# Patient Record
Sex: FEMALE | Race: WHITE | NOT HISPANIC OR LATINO | Employment: FULL TIME | URBAN - METROPOLITAN AREA
[De-identification: names, ages, dates, MRNs, and addresses within clinical notes are randomized per-mention and may not be internally consistent; named-entity substitution may affect disease eponyms.]

---

## 2017-10-27 ENCOUNTER — OFFICE VISIT (OUTPATIENT)
Dept: FAMILY MEDICINE | Facility: CLINIC | Age: 52
End: 2017-10-27
Payer: COMMERCIAL

## 2017-10-27 ENCOUNTER — TELEPHONE (OUTPATIENT)
Dept: FAMILY MEDICINE | Facility: CLINIC | Age: 52
End: 2017-10-27

## 2017-10-27 VITALS
DIASTOLIC BLOOD PRESSURE: 100 MMHG | BODY MASS INDEX: 27.8 KG/M2 | WEIGHT: 173 LBS | TEMPERATURE: 97.7 F | OXYGEN SATURATION: 99 % | HEIGHT: 66 IN | SYSTOLIC BLOOD PRESSURE: 160 MMHG | HEART RATE: 69 BPM

## 2017-10-27 DIAGNOSIS — M31.30 WEGENER'S GRANULOMATOSIS: Primary | ICD-10-CM

## 2017-10-27 DIAGNOSIS — Z12.31 ENCOUNTER FOR SCREENING MAMMOGRAM FOR BREAST CANCER: ICD-10-CM

## 2017-10-27 DIAGNOSIS — R73.9 HYPERGLYCEMIA: ICD-10-CM

## 2017-10-27 DIAGNOSIS — Z72.0 TOBACCO ABUSE: ICD-10-CM

## 2017-10-27 DIAGNOSIS — I10 HYPERTENSION GOAL BP (BLOOD PRESSURE) < 140/90: ICD-10-CM

## 2017-10-27 LAB
ALBUMIN SERPL-MCNC: 4 G/DL (ref 3.4–5)
ANION GAP SERPL CALCULATED.3IONS-SCNC: 7 MMOL/L (ref 3–14)
BUN SERPL-MCNC: 33 MG/DL (ref 7–30)
CALCIUM SERPL-MCNC: 9 MG/DL (ref 8.5–10.1)
CHLORIDE SERPL-SCNC: 105 MMOL/L (ref 94–109)
CO2 SERPL-SCNC: 28 MMOL/L (ref 20–32)
CREAT SERPL-MCNC: 1.42 MG/DL (ref 0.52–1.04)
GFR SERPL CREATININE-BSD FRML MDRD: 39 ML/MIN/1.7M2
GLUCOSE SERPL-MCNC: 103 MG/DL (ref 70–99)
HBA1C MFR BLD: 5.8 % (ref 4.3–6)
PHOSPHATE SERPL-MCNC: 3.8 MG/DL (ref 2.5–4.5)
POTASSIUM SERPL-SCNC: 3.7 MMOL/L (ref 3.4–5.3)
SODIUM SERPL-SCNC: 140 MMOL/L (ref 133–144)

## 2017-10-27 PROCEDURE — 36415 COLL VENOUS BLD VENIPUNCTURE: CPT | Performed by: FAMILY MEDICINE

## 2017-10-27 PROCEDURE — 99214 OFFICE O/P EST MOD 30 MIN: CPT | Performed by: FAMILY MEDICINE

## 2017-10-27 PROCEDURE — 80069 RENAL FUNCTION PANEL: CPT | Performed by: FAMILY MEDICINE

## 2017-10-27 PROCEDURE — 83036 HEMOGLOBIN GLYCOSYLATED A1C: CPT | Performed by: FAMILY MEDICINE

## 2017-10-27 RX ORDER — LOSARTAN POTASSIUM 50 MG/1
50 TABLET ORAL DAILY
Qty: 30 TABLET | Refills: 3 | Status: SHIPPED | OUTPATIENT
Start: 2017-10-27 | End: 2020-02-28

## 2017-10-27 NOTE — MR AVS SNAPSHOT
After Visit Summary   10/27/2017    Flavia Brice    MRN: [id number]           Patient Information     Date Of Birth          1965        Visit Information        Provider Department      10/27/2017 2:40 PM Jimenez Gonzalez MD [facility]        Today's Diagnoses     Wegener's granulomatosis (H)    -  1    Hypertension goal BP (blood pressure) < 140/90        Tobacco abuse        Hyperglycemia        Encounter for screening mammogram for breast cancer           Follow-ups after your visit        Additional Services     RHEUMATOLOGY REFERRAL       Your provider has referred you to: [facility]: [facility] Baldev Bowles [phone]   [email or web address]    Please be aware that coverage of these services is subject to the terms and limitations of your health insurance plan.  Call member services at your health plan with any benefit or coverage questions.      Please bring the following with you to your appointment:    (1) Any X-Rays, CTs or MRIs which have been performed.  Contact the facility where they were done to arrange for  prior to your scheduled appointment.    (2) List of current medications   (3) This referral request   (4) Any documents/labs given to you for this referral                  Future tests that were ordered for you today     Open Future Orders        Priority Expected Expires Ordered    *MA Screening Digital Bilateral Routine  10/27/2018 10/27/2017            Who to contact     If you have questions or need follow up information about today's clinic visit or your schedule please contact [facility] directly at [phone].  Normal or non-critical lab and imaging results will be communicated to you by MyChart, letter or phone within 4 business days after the clinic has received the results. If you do not hear from us within 7 days, please contact the clinic through MyChart or phone. If you have a critical or  "abnormal lab result, we will notify you by phone as soon as possible.  Submit refill requests through [employer] or call your pharmacy and they will forward the refill request to us. Please allow 3 business days for your refill to be completed.          Additional Information About Your Visit        [employer] Information     [employer] gives you secure access to your electronic health record. If you see a primary care provider, you can also send messages to your care team and make appointments. If you have questions, please call your primary care clinic.  If you do not have a primary care provider, please call [phone] and they will assist you.        Care EveryWhere ID     This is your Care EveryWhere ID. This could be used by other organizations to access your [place] medical records  [id number]        Your Vitals Were     Pulse Temperature Height Pulse Oximetry BMI (Body Mass Index)       69 97.7  F (36.5  C) (Oral) 5' 6\" (1.676 m) 99% 27.92 kg/m2        Blood Pressure from Last 3 Encounters:   10/27/17 (!) 160/100   02/27/14 132/83    Weight from Last 3 Encounters:   10/27/17 173 lb (78.5 kg)   02/27/14 182 lb (82.6 kg)              We Performed the Following     Hemoglobin A1c     Renal panel (Alb, BUN, Ca, Cl, CO2, Creat, Gluc, Phos, K, Na)     RHEUMATOLOGY REFERRAL          Today's Medication Changes          These changes are accurate as of: 10/27/17  3:27 PM.  If you have any questions, ask your nurse or doctor.               Start taking these medicines.        Dose/Directions    losartan 50 MG tablet   Commonly known as:  COZAAR   Used for:  Hypertension goal BP (blood pressure) < 140/90   Started by:  Jimenez Gonzalez MD        Dose:  50 mg   Take 1 tablet (50 mg) by mouth daily For blood pressure take with breakfast   Quantity:  30 tablet   Refills:  3            Where to get your medicines      These medications were sent to [facility] - [place] [facility] [address] [facility]  [address] [facility] [address]     Phone:  [phone]     losartan 50 MG tablet                Primary Care Provider Office Phone # Fax #    Brandyn Licea [phone] [address] [facility] [address]        Equal Access to Services     SHERIDAN KUHN : Hadii aad ku hadasho Soomaali, waaxda luqadaha, qaybta kaalmada adeegyada, waxay idiin hayaan adeeg khfelisaalbertina lamegan viveros. So [facility] [phone].    ATENCIÓN: Si habla español, tiene a thompson disposición servicios gratuitos de asistencia lingüística. Louisame al [phone].    We comply with applicable federal civil rights laws and [place] laws. We do not discriminate on the basis of race, color, national origin, age, disability, sex, sexual orientation, or gender identity.            Thank you!     Thank you for choosing [facility]  for your care. Our goal is always to provide you with excellent care. Hearing back from our patients is one way we can continue to improve our services. Please take a few minutes to complete the written survey that you may receive in the mail after your visit with us. Thank you!             Your Updated Medication List - Protect others around you: Learn how to safely use, store and throw away your medicines at www.disposemymeds.org.          This list is accurate as of: 10/27/17  3:27 PM.  Always use your most recent med list.                   Brand Name Dispense Instructions for use Diagnosis    losartan 50 MG tablet    COZAAR    30 tablet    Take 1 tablet (50 mg) by mouth daily For blood pressure take with breakfast    Hypertension goal BP (blood pressure) < 140/90       PREDNISONE PO      Take 2.5 mg by mouth daily

## 2017-10-27 NOTE — NURSING NOTE
"Chief Complaint   Patient presents with     Hypertension       Initial BP (!) 160/100  Pulse 69  Temp 97.7  F (36.5  C) (Oral)  Ht 5' 6\" (1.676 m)  Wt 173 lb (78.5 kg)  SpO2 99%  BMI 27.92 kg/m2 Estimated body mass index is 27.92 kg/(m^2) as calculated from the following:    Height as of this encounter: 5' 6\" (1.676 m).    Weight as of this encounter: 173 lb (78.5 kg).  Medication Reconciliation: complete  Penelope Hall CMA    "

## 2017-10-27 NOTE — LETTER
October 30, 2017    Flavia Brice  [address] [facility] [address]        Dear Flavia,    Generally normal results except kidney tests slightly worse. Blood sugars are NOT in diabetic range. Return to clinic for repeat blood pressure and kidney recheck in 3-4 weeks. Drink more water.    If you have any questions or concerns, please call myself or my nurse at [phone].    Sincerely,    Jimenez Gonzalez MD/nicanor      Results for orders placed or performed in visit on 10/27/17   Renal panel (Alb, BUN, Ca, Cl, CO2, Creat, Gluc, Phos, K, Na)   Result Value Ref Range    Sodium 140 133 - 144 mmol/L    Potassium 3.7 3.4 - 5.3 mmol/L    Chloride 105 94 - 109 mmol/L    Carbon Dioxide 28 20 - 32 mmol/L    Anion Gap 7 3 - 14 mmol/L    Glucose 103 (H) 70 - 99 mg/dL    Urea Nitrogen 33 (H) 7 - 30 mg/dL    Creatinine 1.42 (H) 0.52 - 1.04 mg/dL    GFR Estimate 39 (L) >60 mL/min/1.7m2    GFR Estimate If Black 47 (L) >60 mL/min/1.7m2    Calcium 9.0 8.5 - 10.1 mg/dL    Phosphorus 3.8 2.5 - 4.5 mg/dL    Albumin 4.0 3.4 - 5.0 g/dL   Hemoglobin A1c   Result Value Ref Range    Hemoglobin A1C 5.8 4.3 - 6.0 %

## 2017-10-27 NOTE — TELEPHONE ENCOUNTER
Flavia Brice is a 52 year old female who calls with elevated bp.    NURSING ASSESSMENT:  Description:  Pt states last time she was at dentist it was for infection and 4 teeth had to be pulled, her bp was elevated at that appointment.  Today she went back and bp was 166/102 on one wrist and 167/123 on other wrist.  Pt is smoker.  Pt had had some odom's recently but not today.  Pt denies any odom, chest pain,  dizziness, trouble breathing, light headedness or other sx.  She feels fine. Pt thinks she may have been anxious at dentist today.  Allergies: No Known Allergies      NURSING PLAN: Nursing advice to patient appt today, appt made.  Advised if any sx start needs to go to ER. Advised to not drink caffeine or smoke before ov.  Advised to do relaxing things this afternoon.      RECOMMENDED DISPOSITION:  See in 24 hours   Will comply with recommendation: Yes  If further questions/concerns or if symptoms do not improve, worsen or new symptoms develop, call your PCP or [facility] as soon as possible.      Guideline used:  Telephone Triage Protocols for Nurses, pp. 347-349 Fifth Edition, Eva Atkins RN

## 2017-10-27 NOTE — PROGRESS NOTES
"SUBJECTIVE:  Flavia Brice, a 52 year old female scheduled an appointment to discuss the following issues:  Blood pressure elevated.  Mom possible htn too.   chantix in past not helpful. No wellbutrin in past.   Smoker 1ppd.  No family history mi/cva. Parents alive.  No chest pain or shortness of breath. Good exercise tolerance. History pneumonia x1 in past - albuterol x1.  On prednisone for years for rea's granuloma.   Likes sodium but not added. Exercise - active and lots at work.   Stress from work.   Heart testing about 5 years ago.   Not seen rheumatology 2014.   No past medical history on file.    Past Surgical History:   Procedure Laterality Date     GALLBLADDER SURGERY         Family History   Problem Relation Age of Onset     DIABETES Father      HEART DISEASE Maternal Grandmother      HEART DISEASE Maternal Grandfather      Arthritis Paternal Grandmother      HEART DISEASE Paternal Grandfather        Social History   Substance Use Topics     Smoking status: Current Every Day Smoker     Smokeless tobacco: Never Used     Alcohol use Yes      Comment: monthly       ROS:  All other ROS negative  OBJECTIVE:  BP (!) 160/100  Pulse 69  Temp 97.7  F (36.5  C) (Oral)  Ht 5' 6\" (1.676 m)  Wt 173 lb (78.5 kg)  SpO2 99%  BMI 27.92 kg/m2  EXAM:  GENERAL APPEARANCE: healthy, alert and no distress  EYES: EOMI,  PERRL  HENT: ear canals and TM's normal and nose and mouth without ulcers or lesions  NECK: no adenopathy, no asymmetry, masses, or scars and thyroid normal to palpation  RESP: lungs clear to auscultation - no rales, rhonchi or wheezes  CV: regular rates and rhythm, normal S1 S2, no S3 or S4 and no murmur, click or rub -  ABDOMEN:  soft, nontender, no HSM or masses and bowel sounds normal  MS: extremities normal- no gross deformities noted, no evidence of inflammation in joints, FROM in all extremities.  NEURO: Normal strength and tone, sensory exam grossly normal, mentation intact and speech " normal  PSYCH: mentation appears normal and affect normal/bright  PSYCH: mildly anxious    ASSESSMENT / PLAN:  (M31.30) Wegener's granulomatosis (H)  (primary encounter diagnosis)  Comment: on prednisone past 2 weeks. Not seen rheum in years  Plan: RHEUMATOLOGY REFERRAL        Follow-up rheum.     (I10) Hypertension goal BP (blood pressure) < 140/90  Comment: needs help  Plan: Renal panel (Alb, BUN, Ca, Cl, CO2, Creat,         Gluc, Phos, K, Na), losartan (COZAAR) 50 MG         tablet        Reveiwed risks and side effects of medication  Chest pain or shortness of breath to er. Quit smoking. Return to clinic 3-4 weeks. Exercise and limit sodium. Lipids ok in past  Call/email with questions/concerns.    (Z72.0) Tobacco abuse  Comment: needs help. Failed chantix  Plan: consider wellbutrin but blood pressure needs addressing.     (R73.9) Hyperglycemia  Comment: not bad on prednisone  Plan: Hemoglobin A1c        Lower simple carbs    (Z12.31) Encounter for screening mammogram for breast cancer  Comment: overdue  Plan: *MA Screening Digital Bilateral        Pap due also.     Jimenez Gonzalez

## 2017-11-19 ENCOUNTER — HEALTH MAINTENANCE LETTER (OUTPATIENT)
Age: 52
End: 2017-11-19

## 2017-12-01 ENCOUNTER — OFFICE VISIT (OUTPATIENT)
Dept: URGENT CARE | Facility: URGENT CARE | Age: 52
End: 2017-12-01
Payer: COMMERCIAL

## 2017-12-01 ENCOUNTER — RADIANT APPOINTMENT (OUTPATIENT)
Dept: GENERAL RADIOLOGY | Facility: CLINIC | Age: 52
End: 2017-12-01
Attending: FAMILY MEDICINE
Payer: COMMERCIAL

## 2017-12-01 ENCOUNTER — TELEPHONE (OUTPATIENT)
Dept: FAMILY MEDICINE | Facility: CLINIC | Age: 52
End: 2017-12-01

## 2017-12-01 VITALS
WEIGHT: 174.2 LBS | DIASTOLIC BLOOD PRESSURE: 93 MMHG | HEART RATE: 71 BPM | SYSTOLIC BLOOD PRESSURE: 151 MMHG | OXYGEN SATURATION: 98 % | BODY MASS INDEX: 28.12 KG/M2 | TEMPERATURE: 97.2 F

## 2017-12-01 DIAGNOSIS — Z72.0 TOBACCO ABUSE: ICD-10-CM

## 2017-12-01 DIAGNOSIS — R05.9 COUGH: ICD-10-CM

## 2017-12-01 DIAGNOSIS — J20.9 ACUTE BRONCHITIS WITH SYMPTOMS > 10 DAYS: Primary | ICD-10-CM

## 2017-12-01 PROCEDURE — 99213 OFFICE O/P EST LOW 20 MIN: CPT | Performed by: FAMILY MEDICINE

## 2017-12-01 PROCEDURE — 71020 XR CHEST 2 VW: CPT

## 2017-12-01 RX ORDER — ALBUTEROL SULFATE 90 UG/1
2 AEROSOL, METERED RESPIRATORY (INHALATION) EVERY 6 HOURS PRN
Qty: 1 INHALER | Refills: 1 | Status: SHIPPED | OUTPATIENT
Start: 2017-12-01 | End: 2018-03-26

## 2017-12-01 RX ORDER — AZITHROMYCIN 250 MG/1
TABLET, FILM COATED ORAL
Qty: 6 TABLET | Refills: 0 | Status: SHIPPED | OUTPATIENT
Start: 2017-12-01 | End: 2018-03-26

## 2017-12-01 NOTE — MR AVS SNAPSHOT
After Visit Summary   12/1/2017    Flavia Brcie    MRN: [id number]           Patient Information     Date Of Birth          1965        Visit Information        Provider Department      12/1/2017 5:00 PM Pipo Angel MD [facility]        Today's Diagnoses     Acute bronchitis with symptoms > 10 days    -  1    Cough        Tobacco abuse          Care Instructions      Acute Bronchitis  Your healthcare provider has told you that you have acute bronchitis. Bronchitis is infection or inflammation of the bronchial tubes (airways in the lungs). Normally, air moves easily in and out of the airways. Bronchitis narrows the airways, making it harder for air to flow in and out of the lungs. This causes symptoms such as shortness of breath, coughing up yellow or green mucus, and wheezing. Bronchitis can be acute or chronic. Acute means the condition comes on quickly and goes away in a short time, usually within 3 to 10 days. Chronic means a condition lasts a long time and often comes back.    What causes acute bronchitis?  Acute bronchitis almost always starts as a viral respiratory infection, such as a cold or the flu. Certain factors make it more likely for a cold or flu to turn into bronchitis. These include being very young, being elderly, having a heart or lung problem, or having a weak immune system. Cigarette smoking also makes bronchitis more likely.  When bronchitis develops, the airways become swollen. The airways may also become infected with bacteria. This is known as a secondary infection.  Diagnosing acute bronchitis  Your healthcare provider will examine you and ask about your symptoms and health history. You may also have a sputum culture to test the fluid in your lungs. Chest X-rays may be done to look for infection in the lungs.  Treating acute bronchitis  Bronchitis usually clears up as the cold or flu goes away. You can help feel better faster by doing the  following:    Take medicine as directed. You may be told to take ibuprofen or other over-the-counter medicines. These help relieve inflammation in your bronchial tubes. Your healthcare provider may prescribe an inhaler to help open up the bronchial tubes. Most of the time, acute bronchitis is caused by a viral infection. Antibiotics are usually not prescribed for viral infections.    Drink plenty of fluids, such as water, juice, or warm soup. Fluids loosen mucus so that you can cough it up. This helps you breathe more easily. Fluids also prevent dehydration.    Make sure you get plenty of rest.    Do not smoke. Do not allow anyone else to smoke in your home.  Recovery and follow-up  Follow up with your doctor as you are told. You will likely feel better in a week or two. But a dry cough can linger beyond that time. Let your doctor know if you still have symptoms (other than a dry cough) after 2 weeks, or if you re prone to getting bronchial infections. Take steps to protect yourself from future infections. These steps include stopping smoking and avoiding tobacco smoke, washing your hands often, and getting a yearly flu shot.  When to call your healthcare provider  Call the healthcare provider if you have any of the following:    Fever of 100.4 F (38.0 C) or higher, or as advised    Symptoms that get worse, or new symptoms    Trouble breathing    Symptoms that don t start to improve within a week, or within 3 days of taking antibiotics   Date Last Reviewed: 12/1/2016 2000-2017 [employer] [address] [place] [address], [place] [address]. All rights reserved. This information is not intended as a substitute for professional medical care. Always follow your healthcare professional's instructions.        Patient Education    Azithromycin Ophthalmic drops, solution    Azithromycin Oral suspension    Azithromycin Oral suspension, extended release    Azithromycin Oral tablet    Azithromycin Solution for  injection  Azithromycin Oral tablet  What is this medicine?  AZITHROMYCIN (az ith will MYE sin) is a macrolide antibiotic. It is used to treat or prevent certain kinds of bacterial infections. It will not work for colds, flu, or other viral infections.  This medicine may be used for other purposes; ask your health care provider or pharmacist if you have questions.  What should I tell my health care provider before I take this medicine?  They need to know if you have any of these conditions:    kidney disease    liver disease    irregular heartbeat or heart disease    an unusual or allergic reaction to azithromycin, erythromycin, other macrolide antibiotics, foods, dyes, or preservatives    pregnant or trying to get pregnant    breast-feeding  How should I use this medicine?  Take this medicine by mouth with a full glass of water. Follow the directions on the prescription label. The tablets can be taken with food or on an empty stomach. If the medicine upsets your stomach, take it with food. Take your medicine at regular intervals. Do not take your medicine more often than directed. Take all of your medicine as directed even if you think your are better. Do not skip doses or stop your medicine early.  Talk to your pediatrician regarding the use of this medicine in children. Special care may be needed.  Overdosage: If you think you have taken too much of this medicine contact a poison control center or emergency room at once.  NOTE: This medicine is only for you. Do not share this medicine with others.  What if I miss a dose?  If you miss a dose, take it as soon as you can. If it is almost time for your next dose, take only that dose. Do not take double or extra doses.  What may interact with this medicine?  Do not take this medicine with any of the following medications:    lincomycin  This medicine may also interact with the following medications:    amiodarone    antacids    birth control  pills    cyclosporine    digoxin    magnesium    nelfinavir    phenytoin    warfarin  This list may not describe all possible interactions. Give your health care provider a list of all the medicines, herbs, non-prescription drugs, or dietary supplements you use. Also tell them if you smoke, drink alcohol, or use illegal drugs. Some items may interact with your medicine.  What should I watch for while using this medicine?  Tell your doctor or health care professional if your symptoms do not improve.  Do not treat diarrhea with over the counter products. Contact your doctor if you have diarrhea that lasts more than 2 days or if it is severe and watery.  This medicine can make you more sensitive to the sun. Keep out of the sun. If you cannot avoid being in the sun, wear protective clothing and use sunscreen. Do not use sun lamps or tanning beds/booths.  What side effects may I notice from receiving this medicine?  Side effects that you should report to your doctor or health care professional as soon as possible:    allergic reactions like skin rash, itching or hives, swelling of the face, lips, or tongue    confusion, nightmares or hallucinations    dark urine    difficulty breathing    hearing loss    irregular heartbeat or chest pain    pain or difficulty passing urine    redness, blistering, peeling or loosening of the skin, including inside the mouth    white patches or sores in the mouth    yellowing of the eyes or skin  Side effects that usually do not require medical attention (report to your doctor or health care professional if they continue or are bothersome):    diarrhea    dizziness, drowsiness    headache    stomach upset or vomiting    tooth discoloration    vaginal irritation  This list may not describe all possible side effects. Call your doctor for medical advice about side effects. You may report side effects to FDA at 2-106-FDA-7358.  Where should I keep my medicine?  Keep out of the reach of  children.  Store at room temperature between 15 and 30 degrees C (59 and 86 degrees F). Throw away any unused medicine after the expiration date.  NOTE:This sheet is a summary. It may not cover all possible information. If you have questions about this medicine, talk to your doctor, pharmacist, or health care provider. Copyright  2016 [employer]      Proair HFA Inhaler  Medicine: Albuterol (al-BYOO-ter-ahl)  What it does  Works quickly to relax your airways and make breathing easier. The medicine usually lasts 3 to 4 hours. Use when you need fast relief.  Test spray (priming)  Prime inhaler before first use or if not used for 2 weeks or more. Shake the inhaler and spray 3 times away from face.  How to use this inhaler  1. Wash and dry your hands well.  2. Remove the mouthpiece cover. Check for loose parts inside the mouthpiece.  3. Sit up straight or stand up.  4. Shake inhaler well before each spray.  5. Fully exhale (breathe out) through mouth. Hold the inhaler so the mouthpiece is at the bottom and the canister is sticking straight up.  6. Place the mouthpiece between your lips. Make sure that your tongue is flat under the mouthpiece and does not block the opening.  7. Seal your lips around the mouthpiece.  8. Push the canister all the way down as you slowly take a deep breath through your mouth for 5 seconds.    9. Hold your breath for 10 seconds. If you cannot hold your breath for 10 seconds, then hold it for as long as you can.  10. If you need another dose, wait one minute and repeat steps 4 to 9.  11. Replace the cover after each use. Store in a cool, dry place.  Cleaning  Wash one time each week. Remove canister and wash mouthpiece with warm water. Do not get the canister wet. Let air-dry overnight. Put inhaler back together and spray two times.  If not cleaned, the inhaler may not work.  How to tell if the inhaler is empty  Each inhaler contains 200 puffs. The inhaler is empty when the dose counter  reads 0. The numbers will turn red when you have 20 doses left to remind you to get a refill.  If you have questions about the use of your inhaler, please ask your pharmacist or provider.  For more information and video demos, go to [email or web address].  For informational purposes only. Not to replace the advice of your health care provider.  Copyright   2013 [facility]. All rights reserved. [employer] [id number] - REV 02/16.    How to Quit Smoking  Smoking is one of the hardest habits to break. About half of all people who have ever smoked have been able to quit. Most people who still smoke want to quit. Here are some of the best ways to stop smoking.    Keep trying  Most smokers make many attempts at quitting before they are successful. It s important not to give up.  Go cold turkey  Most former smokers quit cold turkey (all at once). Trying to cut back gradually doesn't seem to work as well, perhaps because it continues the smoking habit. Also, it is possible to inhale more while smoking fewer cigarettes. This results in the same amount of nicotine in your body.  Get support  Support programs can be a big help, especially for heavy smokers. These groups offer lectures, ways to change behavior, and peer support. Here are some ways to find a support program:    Free national quitline: 800-QUIT-NOW (896-542-5868).    [facility] quit-smoking programs.    American Lung Association: (938.591.7421).    American Cancer Society (004-243-6398).  Support at home is important too. Nonsmokers can offer praise and encouragement. If the smoker in your life finds it hard to quit, encourage them to keep trying.  Over-the-counter medicines  Nicotine replacement therapy may make quitting easier. Certain aids, such as the nicotine patch, gum, and lozenges, are available without a prescription. It is best to use these under a doctor s care, though. The skin patch provides a steady supply of nicotine. Nicotine gum and  "lozenges give temporary bursts of low levels of nicotine. Both methods reduce the craving for cigarettes. Warning: If you have nausea, vomiting, dizziness, weakness, or a fast heartbeat, stop using these products and see your doctor.  Prescription medicines  After reviewing your smoking patterns and past attempts to quit, your doctor may offer a prescription medicine such as bupropion, varenicline, a nicotine inhaler, or nasal spray. Each has advantages and side effects. Your doctor can review these with you.  Health benefits of quitting  The benefits of quitting start right away and keep improving the longer you go without smoking. These benefits occur at any age.  So whether you are 17 or 70, quitting is a good decision. Some of the benefits include:    20 minutes: Blood pressure and pulse return to normal.    8 hours: Oxygen levels return to normal.    2 days: Ability to smell and taste begin to improve as damaged nerves regrow.    2 to 3 weeks: Circulation and lung function improve.    1 to 9 months: Coughing, congestion, and shortness of breath decrease; tiredness decreases.    1 year: Risk of heart attack decreases by half.    5 years: Risk of lung cancer decreases by half; risk of stroke becomes the same as a nonsmoker s.  For more on how to quit smoking, try these online resources:     Smokefree.gov    \"Clearing the Air\" booklet from the National Cancer [place]: smokefree.gov/sites/default/files/pdf/clearing-the-air-accessible.pdf  Date Last Reviewed: 3/1/2017    1883-1826 [employer]. [address] [place] [address]. All rights reserved. This information is not intended as a substitute for professional medical care. Always follow your healthcare professional's instructions.                Follow-ups after your visit        Who to contact     If you have questions or need follow up information about today's clinic visit or your schedule please contact [facility] directly at [phone].  Normal or non-critical lab and imaging results will be communicated to you by [employer], letter or phone within 4 business days after the clinic has received the results. If you do not hear from us within 7 days, please contact the clinic through [employer] or phone. If you have a critical or abnormal lab result, we will notify you by phone as soon as possible.  Submit refill requests through [employer] or call your pharmacy and they will forward the refill request to us. Please allow 3 business days for your refill to be completed.          Additional Information About Your Visit        [employer] Information     [employer] gives you secure access to your electronic health record. If you see a primary care provider, you can also send messages to your care team and make appointments. If you have questions, please call your primary care clinic.  If you do not have a primary care provider, please call [phone] and they will assist you.        Care EveryWhere ID     This is your Care EveryWhere ID. This could be used by other organizations to access your [place] medical records  [id number]        Your Vitals Were     Pulse Temperature Pulse Oximetry BMI (Body Mass Index)          71 97.2  F (36.2  C) (Oral) 98% 28.12 kg/m2         Blood Pressure from Last 3 Encounters:   12/01/17 (!) 151/93   10/27/17 (!) 160/100   02/27/14 132/83    Weight from Last 3 Encounters:   12/01/17 174 lb 3.2 oz (79 kg)   10/27/17 173 lb (78.5 kg)   02/27/14 182 lb (82.6 kg)                 Today's Medication Changes          These changes are accurate as of: 12/1/17  5:43 PM.  If you have any questions, ask your nurse or doctor.               Start taking these medicines.        Dose/Directions    albuterol 108 (90 BASE) MCG/ACT Inhaler   Commonly known as:  PROAIR HFA/PROVENTIL HFA/VENTOLIN HFA   Used for:  Acute bronchitis with symptoms > 10 days   Started by:  Pipo Angel MD        Dose:  2 puff   Inhale 2 puffs  into the lungs every 6 hours as needed for shortness of breath / dyspnea or wheezing   Quantity:  1 Inhaler   Refills:  1       azithromycin 250 MG tablet   Commonly known as:  ZITHROMAX   Used for:  Acute bronchitis with symptoms > 10 days   Started by:  Pipo Angel MD        Two tablets first day, then one tablet daily for four days.   Quantity:  6 tablet   Refills:  0            Where to get your medicines      These medications were sent to [facility] - [place], [place] - [address]  [address] [facility] [address]     Phone:  [phone]     albuterol 108 (90 BASE) MCG/ACT Inhaler    azithromycin 250 MG tablet                Primary Care Provider Office Phone # Fax #    Brandyn Licea [phone] [address] [facility] [address]        Equal Access to Services     [facility]: Hadii aad ku hadasho Soomaali, waaxda luqadaha, qaybta kaalmada adeegyada, allan monroe hayjuan vincent . So [facility] [phone].    ATENCIÓN: Si habla español, tiene a thompson disposición servicios gratuitos de asistencia lingüística. [facility] [phone].    We comply with applicable federal civil rights laws and [place] laws. We do not discriminate on the basis of race, color, national origin, age, disability, sex, sexual orientation, or gender identity.            Thank you!     Thank you for choosing [facility]  for your care. Our goal is always to provide you with excellent care. Hearing back from our patients is one way we can continue to improve our services. Please take a few minutes to complete the written survey that you may receive in the mail after your visit with us. Thank you!             Your Updated Medication List - Protect others around you: Learn how to safely use, store and throw away your medicines at www.disposemymeds.org.          This list is accurate as of: 12/1/17  5:43 PM.  Always use your most recent med list.                    Brand Name Dispense Instructions for use Diagnosis    albuterol 108 (90 BASE) MCG/ACT Inhaler    PROAIR HFA/PROVENTIL HFA/VENTOLIN HFA    1 Inhaler    Inhale 2 puffs into the lungs every 6 hours as needed for shortness of breath / dyspnea or wheezing    Acute bronchitis with symptoms > 10 days       azithromycin 250 MG tablet    ZITHROMAX    6 tablet    Two tablets first day, then one tablet daily for four days.    Acute bronchitis with symptoms > 10 days       losartan 50 MG tablet    COZAAR    30 tablet    Take 1 tablet (50 mg) by mouth daily For blood pressure take with breakfast    Hypertension goal BP (blood pressure) < 140/90       PREDNISONE PO      Take 2.5 mg by mouth daily

## 2017-12-01 NOTE — TELEPHONE ENCOUNTER
Panel Management Review      Patient has the following on her problem list: None      Composite cancer screening  Chart review shows that this patient is due/due soon for the following Pap Smear and Colonoscopy  Summary:    Patient is due/failing the following:   COLONOSCOPY and PAP    Action needed:   afe,colonoscopy    Type of outreach:    Sent [email or web address] message.    Questions for provider review:    None                                                                                                                                    Penelope Hall CMA     Chart routed to 0 .

## 2017-12-01 NOTE — NURSING NOTE
"Chief Complaint   Patient presents with     Cough       Initial BP (!) 151/93  Pulse 71  Temp 97.2  F (36.2  C) (Oral)  Wt 174 lb 3.2 oz (79 kg)  SpO2 98%  BMI 28.12 kg/m2 Estimated body mass index is 28.12 kg/(m^2) as calculated from the following:    Height as of 10/27/17: 5' 6\" (1.676 m).    Weight as of this encounter: 174 lb 3.2 oz (79 kg).  Medication Reconciliation: complete     Vy Valencia MA      "

## 2017-12-01 NOTE — PATIENT INSTRUCTIONS
Acute Bronchitis  Your healthcare provider has told you that you have acute bronchitis. Bronchitis is infection or inflammation of the bronchial tubes (airways in the lungs). Normally, air moves easily in and out of the airways. Bronchitis narrows the airways, making it harder for air to flow in and out of the lungs. This causes symptoms such as shortness of breath, coughing up yellow or green mucus, and wheezing. Bronchitis can be acute or chronic. Acute means the condition comes on quickly and goes away in a short time, usually within 3 to 10 days. Chronic means a condition lasts a long time and often comes back.    What causes acute bronchitis?  Acute bronchitis almost always starts as a viral respiratory infection, such as a cold or the flu. Certain factors make it more likely for a cold or flu to turn into bronchitis. These include being very young, being elderly, having a heart or lung problem, or having a weak immune system. Cigarette smoking also makes bronchitis more likely.  When bronchitis develops, the airways become swollen. The airways may also become infected with bacteria. This is known as a secondary infection.  Diagnosing acute bronchitis  Your healthcare provider will examine you and ask about your symptoms and health history. You may also have a sputum culture to test the fluid in your lungs. Chest X-rays may be done to look for infection in the lungs.  Treating acute bronchitis  Bronchitis usually clears up as the cold or flu goes away. You can help feel better faster by doing the following:    Take medicine as directed. You may be told to take ibuprofen or other over-the-counter medicines. These help relieve inflammation in your bronchial tubes. Your healthcare provider may prescribe an inhaler to help open up the bronchial tubes. Most of the time, acute bronchitis is caused by a viral infection. Antibiotics are usually not prescribed for viral infections.    Drink plenty of fluids, such as  water, juice, or warm soup. Fluids loosen mucus so that you can cough it up. This helps you breathe more easily. Fluids also prevent dehydration.    Make sure you get plenty of rest.    Do not smoke. Do not allow anyone else to smoke in your home.  Recovery and follow-up  Follow up with your doctor as you are told. You will likely feel better in a week or two. But a dry cough can linger beyond that time. Let your doctor know if you still have symptoms (other than a dry cough) after 2 weeks, or if you re prone to getting bronchial infections. Take steps to protect yourself from future infections. These steps include stopping smoking and avoiding tobacco smoke, washing your hands often, and getting a yearly flu shot.  When to call your healthcare provider  Call the healthcare provider if you have any of the following:    Fever of 100.4 F (38.0 C) or higher, or as advised    Symptoms that get worse, or new symptoms    Trouble breathing    Symptoms that don t start to improve within a week, or within 3 days of taking antibiotics   Date Last Reviewed: 12/1/2016 2000-2017 [employer]. [address] [place] [address]. All rights reserved. This information is not intended as a substitute for professional medical care. Always follow your healthcare professional's instructions.        Patient Education    Azithromycin Ophthalmic drops, solution    Azithromycin Oral suspension    Azithromycin Oral suspension, extended release    Azithromycin Oral tablet    Azithromycin Solution for injection  Azithromycin Oral tablet  What is this medicine?  AZITHROMYCIN (az ith will MYE sin) is a macrolide antibiotic. It is used to treat or prevent certain kinds of bacterial infections. It will not work for colds, flu, or other viral infections.  This medicine may be used for other purposes; ask your health care provider or pharmacist if you have questions.  What should I tell my health care provider before I take  this medicine?  They need to know if you have any of these conditions:    kidney disease    liver disease    irregular heartbeat or heart disease    an unusual or allergic reaction to azithromycin, erythromycin, other macrolide antibiotics, foods, dyes, or preservatives    pregnant or trying to get pregnant    breast-feeding  How should I use this medicine?  Take this medicine by mouth with a full glass of water. Follow the directions on the prescription label. The tablets can be taken with food or on an empty stomach. If the medicine upsets your stomach, take it with food. Take your medicine at regular intervals. Do not take your medicine more often than directed. Take all of your medicine as directed even if you think your are better. Do not skip doses or stop your medicine early.  Talk to your pediatrician regarding the use of this medicine in children. Special care may be needed.  Overdosage: If you think you have taken too much of this medicine contact a poison control center or emergency room at once.  NOTE: This medicine is only for you. Do not share this medicine with others.  What if I miss a dose?  If you miss a dose, take it as soon as you can. If it is almost time for your next dose, take only that dose. Do not take double or extra doses.  What may interact with this medicine?  Do not take this medicine with any of the following medications:    lincomycin  This medicine may also interact with the following medications:    amiodarone    antacids    birth control pills    cyclosporine    digoxin    magnesium    nelfinavir    phenytoin    warfarin  This list may not describe all possible interactions. Give your health care provider a list of all the medicines, herbs, non-prescription drugs, or dietary supplements you use. Also tell them if you smoke, drink alcohol, or use illegal drugs. Some items may interact with your medicine.  What should I watch for while using this medicine?  Tell your doctor or  health care professional if your symptoms do not improve.  Do not treat diarrhea with over the counter products. Contact your doctor if you have diarrhea that lasts more than 2 days or if it is severe and watery.  This medicine can make you more sensitive to the sun. Keep out of the sun. If you cannot avoid being in the sun, wear protective clothing and use sunscreen. Do not use sun lamps or tanning beds/booths.  What side effects may I notice from receiving this medicine?  Side effects that you should report to your doctor or health care professional as soon as possible:    allergic reactions like skin rash, itching or hives, swelling of the face, lips, or tongue    confusion, nightmares or hallucinations    dark urine    difficulty breathing    hearing loss    irregular heartbeat or chest pain    pain or difficulty passing urine    redness, blistering, peeling or loosening of the skin, including inside the mouth    white patches or sores in the mouth    yellowing of the eyes or skin  Side effects that usually do not require medical attention (report to your doctor or health care professional if they continue or are bothersome):    diarrhea    dizziness, drowsiness    headache    stomach upset or vomiting    tooth discoloration    vaginal irritation  This list may not describe all possible side effects. Call your doctor for medical advice about side effects. You may report side effects to FDA at 7-835-FDA-1763.  Where should I keep my medicine?  Keep out of the reach of children.  Store at room temperature between 15 and 30 degrees C (59 and 86 degrees F). Throw away any unused medicine after the expiration date.  NOTE:This sheet is a summary. It may not cover all possible information. If you have questions about this medicine, talk to your doctor, pharmacist, or health care provider. Copyright  2016 [employer]      Proair HFA Inhaler  Medicine: Albuterol ([id number]-ter-ahl)  What it does  Works quickly to relax  your airways and make breathing easier. The medicine usually lasts 3 to 4 hours. Use when you need fast relief.  Test spray (priming)  Prime inhaler before first use or if not used for 2 weeks or more. Shake the inhaler and spray 3 times away from face.  How to use this inhaler  1. Wash and dry your hands well.  2. Remove the mouthpiece cover. Check for loose parts inside the mouthpiece.  3. Sit up straight or stand up.  4. Shake inhaler well before each spray.  5. Fully exhale (breathe out) through mouth. Hold the inhaler so the mouthpiece is at the bottom and the canister is sticking straight up.  6. Place the mouthpiece between your lips. Make sure that your tongue is flat under the mouthpiece and does not block the opening.  7. Seal your lips around the mouthpiece.  8. Push the canister all the way down as you slowly take a deep breath through your mouth for 5 seconds.    9. Hold your breath for 10 seconds. If you cannot hold your breath for 10 seconds, then hold it for as long as you can.  10. If you need another dose, wait one minute and repeat steps 4 to 9.  11. Replace the cover after each use. Store in a cool, dry place.  Cleaning  Wash one time each week. Remove canister and wash mouthpiece with warm water. Do not get the canister wet. Let air-dry overnight. Put inhaler back together and spray two times.  If not cleaned, the inhaler may not work.  How to tell if the inhaler is empty  Each inhaler contains 200 puffs. The inhaler is empty when the dose counter reads 0. The numbers will turn red when you have 20 doses left to remind you to get a refill.  If you have questions about the use of your inhaler, please ask your pharmacist or provider.  For more information and video demos, go to [email or web address].  For informational purposes only. Not to replace the advice of your health care provider.  Copyright   2013 [facility]. All rights reserved. [employer] 228846 - REV 02/16.    How  to Quit Smoking  Smoking is one of the hardest habits to break. About half of all people who have ever smoked have been able to quit. Most people who still smoke want to quit. Here are some of the best ways to stop smoking.    Keep trying  Most smokers make many attempts at quitting before they are successful. It s important not to give up.  Go cold turkey  Most former smokers quit cold turkey (all at once). Trying to cut back gradually doesn't seem to work as well, perhaps because it continues the smoking habit. Also, it is possible to inhale more while smoking fewer cigarettes. This results in the same amount of nicotine in your body.  Get support  Support programs can be a big help, especially for heavy smokers. These groups offer lectures, ways to change behavior, and peer support. Here are some ways to find a support program:    Free national quitline: 800-QUIT-NOW (453-373-1631).    [facility] quit-smoking programs.    American Lung Association: (600.431.2831).    American Cancer Society (483-782-5215).  Support at home is important too. Nonsmokers can offer praise and encouragement. If the smoker in your life finds it hard to quit, encourage them to keep trying.  Over-the-counter medicines  Nicotine replacement therapy may make quitting easier. Certain aids, such as the nicotine patch, gum, and lozenges, are available without a prescription. It is best to use these under a doctor s care, though. The skin patch provides a steady supply of nicotine. Nicotine gum and lozenges give temporary bursts of low levels of nicotine. Both methods reduce the craving for cigarettes. Warning: If you have nausea, vomiting, dizziness, weakness, or a fast heartbeat, stop using these products and see your doctor.  Prescription medicines  After reviewing your smoking patterns and past attempts to quit, your doctor may offer a prescription medicine such as bupropion, varenicline, a nicotine inhaler, or nasal spray. Each has  "advantages and side effects. Your doctor can review these with you.  Health benefits of quitting  The benefits of quitting start right away and keep improving the longer you go without smoking. These benefits occur at any age.  So whether you are 17 or 70, quitting is a good decision. Some of the benefits include:    20 minutes: Blood pressure and pulse return to normal.    8 hours: Oxygen levels return to normal.    2 days: Ability to smell and taste begin to improve as damaged nerves regrow.    2 to 3 weeks: Circulation and lung function improve.    1 to 9 months: Coughing, congestion, and shortness of breath decrease; tiredness decreases.    1 year: Risk of heart attack decreases by half.    5 years: Risk of lung cancer decreases by half; risk of stroke becomes the same as a nonsmoker s.  For more on how to quit smoking, try these online resources:     Smokefree.gov    \"Clearing the Air\" booklet from the National Cancer [place]: smokefree.gov/sites/default/files/pdf/clearing-the-air-accessible.pdf  Date Last Reviewed: 3/1/2017    4046-0684 [employer]. [address] [place] [id number]. All rights reserved. This information is not intended as a substitute for professional medical care. Always follow your healthcare professional's instructions.        "

## 2017-12-01 NOTE — PROGRESS NOTES
SUBJECTIVE:                                                    Flavia Brice is a 52 year old female who presents to clinic today for the following health issues:    ENT Symptoms             Symptoms: cc Present Absent Comment   Fever/Chills   x    Fatigue  x     Muscle Aches   x    Eye Irritation   x    Sneezing   x    Nasal Gene/Drg  x     Sinus Pressure/Pain  x     Loss of smell   x    Dental pain   x    Sore Throat   x    Swollen Glands   x    Ear Pain/Fullness   x    Cough x      Wheeze  x  Feels like her chest is tight, slightly productive    Chest Pain   x    Shortness of breath   x    Rash   x    Other   x      Symptom duration:  6 weeks   Symptom severity:     Treatments tried:  Dayquil, Nyquil, Mucinex, and Theraflu    Contacts:  None     Smokes about 1/2 a pack of cigarette/day.       Problem list and histories reviewed & adjusted, as indicated.  Additional history: as documented    Patient Active Problem List   Diagnosis     Wegener's granulomatosis (H)     CARDIOVASCULAR SCREENING; LDL GOAL LESS THAN 130     Overweight     Advanced directives, counseling/discussion     Tobacco abuse     Past Surgical History:   Procedure Laterality Date     GALLBLADDER SURGERY         Social History   Substance Use Topics     Smoking status: Current Every Day Smoker     Smokeless tobacco: Never Used     Alcohol use Yes      Comment: monthly     Family History   Problem Relation Age of Onset     DIABETES Father      HEART DISEASE Maternal Grandmother      HEART DISEASE Maternal Grandfather      Arthritis Paternal Grandmother      HEART DISEASE Paternal Grandfather          Current Outpatient Prescriptions   Medication Sig Dispense Refill     PREDNISONE PO Take 2.5 mg by mouth daily       losartan (COZAAR) 50 MG tablet Take 1 tablet (50 mg) by mouth daily For blood pressure take with breakfast 30 tablet 3     No Known Allergies  Recent Labs   Lab Test  10/27/17   1541  02/27/14   1148 10/29/13 02/28/13   A1C   5.8   --   6.0  6.0   LDL   --   87   --    --    HDL   --   74   --    --    TRIG   --   160*   --    --    ALT   --   39  29  30   CR  1.42*  1.21*  1.16  1.46   GFRESTIMATED  39*  47*  49.7  3.81   GFRESTBLACK  47*  57*  >60.0  46.1   POTASSIUM  3.7  3.8  3.6   --       BP Readings from Last 3 Encounters:   12/01/17 (!) 151/93   10/27/17 (!) 160/100   02/27/14 132/83    Wt Readings from Last 3 Encounters:   12/01/17 174 lb 3.2 oz (79 kg)   10/27/17 173 lb (78.5 kg)   02/27/14 182 lb (82.6 kg)                  Labs reviewed in EPIC          ROS:   ROS: 10 point ROS neg other than the symptoms noted above in the HPI.    OBJECTIVE:   BP (!) 151/93  Pulse 71  Temp 97.2  F (36.2  C) (Oral)  Wt 174 lb 3.2 oz (79 kg)  SpO2 98%  BMI 28.12 kg/m2  Body mass index is 28.12 kg/(m^2).  GENERAL: alert and no distress  EYES: Eyes grossly normal to inspection, PERRL and conjunctivae and sclerae normal  HENT: ear canals and TM's normal, nose and mouth without ulcers or lesions  NECK: no adenopathy, no asymmetry, masses, or scars and thyroid normal to palpation  RESP: few bibasilar crackles, no wheeze or rhonchi auscultated   CV: regular rates and rhythm, normal S1 S2, no S3 or S4 and no murmur, click or rub  ABDOMEN: soft, nontender, no hepatosplenomegaly, no masses and bowel sounds normal  MS: no gross musculoskeletal defects noted, no edema    XR CHEST 2 VW 12/1/2017 5:36 PM      HISTORY: cough; Cough         IMPRESSION: Mild linear atelectasis or fibrosis at the left lung base.  The lungs are otherwise clear. No pleural effusions.         ASSESSMENT/PLAN:         ICD-10-CM    1. Acute bronchitis with symptoms > 10 days J20.9 azithromycin (ZITHROMAX) 250 MG tablet     albuterol (PROAIR HFA/PROVENTIL HFA/VENTOLIN HFA) 108 (90 BASE) MCG/ACT Inhaler   2. Cough R05 XR Chest 2 Views   3. Tobacco abuse Z72.0        Discussed in detail differentials and further management. Chest X-ray unremarkable, findings expalined. Symptoms  are likely secondary to subacute bronchitis. Azithromycin and albuterol prescribed, common side effects discussed. Recommended well hydration, over-the-counter analgesia, warm fluids and humidifier use. Blood pressure above target goal, will be following PCP next week. Smoking cessation counseling provided. Written instructions/information provided. Patient understood and in agreement with the above plan. All questions are answered.       MEDICATIONS:   Orders Placed This Encounter   Medications     azithromycin (ZITHROMAX) 250 MG tablet     Sig: Two tablets first day, then one tablet daily for four days.     Dispense:  6 tablet     Refill:  0     albuterol (PROAIR HFA/PROVENTIL HFA/VENTOLIN HFA) 108 (90 BASE) MCG/ACT Inhaler     Sig: Inhale 2 puffs into the lungs every 6 hours as needed for shortness of breath / dyspnea or wheezing     Dispense:  1 Inhaler     Refill:  1     Patient Instructions       Acute Bronchitis  Your healthcare provider has told you that you have acute bronchitis. Bronchitis is infection or inflammation of the bronchial tubes (airways in the lungs). Normally, air moves easily in and out of the airways. Bronchitis narrows the airways, making it harder for air to flow in and out of the lungs. This causes symptoms such as shortness of breath, coughing up yellow or green mucus, and wheezing. Bronchitis can be acute or chronic. Acute means the condition comes on quickly and goes away in a short time, usually within 3 to 10 days. Chronic means a condition lasts a long time and often comes back.    What causes acute bronchitis?  Acute bronchitis almost always starts as a viral respiratory infection, such as a cold or the flu. Certain factors make it more likely for a cold or flu to turn into bronchitis. These include being very young, being elderly, having a heart or lung problem, or having a weak immune system. Cigarette smoking also makes bronchitis more likely.  When bronchitis develops, the  airways become swollen. The airways may also become infected with bacteria. This is known as a secondary infection.  Diagnosing acute bronchitis  Your healthcare provider will examine you and ask about your symptoms and health history. You may also have a sputum culture to test the fluid in your lungs. Chest X-rays may be done to look for infection in the lungs.  Treating acute bronchitis  Bronchitis usually clears up as the cold or flu goes away. You can help feel better faster by doing the following:    Take medicine as directed. You may be told to take ibuprofen or other over-the-counter medicines. These help relieve inflammation in your bronchial tubes. Your healthcare provider may prescribe an inhaler to help open up the bronchial tubes. Most of the time, acute bronchitis is caused by a viral infection. Antibiotics are usually not prescribed for viral infections.    Drink plenty of fluids, such as water, juice, or warm soup. Fluids loosen mucus so that you can cough it up. This helps you breathe more easily. Fluids also prevent dehydration.    Make sure you get plenty of rest.    Do not smoke. Do not allow anyone else to smoke in your home.  Recovery and follow-up  Follow up with your doctor as you are told. You will likely feel better in a week or two. But a dry cough can linger beyond that time. Let your doctor know if you still have symptoms (other than a dry cough) after 2 weeks, or if you re prone to getting bronchial infections. Take steps to protect yourself from future infections. These steps include stopping smoking and avoiding tobacco smoke, washing your hands often, and getting a yearly flu shot.  When to call your healthcare provider  Call the healthcare provider if you have any of the following:    Fever of 100.4 F (38.0 C) or higher, or as advised    Symptoms that get worse, or new symptoms    Trouble breathing    Symptoms that don t start to improve within a week, or within 3 days of taking  antibiotics   Date Last Reviewed: 12/1/2016 2000-2017 [employer]. [address] [place] [address], [place] [address]. All rights reserved. This information is not intended as a substitute for professional medical care. Always follow your healthcare professional's instructions.        Patient Education    Azithromycin Ophthalmic drops, solution    Azithromycin Oral suspension    Azithromycin Oral suspension, extended release    Azithromycin Oral tablet    Azithromycin Solution for injection  Azithromycin Oral tablet  What is this medicine?  AZITHROMYCIN (az ith willkaterina MCFARLAND sin) is a macrolide antibiotic. It is used to treat or prevent certain kinds of bacterial infections. It will not work for colds, flu, or other viral infections.  This medicine may be used for other purposes; ask your health care provider or pharmacist if you have questions.  What should I tell my health care provider before I take this medicine?  They need to know if you have any of these conditions:    kidney disease    liver disease    irregular heartbeat or heart disease    an unusual or allergic reaction to azithromycin, erythromycin, other macrolide antibiotics, foods, dyes, or preservatives    pregnant or trying to get pregnant    breast-feeding  How should I use this medicine?  Take this medicine by mouth with a full glass of water. Follow the directions on the prescription label. The tablets can be taken with food or on an empty stomach. If the medicine upsets your stomach, take it with food. Take your medicine at regular intervals. Do not take your medicine more often than directed. Take all of your medicine as directed even if you think your are better. Do not skip doses or stop your medicine early.  Talk to your pediatrician regarding the use of this medicine in children. Special care may be needed.  Overdosage: If you think you have taken too much of this medicine contact a poison control center or emergency room at once.  NOTE:  This medicine is only for you. Do not share this medicine with others.  What if I miss a dose?  If you miss a dose, take it as soon as you can. If it is almost time for your next dose, take only that dose. Do not take double or extra doses.  What may interact with this medicine?  Do not take this medicine with any of the following medications:    lincomycin  This medicine may also interact with the following medications:    amiodarone    antacids    birth control pills    cyclosporine    digoxin    magnesium    nelfinavir    phenytoin    warfarin  This list may not describe all possible interactions. Give your health care provider a list of all the medicines, herbs, non-prescription drugs, or dietary supplements you use. Also tell them if you smoke, drink alcohol, or use illegal drugs. Some items may interact with your medicine.  What should I watch for while using this medicine?  Tell your doctor or health care professional if your symptoms do not improve.  Do not treat diarrhea with over the counter products. Contact your doctor if you have diarrhea that lasts more than 2 days or if it is severe and watery.  This medicine can make you more sensitive to the sun. Keep out of the sun. If you cannot avoid being in the sun, wear protective clothing and use sunscreen. Do not use sun lamps or tanning beds/booths.  What side effects may I notice from receiving this medicine?  Side effects that you should report to your doctor or health care professional as soon as possible:    allergic reactions like skin rash, itching or hives, swelling of the face, lips, or tongue    confusion, nightmares or hallucinations    dark urine    difficulty breathing    hearing loss    irregular heartbeat or chest pain    pain or difficulty passing urine    redness, blistering, peeling or loosening of the skin, including inside the mouth    white patches or sores in the mouth    yellowing of the eyes or skin  Side effects that usually do not  require medical attention (report to your doctor or health care professional if they continue or are bothersome):    diarrhea    dizziness, drowsiness    headache    stomach upset or vomiting    tooth discoloration    vaginal irritation  This list may not describe all possible side effects. Call your doctor for medical advice about side effects. You may report side effects to FDA at 8-733-UOD-8345.  Where should I keep my medicine?  Keep out of the reach of children.  Store at room temperature between 15 and 30 degrees C (59 and 86 degrees F). Throw away any unused medicine after the expiration date.  NOTE:This sheet is a summary. It may not cover all possible information. If you have questions about this medicine, talk to your doctor, pharmacist, or health care provider. Copyright  2016 [employer]      Proair HFA Inhaler  Medicine: Albuterol (al-BYOO-ter-ahl)  What it does  Works quickly to relax your airways and make breathing easier. The medicine usually lasts 3 to 4 hours. Use when you need fast relief.  Test spray (priming)  Prime inhaler before first use or if not used for 2 weeks or more. Shake the inhaler and spray 3 times away from face.  How to use this inhaler  1. Wash and dry your hands well.  2. Remove the mouthpiece cover. Check for loose parts inside the mouthpiece.  3. Sit up straight or stand up.  4. Shake inhaler well before each spray.  5. Fully exhale (breathe out) through mouth. Hold the inhaler so the mouthpiece is at the bottom and the canister is sticking straight up.  6. Place the mouthpiece between your lips. Make sure that your tongue is flat under the mouthpiece and does not block the opening.  7. Seal your lips around the mouthpiece.  8. Push the canister all the way down as you slowly take a deep breath through your mouth for 5 seconds.    9. Hold your breath for 10 seconds. If you cannot hold your breath for 10 seconds, then hold it for as long as you can.  10. If you need another  dose, wait one minute and repeat steps 4 to 9.  11. Replace the cover after each use. Store in a cool, dry place.  Cleaning  Wash one time each week. Remove canister and wash mouthpiece with warm water. Do not get the canister wet. Let air-dry overnight. Put inhaler back together and spray two times.  If not cleaned, the inhaler may not work.  How to tell if the inhaler is empty  Each inhaler contains 200 puffs. The inhaler is empty when the dose counter reads 0. The numbers will turn red when you have 20 doses left to remind you to get a refill.  If you have questions about the use of your inhaler, please ask your pharmacist or provider.  For more information and video demos, go to [employer]org/inhaler.  For informational purposes only. Not to replace the advice of your health care provider.  Copyright   2013 [facility]. All rights reserved. [employer] 108087 - REV 02/16.    How to Quit Smoking  Smoking is one of the hardest habits to break. About half of all people who have ever smoked have been able to quit. Most people who still smoke want to quit. Here are some of the best ways to stop smoking.    Keep trying  Most smokers make many attempts at quitting before they are successful. It s important not to give up.  Go cold turkey  Most former smokers quit cold turkey (all at once). Trying to cut back gradually doesn't seem to work as well, perhaps because it continues the smoking habit. Also, it is possible to inhale more while smoking fewer cigarettes. This results in the same amount of nicotine in your body.  Get support  Support programs can be a big help, especially for heavy smokers. These groups offer lectures, ways to change behavior, and peer support. Here are some ways to find a support program:    Free national quitline: 800-QUIT-NOW (506-496-0550).    Hospital quit-smoking programs.    American Lung Association: (721.628.4462).    American Cancer Society (990-406-9906).  Support at  "home is important too. Nonsmokers can offer praise and encouragement. If the smoker in your life finds it hard to quit, encourage them to keep trying.  Over-the-counter medicines  Nicotine replacement therapy may make quitting easier. Certain aids, such as the nicotine patch, gum, and lozenges, are available without a prescription. It is best to use these under a doctor s care, though. The skin patch provides a steady supply of nicotine. Nicotine gum and lozenges give temporary bursts of low levels of nicotine. Both methods reduce the craving for cigarettes. Warning: If you have nausea, vomiting, dizziness, weakness, or a fast heartbeat, stop using these products and see your doctor.  Prescription medicines  After reviewing your smoking patterns and past attempts to quit, your doctor may offer a prescription medicine such as bupropion, varenicline, a nicotine inhaler, or nasal spray. Each has advantages and side effects. Your doctor can review these with you.  Health benefits of quitting  The benefits of quitting start right away and keep improving the longer you go without smoking. These benefits occur at any age.  So whether you are 17 or 70, quitting is a good decision. Some of the benefits include:    20 minutes: Blood pressure and pulse return to normal.    8 hours: Oxygen levels return to normal.    2 days: Ability to smell and taste begin to improve as damaged nerves regrow.    2 to 3 weeks: Circulation and lung function improve.    1 to 9 months: Coughing, congestion, and shortness of breath decrease; tiredness decreases.    1 year: Risk of heart attack decreases by half.    5 years: Risk of lung cancer decreases by half; risk of stroke becomes the same as a nonsmoker s.  For more on how to quit smoking, try these online resources:     Smokefree.gov    \"Clearing the Air\" booklet from the National [employer] [place]: smokefree.gov/sites/default/files/pdf/clearing-the-air-accessible.pdf  Date Last Reviewed: " 3/1/2017    0772-4432 [employer]. [address] [place] [address], [place] [address]. All rights reserved. This information is not intended as a substitute for professional medical care. Always follow your healthcare professional's instructions.            Pipo Angel MD  [facility]

## 2017-12-05 ENCOUNTER — TELEPHONE (OUTPATIENT)
Dept: FAMILY MEDICINE | Facility: CLINIC | Age: 52
End: 2017-12-05

## 2018-01-18 ENCOUNTER — RADIANT APPOINTMENT (OUTPATIENT)
Dept: MAMMOGRAPHY | Facility: CLINIC | Age: 53
End: 2018-01-18
Payer: COMMERCIAL

## 2018-01-18 DIAGNOSIS — Z12.31 VISIT FOR SCREENING MAMMOGRAM: ICD-10-CM

## 2018-01-18 PROCEDURE — 77067 SCR MAMMO BI INCL CAD: CPT | Mod: TC

## 2018-03-08 ENCOUNTER — TELEPHONE (OUTPATIENT)
Dept: FAMILY MEDICINE | Facility: CLINIC | Age: 53
End: 2018-03-08

## 2018-03-08 NOTE — TELEPHONE ENCOUNTER
Panel Management Review      Patient has the following on her problem list: None      Composite cancer screening  Chart review shows that this patient is due/due soon for the following Pap Smear, Colonoscopy and Fecal Colorectal (FIT)  Summary:    Patient is due/failing the following:   COLONOSCOPY, FIT and PAP    Action needed:   Patient needs office visit for AFE.    Type of outreach:    Sent [employer] message.    Questions for provider review:    None                                                                                                                                    Penelope Hall CMA     Chart routed to 0 .

## 2018-03-26 ENCOUNTER — OFFICE VISIT (OUTPATIENT)
Dept: FAMILY MEDICINE | Facility: CLINIC | Age: 53
End: 2018-03-26
Payer: COMMERCIAL

## 2018-03-26 ENCOUNTER — TELEPHONE (OUTPATIENT)
Dept: OTHER | Facility: CLINIC | Age: 53
End: 2018-03-26

## 2018-03-26 VITALS
HEART RATE: 63 BPM | RESPIRATION RATE: 16 BRPM | BODY MASS INDEX: 30.56 KG/M2 | SYSTOLIC BLOOD PRESSURE: 171 MMHG | OXYGEN SATURATION: 100 % | DIASTOLIC BLOOD PRESSURE: 99 MMHG | TEMPERATURE: 97.8 F | WEIGHT: 183.4 LBS | HEIGHT: 65 IN

## 2018-03-26 DIAGNOSIS — I10 HYPERTENSION GOAL BP (BLOOD PRESSURE) < 140/90: ICD-10-CM

## 2018-03-26 DIAGNOSIS — Z12.4 SCREENING FOR CERVICAL CANCER: ICD-10-CM

## 2018-03-26 DIAGNOSIS — Z00.01 ENCOUNTER FOR GENERAL ADULT MEDICAL EXAMINATION WITH ABNORMAL FINDINGS: Primary | ICD-10-CM

## 2018-03-26 DIAGNOSIS — Z11.51 SCREENING FOR HUMAN PAPILLOMAVIRUS: ICD-10-CM

## 2018-03-26 DIAGNOSIS — F43.21 SITUATIONAL DEPRESSION: ICD-10-CM

## 2018-03-26 DIAGNOSIS — Z13.220 SCREENING FOR LIPOID DISORDERS: ICD-10-CM

## 2018-03-26 DIAGNOSIS — Z12.11 SPECIAL SCREENING FOR MALIGNANT NEOPLASMS, COLON: ICD-10-CM

## 2018-03-26 DIAGNOSIS — M31.30 WEGENER'S GRANULOMATOSIS: ICD-10-CM

## 2018-03-26 DIAGNOSIS — Z11.59 NEED FOR HEPATITIS C SCREENING TEST: ICD-10-CM

## 2018-03-26 DIAGNOSIS — F17.200 TOBACCO USE DISORDER: ICD-10-CM

## 2018-03-26 DIAGNOSIS — Z13.29 SCREENING FOR THYROID DISORDER: ICD-10-CM

## 2018-03-26 DIAGNOSIS — Z13.1 SCREENING FOR DIABETES MELLITUS: ICD-10-CM

## 2018-03-26 LAB
ALBUMIN SERPL-MCNC: 3.6 G/DL (ref 3.4–5)
ANION GAP SERPL CALCULATED.3IONS-SCNC: 11 MMOL/L (ref 3–14)
BUN SERPL-MCNC: 23 MG/DL (ref 7–30)
CALCIUM SERPL-MCNC: 8.6 MG/DL (ref 8.5–10.1)
CHLORIDE SERPL-SCNC: 110 MMOL/L (ref 94–109)
CHOLEST SERPL-MCNC: 188 MG/DL
CO2 SERPL-SCNC: 21 MMOL/L (ref 20–32)
CREAT SERPL-MCNC: 1.08 MG/DL (ref 0.52–1.04)
GFR SERPL CREATININE-BSD FRML MDRD: 53 ML/MIN/1.7M2
GLUCOSE SERPL-MCNC: 68 MG/DL (ref 70–99)
HDLC SERPL-MCNC: 87 MG/DL
LDLC SERPL CALC-MCNC: 82 MG/DL
NONHDLC SERPL-MCNC: 101 MG/DL
PHOSPHATE SERPL-MCNC: 3 MG/DL (ref 2.5–4.5)
POTASSIUM SERPL-SCNC: 4.1 MMOL/L (ref 3.4–5.3)
SODIUM SERPL-SCNC: 142 MMOL/L (ref 133–144)
TRIGL SERPL-MCNC: 93 MG/DL
TSH SERPL DL<=0.005 MIU/L-ACNC: 1.44 MU/L (ref 0.4–4)

## 2018-03-26 PROCEDURE — 36415 COLL VENOUS BLD VENIPUNCTURE: CPT | Performed by: NURSE PRACTITIONER

## 2018-03-26 PROCEDURE — 84443 ASSAY THYROID STIM HORMONE: CPT | Performed by: NURSE PRACTITIONER

## 2018-03-26 PROCEDURE — 80061 LIPID PANEL: CPT | Performed by: NURSE PRACTITIONER

## 2018-03-26 PROCEDURE — 86803 HEPATITIS C AB TEST: CPT | Performed by: NURSE PRACTITIONER

## 2018-03-26 PROCEDURE — 99396 PREV VISIT EST AGE 40-64: CPT | Performed by: NURSE PRACTITIONER

## 2018-03-26 PROCEDURE — 87624 HPV HI-RISK TYP POOLED RSLT: CPT | Performed by: NURSE PRACTITIONER

## 2018-03-26 PROCEDURE — G0145 SCR C/V CYTO,THINLAYER,RESCR: HCPCS | Performed by: NURSE PRACTITIONER

## 2018-03-26 PROCEDURE — 80069 RENAL FUNCTION PANEL: CPT | Performed by: NURSE PRACTITIONER

## 2018-03-26 PROCEDURE — 99212 OFFICE O/P EST SF 10 MIN: CPT | Mod: 25 | Performed by: NURSE PRACTITIONER

## 2018-03-26 RX ORDER — LOSARTAN POTASSIUM AND HYDROCHLOROTHIAZIDE 12.5; 5 MG/1; MG/1
1 TABLET ORAL DAILY
Qty: 90 TABLET | Refills: 3 | Status: SHIPPED | OUTPATIENT
Start: 2018-03-26 | End: 2020-07-27 | Stop reason: DRUGHIGH

## 2018-03-26 RX ORDER — LOSARTAN POTASSIUM 50 MG/1
50 TABLET ORAL DAILY
Qty: 30 TABLET | Refills: 3 | Status: CANCELLED | OUTPATIENT
Start: 2018-03-26

## 2018-03-26 ASSESSMENT — PATIENT HEALTH QUESTIONNAIRE - PHQ9: 5. POOR APPETITE OR OVEREATING: NOT AT ALL

## 2018-03-26 ASSESSMENT — ANXIETY QUESTIONNAIRES
1. FEELING NERVOUS, ANXIOUS, OR ON EDGE: SEVERAL DAYS
2. NOT BEING ABLE TO STOP OR CONTROL WORRYING: NOT AT ALL
GAD7 TOTAL SCORE: 1
5. BEING SO RESTLESS THAT IT IS HARD TO SIT STILL: NOT AT ALL
6. BECOMING EASILY ANNOYED OR IRRITABLE: NOT AT ALL
7. FEELING AFRAID AS IF SOMETHING AWFUL MIGHT HAPPEN: NOT AT ALL
3. WORRYING TOO MUCH ABOUT DIFFERENT THINGS: NOT AT ALL
IF YOU CHECKED OFF ANY PROBLEMS ON THIS QUESTIONNAIRE, HOW DIFFICULT HAVE THESE PROBLEMS MADE IT FOR YOU TO DO YOUR WORK, TAKE CARE OF THINGS AT HOME, OR GET ALONG WITH OTHER PEOPLE: NOT DIFFICULT AT ALL

## 2018-03-26 NOTE — TELEPHONE ENCOUNTER
Pt referred to [facility] by Monet Ackerman NP for Wegener's granulomatosis and intermittent leg swelling.    Pt needs to be scheduled for consult with Vascular Medicine.  Will route to scheduling to coordinate an appointment at next available.    NARESH Aldana RN

## 2018-03-26 NOTE — TELEPHONE ENCOUNTER
Called and left a message for the patient to call us back to schedule a consult appointment with vascular medicine.

## 2018-03-26 NOTE — PROGRESS NOTES
"  SUBJECTIVE:   Flavia Brice is a 53 year old female who presents to clinic today for the following health issues:      Hypertension Follow-up      Outpatient blood pressures are not being checked.    Low Salt Diet: not monitoring salt      Amount of exercise or physical activity: None    Problems taking medications regularly: No    Medication side effects: none    Diet: regular (no restrictions)    Patient is getting headaches-thinks this is related to her blood pressure    {additional problems for provider to add:998002}    Problem list and histories reviewed & adjusted, as indicated.  Additional history: {NONE - AS DOCUMENTED:279808::\"as documented\"}    {HIST REVIEW/ LINKS 2:909407}    Reviewed and updated as needed this visit by clinical staff       Reviewed and updated as needed this visit by Provider         {PROVIDER CHARTING PREFERENCE:077405}  "

## 2018-03-26 NOTE — MR AVS SNAPSHOT
After Visit Summary   3/26/2018    Flavia Brice    MRN: [id number]           Patient Information     Date Of Birth          1965        Visit Information        Provider Department      3/26/2018 10:20 AM Monet Ackerman NP [facility]        Today's Diagnoses     Screening for cervical cancer    -  1    Hypertension goal BP (blood pressure) < 140/90        Screening for human papillomavirus        Special screening for malignant neoplasms, colon        Need for hepatitis C screening test        Screening for lipoid disorders        Screening for diabetes mellitus        Tobacco use disorder        Screening for thyroid disorder        Wegener's granulomatosis (H)          Care Instructions    Blood pressure check in 2 weeks.  Follow up as needed.     Preventive Health Recommendations  Female Ages 50 - 64    Yearly exam: See your health care provider every year in order to  o Review health changes.   o Discuss preventive care.    o Review your medicines if your doctor has prescribed any.      Get a Pap test every three years (unless you have an abnormal result and your provider advises testing more often).    If you get Pap tests with HPV test, you only need to test every 5 years, unless you have an abnormal result.     You do not need a Pap test if your uterus was removed (hysterectomy) and you have not had cancer.    You should be tested each year for STDs (sexually transmitted diseases) if you're at risk.     Have a mammogram every 1 to 2 years.    Have a colonoscopy at age 50, or have a yearly FIT test (stool test). These exams screen for colon cancer.      Have a cholesterol test every 5 years, or more often if advised.    Have a diabetes test (fasting glucose) every three years. If you are at risk for diabetes, you should have this test more often.     If you are at risk for osteoporosis (brittle bone disease), think about having a bone density scan (DEXA).    Shots: Get  a flu shot each year. Get a tetanus shot every 10 years.    Nutrition:     Eat at least 5 servings of fruits and vegetables each day.    Eat whole-grain bread, whole-wheat pasta and brown rice instead of white grains and rice.    Talk to your provider about Calcium and Vitamin D.     Lifestyle    Exercise at least 150 minutes a week (30 minutes a day, 5 days a week). This will help you control your weight and prevent disease.    Limit alcohol to one drink per day.    No smoking.     Wear sunscreen to prevent skin cancer.     See your dentist every six months for an exam and cleaning.    See your eye doctor every 1 to 2 years.    Low-Salt Choices  Eating salt (sodium) can make your body retain too much water. Excess water makes your heart work harder. Canned, packaged, and frozen foods are easy to prepare. But they are often high in sodium. Here are some ideas for low-salt foods you can easily make yourself.    For breakfast    Fruit or 100% fruit juice    Whole-wheat bread or an English muffin. Look for sodium content on Nutrition Facts labels.    Low-fat milk or yogurt    Unsalted eggs    Shredded wheat    Corn tortillas    Unsalted steamed rice    Regular (not instant) hot cereal, made without salt  Stay away from:    Sausage, sanford, and ham    Flour tortillas    Packaged muffins, pancakes, and biscuits    Instant hot cereals    Cottage cheese  For lunch and dinner    Fresh fish, chicken, turkey, or meat--baked, broiled, or roasted without salt    Dry beans, cooked without salt    Tofu, stir-fried without salt    Unsalted fresh fruit and vegetables, or frozen or canned fruit and vegetables with no added salt  Stay away from:    Lunch or deli meat that is cured or smoked    Cheese    Tomato juice and ketchup    Canned vegetables, soups, and fish not labeled as no-salt-added or reduced sodium    Packaged gravies and sauces    Olives, pickles, and relish    Bottled salad dressings  For snacks and  desserts    Yogurt    Unsalted, air-popped popcorn    Unsalted nuts or seeds  Stay away from:    Pies and cakes    Packaged dessert mixes    Pizza    Canned and packaged puddings    Pretzels, chips, crackers, and nuts--unless the label says unsalted  Date Last Reviewed: 6/1/2017 2000-2017 [employer]. [address] [place] [address]. All rights reserved. This information is not intended as a substitute for professional medical care. Always follow your healthcare professional's instructions.        Step-by-Step  Checking Your Blood Pressure    Date Last Reviewed: 4/27/2016 2000-2017 [employer]. [address] [place] [address]. All rights reserved. This information is not intended as a substitute for professional medical care. Always follow your healthcare professional's instructions.                Follow-ups after your visit        Additional Services     GASTROENTEROLOGY ADULT REF PROCEDURE ONLY       Last Lab Result: Creatinine (mg/dL)       Date                     Value                 10/27/2017               1.42 (H)         ----------  There is no height or weight on file to calculate BMI.     Needed:  No  Language:  English    Patient will be contacted to schedule procedure.     Please be aware that coverage of these services is subject to the terms and limitations of your health insurance plan.  Call member services at your health plan with any benefit or coverage questions.  Any procedures must be performed at a [facility] facility OR coordinated by your clinic's referral office.    Please bring the following with you to your appointment:    (1) Any X-Rays, CTs or MRIs which have been performed.  Contact the facility where they were done to arrange for  prior to your scheduled appointment.    (2) List of current medications   (3) This referral request   (4) Any documents/labs given to you for this referral            VASCULAR REFERRAL        Your provider has referred you to the [facility] at [facility].    Reason for Referral: Vascular Medicine    Urgency of Referral: Next available    A referral has been sent to our Vascular  Services. If you do not receive a call from them within 24-48 hours you may reach them at [phone].    Please be aware that coverage of these services is subject to the terms and limitations of your health insurance plan.  Call member services at your health plan with any benefit or coverage questions.      Please bring the following with you to your appointment:  (1) Any X-Rays, CTs or MRIs which have been performed.  Contact the facility where they were done to arrange for  prior to your scheduled appointment.  Any new CT, MRI or other procedures ordered by your specialist must be performed at a [facility] or coordinated by your clinic's referral office.    (2) List of current medications   (3) This referral request   (4) Any documents/labs given to you for this referral                  Follow-up notes from your care team     Return if symptoms worsen or fail to improve.      Who to contact     Normal or non-critical lab and imaging results will be communicated to you by Mailjethart, letter or phone within 4 business days after the clinic has received the results. If you do not hear from us within 7 days, please contact the clinic through Mailjethart or phone. If you have a critical or abnormal lab result, we will notify you by phone as soon as possible.  Submit refill requests through [employer] or call your pharmacy and they will forward the refill request to us. Please allow 3 business days for your refill to be completed.          If you need to speak with a  for additional information , please call: [phone]             Additional Information About Your Visit        [employer] Information     [employer] gives you secure access to your electronic health record. If you see a  "primary care provider, you can also send messages to your care team and make appointments. If you have questions, please call your primary care clinic.  If you do not have a primary care provider, please call [phone] and they will assist you.        Care EveryWhere ID     This is your Care EveryWhere ID. This could be used by other organizations to access your [facility] medical records  [id number]        Your Vitals Were     Pulse Temperature Respirations Height Last Period Pulse Oximetry    63 97.8  F (36.6  C) (Oral) 16 5' 5.35\" (1.66 m) (Approximate) 100%    Breastfeeding? BMI (Body Mass Index)                No 30.19 kg/m2           Blood Pressure from Last 3 Encounters:   03/26/18 (!) 171/99   12/01/17 (!) 151/93   10/27/17 (!) 160/100    Weight from Last 3 Encounters:   03/26/18 183 lb 6.4 oz (83.2 kg)   12/01/17 174 lb 3.2 oz (79 kg)   10/27/17 173 lb (78.5 kg)              We Performed the Following     GASTROENTEROLOGY ADULT REF PROCEDURE ONLY     Hepatitis C Screen Reflex to HCV RNA Quant and Genotype     HPV High Risk Types DNA Cervical     Lipid panel reflex to direct LDL Fasting     Pap imaged thin layer screen with HPV - recommended age 30 - 65 years (select HPV order below)     Renal panel (Alb, BUN, Ca, Cl, CO2, Creat, Gluc, Phos, K, Na)     TOBACCO CESSATION - FOR HEALTH MAINTENANCE     TSH with free T4 reflex     VASCULAR REFERRAL          Today's Medication Changes          These changes are accurate as of 3/26/18 10:53 AM.  If you have any questions, ask your nurse or doctor.               Start taking these medicines.        Dose/Directions    losartan-hydrochlorothiazide 50-12.5 MG per tablet   Commonly known as:  HYZAAR   Used for:  Hypertension goal BP (blood pressure) < 140/90   Started by:  Monet Ackerman NP        Dose:  1 tablet   Take 1 tablet by mouth daily   Quantity:  90 tablet   Refills:  3            Where to get your medicines      These medications were sent to [facility] " [facility] - [place], [place] - [address]  [address] [address]     Phone:  [phone]     losartan-hydrochlorothiazide 50-12.5 MG per tablet                Primary Care Provider Office Phone # Fax #    Brandyn Licea [phone] [address] [facility] [address]        Equal Access to Services     SHERIDAN KUHN : Hadii aad ku hadasho Soomaali, waaxda luqadaha, qaybta kaalmada adeegyada, waxay idiin hayaan adeeg kharash la'juann . [facility] [phone].    ATENCIÓN: Si habla español, tiene a thompson disposición servicios gratuitos de asistencia lingüística. Llame al [phone].    We comply with applicable federal civil rights laws and [place] laws. We do not discriminate on the basis of race, color, national origin, age, disability, sex, sexual orientation, or gender identity.            Thank you!     Thank you for choosing [facility]  for your care. Our goal is always to provide you with excellent care. Hearing back from our patients is one way we can continue to improve our services. Please take a few minutes to complete the written survey that you may receive in the mail after your visit with us. Thank you!             Your Updated Medication List - Protect others around you: Learn how to safely use, store and throw away your medicines at www.disposemymeds.org.          This list is accurate as of 3/26/18 10:53 AM.  Always use your most recent med list.                   Brand Name Dispense Instructions for use Diagnosis    losartan 50 MG tablet    COZAAR    30 tablet    Take 1 tablet (50 mg) by mouth daily For blood pressure take with breakfast    Hypertension goal BP (blood pressure) < 140/90       losartan-hydrochlorothiazide 50-12.5 MG per tablet    HYZAAR    90 tablet    Take 1 tablet by mouth daily    Hypertension goal BP (blood pressure) < 140/90       PREDNISONE PO      Take 2.5 mg by mouth daily

## 2018-03-26 NOTE — PROGRESS NOTES
SUBJECTIVE:   CC: Flavia Brice is an 53 year old woman who presents for preventive health visit.     Healthy Habits:    Do you get at least three servings of calcium containing foods daily (dairy, green leafy vegetables, etc.)? yes    Amount of exercise or daily activities, outside of work: 0 day(s) per week    Problems taking medications regularly No    Medication side effects: No    Have you had an eye exam in the past two years? yes    Do you see a dentist twice per year? yes    Do you have sleep apnea, excessive snoring or daytime drowsiness?yes    Patient/Parent of patient informed that anything we discuss that is not related to preventative medicine, may be billed for; patient verbalizes understanding.    Concern(s):  1. Blood pressure-getting headaches; wearing off too soon?  2. Hx of Wegener granulomatosis- would like creatinine checked and referral to vascular. Starting to have symptoms she had previously- occasional ringing in ear, intermittent leg swelling.         Today's PHQ-2 Score:   PHQ-2 ( 1999 [employer]) 3/26/2018 2/27/2014   Q1: Little interest or pleasure in doing things 0 0   Q2: Feeling down, depressed or hopeless 0 0   PHQ-2 Score 0 0       Abuse: Current or Past(Physical, Sexual or Emotional)- No  Do you feel safe in your environment - Yes    Social History   Substance Use Topics     Smoking status: Current Every Day Smoker     Packs/day: 0.50     Types: Cigarettes     Smokeless tobacco: Never Used     Alcohol use Yes      Comment: monthly     If you drink alcohol do you typically have >3 drinks per day or >7 drinks per week? No                     Reviewed orders with patient.  Reviewed health maintenance and updated orders accordingly - Yes  Labs reviewed in EPIC  BP Readings from Last 3 Encounters:   03/26/18 (!) 171/99   12/01/17 (!) 151/93   10/27/17 (!) 160/100    Wt Readings from Last 3 Encounters:   03/26/18 183 lb 6.4 oz (83.2 kg)   12/01/17 174 lb 3.2 oz (79 kg)   10/27/17  173 lb (78.5 kg)                  Patient Active Problem List   Diagnosis     Wegener's granulomatosis (H)     CARDIOVASCULAR SCREENING; LDL GOAL LESS THAN 130     Overweight     Advanced directives, counseling/discussion     Tobacco abuse     Past Surgical History:   Procedure Laterality Date     GALLBLADDER SURGERY         Social History   Substance Use Topics     Smoking status: Current Every Day Smoker     Packs/day: 0.50     Types: Cigarettes     Smokeless tobacco: Never Used     Alcohol use Yes      Comment: monthly     Family History   Problem Relation Age of Onset     DIABETES Father      HEART DISEASE Maternal Grandmother      HEART DISEASE Maternal Grandfather      Arthritis Paternal Grandmother      HEART DISEASE Paternal Grandfather          Current Outpatient Prescriptions   Medication Sig Dispense Refill     losartan-hydrochlorothiazide (HYZAAR) 50-12.5 MG per tablet Take 1 tablet by mouth daily 90 tablet 3     PREDNISONE PO Take 2.5 mg by mouth daily       losartan (COZAAR) 50 MG tablet Take 1 tablet (50 mg) by mouth daily For blood pressure take with breakfast 30 tablet 3     No Known Allergies    Patient over age 50, mutual decision to screen reflected in health maintenance.    Pertinent mammograms are reviewed under the imaging tab.  History of abnormal Pap smear: NO - age 30- 65 PAP every 3 years recommended    Reviewed and updated as needed this visit by clinical staff  Tobacco  Allergies  Meds  Med Hx  Surg Hx  Fam Hx  Soc Hx        Reviewed and updated as needed this visit by Provider        History reviewed. No pertinent past medical history.   Past Surgical History:   Procedure Laterality Date     GALLBLADDER SURGERY       Obstetric History     No data available          ROS:  C: NEGATIVE for fever, chills, change in weight  I: NEGATIVE for worrisome rashes, moles or lesions  E: NEGATIVE for vision changes or irritation  ENT: NEGATIVE for ear, mouth and throat problems  R: NEGATIVE for  "significant cough or SOB  B: NEGATIVE for masses, tenderness or discharge  CV: NEGATIVE for chest pain, palpitations or peripheral edema  GI: NEGATIVE for nausea, abdominal pain, heartburn, or change in bowel habits  : NEGATIVE for unusual urinary or vaginal symptoms. No vaginal bleeding.  M: NEGATIVE for significant arthralgias or myalgia  N: NEGATIVE for weakness, dizziness or paresthesias  E: NEGATIVE for temperature intolerance, skin/hair changes  H: NEGATIVE for bleeding problems  P: POSITIVE for situational depression d/t her and spouse losing jobs, she has obtained new employment that she does enjoy, but her  has not.  Discussed antidepressants, pt thinks she is doing ok right now.     OBJECTIVE:   BP (!) 171/99  Pulse 63  Temp 97.8  F (36.6  C) (Oral)  Resp 16  Ht 5' 5.35\" (1.66 m)  Wt 183 lb 6.4 oz (83.2 kg)  LMP  (Approximate)  SpO2 100%  Breastfeeding? No  BMI 30.19 kg/m2  EXAM:  GENERAL APPEARANCE: healthy, alert and no distress  EYES: Eyes grossly normal to inspection, PERRL and conjunctivae and sclerae normal  HENT: ear canals and TM's normal, nose and mouth without ulcers or lesions, oropharynx clear and oral mucous membranes moist  NECK: no adenopathy, no asymmetry, masses, or scars and thyroid normal to palpation  RESP: lungs clear to auscultation - no rales, rhonchi or wheezes  BREAST: deferred per pt  CV: regular rate and rhythm, normal S1 S2, no S3 or S4, no murmur, click or rub, no peripheral edema and peripheral pulses strong  ABDOMEN: soft, nontender, no hepatosplenomegaly, no masses and bowel sounds normal   (female): normal female external genitalia, normal urethral meatus, vaginal mucosal atrophy noted, normal cervix, adnexae, and uterus without masses or abnormal discharge  MS: no musculoskeletal defects are noted and gait is age appropriate without ataxia  SKIN: no suspicious lesions or rashes  NEURO: Normal strength and tone, sensory exam grossly normal, mentation " "intact and speech normal  PSYCH: mentation appears normal POSITIVE for tearful at times.     ASSESSMENT/PLAN:       ICD-10-CM    1. Encounter for general adult medical examination with abnormal findings Z00.01    2. Hypertension goal BP (blood pressure) < 140/90 I10 losartan-hydrochlorothiazide (HYZAAR) 50-12.5 MG per tablet     Renal panel (Alb, BUN, Ca, Cl, CO2, Creat, Gluc, Phos, K, Na)   3. Screening for cervical cancer Z12.4 Pap imaged thin layer screen with HPV - recommended age 30 - 65 years (select HPV order below)   4. Screening for human papillomavirus Z11.51 HPV High Risk Types DNA Cervical   5. Special screening for malignant neoplasms, colon Z12.11 GASTROENTEROLOGY ADULT REF PROCEDURE ONLY   6. Need for hepatitis C screening test Z11.59 Hepatitis C Screen Reflex to HCV RNA Quant and Genotype   7. Screening for lipoid disorders Z13.220 Lipid panel reflex to direct LDL Fasting   8. Screening for diabetes mellitus Z13.1 CANCELED: Glucose   9. Tobacco use disorder F17.200 TOBACCO CESSATION - FOR HEALTH MAINTENANCE   10. Screening for thyroid disorder Z13.29 TSH with free T4 reflex   11. Wegener's granulomatosis (H) M31.30 Renal panel (Alb, BUN, Ca, Cl, CO2, Creat, Gluc, Phos, K, Na)     VASCULAR REFERRAL   12. Situational depression F43.21        COUNSELING:   Reviewed preventive health counseling, as reflected in patient instructions         reports that she has been smoking Cigarettes.  She has been smoking about 0.50 packs per day. She has never used smokeless tobacco.  Tobacco Cessation Action Plan: Information offered: Patient not interested at this time  Estimated body mass index is 30.19 kg/(m^2) as calculated from the following:    Height as of this encounter: 5' 5.35\" (1.66 m).    Weight as of this encounter: 183 lb 6.4 oz (83.2 kg).   Weight management plan: Discussed healthy diet and exercise guidelines and patient will follow up in 12 months in clinic to re-evaluate.    Counseling " Resources:  ATP IV Guidelines  Pooled Cohorts Equation Calculator  Breast Cancer Risk Calculator  FRAX Risk Assessment  ICSI Preventive Guidelines  Dietary Guidelines for Americans, 2010  USDA's MyPlate  ASA Prophylaxis  Lung CA Screening    ISS[facility] Still  [facility] RANJIT

## 2018-03-26 NOTE — PATIENT INSTRUCTIONS
Blood pressure check in 2 weeks.  Follow up as needed.     Preventive Health Recommendations  Female Ages 50 - 64    Yearly exam: See your health care provider every year in order to  o Review health changes.   o Discuss preventive care.    o Review your medicines if your doctor has prescribed any.      Get a Pap test every three years (unless you have an abnormal result and your provider advises testing more often).    If you get Pap tests with HPV test, you only need to test every 5 years, unless you have an abnormal result.     You do not need a Pap test if your uterus was removed (hysterectomy) and you have not had cancer.    You should be tested each year for STDs (sexually transmitted diseases) if you're at risk.     Have a mammogram every 1 to 2 years.    Have a colonoscopy at age 50, or have a yearly FIT test (stool test). These exams screen for colon cancer.      Have a cholesterol test every 5 years, or more often if advised.    Have a diabetes test (fasting glucose) every three years. If you are at risk for diabetes, you should have this test more often.     If you are at risk for osteoporosis (brittle bone disease), think about having a bone density scan (DEXA).    Shots: Get a flu shot each year. Get a tetanus shot every 10 years.    Nutrition:     Eat at least 5 servings of fruits and vegetables each day.    Eat whole-grain bread, whole-wheat pasta and brown rice instead of white grains and rice.    Talk to your provider about Calcium and Vitamin D.     Lifestyle    Exercise at least 150 minutes a week (30 minutes a day, 5 days a week). This will help you control your weight and prevent disease.    Limit alcohol to one drink per day.    No smoking.     Wear sunscreen to prevent skin cancer.     See your dentist every six months for an exam and cleaning.    See your eye doctor every 1 to 2 years.    Low-Salt Choices  Eating salt (sodium) can make your body retain too much water. Excess water makes your  heart work harder. Canned, packaged, and frozen foods are easy to prepare. But they are often high in sodium. Here are some ideas for low-salt foods you can easily make yourself.    For breakfast    Fruit or 100% fruit juice    Whole-wheat bread or an English muffin. Look for sodium content on Nutrition Facts labels.    Low-fat milk or yogurt    Unsalted eggs    Shredded wheat    Corn tortillas    Unsalted steamed rice    Regular (not instant) hot cereal, made without salt  Stay away from:    Sausage, sanford, and ham    Flour tortillas    Packaged muffins, pancakes, and biscuits    Instant hot cereals    Cottage cheese  For lunch and dinner    Fresh fish, chicken, turkey, or meat baked, broiled, or roasted without salt    Dry beans, cooked without salt    Tofu, stir-fried without salt    Unsalted fresh fruit and vegetables, or frozen or canned fruit and vegetables with no added salt  Stay away from:    Lunch or deli meat that is cured or smoked    Cheese    Tomato juice and ketchup    Canned vegetables, soups, and fish not labeled as no-salt-added or reduced sodium    Packaged gravies and sauces    Olives, pickles, and relish    Bottled salad dressings  For snacks and desserts    Yogurt    Unsalted, air-popped popcorn    Unsalted nuts or seeds  Stay away from:    Pies and cakes    Packaged dessert mixes    Pizza    Canned and packaged puddings    Pretzels, chips, crackers, and nuts unless the label says unsalted  Date Last Reviewed: 6/1/2017 2000-2017 [employer]. [address] [place] [address]. All rights reserved. This information is not intended as a substitute for professional medical care. Always follow your healthcare professional's instructions.        Step-by-Step  Checking Your Blood Pressure    Date Last Reviewed: 4/27/2016 2000-2017 [employer]. [address] [place] [address]. All rights reserved. This information is not intended as a substitute for  professional medical care. Always follow your healthcare professional's instructions.

## 2018-03-26 NOTE — NURSING NOTE
"Chief Complaint   Patient presents with     RECHECK       Initial BP (!) 171/99  Pulse 63  Temp 97.8  F (36.6  C) (Oral)  Resp 16  Ht 5' 5.35\" (1.66 m)  Wt 183 lb 6.4 oz (83.2 kg)  LMP  (Approximate)  SpO2 100%  Breastfeeding? No  BMI 30.19 kg/m2 Estimated body mass index is 30.19 kg/(m^2) as calculated from the following:    Height as of this encounter: 5' 5.35\" (1.66 m).    Weight as of this encounter: 183 lb 6.4 oz (83.2 kg).  Medication Reconciliation: complete     Lisa Burnett MA  "

## 2018-03-27 LAB — HCV AB SERPL QL IA: NONREACTIVE

## 2018-03-27 ASSESSMENT — ANXIETY QUESTIONNAIRES: GAD7 TOTAL SCORE: 1

## 2018-03-27 ASSESSMENT — PATIENT HEALTH QUESTIONNAIRE - PHQ9: SUM OF ALL RESPONSES TO PHQ QUESTIONS 1-9: 4

## 2018-03-27 NOTE — PROGRESS NOTES
Derick Alejandro,    Thank you for your recent office visit.    Here are your recent results.  Labs look good, improved from previous.  Follow up as needed.     Feel free to contact me via [place] or call the clinic at [phone].    Sincerely,    KELLY Sparks, FNP-BC

## 2018-03-28 LAB
COPATH REPORT: NORMAL
PAP: NORMAL

## 2018-03-28 NOTE — TELEPHONE ENCOUNTER
Called and left a message for the patient to call us back to schedule a consult with vascular medicine. Stated this would be our last attempt at calling them and to call us back at their convenience.

## 2018-03-30 LAB
FINAL DIAGNOSIS: NORMAL
HPV HR 12 DNA CVX QL NAA+PROBE: NEGATIVE
HPV16 DNA SPEC QL NAA+PROBE: NEGATIVE
HPV18 DNA SPEC QL NAA+PROBE: NEGATIVE
SPECIMEN DESCRIPTION: NORMAL
SPECIMEN SOURCE CVX/VAG CYTO: NORMAL

## 2018-04-02 PROBLEM — F43.21 SITUATIONAL DEPRESSION: Status: ACTIVE | Noted: 2018-04-02

## 2018-04-02 PROBLEM — I10 HYPERTENSION GOAL BP (BLOOD PRESSURE) < 140/90: Status: ACTIVE | Noted: 2018-04-02

## 2018-04-02 PROBLEM — F17.200 TOBACCO USE DISORDER: Status: ACTIVE | Noted: 2018-04-02

## 2018-04-03 NOTE — TELEPHONE ENCOUNTER
2 calls were made to the patient and she has not called to schedule. Routing back to nurse to notify referring physician.

## 2018-05-11 ENCOUNTER — TELEPHONE (OUTPATIENT)
Dept: FAMILY MEDICINE | Facility: CLINIC | Age: 53
End: 2018-05-11

## 2018-05-11 NOTE — LETTER
June 1, 2018      Flavia Brice  [address] [address]        Dear Flavia,       This is a friendly reminder that you are due for a blood pressure recheck as it was elevated at your last visit.    Your health is important to us.  We periodically review your chart to make sure that you are up-to-date on all health maintenance and that your chronic illnesses are adequately controlled.       You can schedule a appointment with the nurse in the clinic-this is a no charge visit. Or do a walk in appointment with the pharmacy.    Also, upon your doctor reviewing your chart, it appears that you are due for your colonoscopy.    We have taken the liberty of ordering this for you.      Please call the clinic at [phone] to schedule your colonoscopy appointment    If you have already had the above completed, please contact the clinic to have your chart updated.      Thank You,    [facility]

## 2018-05-11 NOTE — TELEPHONE ENCOUNTER
Panel Management Review    Summary:    Patient is due/failing the following:   BP CHECK and COLONOSCOPY    Type of outreach:    Sent Tagito message.    Questions for provider review:    None                                                                                                                                    Lisa Burnett MA       Chart routed to Care Team .

## 2018-05-18 NOTE — TELEPHONE ENCOUNTER
Left message for patient to call back pod 1 hotline([phone])    Patient is due/failing the following:   BP CHECK and COLONOSCOPY

## 2018-05-25 NOTE — TELEPHONE ENCOUNTER
Left message for patient to call back pod 1 hotline([phone])    Patient is due/failing the following:   BP CHECK and COLONOSCOPY

## 2018-06-12 ENCOUNTER — ALLIED HEALTH/NURSE VISIT (OUTPATIENT)
Dept: NURSING | Facility: CLINIC | Age: 53
End: 2018-06-12
Payer: COMMERCIAL

## 2018-06-12 VITALS — DIASTOLIC BLOOD PRESSURE: 79 MMHG | SYSTOLIC BLOOD PRESSURE: 115 MMHG | HEART RATE: 61 BPM

## 2018-06-12 DIAGNOSIS — I10 HYPERTENSION GOAL BP (BLOOD PRESSURE) < 140/90: Primary | ICD-10-CM

## 2018-06-12 PROCEDURE — 99207 ZZC NO CHARGE NURSE ONLY: CPT

## 2018-06-12 NOTE — PROGRESS NOTES
Flaviaben Brice is a 53 year old patient who comes in today for a Blood Pressure check.  Initial BP:  /79  Pulse 61     Disposition: follow-up as previously indicated by provider

## 2018-06-12 NOTE — MR AVS SNAPSHOT
After Visit Summary   6/12/2018    Flavia Brice    MRN: [id number]           Patient Information     Date Of Birth          1965        Visit Information        Provider Department      6/12/2018 2:30 PM [facility]        Today's Diagnoses     Hypertension goal BP (blood pressure) < 140/90    -  1       Follow-ups after your visit        Who to contact     If you have questions or need follow up information about today's clinic visit or your schedule please contact [facility] directly at [phone].  Normal or non-critical lab and imaging results will be communicated to you by [employer], letter or phone within 4 business days after the clinic has received the results. If you do not hear from us within 7 days, please contact the clinic through [employer] or phone. If you have a critical or abnormal lab result, we will notify you by phone as soon as possible.  Submit refill requests through [employer] or call your pharmacy and they will forward the refill request to us. Please allow 3 business days for your refill to be completed.          Additional Information About Your Visit        MyChart Information     [employer] gives you secure access to your electronic health record. If you see a primary care provider, you can also send messages to your care team and make appointments. If you have questions, please call your primary care clinic.  If you do not have a primary care provider, please call [phone] and they will assist you.        Care EveryWhere ID     This is your Care EveryWhere ID. This could be used by other organizations to access your [facility] medical records  [id number]        Your Vitals Were     Pulse                   61            Blood Pressure from Last 3 Encounters:   06/12/18 115/79   03/26/18 (!) 171/99   12/01/17 (!) 151/93    Weight from Last 3 Encounters:   03/26/18 183 lb 6.4 oz (83.2 kg)   12/01/17 174 lb 3.2 oz (79 kg)    10/27/17 173 lb (78.5 kg)              Today, you had the following     No orders found for display       Primary Care Provider Office Phone # Fax #    Monet Ackerman [phone] [address]   [place] [address]        Equal Access to Services     [facility]: Hadii aad ku hadasho Soomaali, waaxda luqadaha, qaybta kaalmada adeegyada, waxay idiin hayaan adeeg khfelisash la'aan . So [facility] [phone].    ATENCIÓN: Si habla español, tiene a thompson disposición servicios gratuitos de asistencia lingüística. [facility] [phone].    We comply with applicable federal civil rights laws and [place] laws. We do not discriminate on the basis of race, color, national origin, age, disability, sex, sexual orientation, or gender identity.            Thank you!     Thank you for choosing [facility]  for your care. Our goal is always to provide you with excellent care. Hearing back from our patients is one way we can continue to improve our services. Please take a few minutes to complete the written survey that you may receive in the mail after your visit with us. Thank you!             Your Updated Medication List - Protect others around you: Learn how to safely use, store and throw away your medicines at www.disposemymeds.org.          This list is accurate as of 6/12/18  2:47 PM.  Always use your most recent med list.                   Brand Name Dispense Instructions for use Diagnosis    losartan 50 MG tablet    COZAAR    30 tablet    Take 1 tablet (50 mg) by mouth daily For blood pressure take with breakfast    Hypertension goal BP (blood pressure) < 140/90       losartan-hydrochlorothiazide 50-12.5 MG per tablet    HYZAAR    90 tablet    Take 1 tablet by mouth daily    Hypertension goal BP (blood pressure) < 140/90       PREDNISONE PO      Take 2.5 mg by mouth daily

## 2018-09-25 ENCOUNTER — TELEPHONE (OUTPATIENT)
Dept: FAMILY MEDICINE | Facility: CLINIC | Age: 53
End: 2018-09-25

## 2018-09-25 DIAGNOSIS — Z12.11 COLON CANCER SCREENING: Primary | ICD-10-CM

## 2018-09-25 NOTE — TELEPHONE ENCOUNTER
Panel Management Review  Summary:    Patient is due/failing the following:   Dap, phq9, gad7  Colonoscopy  Flu vaccine    Type of outreach:    Sent Data[employer] message.    Questions for provider review:    None                                                                                                                                    Lisa Burnett MA       Chart routed to Care Team .

## 2018-09-25 NOTE — LETTER
October 29, 2018      Flavia Brice  [address] [address]        Dear Flavia,       We have tried to contact you by phone several times.  This is a reminder letter.     In order to ensure we are providing the best quality care, we have reviewed your chart and see that you are due for:     1.   Please complete the 2 enclosed questionnaires to update your chart   2.   Flu vaccine   3.   You are due for a visit to follow up on depression and anxiety  4.   Colonoscopy or FIT test(colon cancer screen).    We have taken the liberty of ordering these for you. The FIT test is enclosed with a return envelope.      Please call the clinic at [phone] to schedule your colonoscopy appointment or complete the enclosed FIT test and mail in.    For fasting labs please fast for at least 10 hours. You can still take your medications and have water.     We greatly appreciate the opportunity to serve you.  Thank you for trusting us with your health care.     If you are now being seen elsewhere please let us know and we will remove you from the list.     Sincerely,     [facility]

## 2018-10-02 NOTE — TELEPHONE ENCOUNTER
Left message for patient to call back pod 1 hotline([phone])    Patient is due/failing the following:   Dap, phq9, gad7  Colonoscopy  Flu vaccine

## 2018-10-23 NOTE — TELEPHONE ENCOUNTER
Left message for patient to call back pod 1 hotline([phone])    Patient is due/failing the following:   Dap, phq9, gad7  Colonoscopy  Flu vaccine

## 2019-01-21 ENCOUNTER — TELEPHONE (OUTPATIENT)
Dept: FAMILY MEDICINE | Facility: CLINIC | Age: 54
End: 2019-01-21

## 2019-02-18 ENCOUNTER — HOSPITAL ENCOUNTER (OUTPATIENT)
Facility: AMBULATORY SURGERY CENTER | Age: 54
Discharge: HOME | End: 2019-02-18
Attending: SPECIALIST | Admitting: SPECIALIST
Payer: COMMERCIAL

## 2019-02-18 VITALS
DIASTOLIC BLOOD PRESSURE: 91 MMHG | OXYGEN SATURATION: 98 % | SYSTOLIC BLOOD PRESSURE: 169 MMHG | TEMPERATURE: 98.1 F | RESPIRATION RATE: 16 BRPM

## 2019-02-18 LAB — COLONOSCOPY: NORMAL

## 2019-02-18 PROCEDURE — G8918 PT W/O PREOP ORDER IV AB PRO: HCPCS

## 2019-02-18 PROCEDURE — 45385 COLONOSCOPY W/LESION REMOVAL: CPT

## 2019-02-18 PROCEDURE — 88305 TISSUE EXAM BY PATHOLOGIST: CPT | Performed by: SPECIALIST

## 2019-02-18 PROCEDURE — G8907 PT DOC NO EVENTS ON DISCHARG: HCPCS

## 2019-02-18 PROCEDURE — 45381 COLONOSCOPY SUBMUCOUS NJX: CPT

## 2019-02-18 RX ORDER — FENTANYL CITRATE 50 UG/ML
INJECTION, SOLUTION INTRAMUSCULAR; INTRAVENOUS PRN
Status: DISCONTINUED | OUTPATIENT
Start: 2019-02-18 | End: 2019-02-18 | Stop reason: HOSPADM

## 2019-02-18 NOTE — H&P
Pre-Endoscopy History and Physical     Flavia Brice MRN# [id number]   YOB: 1965 Age: 54 year old     Date of Procedure: 2/18/2019  Primary care provider: Monet Ackerman  Type of Endoscopy: Colonoscopy with possible biopsy, possible polypectomy  Reason for Procedure: screening  Type of Anesthesia Anticipated: Conscious Sedation    HPI:    Flavia is a 54 year old female who will be undergoing the above procedure.      A history and physical has been performed. The patient's medications and allergies have been reviewed. The risks and benefits of the procedure and the sedation options and risks were discussed with the patient.  All questions were answered and informed consent was obtained.      She denies a personal or family history of anesthesia complications or bleeding disorders.     Patient Active Problem List   Diagnosis     Wegener's granulomatosis (H)     CARDIOVASCULAR SCREENING; LDL GOAL LESS THAN 130     Overweight     Advanced directives, counseling/discussion     Tobacco abuse     Hypertension goal BP (blood pressure) < 140/90     Tobacco use disorder     Situational depression        History reviewed. No pertinent past medical history.     Past Surgical History:   Procedure Laterality Date     GALLBLADDER SURGERY         Social History     Tobacco Use     Smoking status: Current Every Day Smoker     Packs/day: 0.50     Types: Cigarettes     Smokeless tobacco: Never Used   Substance Use Topics     Alcohol use: Yes     Comment: monthly       Family History   Problem Relation Age of Onset     Diabetes Father      Heart Disease Maternal Grandmother      Heart Disease Maternal Grandfather      Arthritis Paternal Grandmother      Heart Disease Paternal Grandfather        Prior to Admission medications    Medication Sig Start Date End Date Taking? Authorizing Provider   losartan-hydrochlorothiazide (HYZAAR) 50-12.5 MG per tablet Take 1 tablet by mouth daily 3/26/18  Yes Monet Ackerman,  "NP   losartan (COZAAR) 50 MG tablet Take 1 tablet (50 mg) by mouth daily For blood pressure take with breakfast 10/27/17   Jimenez Gonzalez MD   PREDNISONE PO Take 2.5 mg by mouth daily    Reported, Patient       No Known Allergies     REVIEW OF SYSTEMS:   5 point ROS negative except as noted above in HPI, including Gen., Resp., CV, GI &  system review.    PHYSICAL EXAM:   BP (!) 146/93   Temp 98.1  F (36.7  C) (Temporal)   Resp 14   SpO2 97%  Estimated body mass index is 30.19 kg/m  as calculated from the following:    Height as of 3/26/18: 1.66 m (5' 5.35\").    Weight as of 3/26/18: 83.2 kg (183 lb 6.4 oz).   GENERAL APPEARANCE: alert, and oriented  MENTAL STATUS: alert  AIRWAY EXAM: Mallampatti Class III (visualization of the soft palate and base of uvula)  RESP: lungs clear to auscultation - no rales, rhonchi or wheezes  CV: regular rates and rhythm  DIAGNOSTICS:    Not indicated    IMPRESSION   ASA Class 2 - Mild systemic disease    PLAN:   Plan for Colonoscopy with possible biopsy, possible polypectomy. We discussed the risks, benefits and alternatives and the patient wished to proceed.    The above has been forwarded to the consulting provider.      Signed Electronically by: Javier Guillen  February 18, 2019          "

## 2019-02-18 NOTE — RESULT ENCOUNTER NOTE
Derick Alejandro,    Thank you for your recent office visit.    Here are your recent results.  Colonoscopy results per GI:    Impression:               - The examined portion of the ileum was normal.                             - Six 5 to 20 mm polyps in the ascending colon,                             removed with a hot snare. Incomplete resection (the                             largest sessile polyp). Resected tissue retrieved.                             Tattooed.                             - One 9 mm polyp in the descending colon, removed                             with a hot snare. Resected and retrieved.                             - One 7 mm polyp in the sigmoid colon, removed with                             a cold snare. Resected and retrieved.                             - Diverticulosis in the sigmoid colon.                             - The examination was otherwise normal on direct                             and retroflexion views.   Recommendation:           - Await pathology results.                             - Repeat colonoscopy in 6 months for surveillance                             after piecemeal polypectomy.     Feel free to contact me via [employer] or call the clinic at [phone].    Sincerely,    KELLY Sparks, FNP-BC

## 2019-02-19 NOTE — OR NURSING
Post procedural phone call:   spoke with pt who stated she had 3 bowel movements after discharge that were all blood/clots. She contacted Dr. Guillen who directed her to the ED at [facility].  This morning she is feeling better at home, no rectal bleeding.  Instructed to continue monitoring for blood, increase po fluid intake, and contact Dr. Guillen with any additional concerns.

## 2019-02-20 LAB — COPATH REPORT: NORMAL

## 2019-02-21 NOTE — RESULT ENCOUNTER NOTE
Derick Alejandro,    Thank you for your recent office visit.    Here are your recent results.  Per GI:     Assessment: Adenomatous polyps are benign, but have malignant potential. This increases the risk of colon cancer and impacts the frequency of colonoscopy (every five years). Colonoscopy every three years is indicated when 1) polyp size is > 1 cm, 2) three or more adenomas are present, 3) villous features are present and 4) high grade dysplasia is present. Your patient met three of these criteria. Some evidence suggests that daily aspirin or calcium reduces the risk of recurrence, but studies are inconclusive. Daily aspirin can be used for prevention of colorectal cancer (between the ages of 50 and 70). Because the large polyp was removed piecemeal and I suspect there is residual polyp tissue, an even shorter interval is indicated.     Recommendations: Surveillance colonoscopy in six months (2019).   First degree relatives of patients with adenomatous polyps are at increased risk of developing adenomas and colorectal cancer; screening colonoscopy is recommended.   Assess the patient's age and comorbid conditions to determine if a daily aspirin is appropriate.     Feel free to contact me via [employer] or call the clinic at [phone].    Sincerely,    Monet Ackerman, KELLY, FNP-BC

## 2019-09-23 ENCOUNTER — TELEPHONE (OUTPATIENT)
Dept: FAMILY MEDICINE | Facility: CLINIC | Age: 54
End: 2019-09-23

## 2019-09-23 NOTE — LETTER
October 10, 2019      Flavia Brice  [address] [address]        Dear Flavia,     We have tried to contact you by phone several and/or mychart times.    In order to ensure we are providing the best quality care, we have reviewed your chart and see that you are due for the following.    1. Your next office visit is due for physical   2. Fasting/Non-Fasting labs- basic panel, hiv screen   3. Vaccines-Influenza, shingles   4. Tobacco cessation   5. Blood pressure check with the ancillary nurse   6. Complete the enclosed questionnaires to update your chart     For fasting labs please fast for at least 10 hours. You can still take your medications and have water.    We greatly appreciate the opportunity to serve you.  Thank you for trusting us with your health care.    If you are now being seen elsewhere please let us know and we will remove you from the list.    Sincerely,    [facility]

## 2019-09-23 NOTE — TELEPHONE ENCOUNTER
Panel Management Review  Summary:    Patient is due/failing the following:   Preventative visit, BP recheck, labs, PHQ9/Thad/ depression action plan, Advance care planning, tobacco use and flu shot.     Type of outreach:    Please call to assist with scheduling                                                                                                                    Monet Ackerman NP     Chart routed to Care Team .

## 2019-09-26 NOTE — TELEPHONE ENCOUNTER
Type of outreach:    Sent Taggo message.     Patient is due/failing the following:   Physical  Ancillary BP recheck  Labs-basic panel, hiv screen  PHQ9/Gad7  tobacco cessation  Vaccines-flu shot, shingles vaccine

## 2019-10-03 NOTE — TELEPHONE ENCOUNTER
Type of outreach:    Phone, left message for patient to call back.     Patient is due/failing the following:   Physical  Ancillary BP recheck  Labs-basic panel, hiv screen  PHQ9/Gad7  tobacco cessation  Vaccines-flu shot, shingles vaccine

## 2019-12-23 ENCOUNTER — TELEPHONE (OUTPATIENT)
Dept: FAMILY MEDICINE | Facility: CLINIC | Age: 54
End: 2019-12-23

## 2019-12-23 NOTE — LETTER
January 6, 2020      Flavia Brice  [address] [address]        Dear Flavia,     We have tried to contact you by phone several and/or mychart times.    In order to ensure we are providing the best quality care, we have reviewed your chart and see that you are due for the following.    1. Your next office visit is due for a physical and follow up on blood pressure and depression   2. Fasting/Non-Fasting labs- hiv screen, basic panel   3. Mammogram   4. Enclosed questionnaires       For fasting labs please fast for at least 10 hours. You can still take your medications and have water.    We greatly appreciate the opportunity to serve you.  Thank you for trusting us with your health care.    If you are now being seen elsewhere please let us know and we will remove you from the list.    Sincerely,  [facility]

## 2019-12-23 NOTE — TELEPHONE ENCOUNTER
Panel Management Review  Summary:    Patient is due/failing the following:   BP CHECK  Health Maintenance Due   Topic Date Due     HIV SCREENING  01/20/1979     ZOSTER IMMUNIZATION (1 of 2) 01/20/2014     PHQ-2  01/01/2019     ADVANCE CARE PLANNING  02/27/2019     TOBACCO CESSATION COUNSELING  03/26/2019     PREVENTIVE CARE VISIT  03/26/2019     BMP  03/26/2019     DHRUV ASSESSMENT  03/26/2019     INFLUENZA VACCINE (1) 09/01/2019     MAMMO SCREENING  01/18/2020        Type of outreach:    Please call to assist with scheduling                                                                                                                   Monet Ackerman NP    Chart routed to Care Team .

## 2020-02-25 NOTE — PROGRESS NOTES
Subjective     Flavia Brice is a 55 year old female who presents to clinic today for the following health issues:    HPI   ED/UC Followup:    Facility:  [facility]  Date of visit: 2/18/2020  Reason for visit: Hypertension, chest pain,   Current Status: patient states doing better. Blood pressure is still high.  She reports that she has never been good about taking medication, she would take her BP medication if she had headaches, otherwise she would not take it.  She has not taken medication daily for a week.  She has joint a gym, attending 3-4 times per week and she is on day 9 of chantix to quit smoking. 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        Patient Active Problem List   Diagnosis     Wegener's granulomatosis (H)     CARDIOVASCULAR SCREENING; LDL GOAL LESS THAN 130     Overweight     Advanced directives, counseling/discussion     Tobacco abuse     Hypertension goal BP (blood pressure) < 140/90     Tobacco use disorder     Situational depression     Past Surgical History:   Procedure Laterality Date     COLONOSCOPY WITH CO2 INSUFFLATION N/A 2/18/2019    Procedure: COLONOSCOPY WITH CO2 INSUFFLATION;  Surgeon: Javier Guillen MD;  Location: [facility] OR     GALLBLADDER SURGERY         Social History     Tobacco Use     Smoking status: Former Smoker     Packs/day: 0.50     Types: Cigarettes     Smokeless tobacco: Never Used   Substance Use Topics     Alcohol use: Yes     Comment: monthly     Family History   Problem Relation Age of Onset     Diabetes Father      Heart Disease Maternal Grandmother      Heart Disease Maternal Grandfather      Arthritis Paternal Grandmother      Heart Disease Paternal Grandfather          Current Outpatient Medications   Medication Sig Dispense Refill     losartan-hydrochlorothiazide (HYZAAR) 100-12.5 MG tablet Take 1 tablet by mouth daily 90 tablet 1     losartan-hydrochlorothiazide (HYZAAR) 50-12.5 MG per  "tablet Take 1 tablet by mouth daily 90 tablet 3     varenicline (CHANTIX) 1 MG tablet Take 1 mg by mouth       CHANTIX STARTING MONTH MARILY 0.5 MG X 11 & 1 MG X 42 tablet        No Known Allergies  Recent Labs   Lab Test 03/26/18  1058 10/27/17  1541 02/27/14  1148 10/29/13 02/28/13   A1C  --  5.8  --  6.0 6.0   LDL 82  --  87  --   --    HDL 87  --  74  --   --    TRIG 93  --  160*  --   --    ALT  --   --  39 29 30   CR 1.08* 1.42* 1.21* 1.16 1.46   GFRESTIMATED 53* 39* 47* 49.7 3.81   GFRESTBLACK 64 47* 57* >60.0 46.1   POTASSIUM 4.1 3.7 3.8 3.6  --    TSH 1.44  --   --   --   --       BP Readings from Last 3 Encounters:   02/28/20 (!) 146/85   02/18/19 (!) 169/91   06/12/18 115/79    Wt Readings from Last 3 Encounters:   02/28/20 77.1 kg (170 lb)   03/26/18 83.2 kg (183 lb 6.4 oz)   12/01/17 79 kg (174 lb 3.2 oz)                    Reviewed and updated as needed this visit by Provider  Tobacco  Allergies  Meds  Problems  Med Hx  Surg Hx  Fam Hx         Review of Systems   ROS COMP: Constitutional, HEENT, cardiovascular, pulmonary, GI, , musculoskeletal, neuro, skin, endocrine and psych systems are negative, except as otherwise noted.      Objective    BP (!) 146/85   Pulse 68   Temp 98.2  F (36.8  C) (Oral)   Resp 16   Ht 1.66 m (5' 5.35\")   Wt 77.1 kg (170 lb)   SpO2 99%   BMI 27.98 kg/m    Body mass index is 27.98 kg/m .  Physical Exam   GENERAL: healthy, alert and no distress  EYES: Eyes grossly normal to inspection, PERRL and conjunctivae and sclerae normal  RESP: lungs clear to auscultation - no rales, rhonchi or wheezes  CV: regular rate and rhythm, normal S1 S2, no S3 or S4, no murmur, click or rub, no peripheral edema and peripheral pulses strong  MS: no gross musculoskeletal defects noted, no edema, no CVA tenderness  PSYCH: mentation appears normal, POSITIVE tearful at times during visit.     Diagnostic Test Results:  Labs reviewed in Epic  See orders        Assessment & Plan       " "ICD-10-CM    1. Hospital discharge follow-up Z09    2. Advance care planning Z71.89    3. Hypertension goal BP (blood pressure) < 140/90 I10 BASIC METABOLIC PANEL     losartan-hydrochlorothiazide (HYZAAR) 100-12.5 MG tablet   4. Encounter for screening mammogram for breast cancer Z12.31 MA SCREENING DIGITAL BILAT - Future  (s+30)   5. Screening for human immunodeficiency virus without presence of risk factors Z11.4 HIV Screening        BMI:   Estimated body mass index is 27.98 kg/m  as calculated from the following:    Height as of this encounter: 1.66 m (5' 5.35\").    Weight as of this encounter: 77.1 kg (170 lb).   Weight management plan: Discussed healthy diet and exercise guidelines        See Patient Instructions    Return in about 2 weeks (around 3/13/2020), or if symptoms worsen or fail to improve, for BP Recheck.    Monet Ackerman, ISSAC  [facility] [place]    "

## 2020-02-25 NOTE — PATIENT INSTRUCTIONS
Reminders:     Please remember to arrive 5-10 minutes early for your appointments. If you are late you may need to reschedule your appointment.    If you have mychart please be aware your results and communications will be sent within your mychart. Unless results are critical and/or urgent value.       Patient Education     Low-Salt Choices  Eating salt (sodium) can make your body retain too much water. Excess water makes your heart work harder. Canned, packaged, and frozen foods are easy to prepare. But they are often high in sodium. Here are some ideas for low-salt foods you can easily make yourself.    For breakfast    Fruit or 100% fruit juice    Whole-wheat bread or an English muffin. Look for sodium content on Nutrition Facts labels.    Low-fat milk or yogurt    Unsalted eggs    Shredded wheat    Corn tortillas    Unsalted steamed rice    Regular (not instant) hot cereal, made without salt  Stay away from:    Sausage, sanford, and ham    Flour tortillas    Packaged muffins, pancakes, and biscuits    Instant hot cereals    Cottage cheese    For lunch and dinner    Fresh fish, chicken, turkey, or meat--baked, broiled, or roasted without salt    Dry beans, cooked without salt    Tofu, stir-fried without salt    Unsalted fresh fruit and vegetables, or frozen or canned fruit and vegetables with no added salt  Stay away from:    Lunch or deli meat that is cured or smoked    Cheese    Tomato juice and ketchup    Canned vegetables, soups, and fish not labeled as no-salt-added or reduced sodium    Packaged gravies and sauces    Olives, pickles, and relish    Bottled salad dressings    For snacks and desserts    Yogurt    Unsalted, air-popped popcorn    Unsalted nuts or seeds  Stay away from:    Pies and cakes    Packaged dessert mixes    Pizza    Canned and packaged puddings    Pretzels, chips, crackers, and nuts--unless the label says unsalted    Date Last Reviewed: 6/1/2017 2000-2019 [employer] [address]  [place] [address]. All rights reserved. This information is not intended as a substitute for professional medical care. Always follow your healthcare professional's instructions.

## 2020-02-28 ENCOUNTER — OFFICE VISIT (OUTPATIENT)
Dept: FAMILY MEDICINE | Facility: CLINIC | Age: 55
End: 2020-02-28
Payer: COMMERCIAL

## 2020-02-28 VITALS
BODY MASS INDEX: 28.32 KG/M2 | HEART RATE: 68 BPM | SYSTOLIC BLOOD PRESSURE: 146 MMHG | TEMPERATURE: 98.2 F | OXYGEN SATURATION: 99 % | DIASTOLIC BLOOD PRESSURE: 85 MMHG | WEIGHT: 170 LBS | HEIGHT: 65 IN | RESPIRATION RATE: 16 BRPM

## 2020-02-28 DIAGNOSIS — I10 HYPERTENSION GOAL BP (BLOOD PRESSURE) < 140/90: ICD-10-CM

## 2020-02-28 DIAGNOSIS — Z12.31 ENCOUNTER FOR SCREENING MAMMOGRAM FOR BREAST CANCER: ICD-10-CM

## 2020-02-28 DIAGNOSIS — Z11.4 SCREENING FOR HUMAN IMMUNODEFICIENCY VIRUS WITHOUT PRESENCE OF RISK FACTORS: ICD-10-CM

## 2020-02-28 DIAGNOSIS — Z09 HOSPITAL DISCHARGE FOLLOW-UP: Primary | ICD-10-CM

## 2020-02-28 DIAGNOSIS — Z71.89 ADVANCE CARE PLANNING: ICD-10-CM

## 2020-02-28 LAB
ANION GAP SERPL CALCULATED.3IONS-SCNC: 6 MMOL/L (ref 3–14)
BUN SERPL-MCNC: 33 MG/DL (ref 7–30)
CALCIUM SERPL-MCNC: 9.2 MG/DL (ref 8.5–10.1)
CHLORIDE SERPL-SCNC: 109 MMOL/L (ref 94–109)
CO2 SERPL-SCNC: 26 MMOL/L (ref 20–32)
CREAT SERPL-MCNC: 1.36 MG/DL (ref 0.52–1.04)
GFR SERPL CREATININE-BSD FRML MDRD: 43 ML/MIN/{1.73_M2}
GLUCOSE SERPL-MCNC: 125 MG/DL (ref 70–99)
POTASSIUM SERPL-SCNC: 4.6 MMOL/L (ref 3.4–5.3)
SODIUM SERPL-SCNC: 141 MMOL/L (ref 133–144)

## 2020-02-28 PROCEDURE — 99214 OFFICE O/P EST MOD 30 MIN: CPT | Performed by: NURSE PRACTITIONER

## 2020-02-28 PROCEDURE — 36415 COLL VENOUS BLD VENIPUNCTURE: CPT | Performed by: NURSE PRACTITIONER

## 2020-02-28 PROCEDURE — 80048 BASIC METABOLIC PNL TOTAL CA: CPT | Performed by: NURSE PRACTITIONER

## 2020-02-28 RX ORDER — LOSARTAN POTASSIUM AND HYDROCHLOROTHIAZIDE 12.5; 1 MG/1; MG/1
1 TABLET ORAL DAILY
Qty: 90 TABLET | Refills: 1 | Status: SHIPPED | OUTPATIENT
Start: 2020-02-28 | End: 2020-07-27

## 2020-02-28 RX ORDER — VARENICLINE TARTRATE 1 MG/1
1 TABLET, FILM COATED ORAL
COMMUNITY
Start: 2020-02-04 | End: 2020-07-27

## 2020-02-28 ASSESSMENT — ANXIETY QUESTIONNAIRES
6. BECOMING EASILY ANNOYED OR IRRITABLE: SEVERAL DAYS
5. BEING SO RESTLESS THAT IT IS HARD TO SIT STILL: MORE THAN HALF THE DAYS
3. WORRYING TOO MUCH ABOUT DIFFERENT THINGS: NOT AT ALL
GAD7 TOTAL SCORE: 4
1. FEELING NERVOUS, ANXIOUS, OR ON EDGE: NOT AT ALL
7. FEELING AFRAID AS IF SOMETHING AWFUL MIGHT HAPPEN: NOT AT ALL
2. NOT BEING ABLE TO STOP OR CONTROL WORRYING: NOT AT ALL
IF YOU CHECKED OFF ANY PROBLEMS ON THIS QUESTIONNAIRE, HOW DIFFICULT HAVE THESE PROBLEMS MADE IT FOR YOU TO DO YOUR WORK, TAKE CARE OF THINGS AT HOME, OR GET ALONG WITH OTHER PEOPLE: NOT DIFFICULT AT ALL

## 2020-02-28 ASSESSMENT — PATIENT HEALTH QUESTIONNAIRE - PHQ9
SUM OF ALL RESPONSES TO PHQ QUESTIONS 1-9: 3
5. POOR APPETITE OR OVEREATING: SEVERAL DAYS

## 2020-02-28 ASSESSMENT — MIFFLIN-ST. JEOR: SCORE: 1367.6

## 2020-02-29 ASSESSMENT — ANXIETY QUESTIONNAIRES: GAD7 TOTAL SCORE: 4

## 2020-03-02 ENCOUNTER — HEALTH MAINTENANCE LETTER (OUTPATIENT)
Age: 55
End: 2020-03-02

## 2020-03-05 NOTE — RESULT ENCOUNTER NOTE
Derick Alejandro,    Thank you for your recent office visit.    Here are your recent results.  Your creatine and GFR have slightly worsened from last year, I see this has been an issue in the past as well.  Continue to work on blood pressure management. Low salt diet, work on increasing exercise daily.  Would you like to see a nephrologist to discuss?    Feel free to contact me via [employer] or call the clinic at [phone].    Sincerely,    KELLY Sparks, FNP-BC

## 2020-06-12 ENCOUNTER — TELEPHONE (OUTPATIENT)
Dept: FAMILY MEDICINE | Facility: CLINIC | Age: 55
End: 2020-06-12

## 2020-06-12 NOTE — TELEPHONE ENCOUNTER
Panel Management Review    Summary:    Patient is due/failing the following:   PHYSICAL with hypertension recheck    Type of outreach:    Sent [employer] message.                                                                                                                                  Zulma Coronado CMA     Chart routed to Care Team .

## 2020-06-19 NOTE — TELEPHONE ENCOUNTER
Type of outreach:    Phone, left message for patient to call back.        Patient is due/failing the following:   Follow up on blood pressure and depression  PHYSICAL

## 2020-07-22 ENCOUNTER — APPOINTMENT (OUTPATIENT)
Dept: CT IMAGING | Facility: CLINIC | Age: 55
End: 2020-07-22
Attending: EMERGENCY MEDICINE
Payer: COMMERCIAL

## 2020-07-22 ENCOUNTER — NURSE TRIAGE (OUTPATIENT)
Dept: NURSING | Facility: CLINIC | Age: 55
End: 2020-07-22

## 2020-07-22 ENCOUNTER — HOSPITAL ENCOUNTER (EMERGENCY)
Facility: CLINIC | Age: 55
Discharge: HOME | End: 2020-07-22
Attending: EMERGENCY MEDICINE | Admitting: EMERGENCY MEDICINE
Payer: COMMERCIAL

## 2020-07-22 ENCOUNTER — APPOINTMENT (OUTPATIENT)
Dept: GENERAL RADIOLOGY | Facility: CLINIC | Age: 55
End: 2020-07-22
Attending: EMERGENCY MEDICINE
Payer: COMMERCIAL

## 2020-07-22 VITALS
OXYGEN SATURATION: 100 % | RESPIRATION RATE: 16 BRPM | TEMPERATURE: 98.7 F | BODY MASS INDEX: 27.98 KG/M2 | DIASTOLIC BLOOD PRESSURE: 84 MMHG | SYSTOLIC BLOOD PRESSURE: 146 MMHG | HEART RATE: 84 BPM | WEIGHT: 170 LBS

## 2020-07-22 DIAGNOSIS — R06.02 SHORTNESS OF BREATH: ICD-10-CM

## 2020-07-22 LAB
ANION GAP SERPL CALCULATED.3IONS-SCNC: 2 MMOL/L (ref 3–14)
BASOPHILS # BLD AUTO: 0.1 10E9/L (ref 0–0.2)
BASOPHILS NFR BLD AUTO: 0.4 %
BUN SERPL-MCNC: 29 MG/DL (ref 7–30)
CALCIUM SERPL-MCNC: 9.6 MG/DL (ref 8.5–10.1)
CHLORIDE SERPL-SCNC: 108 MMOL/L (ref 94–109)
CO2 SERPL-SCNC: 27 MMOL/L (ref 20–32)
CREAT SERPL-MCNC: 1.17 MG/DL (ref 0.52–1.04)
D DIMER PPP FEU-MCNC: 0.6 UG/ML FEU (ref 0–0.5)
DIFFERENTIAL METHOD BLD: ABNORMAL
EOSINOPHIL # BLD AUTO: 0.1 10E9/L (ref 0–0.7)
EOSINOPHIL NFR BLD AUTO: 1 %
ERYTHROCYTE [DISTWIDTH] IN BLOOD BY AUTOMATED COUNT: 13.9 % (ref 10–15)
GFR SERPL CREATININE-BSD FRML MDRD: 52 ML/MIN/{1.73_M2}
GLUCOSE SERPL-MCNC: 92 MG/DL (ref 70–99)
HCT VFR BLD AUTO: 38.8 % (ref 35–47)
HGB BLD-MCNC: 12 G/DL (ref 11.7–15.7)
IMM GRANULOCYTES # BLD: 0.1 10E9/L (ref 0–0.4)
IMM GRANULOCYTES NFR BLD: 0.5 %
LABORATORY COMMENT REPORT: NORMAL
LYMPHOCYTES # BLD AUTO: 1.4 10E9/L (ref 0.8–5.3)
LYMPHOCYTES NFR BLD AUTO: 11.4 %
MCH RBC QN AUTO: 30.5 PG (ref 26.5–33)
MCHC RBC AUTO-ENTMCNC: 30.9 G/DL (ref 31.5–36.5)
MCV RBC AUTO: 99 FL (ref 78–100)
MONOCYTES # BLD AUTO: 1.2 10E9/L (ref 0–1.3)
MONOCYTES NFR BLD AUTO: 9.4 %
NEUTROPHILS # BLD AUTO: 9.7 10E9/L (ref 1.6–8.3)
NEUTROPHILS NFR BLD AUTO: 77.3 %
NRBC # BLD AUTO: 0 10*3/UL
NRBC BLD AUTO-RTO: 0 /100
NT-PROBNP SERPL-MCNC: 929 PG/ML (ref 0–900)
PLATELET # BLD AUTO: 315 10E9/L (ref 150–450)
POTASSIUM SERPL-SCNC: 4.3 MMOL/L (ref 3.4–5.3)
RBC # BLD AUTO: 3.94 10E12/L (ref 3.8–5.2)
SARS-COV-2 RNA SPEC QL NAA+PROBE: NEGATIVE
SARS-COV-2 RNA SPEC QL NAA+PROBE: NORMAL
SODIUM SERPL-SCNC: 137 MMOL/L (ref 133–144)
SPECIMEN SOURCE: NORMAL
SPECIMEN SOURCE: NORMAL
TROPONIN I SERPL-MCNC: <0.015 UG/L (ref 0–0.04)
WBC # BLD AUTO: 12.5 10E9/L (ref 4–11)

## 2020-07-22 PROCEDURE — 99285 EMERGENCY DEPT VISIT HI MDM: CPT | Mod: 25 | Performed by: EMERGENCY MEDICINE

## 2020-07-22 PROCEDURE — 71045 X-RAY EXAM CHEST 1 VIEW: CPT

## 2020-07-22 PROCEDURE — 71275 CT ANGIOGRAPHY CHEST: CPT

## 2020-07-22 PROCEDURE — 83880 ASSAY OF NATRIURETIC PEPTIDE: CPT | Performed by: EMERGENCY MEDICINE

## 2020-07-22 PROCEDURE — C9803 HOPD COVID-19 SPEC COLLECT: HCPCS | Performed by: EMERGENCY MEDICINE

## 2020-07-22 PROCEDURE — 93010 ELECTROCARDIOGRAM REPORT: CPT | Mod: Z6 | Performed by: EMERGENCY MEDICINE

## 2020-07-22 PROCEDURE — 25000128 H RX IP 250 OP 636

## 2020-07-22 PROCEDURE — 93005 ELECTROCARDIOGRAM TRACING: CPT | Performed by: EMERGENCY MEDICINE

## 2020-07-22 PROCEDURE — 85025 COMPLETE CBC W/AUTO DIFF WBC: CPT | Performed by: EMERGENCY MEDICINE

## 2020-07-22 PROCEDURE — 80048 BASIC METABOLIC PNL TOTAL CA: CPT | Performed by: EMERGENCY MEDICINE

## 2020-07-22 PROCEDURE — 85379 FIBRIN DEGRADATION QUANT: CPT | Performed by: EMERGENCY MEDICINE

## 2020-07-22 PROCEDURE — U0003 INFECTIOUS AGENT DETECTION BY NUCLEIC ACID (DNA OR RNA); SEVERE ACUTE RESPIRATORY SYNDROME CORONAVIRUS 2 (SARS-COV-2) (CORONAVIRUS DISEASE [COVID-19]), AMPLIFIED PROBE TECHNIQUE, MAKING USE OF HIGH THROUGHPUT TECHNOLOGIES AS DESCRIBED BY CMS-2020-01-R: HCPCS | Performed by: EMERGENCY MEDICINE

## 2020-07-22 PROCEDURE — 84484 ASSAY OF TROPONIN QUANT: CPT | Performed by: EMERGENCY MEDICINE

## 2020-07-22 RX ORDER — IOPAMIDOL 755 MG/ML
60 INJECTION, SOLUTION INTRAVASCULAR ONCE
Status: COMPLETED | OUTPATIENT
Start: 2020-07-22 | End: 2020-07-22

## 2020-07-22 RX ADMIN — IOPAMIDOL 60 ML: 755 INJECTION, SOLUTION INTRAVENOUS at 13:33

## 2020-07-22 NOTE — TELEPHONE ENCOUNTER
She has chest tightness with breathing. She has an autoimmune disease and was seen in the  ER last weekend for that. They advised her next time, to go to the [facility], which is where she'll go today.  Bronwyn Vang RN  [facility]      Reason for Disposition    SEVERE or constant chest pain or pressure (Exception: mild central chest pain, present only when coughing)     Woke at 3:30 a.m. had to put a fan in the window. She got up and took some Dayquil and an hot shower. Wasn't phlegmy but did feel better.    Additional Information    Negative: SEVERE difficulty breathing (e.g., struggling for each breath, speaks in single words)    Negative: Difficult to awaken or acting confused (e.g., disoriented, slurred speech)    Negative: Bluish (or gray) lips or face now    Negative: Shock suspected (e.g., cold/pale/clammy skin, too weak to stand, low BP, rapid pulse)    Negative: Sounds like a life-threatening emergency to the triager    Negative: [1] COVID-19 exposure AND [2] no symptoms    Negative: COVID-19 and Breastfeeding, questions about    Negative: [1] Adult with possible COVID-19 symptoms AND [2] triager concerned about severity of symptoms or other causes    Protocols used: CORONAVIRUS (COVID-19) DIAGNOSED OR [id number] 5.16.20

## 2020-07-22 NOTE — ED TRIAGE NOTES
"Patient reports \"just not feeling well\" c/o  Chest tightness and difficult taking a \"big breath in\"  Patient reports that she was at [facility] recently with concerns of left sided swelling from arms all the way down to her feet.  Was worked up  And told to go to rheumotology    "

## 2020-07-22 NOTE — ED PROVIDER NOTES
[place] EMERGENCY DEPARTMENT ([facility])  July 22, 2020  History     Chief Complaint   Patient presents with     Chest Pain     Shortness of Breath     HPI  Flavia Brice is a 55 year old female with a previous medical history relevant for Wegener's granulomatosis, chronic diastolic heart failure, ascending thoracic aorta aneurysm(4.6cm on chest CT 02/18/2020), migratory polyarthritis, acute kidney injury(07/12/20) and HTN who presents to the ED for evaluation of shortness of breath and chest tightness.     Patient reports that the chest pain and shortness of breath started early this morning.  The pain is low substernal area and stabbing, non-radiating, 4-5/10. Only present with deep breaths.  Improves with exhalation. Took some Dayquil and albuterol which did not help with the pain.  Has difficulty breathing, worse with lying down.  She had similar symptoms in February 2020 and was seen at [facility].  She states that she was told that it was likely bronchitis.  She was admitted to [facility] July 2020 for polyarthralgia, discharged 2 days ago. Also reports 4-5 episodes of diarrhea yesterday afternoon, none since then. Denies fever.  Has been coughing yellowish mucus since yesterday. No abdominal pain, nausea, vomiting, diarrhea, dysuria. Denies numbness, paresthesias.  She reports some sore throat and sore lymph nodes in her anterior neck.         PAST MEDICAL HISTORY: History reviewed. No pertinent past medical history.    PAST SURGICAL HISTORY:   Past Surgical History:   Procedure Laterality Date     COLONOSCOPY WITH CO2 INSUFFLATION N/A 2/18/2019    Procedure: COLONOSCOPY WITH CO2 INSUFFLATION;  Surgeon: Javier Guillen MD;  Location: [facility] OR     GALLBLADDER SURGERY         Past medical history, past surgical history, medications, and allergies were reviewed with the patient. Additional pertinent items: None    FAMILY HISTORY:   Family History   Problem Relation Age of Onset     Diabetes  Father      Heart Disease Maternal Grandmother      Heart Disease Maternal Grandfather      Arthritis Paternal Grandmother      Heart Disease Paternal Grandfather        SOCIAL HISTORY:   Social History     Tobacco Use     Smoking status: Former Smoker     Packs/day: 0.50     Types: Cigarettes     Smokeless tobacco: Never Used   Substance Use Topics     Alcohol use: Yes     Comment: monthly     Social history was reviewed with the patient. Additional pertinent items: None      Discharge Medication List as of 7/22/2020  3:38 PM      CONTINUE these medications which have NOT CHANGED    Details   CHANTIX STARTING MONTH MARILY 0.5 MG X 11 & 1 MG X 42 tablet ZANE, Historical      losartan-hydrochlorothiazide (HYZAAR) 100-12.5 MG tablet Take 1 tablet by mouth daily, Disp-90 tablet, R-1, E-Prescribe      losartan-hydrochlorothiazide (HYZAAR) 50-12.5 MG per tablet Take 1 tablet by mouth daily, Disp-90 tablet, R-3, E-Prescribe      varenicline (CHANTIX) 1 MG tablet Take 1 mg by mouth, Historical              No Known Allergies     Review of Systems  A complete review of systems was performed with pertinent positives and negatives noted in the HPI, and all other systems negative.    Physical Exam   BP: (!) 164/102  Pulse: 61  Temp: 98.7  F (37.1  C)  Resp: 18  Weight: 77.1 kg (170 lb)  SpO2: 99 %      Physical Exam    GEN:  Alert, well developed, no acute distress  HEENT:  PERRL, EOMI, Mucous membranes are moist. Tender anterior cervical lymph nodes are present.   Cardio:  RRR, no murmur, radial and dorsalis pedis pulses equal bilaterally  PULM:  Lungs clear, good air movement, no wheezes, rales   Abd:  Soft, normal bowel sounds, no focal tenderness  Back exam:  No CVA tenderness  Musculoskeletal:  No chest wall tenderness, extremities have normal range of motion, no lower extremity swelling or calf tenderness  Neuro:  Alert and oriented X3, Follows commands, moving all extremities spontaneously   Skin:  Warm, dry   ED Course  "       Procedures             EKG Interpretation:      Interpreted by Flavia Dodge MD  Time reviewed: 11\"50  Symptoms at time of EKG: chest pain   Rhythm: Sinus bradycardia with sinus arrhythmia  Rate: 56  Axis: normal  Ectopy: none  Conduction: normal, minimal voltage criteria for LVH, may be normal variant  ST Segments/ T Waves: No ST-T wave changes  Q Waves: none  Comparison to prior: No old EKG available    Clinical Impression: Sinus bradycardia with sinus arrhythmia        Results for orders placed or performed during the hospital encounter of 07/22/20 (from the past 24 hour(s))   CBC with platelets differential   Result Value Ref Range    WBC 12.5 (H) 4.0 - 11.0 10e9/L    RBC Count 3.94 3.8 - 5.2 10e12/L    Hemoglobin 12.0 11.7 - 15.7 g/dL    Hematocrit 38.8 35.0 - 47.0 %    MCV 99 78 - 100 fl    MCH 30.5 26.5 - 33.0 pg    MCHC 30.9 (L) 31.5 - 36.5 g/dL    RDW 13.9 10.0 - 15.0 %    Platelet Count 315 150 - 450 10e9/L    Diff Method Automated Method     % Neutrophils 77.3 %    % Lymphocytes 11.4 %    % Monocytes 9.4 %    % Eosinophils 1.0 %    % Basophils 0.4 %    % Immature Granulocytes 0.5 %    Nucleated RBCs 0 0 /100    Absolute Neutrophil 9.7 (H) 1.6 - 8.3 10e9/L    Absolute Lymphocytes 1.4 0.8 - 5.3 10e9/L    Absolute Monocytes 1.2 0.0 - 1.3 10e9/L    Absolute Eosinophils 0.1 0.0 - 0.7 10e9/L    Absolute Basophils 0.1 0.0 - 0.2 10e9/L    Abs Immature Granulocytes 0.1 0 - 0.4 10e9/L    Absolute Nucleated RBC 0.0    Basic metabolic panel   Result Value Ref Range    Sodium 137 133 - 144 mmol/L    Potassium 4.3 3.4 - 5.3 mmol/L    Chloride 108 94 - 109 mmol/L    Carbon Dioxide 27 20 - 32 mmol/L    Anion Gap 2 (L) 3 - 14 mmol/L    Glucose 92 70 - 99 mg/dL    Urea Nitrogen 29 7 - 30 mg/dL    Creatinine 1.17 (H) 0.52 - 1.04 mg/dL    GFR Estimate 52 (L) >60 mL/min/[1.73_m2]    GFR Estimate If Black 60 (L) >60 mL/min/[1.73_m2]    Calcium 9.6 8.5 - 10.1 mg/dL   Troponin I   Result Value Ref Range    " Troponin I ES <0.015 0.000 - 0.045 ug/L   D dimer quantitative   Result Value Ref Range    D Dimer 0.6 (H) 0.0 - 0.50 ug/ml FEU   Nt probnp inpatient   Result Value Ref Range    N-Terminal Pro BNP Inpatient 929 (H) 0 - 900 pg/mL   Symptomatic COVID-19 Virus (Coronavirus) by PCR    Specimen: Nasopharyngeal   Result Value Ref Range    COVID-19 Virus PCR to [facility] - Source Nasopharyngeal     COVID-19 Virus PCR to [facility] - Result       Test received-See reflex to IDDL test SARS CoV2 (COVID-19) Virus RT-PCR   SARS-CoV-2 COVID-19 Virus (Coronavirus) RT-PCR Nasopharyngeal    Specimen: Nasopharyngeal   Result Value Ref Range    SARS-CoV-2 Virus Specimen Source Nasopharyngeal     SARS-CoV-2 PCR Result NEGATIVE     SARS-CoV-2 PCR Comment       Testing was performed using the Xpert Xpress SARS-CoV-2 Assay on the Cepheid Gene-Xpert   Instrument Systems. Additional information about this Emergency Use Authorization (EUA)   assay can be found via the Lab Guide.     EKG 12 lead   Result Value Ref Range    Interpretation ECG Click View Image link to view waveform and result    XR Chest Port 1 View    Narrative    Portable chest    INDICATION: Chest pain    COMPARISON: 12/1/2017    FINDINGS: Heart size and shape appear normal. There is atherosclerotic  calcification at the aortic arch. Lungs and pulmonary vascularity  appear normal. Bony structures appear grossly intact.      Impression    IMPRESSION: Aortic atherosclerosis.    MERCEDES MEDRANO MD   CT Chest Pulmonary Embolism w Contrast    Narrative    EXAMINATION: CTA pulmonary angiogram, 7/22/2020 1:41 PM     COMPARISON: None.    HISTORY: PE suspected, intermediate prob, positive D-dimer; chest pain    TECHNIQUE: Volumetric helical acquisition of CT images of the chest  from the lung apices to the kidneys were acquired after the  administration of 80 mL of Isovue-370 IV contrast. Non-Flash technique  with shallow inspiratory breath hold technique.   Post-processed  multiplanar and/or MIP reformations were obtained, archived to PACS  and used in interpretation of this study.     FINDINGS:      Contrast bolus is: adequate.  Exam is negative for acute pulmonary  embolism.       The largest right ventricle transaxial diameter is (measured from  endocardium to endocardium): 4.6 cm   The largest left ventricle transaxial diameter is (measured from  endocardium to endocardium): 4.7 cm  RV/LV ratio is: 1.0 (if ratio greater than 1.1 then sign is suspicious  for right heart strain)  Reflux of contrast into the IVC? yes  Paradoxical bowing of the interventricular septum to the left? no    Chest:     Thyroid is unremarkable. Heart is not enlarged. No pericardial  effusion. No significant coronary artery calcifications. The ascending  aorta is enlarged measuring up to 4.5 cm. The main pulmonary artery is  normal in caliber.    No mediastinal or hilar lymphadenopathy.    Trachea and central airways are clear. Mild basilar predominant  peribronchovascular groundglass opacities likely related to shallow  breath hold. No pleural effusion or pneumothorax.    Scattered pulmonary nodules. For example:  -Solid pulmonary nodule in the posterior right lower lobe measuring 2  mm (series 9, image 106).  -Solid pulmonary nodule in the inferomedial right lower lobe measuring  2.5 mm (series 9, image 89)    Abdomen: Visualized abdomen is limited.. Small sliding-type hiatal  hernia.    Bones and Soft Tissues: No suspicious osseous lesion. No suspicious  mass.  Mild degenerative changes of the thoracic spine.        Impression    IMPRESSION:   1. No pulmonary embolus appreciated.    2. Enlargement of the ascending aorta measuring up to 4.5 cm.  Attention on follow-up.    3. Scattered sub-3 mm solid pulmonary nodules. Consider optional  follow-up with CT chest in 12 months if patient is considered high  risk for cancer as per Fleischner 2017 guidelines.    In the event of a positive result  for acute pulmonary embolism  resulting in right heart strain, consider calling the   [facility] hospital  for PERT (Pulmonary Embolism Response Team)  Activation?    PERT -- Pulmonary Embolism Response Team (Multidisciplinary team  including cardiology, interventional radiology, critical care,  hematology)    I have personally reviewed the examination and initial interpretation  and I agree with the findings.    OFELIA HUERTAS MD     Medications   iopamidol (ISOVUE-370) solution 60 mL (60 mLs Intravenous Given 7/22/20 1333)   sodium chloride (PF) 0.9% PF flush 90 mL (90 mLs Intravenous Given 7/22/20 1333)         The labs and x-ray, CT findings were all discussed with the patient.  Given that she is reporting increased shortness of breath with laying down, has an elevated BNP and blood pressure is elevated, could be that she is having some symptoms from heart failure.  Patient's plane is pleuritic and not present with exhalation, I do not think this is acute coronary syndrome.  EKG and troponin are negative.  PE is ruled out with negative CT scan.  There is no sign of pneumothorax or pulmonary edema, pleural effusion.  No pneumonia seen on the CT.  Her a sending aortic aneurysm is stable and there is no sign of rupture.  I spoke with the cardiology fellow regarding medication management for the patient.  She had previously been on Hyzaar and was switched to metoprolol during hospital admission earlier this month.  The cardiology fellow recommended that she go back on the losartan if she tolerates it.  Patient still has some of the Hyzaar at home, so she will restart Hyzaar at the 50 mg dose and continue her current dose of metoprolol for better blood pressure control.    Assessments & Plan (with Medical Decision Making)   Patient presents with pleuritic chest pain and shortness of breath, particularly when laying down.  She does have a cough, however, CT of the chest is negative for signs of pneumonia.  Her  coronavirus test is negative.  Patient's oxygen saturations are normal on room air, she is breathing comfortable in the emergency department and did not want any pain medicine here.  I think she is stable for continued outpatient management.  She has an appointment to follow-up with her primary care provider in 5 days and she will schedule a follow-up appointment with her cardiologist as well.  I advised the patient to return the emergency department if she has worsening pain, worsening shortness of breath, fever, any other concerns.    I have reviewed the nursing notes.    I have reviewed the findings, diagnosis, plan and need for follow up with the patient.    Discharge Medication List as of 7/22/2020  3:38 PM          Final diagnoses:   Shortness of breath       7/22/2020   [facility], [place], EMERGENCY DEPARTMENT     Flavia Dodge MD  07/22/20 1487

## 2020-07-22 NOTE — DISCHARGE INSTRUCTIONS
Restart taking your Hyzaar (losartan-hydrochlorothiazide) at the 50-12.5 mg dose. (once daily). Keep your appointment with your primary provider in 5 days and schedule an appointment with your cardiologist.  Return to the Emergency Department if you have worsening of your shortness of breath or chest pain or any other concerns.

## 2020-07-22 NOTE — ED AVS SNAPSHOT
[facility], [place], Emergency Department  [address] [place] [id number]  Phone:  [phone]                                    Flavia Brice   MRN: [id number]    Department:  [facility], Emergency Department   Date of Visit:  7/22/2020           After Visit Summary Signature Page    I have received my discharge instructions, and my questions have been answered. I have discussed any challenges I see with this plan with the nurse or doctor.    ..........................................................................................................................................  Patient/Patient Representative Signature      ..........................................................................................................................................  Patient Representative Print Name and Relationship to Patient    ..................................................               ................................................  Date                                   Time    ..........................................................................................................................................  Reviewed by Signature/Title    ...................................................              ..............................................  Date                                               Time          22EPIC Rev 08/18

## 2020-07-23 LAB — INTERPRETATION ECG - MUSE: NORMAL

## 2020-07-27 ENCOUNTER — OFFICE VISIT (OUTPATIENT)
Dept: FAMILY MEDICINE | Facility: CLINIC | Age: 55
End: 2020-07-27
Payer: COMMERCIAL

## 2020-07-27 VITALS
DIASTOLIC BLOOD PRESSURE: 76 MMHG | SYSTOLIC BLOOD PRESSURE: 132 MMHG | RESPIRATION RATE: 16 BRPM | TEMPERATURE: 98.9 F | HEART RATE: 71 BPM | OXYGEN SATURATION: 96 % | WEIGHT: 185.8 LBS | BODY MASS INDEX: 30.58 KG/M2

## 2020-07-27 DIAGNOSIS — F43.21 SITUATIONAL DEPRESSION: ICD-10-CM

## 2020-07-27 DIAGNOSIS — Z09 HOSPITAL DISCHARGE FOLLOW-UP: Primary | ICD-10-CM

## 2020-07-27 DIAGNOSIS — M31.31 GRANULOMATOSIS WITH POLYANGIITIS WITH RENAL INVOLVEMENT (H): ICD-10-CM

## 2020-07-27 DIAGNOSIS — I10 HYPERTENSION GOAL BP (BLOOD PRESSURE) < 140/90: ICD-10-CM

## 2020-07-27 DIAGNOSIS — R39.9 URINARY SYMPTOM OR SIGN: ICD-10-CM

## 2020-07-27 LAB
ALBUMIN UR-MCNC: ABNORMAL MG/DL
APPEARANCE UR: CLEAR
BACTERIA #/AREA URNS HPF: ABNORMAL /HPF
BILIRUB UR QL STRIP: NEGATIVE
COLOR UR AUTO: YELLOW
GLUCOSE UR STRIP-MCNC: NEGATIVE MG/DL
HGB UR QL STRIP: ABNORMAL
KETONES UR STRIP-MCNC: NEGATIVE MG/DL
LEUKOCYTE ESTERASE UR QL STRIP: NEGATIVE
NITRATE UR QL: NEGATIVE
NON-SQ EPI CELLS #/AREA URNS LPF: ABNORMAL /LPF
PH UR STRIP: 5 PH (ref 5–7)
RBC #/AREA URNS AUTO: ABNORMAL /HPF
SOURCE: ABNORMAL
SP GR UR STRIP: 1.01 (ref 1–1.03)
UROBILINOGEN UR STRIP-ACNC: 0.2 EU/DL (ref 0.2–1)
WBC #/AREA URNS AUTO: ABNORMAL /HPF

## 2020-07-27 PROCEDURE — 99214 OFFICE O/P EST MOD 30 MIN: CPT | Performed by: NURSE PRACTITIONER

## 2020-07-27 PROCEDURE — 81001 URINALYSIS AUTO W/SCOPE: CPT | Performed by: NURSE PRACTITIONER

## 2020-07-27 RX ORDER — PREDNISONE 10 MG/1
TABLET ORAL
Qty: 20 TABLET | Refills: 0 | Status: SHIPPED | OUTPATIENT
Start: 2020-07-27 | End: 2020-08-10

## 2020-07-27 RX ORDER — LOSARTAN POTASSIUM AND HYDROCHLOROTHIAZIDE 12.5; 1 MG/1; MG/1
1 TABLET ORAL DAILY
Qty: 90 TABLET | Refills: 1 | Status: SHIPPED | OUTPATIENT
Start: 2020-07-27 | End: 2021-05-18

## 2020-07-27 ASSESSMENT — ANXIETY QUESTIONNAIRES
IF YOU CHECKED OFF ANY PROBLEMS ON THIS QUESTIONNAIRE, HOW DIFFICULT HAVE THESE PROBLEMS MADE IT FOR YOU TO DO YOUR WORK, TAKE CARE OF THINGS AT HOME, OR GET ALONG WITH OTHER PEOPLE: SOMEWHAT DIFFICULT
5. BEING SO RESTLESS THAT IT IS HARD TO SIT STILL: SEVERAL DAYS
GAD7 TOTAL SCORE: 7
1. FEELING NERVOUS, ANXIOUS, OR ON EDGE: SEVERAL DAYS
6. BECOMING EASILY ANNOYED OR IRRITABLE: SEVERAL DAYS
3. WORRYING TOO MUCH ABOUT DIFFERENT THINGS: SEVERAL DAYS
7. FEELING AFRAID AS IF SOMETHING AWFUL MIGHT HAPPEN: SEVERAL DAYS
2. NOT BEING ABLE TO STOP OR CONTROL WORRYING: SEVERAL DAYS

## 2020-07-27 ASSESSMENT — PATIENT HEALTH QUESTIONNAIRE - PHQ9
5. POOR APPETITE OR OVEREATING: SEVERAL DAYS
SUM OF ALL RESPONSES TO PHQ QUESTIONS 1-9: 6

## 2020-07-27 NOTE — PROGRESS NOTES
Subjective     Flavia Brice is a 55 year old female who presents to clinic today for the following health issues:    HPI     Hypertension Follow-up      Do you check your blood pressure regularly outside of the clinic? No     Are you following a low salt diet? Yes    Are your blood pressures ever more than 140 on the top number (systolic) OR more   than 90 on the bottom number (diastolic), for example 140/90? na    Depression Followup    How are you doing with your depression since your last visit? No change    Are you having other symptoms that might be associated with depression? No    Have you had a significant life event?  No-has autoimmune disease      Are you feeling anxious or having panic attacks?   Yes:  is anxious as the last time she had a flare- 8 years ago- she went into kidney failure and had to have dialysis and chemo for 6 months.  She had lost her job. Sympotoms returning- to hospital x2, they discussed steroids but never put her on them.  In the past, whenever she had a flare of any symptoms she would take prednisone and it would resolve.  Has rheumatology apptointment next week.     Do you have any concerns with your use of alcohol or other drugs? No    Social History     Tobacco Use     Smoking status: Former Smoker     Packs/day: 0.50     Types: Cigarettes     Smokeless tobacco: Never Used   Substance Use Topics     Alcohol use: Yes     Comment: monthly     Drug use: No     PHQ 3/26/2018 2/28/2020   PHQ-9 Total Score 4 3   Q9: Thoughts of better off dead/self-harm past 2 weeks Not at all Not at all     DHRUV-7 SCORE 3/26/2018 2/28/2020   Total Score 1 4     Last PHQ-9 2/28/2020   1.  Little interest or pleasure in doing things 0   2.  Feeling down, depressed, or hopeless 0   3.  Trouble falling or staying asleep, or sleeping too much 1   4.  Feeling tired or having little energy 2   5.  Poor appetite or overeating 0   6.  Feeling bad about yourself 0   7.  Trouble concentrating 0   8.   Moving slowly or restless 0   Q9: Thoughts of better off dead/self-harm past 2 weeks 0   PHQ-9 Total Score 3   Difficulty at work, home, or with people -     DHRUV-7  2/28/2020   1. Feeling nervous, anxious, or on edge 0   2. Not being able to stop or control worrying 0   3. Worrying too much about different things 0   4. Trouble relaxing 1   5. Being so restless that it is hard to sit still 2   6. Becoming easily annoyed or irritable 1   7. Feeling afraid, as if something awful might happen 0   DHRUV-7 Total Score 4   If you checked any problems, how difficult have they made it for you to do your work, take care of things at home, or get along with other people? Not difficult at all         Suicide Assessment Five-step Evaluation and Treatment (SAFE-T)          ED/UC Followup:    Facility:  [facility]  Date of visit: 7/22/2020  Reason for visit: shortness of breath  Current Status: discharged       ED/UC Followup:    Facility:  [facility]  Date of visit: 7/11/2020  Reason for visit:   Polyarthralgia (Primary Dx);   YOLI (acute kidney injury) (HC);   HTN (hypertension)  Current Status: discharged       Patient Active Problem List   Diagnosis     Wegener's granulomatosis (H)     CARDIOVASCULAR SCREENING; LDL GOAL LESS THAN 130     Overweight     Advanced directives, counseling/discussion     Tobacco abuse     Hypertension goal BP (blood pressure) < 140/90     Tobacco use disorder     Situational depression     Past Surgical History:   Procedure Laterality Date     COLONOSCOPY WITH CO2 INSUFFLATION N/A 2/18/2019    Procedure: COLONOSCOPY WITH CO2 INSUFFLATION;  Surgeon: Javier Guillen MD;  Location: [facility] OR     GALLBLADDER SURGERY         Social History     Tobacco Use     Smoking status: Former Smoker     Packs/day: 0.50     Types: Cigarettes     Smokeless tobacco: Never Used   Substance Use Topics     Alcohol use: Yes     Comment: monthly     Family History   Problem Relation Age of Onset     Diabetes Father      Heart Disease  Maternal Grandmother      Heart Disease Maternal Grandfather      Arthritis Paternal Grandmother      Heart Disease Paternal Grandfather          Current Outpatient Medications   Medication Sig Dispense Refill     losartan-hydrochlorothiazide (HYZAAR) 100-12.5 MG tablet Take 1 tablet by mouth daily 90 tablet 1     predniSONE (DELTASONE) 10 MG tablet Take 2 tablets (20 mg) by mouth daily for 3 days, THEN 1 tablet (10 mg) daily for 3 days, THEN 0.5 tablets (5 mg) daily for 3 days. Take 3 tabs by mouth daily x 3 days, then 2 tabs daily x 3 days, then 1 tab daily x 3 days, then 1/2 tab daily x 3 days. 20 tablet 0     No Known Allergies  Recent Labs   Lab Test 07/22/20  1117 02/28/20  1044 03/26/18  1058 10/27/17  1541 02/27/14  1148 10/29/13 02/28/13   A1C  --   --   --  5.8  --  6.0 6.0   LDL  --   --  82  --  87  --   --    HDL  --   --  87  --  74  --   --    TRIG  --   --  93  --  160*  --   --    ALT  --   --   --   --  39 29 30   CR 1.17* 1.36* 1.08* 1.42* 1.21* 1.16 1.46   GFRESTIMATED 52* 43* 53* 39* 47* 49.7 3.81   GFRESTBLACK 60* 50* 64 47* 57* >60.0 46.1   POTASSIUM 4.3 4.6 4.1 3.7 3.8 3.6  --    TSH  --   --  1.44  --   --   --   --       BP Readings from Last 3 Encounters:   07/27/20 132/76   07/22/20 (!) 146/84   02/28/20 (!) 146/85    Wt Readings from Last 3 Encounters:   07/27/20 84.3 kg (185 lb 12.8 oz)   07/22/20 77.1 kg (170 lb)   02/28/20 77.1 kg (170 lb)                    Reviewed and updated as needed this visit by Provider         Review of Systems   Constitutional, HEENT, cardiovascular, pulmonary, GI, , musculoskeletal, neuro, skin, endocrine and psych systems are negative, except as otherwise noted.      Objective    /76   Pulse 71   Temp 98.9  F (37.2  C) (Tympanic)   Resp 16   Wt 84.3 kg (185 lb 12.8 oz)   SpO2 96%   BMI 30.58 kg/m    Body mass index is 30.58 kg/m .  Physical Exam   OBJECTIVE:                                                      GENERAL: healthy, alert and no  distress  RESP: lungs clear to auscultation - no rales, rhonchi or wheezes  CV: regular rate and rhythm, normal S1 S2, no S3 or S4, no murmur, click or rub, no peripheral edema and peripheral pulses strong  MS: no gross musculoskeletal defects noted, no edema POSITIVE for left flank tenderness on palpation  PSYCH: mentation appears normal, POSITIVE tearful throughout visit.       Diagnostic Test Results:  Labs reviewed in Epic  See orders        Assessment & Plan       ICD-10-CM    1. Hospital discharge follow-up  Z09    2. Urinary symptom or sign  R39.9 *UA reflex to Microscopic and Culture ([facility] (except [place] and [place])   3. Hypertension goal BP (blood pressure) < 140/90  I10 losartan-hydrochlorothiazide (HYZAAR) 100-12.5 MG tablet   4. Granulomatosis with polyangiitis with renal involvement (H)  M31.31 predniSONE (DELTASONE) 10 MG tablet   5. Situational depression  F43.21           See Patient Instructions    Return in about 6 months (around 1/27/2021), or if symptoms worsen or fail to improve.    Monet Ackerman, P  [facility]

## 2020-07-27 NOTE — RESULT ENCOUNTER NOTE
Derick Alejandro,    Thank you for your recent office visit.    Here are your recent results.  No infection seen on urinalysis.     Feel free to contact me via [employer] or call the clinic at [phone].    Sincerely,    KELLY Sparks, FNP-BC

## 2020-07-28 ASSESSMENT — ANXIETY QUESTIONNAIRES: GAD7 TOTAL SCORE: 7

## 2020-08-04 NOTE — PROGRESS NOTES
"Flavia Brice is a 55 year old female who is being evaluated via a billable video visit.      The patient has been notified of following:     \"This video visit will be conducted via a call between you and your physician/provider. We have found that certain health care needs can be provided without the need for an in-person physical exam.  This service lets us provide the care you need with a video conversation.  If a prescription is necessary we can send it directly to your pharmacy.  If lab work is needed we can place an order for that and you can then stop by our lab to have the test done at a later time.    Video visits are billed at different rates depending on your insurance coverage.  Please reach out to your insurance provider with any questions.    If during the course of the call the physician/provider feels a video visit is not appropriate, you will not be charged for this service.\"    Patient has given verbal consent for Video visit? Yes  How would you like to obtain your AVS? MyChart  If you are dropped from the video visit, the video invite should be resent to: Text to cell phone: [phone]  Will anyone else be joining your video visit? Yes, patient's mother    Rheumatology Video Visit      Flavia Brice MRN# [id number]   YOB: 1965 Age: 55 year old      Date of visit: 8/05/20   PCP: Monet Ackerman    Chief Complaint   Patient presents with:  Consult: auto immune disease, tapering on prednisone so pain and swelling ius coming back    Assessment and Plan     1.  Granulomatosis with polyangiitis: Dx'd 2012 by Dr. Wesley at [facility] when she presented with pulmonary involvement, pericarditis/effusion, borderline aneurysmal dilation of the ascending aorta and biopsy proven necrotizing crescentic nephritis.  Went into remission with cytoxan and prednisone, then maintained on AZA for 2 yrs per patient, stopped when lost to follow-up. Recurrence recently with YOLI, pleuritic " chest pain, inflammatory arthritis (MCPs, PIPs, shoulders, knees) that has responded well to prednisone.  Currently on prednisone 5mg daily and having arthritis symptoms; felt better recently when on prednisone 30mg daily and 20mg daily.  We reviewed the diagnosis of GPA and the tx options.  Discussed restarting AZA versus using rituximab; after thorough discussion plan to use rituximab and increase prednisone after labs are done.  - Labs: CBC, CMP, ESR, CRP, ANCA, PR3, MPO, GBM, MALLORY, RF, CCP, hepatitis B/C, quantiferon TB gold plus, Lyme, HIV, quantitative immunoglobulins, CD19 B cell count, UA, Uprotein:creatinine  - After above labs reviewed, and if okay:            - prednisone 20mg daily x2weeks, then 15mg daily x2weeks, then 10mg daily thereafter (f/u before further tapering)           - Rituximab 1gram IV [id number] for a total of 2 doses   - Labs in 4 weeks months: CBC, CMP, ESR, CRP, UA    # Rituximab (Rituxan) Risks and Benefits: The risks and benefits of rituximab were discussed in detail and the patient verbalized understanding.  The risks discussed include, but are not limited to, the risk for hypersensitivity, anaphylaxis, anaphylactoid reactions, fatal infusion reactions, an increased risk for serious infections leading to hospitalization or death, severe mucocutaneous reactions, reactivation of hepatitis B, and development of progressive multifocal leukoencephalopathy (PML) resulting in death.  The most common adverse reactions are infections, nasopharyngitis, urinary tract infections, nausea, diarrhea, headache, muscle spasms, and anemia.  It was discussed that the medication would need to be discontinued if a serious infection develops.  It was discussed that live vaccinations should not be received while using rituximab or within 30 days prior to starting rituximab.  I encouraged reviewing the package insert and asking any questions about the medication.      # Prednisone Risks and Benefits: The  risks and benefits of prednisone were discussed in detail and the patient verbalized understanding.  The risks discussed include, but are not limited to, weight gain, fluid retention, impaired wound healing, hyperglycemia, adrenal suppression, GI upset, peptic ulcer, hepatotoxicity, aseptic necrosis of the femoral and humeral heads, osteoporosis, myopathy, tendon rupture (particularly Achilles tendon), ocular changes including an increased intraocular pressure.  I encouraged reviewing the package insert and asking any questions about the medication.      2. 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     3. Cytoxan use history: needs periodic UA to eval for hematuria, due to increased risk for bladder cancer.     4. Ascending aorta 4.5cm: she has been advised to see cardiology by multiple providers per patient; requested referral  - Cardiology referral     # Relevant labs and imaging were reviewed with the patient    # High risk medication toxicity monitoring: discussion and labs reviewed; appropriate labs ordered. See above.  Instructed that if confirmed to have COVID-19 or exposure to someone with confirmed COVID-19 to call this clinic for directions on DMARD management.    # Note that this is a virtual visit to reduce the risk of COVID-19 exposure during this current pandemic.      # Considered to be at high risk of complications from the COVID-19 virus.  It is recommended to limit contact with other people and if possible to work remotely or provide a leave of absence to reduce the risk for COVID-19.      Ms. Brice verbalized agreement with and understanding of the rational for the diagnosis and treatment plan.  All questions were answered to best of my ability and the patient's satisfaction. Ms. Brice was advised to contact the clinic with any questions that may arise after the  clinic visit.      Thank you for involving me in the care of the patient    Return to clinic: 1 months      HPI   Flavia Brice is a 55 year old female with a past medical history significant for hypertension, depression, and granulomatosis with polyangiitis who is seen for initial rheumatology evaluation in this clinic for granulomatosis with polyangiitis.    10/31/2013 [facility] rheumatology clinic note by Dr. Ann Wesley documents severe CO-3 ANCA positive GPA with pulmonary involvement, pericarditis/effusion, borderline aneurysmal dilation of the ascending aorta, necrotizing crescentic nephritis.  Kidney biopsy has confirmed diagnosis in the past.  Has received Solu-Medrol x3, Cytoxan x1 at 1300 mg, on prednisone 60-80 mg daily.  Deteriorated quickly despite those interventions and was hospitalized again, requiring plasmapheresis and dialysis.  Was seeing Dr. Velazquez from [facility] and was again placed on Cytoxan 15 mg/kg every 3 weeks.  Has seen ENT but there was no clear sinus involvement.  She was then transitioned after 7 infusions of Cytoxan to Imuran on 11/2012 and has been doing well on that.      7/11/2020 [facility] hospitalization note: Acute kidney injury, migratory polyarthritis, chronic diastolic heart failure.  Left knee and left hip pain.  Right shoulder pain that migrated to the left shoulder.  No joint swelling.  No fevers.  In the past she had flare of her joint pain that she was treated with prednisone.  Prior to this ED evaluation she reportedly used prednisone 20 mg without improvement.    7/22/2020: [facility] emergency department visit for shortness of breath.  Notes history of granulomatosis with polyangiitis, diastolic heart failure, a sending thoracic aortic aneurysm from 4.6 cm on chest CT 2/18/2020), migratory polyarthritis, acute kidney injury, and hypertension.  Coronavirus test negative.  Chest CT negative for pneumonia.  O2 saturation normal.  Advised to  follow-up with her PCP and cardiology.    Today, 8/5/2020:  Ms. Brice reports that she was dx'd with GPA by Dr. Wesley. She was treated and in remission; was on AZA for a year and no issues for a while. Lost to follow-up with Dr. Wesley.  Then 1 year ago started having more shortness of breath and found to have more lung nodules.  Recalls heart failure and kidney failure when first dx'd.  Told to see cardiology to follow the aneurysm.  Then in Jan 2020 started to have pain in her shoulders, knees ankles. Pain was so bad that she went to [facility]; found to have YOLI.  Until able to have this rheumatology evaluation, she says that she was given prednisone to help the joint pain; currently on 5mg daily of prednisone; felt much better when on prednisone 30mg daily. Has been dealing with right knee swelling; the prednisone helped with this.  Joint pains are worse in the AM; improve with time and activity. Morning stiffness is very mild while on prednisone 5mg daily.  No hematuria. No hemoptysis.  Flavia Brice's mother was present during the clinic visit.     Denies fevers, chills, nausea, vomiting. Occasional constipation or diarrhea. No abdominal pain. No chest pain/pressure, palpitations, or shortness of breath currently; but was seen 3 times recently in the ED for chest pain with deep breaths.  No LE swelling.  No oral or nasal sores.  No rash. No sicca symptoms. No photosensitivity or photophobia. No eye pain or redness. No history of inflammatory eye disease.  No history of DVT, pulmonary embolism, or miscarriage.   No history of serositis.  No history of Raynaud's Phenomenon.  No known seizure disorder.  No known renal disorder.      Tobacco: quit smoking in Feb 2020  EtOH: no more than 1 drink per month  Drugs: none  Occupation: manager at [employer]    ROS   GEN: No fevers, chills, night sweats, or weight change  SKIN: No itching, rashes, sores  HEENT: No epistaxis. No oral or nasal ulcers.  CV: See  HPI  PULM: No shortness of breath, wheeze, cough now; see HPI  GI: No nausea, vomiting, constipation, diarrhea. No blood in stool. No abdominal pain.  : No blood in urine.  MSK: See HPI.  NEURO: No numbness, tingling, or weakness.  ENDO: No heat/cold intolerance.  EXT: No LE swelling  PSYCH: Negative    Active Problem List     Patient Active Problem List   Diagnosis     Wegener's granulomatosis (H)     CARDIOVASCULAR SCREENING; LDL GOAL LESS THAN 130     Overweight     Advanced directives, counseling/discussion     Tobacco abuse     Hypertension goal BP (blood pressure) < 140/90     Tobacco use disorder     Situational depression     Past Medical History   History reviewed. No pertinent past medical history.  Past Surgical History     Past Surgical History:   Procedure Laterality Date     COLONOSCOPY WITH CO2 INSUFFLATION N/A 2/18/2019    Procedure: COLONOSCOPY WITH CO2 INSUFFLATION;  Surgeon: Javier Guillen MD;  Location: [facility] OR     GALLBLADDER SURGERY       Allergy   No Known Allergies  Current Medication List     Current Outpatient Medications   Medication Sig     losartan-hydrochlorothiazide (HYZAAR) 100-12.5 MG tablet Take 1 tablet by mouth daily     predniSONE (DELTASONE) 10 MG tablet Take 2 tablets (20 mg) by mouth daily for 3 days, THEN 1 tablet (10 mg) daily for 3 days, THEN 0.5 tablets (5 mg) daily for 3 days. Take 3 tabs by mouth daily x 3 days, then 2 tabs daily x 3 days, then 1 tab daily x 3 days, then 1/2 tab daily x 3 days.     No current facility-administered medications for this visit.          Social History   See HPI    Family History     Family History   Problem Relation Age of Onset     Diabetes Father      Heart Disease Maternal Grandmother      Heart Disease Maternal Grandfather      Arthritis Paternal Grandmother      Heart Disease Paternal Grandfather      Grandmother: rheumatoid arthritis    Physical Exam     Temp Readings from Last 3 Encounters:   07/27/20 98.9  F (37.2  C) (Tympanic)  "  07/22/20 98.7  F (37.1  C) (Oral)   02/28/20 98.2  F (36.8  C) (Oral)     BP Readings from Last 5 Encounters:   07/27/20 132/76   07/22/20 (!) 146/84   02/28/20 (!) 146/85   02/18/19 (!) 169/91   06/12/18 115/79     Pulse Readings from Last 1 Encounters:   07/27/20 71     Resp Readings from Last 1 Encounters:   07/27/20 16     Estimated body mass index is 30.58 kg/m  as calculated from the following:    Height as of 2/28/20: 1.66 m (5' 5.35\").    Weight as of 7/27/20: 84.3 kg (185 lb 12.8 oz).      GEN: NAD. Healthy appearing adult.   HEENT: MMM.  Anicteric, noninjected sclera. No obvious external lesions of the ear and nose. Hearing intact.  PULM: No increased work of breathing; no use of accessory muscles.  MSK:  Swelling of the right 1st-3rd MCPs and left 2nd MCP.  Wrists and elbows without swelling.  Shoulders with normal ROM. Knees without swelling.    SKIN: No rash or jaundice seen  PSYCH: Alert. Appropriate.      Labs / Imaging (select studies)     CBC  Recent Labs   Lab Test 07/22/20  1117 02/27/14  1148   WBC 12.5* 6.0   RBC 3.94 3.85   HGB 12.0 12.4   HCT 38.8 35.3   MCV 99 92   RDW 13.9 13.2    228   MCH 30.5 32.2   MCHC 30.9* 35.0   NEUTROPHIL 77.3 60.1   LYMPH 11.4 29.3   MONOCYTE 9.4 8.3   EOSINOPHIL 1.0 1.7   BASOPHIL 0.4 0.6   ANEU 9.7* 3.6   ALYM 1.4 1.8   MARCOS 1.2 0.5   AEOS 0.1 0.1   ABAS 0.1 0.0     CMP  Recent Labs   Lab Test 07/22/20  1117 02/28/20  1044 03/26/18  1058 10/27/17  1541 02/27/14  1148  10/29/13 02/28/13 02/01/13    141 142 140 138   < >  --   --   --    POTASSIUM 4.3 4.6 4.1 3.7 3.8  --  3.6  --  3.8   CHLORIDE 108 109 110* 105 101   < >  --   --   --    CO2 27 26 21 28 26   < >  --   --   --    ANIONGAP 2* 6 11 7 11   < >  --   --   --    GLC 92 125* 68* 103* 79  --  107*  --   --    BUN 29 33* 23 33* 23   < >  --   --   --    CR 1.17* 1.36* 1.08* 1.42* 1.21*  --  1.16 1.46  --    GFRESTIMATED 52* 43* 53* 39* 47*  --  49.7 3.81 34.3   GFRESTBLACK 60* 50* 64 47* 57* "  --  >60.0 46.1 41.5   JESSICA 9.6 9.2 8.6 9.0 9.6   < >  --   --   --    BILITOTAL  --   --   --   --  0.5  --   --   --   --    ALBUMIN  --   --  3.6 4.0 4.3  --   --   --   --    PROTTOTAL  --   --   --   --  7.0  --   --   --   --    ALKPHOS  --   --   --   --  73  --   --   --   --    AST  --   --   --   --  29  --  27 19 18   ALT  --   --   --   --  39  --  29 30 28    < > = values in this interval not displayed.     HgA1c  Recent Labs   Lab Test 10/27/17  1541 10/29/13 02/28/13   A1C 5.8 6.0 6.0     Calcium/VitaminD  Recent Labs   Lab Test 07/22/20  1117 02/28/20  1044 03/26/18  1058  02/27/14  1148   JESSICA 9.6 9.2 8.6   < > 9.6   VITDT  --   --   --   --  28*    < > = values in this interval not displayed.     TSH/T4  Recent Labs   Lab Test 03/26/18  1058   TSH 1.44     Lipid Panel  Recent Labs   Lab Test 03/26/18  1058 02/27/14  1148   CHOL 188 192   TRIG 93 160*   HDL 87 74   LDL 82 87   VLDL  --  32*   CHOLHDLRATIO  --  2.6   NHDL 101  --      Hepatitis C  Recent Labs   Lab Test 03/26/18  1058   HCVAB Nonreactive     UA  Recent Labs   Lab Test 07/27/20  0938   COLOR Yellow   APPEARANCE Clear   URINEGLC Negative   URINEBILI Negative   SG 1.015   URINEPH 5.0   PROTEIN Trace*   UROBILINOGEN 0.2   NITRITE Negative   UBLD Small*   LEUKEST Negative   WBCU 0 - 5   RBCU 2-5*   SQUAMOUSEPI Few   BACTERIA Few*     Urine Microscopic  Recent Labs   Lab Test 07/27/20  0938   WBCU 0 - 5   RBCU 2-5*   SQUAMOUSEPI Few   BACTERIA Few*       Recent Results (from the past 744 hour(s))   XR Chest Port 1 View    Narrative    Portable chest    INDICATION: Chest pain    COMPARISON: 12/1/2017    FINDINGS: Heart size and shape appear normal. There is atherosclerotic  calcification at the aortic arch. Lungs and pulmonary vascularity  appear normal. Bony structures appear grossly intact.      Impression    IMPRESSION: Aortic atherosclerosis.    MERCEDES MEDRANO MD   CT Chest Pulmonary Embolism w Contrast    Narrative    EXAMINATION:  CTA pulmonary angiogram, 7/22/2020 1:41 PM     COMPARISON: None.    HISTORY: PE suspected, intermediate prob, positive D-dimer; chest pain    TECHNIQUE: Volumetric helical acquisition of CT images of the chest  from the lung apices to the kidneys were acquired after the  administration of 80 mL of Isovue-370 IV contrast. Non-Flash technique  with shallow inspiratory breath hold technique.  Post-processed  multiplanar and/or MIP reformations were obtained, archived to PACS  and used in interpretation of this study.     FINDINGS:      Contrast bolus is: adequate.  Exam is negative for acute pulmonary  embolism.       The largest right ventricle transaxial diameter is (measured from  endocardium to endocardium): 4.6 cm   The largest left ventricle transaxial diameter is (measured from  endocardium to endocardium): 4.7 cm  RV/LV ratio is: 1.0 (if ratio greater than 1.1 then sign is suspicious  for right heart strain)  Reflux of contrast into the IVC? yes  Paradoxical bowing of the interventricular septum to the left? no    Chest:     Thyroid is unremarkable. Heart is not enlarged. No pericardial  effusion. No significant coronary artery calcifications. The ascending  aorta is enlarged measuring up to 4.5 cm. The main pulmonary artery is  normal in caliber.    No mediastinal or hilar lymphadenopathy.    Trachea and central airways are clear. Mild basilar predominant  peribronchovascular groundglass opacities likely related to shallow  breath hold. No pleural effusion or pneumothorax.    Scattered pulmonary nodules. For example:  -Solid pulmonary nodule in the posterior right lower lobe measuring 2  mm (series 9, image 106).  -Solid pulmonary nodule in the inferomedial right lower lobe measuring  2.5 mm (series 9, image 89)    Abdomen: Visualized abdomen is limited.. Small sliding-type hiatal  hernia.    Bones and Soft Tissues: No suspicious osseous lesion. No suspicious  mass.  Mild degenerative changes of the thoracic  spine.        Impression    IMPRESSION:   1. No pulmonary embolus appreciated.    2. Enlargement of the ascending aorta measuring up to 4.5 cm.  Attention on follow-up.    3. Scattered sub-3 mm solid pulmonary nodules. Consider optional  follow-up with CT chest in 12 months if patient is considered high  risk for cancer as per Fleischner 2017 guidelines.    In the event of a positive result for acute pulmonary embolism  resulting in right heart strain, consider calling the   [facility] hospital  for PERT (Pulmonary Embolism Response Team)  Activation?    PERT -- Pulmonary Embolism Response Team (Multidisciplinary team  including cardiology, interventional radiology, critical care,  hematology)    I have personally reviewed the examination and initial interpretation  and I agree with the findings.    OFELIA HUERTAS MD                       Immunization History     Immunization History   Administered Date(s) Administered     Influenza Vaccine IM > 6 months Valent IIV4 10/31/2013, 02/19/2020     Pneumococcal 23 valent 04/25/2008, 06/28/2012     Tdap (Adacel,Boostrix) 07/02/2012          Chart documentation done in part with Dragon Voice recognition Software. Although reviewed after completion, some word and grammatical error may remain.      Video-Visit Details    Type of service:  Video Visit    Video Start Time: 11:22 AM  Video End Time: 12:10 PM    Originating Location (pt. Location): Home in [place]    Distant Location (provider location):  Home    Platform used for Video Visit: Iman Moreira MD

## 2020-08-05 ENCOUNTER — VIRTUAL VISIT (OUTPATIENT)
Dept: RHEUMATOLOGY | Facility: CLINIC | Age: 55
End: 2020-08-05
Payer: COMMERCIAL

## 2020-08-05 DIAGNOSIS — M31.31 GRANULOMATOSIS WITH POLYANGIITIS WITH RENAL INVOLVEMENT (H): Primary | ICD-10-CM

## 2020-08-05 DIAGNOSIS — I71.9 AORTIC ANEURYSM WITHOUT RUPTURE, UNSPECIFIED PORTION OF AORTA (H): ICD-10-CM

## 2020-08-05 DIAGNOSIS — M31.31 GRANULOMATOSIS WITH POLYANGIITIS WITH RENAL INVOLVEMENT (H): ICD-10-CM

## 2020-08-05 DIAGNOSIS — Z92.21 HISTORY OF CYTOXAN EXPOSURE: ICD-10-CM

## 2020-08-05 DIAGNOSIS — M19.90 INFLAMMATORY ARTHRITIS: ICD-10-CM

## 2020-08-05 DIAGNOSIS — R09.1 PLEURITIS: ICD-10-CM

## 2020-08-05 LAB
ALBUMIN SERPL-MCNC: 3.7 G/DL (ref 3.4–5)
ALBUMIN UR-MCNC: NEGATIVE MG/DL
ALP SERPL-CCNC: 84 U/L (ref 40–150)
ALT SERPL W P-5'-P-CCNC: 26 U/L (ref 0–50)
ANION GAP SERPL CALCULATED.3IONS-SCNC: 8 MMOL/L (ref 3–14)
APPEARANCE UR: CLEAR
AST SERPL W P-5'-P-CCNC: 9 U/L (ref 0–45)
BACTERIA #/AREA URNS HPF: ABNORMAL /HPF
BASOPHILS # BLD AUTO: 0 10E9/L (ref 0–0.2)
BASOPHILS NFR BLD AUTO: 0.3 %
BILIRUB SERPL-MCNC: 0.3 MG/DL (ref 0.2–1.3)
BILIRUB UR QL STRIP: NEGATIVE
BUN SERPL-MCNC: 41 MG/DL (ref 7–30)
CALCIUM SERPL-MCNC: 9 MG/DL (ref 8.5–10.1)
CHLORIDE SERPL-SCNC: 111 MMOL/L (ref 94–109)
CO2 SERPL-SCNC: 22 MMOL/L (ref 20–32)
COLOR UR AUTO: YELLOW
CREAT SERPL-MCNC: 1.46 MG/DL (ref 0.52–1.04)
CRP SERPL-MCNC: 5.7 MG/L (ref 0–8)
DIFFERENTIAL METHOD BLD: NORMAL
EOSINOPHIL # BLD AUTO: 0.1 10E9/L (ref 0–0.7)
EOSINOPHIL NFR BLD AUTO: 0.6 %
ERYTHROCYTE [DISTWIDTH] IN BLOOD BY AUTOMATED COUNT: 14.7 % (ref 10–15)
ERYTHROCYTE [SEDIMENTATION RATE] IN BLOOD BY WESTERGREN METHOD: 9 MM/H (ref 0–30)
GFR SERPL CREATININE-BSD FRML MDRD: 40 ML/MIN/{1.73_M2}
GLUCOSE SERPL-MCNC: 137 MG/DL (ref 70–99)
GLUCOSE UR STRIP-MCNC: NEGATIVE MG/DL
HCT VFR BLD AUTO: 38.1 % (ref 35–47)
HGB BLD-MCNC: 12.2 G/DL (ref 11.7–15.7)
HGB UR QL STRIP: ABNORMAL
KETONES UR STRIP-MCNC: NEGATIVE MG/DL
LEUKOCYTE ESTERASE UR QL STRIP: NEGATIVE
LYMPHOCYTES # BLD AUTO: 1.7 10E9/L (ref 0.8–5.3)
LYMPHOCYTES NFR BLD AUTO: 16.2 %
MCH RBC QN AUTO: 30.5 PG (ref 26.5–33)
MCHC RBC AUTO-ENTMCNC: 32 G/DL (ref 31.5–36.5)
MCV RBC AUTO: 95 FL (ref 78–100)
MONOCYTES # BLD AUTO: 0.7 10E9/L (ref 0–1.3)
MONOCYTES NFR BLD AUTO: 6.6 %
MUCOUS THREADS #/AREA URNS LPF: PRESENT /LPF
NEUTROPHILS # BLD AUTO: 8.1 10E9/L (ref 1.6–8.3)
NEUTROPHILS NFR BLD AUTO: 76.3 %
NITRATE UR QL: NEGATIVE
NON-SQ EPI CELLS #/AREA URNS LPF: ABNORMAL /LPF
PH UR STRIP: 5 PH (ref 5–7)
PLATELET # BLD AUTO: 365 10E9/L (ref 150–450)
POTASSIUM SERPL-SCNC: 4.2 MMOL/L (ref 3.4–5.3)
PROT SERPL-MCNC: 7.3 G/DL (ref 6.8–8.8)
PROT UR-MCNC: 0.3 G/L
PROT/CREAT 24H UR: 0.37 G/G CR (ref 0–0.2)
RBC # BLD AUTO: 4 10E12/L (ref 3.8–5.2)
RBC #/AREA URNS AUTO: ABNORMAL /HPF
SODIUM SERPL-SCNC: 141 MMOL/L (ref 133–144)
SOURCE: ABNORMAL
SP GR UR STRIP: 1.02 (ref 1–1.03)
UROBILINOGEN UR STRIP-ACNC: 0.2 EU/DL (ref 0.2–1)
WBC # BLD AUTO: 10.6 10E9/L (ref 4–11)
WBC #/AREA URNS AUTO: ABNORMAL /HPF

## 2020-08-05 PROCEDURE — 82784 ASSAY IGA/IGD/IGG/IGM EACH: CPT | Performed by: INTERNAL MEDICINE

## 2020-08-05 PROCEDURE — 86618 LYME DISEASE ANTIBODY: CPT | Performed by: INTERNAL MEDICINE

## 2020-08-05 PROCEDURE — 85652 RBC SED RATE AUTOMATED: CPT | Performed by: INTERNAL MEDICINE

## 2020-08-05 PROCEDURE — 83876 ASSAY MYELOPEROXIDASE: CPT | Performed by: INTERNAL MEDICINE

## 2020-08-05 PROCEDURE — 83516 IMMUNOASSAY NONANTIBODY: CPT | Performed by: INTERNAL MEDICINE

## 2020-08-05 PROCEDURE — 82043 UR ALBUMIN QUANTITATIVE: CPT | Performed by: INTERNAL MEDICINE

## 2020-08-05 PROCEDURE — 86803 HEPATITIS C AB TEST: CPT | Performed by: INTERNAL MEDICINE

## 2020-08-05 PROCEDURE — 36415 COLL VENOUS BLD VENIPUNCTURE: CPT | Performed by: INTERNAL MEDICINE

## 2020-08-05 PROCEDURE — 87340 HEPATITIS B SURFACE AG IA: CPT | Performed by: INTERNAL MEDICINE

## 2020-08-05 PROCEDURE — 84156 ASSAY OF PROTEIN URINE: CPT | Performed by: INTERNAL MEDICINE

## 2020-08-05 PROCEDURE — 86481 TB AG RESPONSE T-CELL SUSP: CPT | Performed by: INTERNAL MEDICINE

## 2020-08-05 PROCEDURE — 87389 HIV-1 AG W/HIV-1&-2 AB AG IA: CPT | Performed by: INTERNAL MEDICINE

## 2020-08-05 PROCEDURE — 80053 COMPREHEN METABOLIC PANEL: CPT | Performed by: INTERNAL MEDICINE

## 2020-08-05 PROCEDURE — 86039 ANTINUCLEAR ANTIBODIES (ANA): CPT | Performed by: INTERNAL MEDICINE

## 2020-08-05 PROCEDURE — 85025 COMPLETE CBC W/AUTO DIFF WBC: CPT | Performed by: INTERNAL MEDICINE

## 2020-08-05 PROCEDURE — 86140 C-REACTIVE PROTEIN: CPT | Performed by: INTERNAL MEDICINE

## 2020-08-05 PROCEDURE — 86200 CCP ANTIBODY: CPT | Performed by: INTERNAL MEDICINE

## 2020-08-05 PROCEDURE — 86355 B CELLS TOTAL COUNT: CPT | Performed by: INTERNAL MEDICINE

## 2020-08-05 PROCEDURE — 86038 ANTINUCLEAR ANTIBODIES: CPT | Performed by: INTERNAL MEDICINE

## 2020-08-05 PROCEDURE — 81001 URINALYSIS AUTO W/SCOPE: CPT | Performed by: INTERNAL MEDICINE

## 2020-08-05 PROCEDURE — 86704 HEP B CORE ANTIBODY TOTAL: CPT | Performed by: INTERNAL MEDICINE

## 2020-08-05 PROCEDURE — 99204 OFFICE O/P NEW MOD 45 MIN: CPT | Mod: 95 | Performed by: INTERNAL MEDICINE

## 2020-08-05 PROCEDURE — 86431 RHEUMATOID FACTOR QUANT: CPT | Performed by: INTERNAL MEDICINE

## 2020-08-06 LAB
B BURGDOR IGG+IGM SER QL: 0.1 (ref 0–0.89)
CCP AB SER IA-ACNC: <1 U/ML
CD19 CELLS # BLD: 416 CELLS/UL (ref 107–698)
CD19 CELLS NFR BLD: 23 % (ref 6–27)
CREAT UR-MCNC: 81 MG/DL
GBM IGG SER IA-ACNC: <0.2 AI (ref 0–0.9)
HBV CORE AB SERPL QL IA: NONREACTIVE
HBV SURFACE AG SERPL QL IA: NONREACTIVE
HCV AB SERPL QL IA: NONREACTIVE
HIV 1+2 AB+HIV1 P24 AG SERPL QL IA: NONREACTIVE
IGA SERPL-MCNC: 149 MG/DL (ref 84–499)
IGG SERPL-MCNC: 1009 MG/DL (ref 610–1616)
IGM SERPL-MCNC: 32 MG/DL (ref 35–242)
MICROALBUMIN UR-MCNC: 145 MG/L
MICROALBUMIN/CREAT UR: 179.68 MG/G CR (ref 0–25)
MYELOPEROXIDASE AB SER-ACNC: <0.2 AI (ref 0–0.9)
PROTEINASE3 IGG SER-ACNC: 4.8 AI (ref 0–0.9)
RHEUMATOID FACT SER NEPH-ACNC: <7 IU/ML (ref 0–20)

## 2020-08-07 LAB
ANA PAT SER IF-IMP: ABNORMAL
ANA SER QL IF: POSITIVE
ANA TITR SER IF: ABNORMAL {TITER}
GAMMA INTERFERON BACKGROUND BLD IA-ACNC: 0.02 IU/ML
M TB IFN-G CD4+ BCKGRND COR BLD-ACNC: 9.98 IU/ML
M TB TUBERC IFN-G BLD QL: NEGATIVE
MITOGEN IGNF BCKGRD COR BLD-ACNC: 0.02 IU/ML
MITOGEN IGNF BCKGRD COR BLD-ACNC: 0.03 IU/ML

## 2020-08-10 ENCOUNTER — TELEPHONE (OUTPATIENT)
Dept: RHEUMATOLOGY | Facility: CLINIC | Age: 55
End: 2020-08-10

## 2020-08-10 DIAGNOSIS — M19.90 INFLAMMATORY ARTHRITIS: ICD-10-CM

## 2020-08-10 DIAGNOSIS — M31.31 GRANULOMATOSIS WITH POLYANGIITIS WITH RENAL INVOLVEMENT (H): Primary | ICD-10-CM

## 2020-08-10 DIAGNOSIS — R76.8 POSITIVE ANA (ANTINUCLEAR ANTIBODY): ICD-10-CM

## 2020-08-10 DIAGNOSIS — R79.89 ELEVATED SERUM CREATININE: ICD-10-CM

## 2020-08-10 PROBLEM — M31.30 GRANULOMATOSIS WITH POLYANGIITIS (H): Status: ACTIVE | Noted: 2020-08-10

## 2020-08-10 RX ORDER — HEPARIN SODIUM (PORCINE) LOCK FLUSH IV SOLN 100 UNIT/ML 100 UNIT/ML
5 SOLUTION INTRAVENOUS
Status: CANCELLED | OUTPATIENT
Start: 2020-08-10

## 2020-08-10 RX ORDER — METHYLPREDNISOLONE SODIUM SUCCINATE 125 MG/2ML
125 INJECTION, POWDER, LYOPHILIZED, FOR SOLUTION INTRAMUSCULAR; INTRAVENOUS ONCE
Status: CANCELLED
Start: 2020-08-10

## 2020-08-10 RX ORDER — HEPARIN SODIUM,PORCINE 10 UNIT/ML
5 VIAL (ML) INTRAVENOUS
Status: CANCELLED | OUTPATIENT
Start: 2020-08-10

## 2020-08-10 RX ORDER — PREDNISONE 10 MG/1
TABLET ORAL
Qty: 63 TABLET | Refills: 0 | Status: SHIPPED | OUTPATIENT
Start: 2020-08-10 | End: 2020-09-09

## 2020-08-10 RX ORDER — ACETAMINOPHEN 325 MG/1
650 TABLET ORAL ONCE
Status: CANCELLED
Start: 2020-08-10

## 2020-08-10 NOTE — TELEPHONE ENCOUNTER
"----- Message from Ronaldo Moreira MD sent at 8/10/2020  5:11 AM CDT -----  RN: Please CALL to notify Flavia Brice of the following:    xiao qu wu you message sent:  \"Ms. Brice,    Labs showed an elevated creatinine (measure of kidney function) that is on the higher end of the previous values seen in the last couple years.  Mildly elevated protein in the urine.  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  The antinuclear antibody was also positive and will need additional labs to assess if significant with regard to your symptoms and lab findings.  So please complete the followin. More labs are needed. Preferably you have these labs soon, and before receiving rituximab.  Do not delay rituximab for these labs though, as they can be done on the day of your rituximab infusion if they cannot be done sooner.    2. Call to schedule the rituximab infusion. There will be two infusions,  by 2 weeks.  Please let me know if unable to schedule within 10 days.  Also when you call, ask for help with the financial assistance because rituximab is expensive. The financial assistance program will help significantly with the cost (please see [email or web address] for details). Please call one of the following locations to schedule:    [facility] in [place]  [address]  [place], [place] [id number] [phone]    [facility] in [place]  [address]  [place], [place] [id number] [phone]    [facility] in [place]   [address]  [place], [place] [address] [phone]    3. Prednisone: 20mg daily x2weeks, then 15mg daily x2weeks, then 10mg daily thereafter     Sincerely,  Ronaldo Moreira MD  8/10/2020\"  "

## 2020-08-10 NOTE — TELEPHONE ENCOUNTER
Patient returned the call and reports she read the [employer] message and verbalized understanding. She states she is not able to get an appointment at [place] until 8/31/20 so she is going to try to schedule an infusion apt at the [place] infusion center. She will schedule a lab appointment and  her prednisone prescription. Additionally, patient reports she has some short-term disability forms for her work that need to be filled out. She will try to attach the forms to a [employer] message and would like them to be emailed to her once completed.    Vel Koo RN....8/10/2020 10:12 AM

## 2020-08-11 DIAGNOSIS — R76.8 POSITIVE ANA (ANTINUCLEAR ANTIBODY): ICD-10-CM

## 2020-08-11 DIAGNOSIS — R79.89 ELEVATED SERUM CREATININE: ICD-10-CM

## 2020-08-11 DIAGNOSIS — M19.90 INFLAMMATORY ARTHRITIS: ICD-10-CM

## 2020-08-11 LAB — CK SERPL-CCNC: 79 U/L (ref 30–225)

## 2020-08-11 PROCEDURE — 85613 RUSSELL VIPER VENOM DILUTED: CPT | Performed by: INTERNAL MEDICINE

## 2020-08-11 PROCEDURE — 00000401 ZZHCL STATISTIC THROMBIN TIME NC: Performed by: INTERNAL MEDICINE

## 2020-08-11 PROCEDURE — 86147 CARDIOLIPIN ANTIBODY EA IG: CPT | Performed by: INTERNAL MEDICINE

## 2020-08-11 PROCEDURE — 86235 NUCLEAR ANTIGEN ANTIBODY: CPT | Performed by: INTERNAL MEDICINE

## 2020-08-11 PROCEDURE — 36415 COLL VENOUS BLD VENIPUNCTURE: CPT | Performed by: INTERNAL MEDICINE

## 2020-08-11 PROCEDURE — 86225 DNA ANTIBODY NATIVE: CPT | Performed by: INTERNAL MEDICINE

## 2020-08-11 PROCEDURE — 86160 COMPLEMENT ANTIGEN: CPT | Performed by: INTERNAL MEDICINE

## 2020-08-11 PROCEDURE — 86146 BETA-2 GLYCOPROTEIN ANTIBODY: CPT | Performed by: INTERNAL MEDICINE

## 2020-08-11 PROCEDURE — 85730 THROMBOPLASTIN TIME PARTIAL: CPT | Performed by: INTERNAL MEDICINE

## 2020-08-11 PROCEDURE — 00000167 ZZHCL STATISTIC INR NC: Performed by: INTERNAL MEDICINE

## 2020-08-11 PROCEDURE — 82550 ASSAY OF CK (CPK): CPT | Performed by: INTERNAL MEDICINE

## 2020-08-12 LAB
B2 GLYCOPROT1 IGG SERPL IA-ACNC: 0.8 U/ML
B2 GLYCOPROT1 IGM SERPL IA-ACNC: <2.9 U/ML
C3 SERPL-MCNC: 133 MG/DL (ref 81–157)
C4 SERPL-MCNC: 41 MG/DL (ref 13–39)
CARDIOLIPIN ANTIBODY IGG: <1.6 GPL-U/ML (ref 0–19.9)
CARDIOLIPIN ANTIBODY IGM: 0.8 MPL-U/ML (ref 0–19.9)
DSDNA AB SER-ACNC: <1 IU/ML
ENA RNP IGG SER IA-ACNC: <0.2 AI (ref 0–0.9)
ENA SM IGG SER-ACNC: <0.2 AI (ref 0–0.9)
ENA SS-A IGG SER IA-ACNC: <0.2 AI (ref 0–0.9)
ENA SS-B IGG SER IA-ACNC: <0.2 AI (ref 0–0.9)

## 2020-08-13 LAB — LA PPP-IMP: NEGATIVE

## 2020-08-18 ENCOUNTER — MYC MEDICAL ADVICE (OUTPATIENT)
Dept: RHEUMATOLOGY | Facility: CLINIC | Age: 55
End: 2020-08-18

## 2020-08-21 ENCOUNTER — INFUSION THERAPY VISIT (OUTPATIENT)
Dept: INFUSION THERAPY | Facility: CLINIC | Age: 55
End: 2020-08-21
Attending: INTERNAL MEDICINE
Payer: COMMERCIAL

## 2020-08-21 VITALS — SYSTOLIC BLOOD PRESSURE: 123 MMHG | DIASTOLIC BLOOD PRESSURE: 74 MMHG | TEMPERATURE: 97.2 F | HEART RATE: 72 BPM

## 2020-08-21 DIAGNOSIS — M31.31 GRANULOMATOSIS WITH POLYANGIITIS WITH RENAL INVOLVEMENT (H): Primary | ICD-10-CM

## 2020-08-21 PROCEDURE — 96415 CHEMO IV INFUSION ADDL HR: CPT

## 2020-08-21 PROCEDURE — 25000132 ZZH RX MED GY IP 250 OP 250 PS 637: Performed by: INTERNAL MEDICINE

## 2020-08-21 PROCEDURE — 96375 TX/PRO/DX INJ NEW DRUG ADDON: CPT

## 2020-08-21 PROCEDURE — 96413 CHEMO IV INFUSION 1 HR: CPT

## 2020-08-21 PROCEDURE — 25000128 H RX IP 250 OP 636: Performed by: INTERNAL MEDICINE

## 2020-08-21 PROCEDURE — 25800030 ZZH RX IP 258 OP 636: Performed by: INTERNAL MEDICINE

## 2020-08-21 PROCEDURE — 99207 ZZC NO CHARGE NURSE ONLY: CPT

## 2020-08-21 RX ORDER — METHYLPREDNISOLONE SODIUM SUCCINATE 125 MG/2ML
125 INJECTION, POWDER, LYOPHILIZED, FOR SOLUTION INTRAMUSCULAR; INTRAVENOUS ONCE
Status: CANCELLED
Start: 2020-08-21

## 2020-08-21 RX ORDER — HEPARIN SODIUM (PORCINE) LOCK FLUSH IV SOLN 100 UNIT/ML 100 UNIT/ML
5 SOLUTION INTRAVENOUS
Status: CANCELLED | OUTPATIENT
Start: 2020-08-21

## 2020-08-21 RX ORDER — ACETAMINOPHEN 325 MG/1
650 TABLET ORAL ONCE
Status: COMPLETED | OUTPATIENT
Start: 2020-08-21 | End: 2020-08-21

## 2020-08-21 RX ORDER — METHYLPREDNISOLONE SODIUM SUCCINATE 125 MG/2ML
125 INJECTION, POWDER, LYOPHILIZED, FOR SOLUTION INTRAMUSCULAR; INTRAVENOUS ONCE
Status: COMPLETED | OUTPATIENT
Start: 2020-08-21 | End: 2020-08-21

## 2020-08-21 RX ORDER — HEPARIN SODIUM,PORCINE 10 UNIT/ML
5 VIAL (ML) INTRAVENOUS
Status: CANCELLED | OUTPATIENT
Start: 2020-08-21

## 2020-08-21 RX ORDER — ACETAMINOPHEN 325 MG/1
650 TABLET ORAL ONCE
Status: CANCELLED
Start: 2020-08-21

## 2020-08-21 RX ADMIN — ACETAMINOPHEN 650 MG: 325 TABLET, FILM COATED ORAL at 08:39

## 2020-08-21 RX ADMIN — DIPHENHYDRAMINE HYDROCHLORIDE 50 MG: 50 INJECTION, SOLUTION INTRAMUSCULAR; INTRAVENOUS at 09:04

## 2020-08-21 RX ADMIN — RITUXIMAB 1000 MG: 10 INJECTION, SOLUTION INTRAVENOUS at 09:25

## 2020-08-21 RX ADMIN — SODIUM CHLORIDE 250 ML: 9 INJECTION, SOLUTION INTRAVENOUS at 08:39

## 2020-08-21 RX ADMIN — METHYLPREDNISOLONE SODIUM SUCCINATE 125 MG: 125 INJECTION, POWDER, FOR SOLUTION INTRAMUSCULAR; INTRAVENOUS at 08:39

## 2020-08-21 NOTE — PROGRESS NOTES
Infusion Nursing Note:  Flavia Marlow Brice presents today for Rituxan.    Patient seen by provider today: No   present during visit today: Not Applicable.    Note: Day 1 of rituxan, started at 50 ml/hr and increased by 50 every 30 minutes per order.    Intravenous Access:  Peripheral IV placed.    Treatment Conditions:  Biological Infusion Checklist:  ~~~ NOTE: If the patient answers yes to any of the questions below, hold the infusion and contact ordering provider or on-call provider.    1. Have you recently had an elevated temperature, fever, chills, productive cough, coughing for 3 weeks or longer or hemoptysis, abnormal vital signs, night sweats,  chest pain or have you noticed a decrease in your appetite, unexplained weight loss or fatigue? No  2. Do you have any open wounds or new incisions? No  3. Do you have any recent or upcoming hospitalizations, surgeries or dental procedures? No  4. Do you currently have or recently have had any signs of illness or infection or are you on any antibiotics? No  5. Have you had any new, sudden or worsening abdominal pain? No  6. Have you or anyone in your household received a live vaccination in the past 4 weeks? Please note:  No live vaccines while on biologic/chemotherapy until 6 months after the last treatment.  Patient can receive the flu vaccine (shot only) and the pneumovax.  It is optimal for the patient to get these vaccines mid cycle, but they can be given at any time as long as it is not on the day of the infusion. No  7. Have you recently been diagnosed with any new nervous system diseases (ie. Multiple sclerosis, Guillain [place], seizures, neurological changes) or cancer diagnosis? No  8. Are you on any form of radiation or chemotherapy? No  9. Are you pregnant or breast feeding or do you have plans of pregnancy in the future? No  10. Have you been having any signs of worsening depression or suicidal ideations?  (benlysta only) No  11. Have there  been any other new onset medical symptoms? No        Post Infusion Assessment:  Patient tolerated infusion without incident.  Blood return noted pre and post infusion.  Site patent and intact, free from redness, edema or discomfort.  No evidence of extravasations.  Access discontinued per protocol.  Biologic Infusion Post Education: Call the triage nurse at your clinic or seek medical attention if you have chills and/or temperature greater than or equal to 100.5, uncontrolled nausea/vomiting, diarrhea, constipation, dizziness, shortness of breath, chest pain, heart palpitations, weakness or any other new or concerning symptoms, questions or concerns.  You cannot have any live virus vaccines prior to or during treatment or up to 6 months post infusion.  If you have an upcoming surgery, medical procedure or dental procedure during treatment, this should be discussed with your ordering physician and your surgeon/dentist.  If you are having any concerning symptom, if you are unsure if you should get your next infusion or wish to speak to a provider before your next infusion, please call your care coordinator or triage nurse at your clinic to notify them so we can adequately serve you.       Discharge Plan:   AVS to patient via [facility].  Patient will return 9/4/2020 for next appointment.   Patient discharged in stable condition accompanied by: self.  Departure Mode: Ambulatory.    Joleen Winkler RN

## 2020-08-25 NOTE — TELEPHONE ENCOUNTER
Patient is calling regarding FMLA & that there is more papers that need to be signed, please call to advise.

## 2020-08-26 ENCOUNTER — TELEPHONE (OUTPATIENT)
Dept: RHEUMATOLOGY | Facility: CLINIC | Age: 55
End: 2020-08-26

## 2020-08-26 NOTE — TELEPHONE ENCOUNTER
Called patient and informed her that forms were completed. Patient asked that I leave them at the  of the [facility] clinic. She will stop by today to pick them up. Sent copy of forms to be scanned.    Vel Koo RN....8/26/2020 4:21 PM

## 2020-08-26 NOTE — TELEPHONE ENCOUNTER
Reason for Call:  Form, our goal is to have forms completed with 72 hours, however, some forms may require a visit or additional information.    Type of letter, form or note:  disability    Who is the form from?: Insurance comp    Where did the form come from: Patient or family brought in       What clinic location was the form placed at?: [facility]    Where the form was placed: Given to physician    What number is listed as a contact on the form?: [phone] Flavia       Additional comments: please call and  at clinic    Call taken on 8/26/2020 at 12:47 PM by Paige Newell

## 2020-08-26 NOTE — TELEPHONE ENCOUNTER
Placed FMLA forms at  of [facility] for patient to come  today.  Sent copy of completed forms for scanning.    Vel Koo RN....8/26/2020 11:06 AM

## 2020-08-26 NOTE — TELEPHONE ENCOUNTER
Forms were completed by Dr. Moreira and left at  of [facility].  Patient will stop by today to pick them up.  Copy of forms were also sent for scanning.    Vel Koo RN....8/26/2020 4:23 PM

## 2020-09-01 ENCOUNTER — VIRTUAL VISIT (OUTPATIENT)
Dept: CARDIOLOGY | Facility: CLINIC | Age: 55
End: 2020-09-01
Payer: COMMERCIAL

## 2020-09-01 DIAGNOSIS — I71.20 THORACIC AORTIC ANEURYSM WITHOUT RUPTURE (H): Primary | ICD-10-CM

## 2020-09-01 DIAGNOSIS — M31.31 GRANULOMATOSIS WITH POLYANGIITIS WITH RENAL INVOLVEMENT (H): ICD-10-CM

## 2020-09-01 PROCEDURE — 99204 OFFICE O/P NEW MOD 45 MIN: CPT | Mod: 95 | Performed by: INTERNAL MEDICINE

## 2020-09-01 NOTE — PATIENT INSTRUCTIONS
Thank you for coming to the [facility] @ [facility]; please note the following instructions:    1.  Echocardiogram in 6 months to recheck aorta size.    2.  Continue low-salt diet.    3.  Monitor blood pressure at home and upload a 1 week worth of readings through my chart.  Measure blood pressure after resting for 5 minutes.  Take at least 2 measurements 1 minute apart.    4.  We will determine your follow-up visit in cardiology based on your echo results.        If you have any questions regarding your visit please contact your care team:     Cardiology  Telephone Number   Gail HADDAD, RN  Ce FISCHER, RN   Nuha GRUBER, RMLIA WOMACK, RMLIA CAMPO, LPN   [phone] (option 1)   For scheduling appts:     [phone] (select option 1)       For the Device Clinic (Pacemakers and ICD's)  RN's :  Eva Weinberg   During business hours: [phone]    *After business hours:  [phone] (select option 4)      Normal test result notifications will be released via [employer] or mailed within 7 business days.  All other test results, will be communicated via telephone once reviewed by your cardiologist.    If you need a medication refill please contact your pharmacy.  Please allow 3 business days for your refill to be completed.    As always, thank you for trusting us with your health care needs!

## 2020-09-01 NOTE — PROGRESS NOTES
"Flavia Brice is a 55 year old female who is being evaluated via a billable video visit.      The patient has been notified of following:     \"This video visit will be conducted via a call between you and your physician/provider. We have found that certain health care needs can be provided without the need for an in-person physical exam.  This service lets us provide the care you need with a video conversation.  If a prescription is necessary we can send it directly to your pharmacy.  If lab work is needed we can place an order for that and you can then stop by our lab to have the test done at a later time.    Video visits are billed at different rates depending on your insurance coverage.  Please reach out to your insurance provider with any questions.    If during the course of the call the physician/provider feels a video visit is not appropriate, you will not be charged for this service.\"    Patient has given verbal consent for Video visit? Yes    How would you like to obtain your AVS? [place]    Patient would like the video invitation sent by: Send to e-mail at: [email or web address]      Video Start Time:9:00 am    Video-Visit Details    Type of service:  Video Visit    Video End Time (time video stopped): 9:21 (video encounter duration 21 minutes)    Originating Location (pt. Location): Home    Distant Location (provider location):  [facility]     Mode of Communication:  Video Conference via [employer]    HPI: Ms. Flavia Brice is a 55 year old  female with PMH significant for granulomatosis with polyangiitis (GPA) diagnosed in 2012, history of heart failure with preserved ejection fraction, CKD stage III, former smoker and aneurysm of the ascending aorta.    Patient is referred to cardiology by Dr. Moreira from rheumatology for aneurysm of the ascending aorta at 4.5 cm detected by CT of the chest on 7/22/2020.  The CT was performed for possible " PE which did not show any evidence of pulmonary embolism.    Patient was last seen by rheumatology on 8/5/2020 by Dr. Moreira and was noted to have GPA with pulmonary involvement, pericarditis/effusion and necrotizing crescentic nephritis.  Patient is currently on prednisone.  Patient was recommended to start on rituximab.    Patient was admitted to the outside hospital in 2012 few times with presumed heart failure, worsening renal function and she was found to have GPA. She was on hemodialysis for some time.  She did follow-up with cardiology with regards to heart failure with preserved ejection fraction.  She saw cardiology last time in 2012.  She was on diuretics for a period of time.  Patient did not follow-up with rheumatology up until recently.  In February of this year she presented with chest pain, shortness of breath and joint pain.  She was started on prednisone and recently on rituximab which improved her symptoms significantly.  Currently patient denies joint pain, chest pain, shortness of breath, lower extremity edema, PND or orthopnea.  She is able to walk with no difficulty.    Patient quit smoking in February of this year.  She has a 20-pack-year history of smoking.  No family history of premature coronary artery disease or thoracic aortic aneurysm. Patient is on lisinopril hydrochlorothiazide for hypertension.  She has been on this medication for 3 years now.  No diabetes.    Prior CT in 2012 showed ascending aorta size at 4 cm.  Echocardiogram in February 2020 from [facility] showed normal biventricular function.  Ascending aorta was measured at 4.3 cm.  Patient has no family history of thoracic aneurysm with or without rupture.      Most recent labs on 8/5/2020 showed normal CBC with CKD stage III with creatinine of 1.46.  Electrolytes are normal.    Medications, personal, family, and social history reviewed with patient and revised.    PAST MEDICAL HISTORY:  No past medical history on  file.    CURRENT MEDICATIONS:  Current Outpatient Medications   Medication Sig Dispense Refill     losartan-hydrochlorothiazide (HYZAAR) 100-12.5 MG tablet Take 1 tablet by mouth daily 90 tablet 1     predniSONE (DELTASONE) 10 MG tablet Prednisone: 20mg daily x2weeks, then 15mg daily x2weeks, then 10mg daily thereafter 63 tablet 0       PAST SURGICAL HISTORY:  Past Surgical History:   Procedure Laterality Date     COLONOSCOPY WITH CO2 INSUFFLATION N/A 2/18/2019    Procedure: COLONOSCOPY WITH CO2 INSUFFLATION;  Surgeon: Javier Guillen MD;  Location: [facility] OR     GALLBLADDER SURGERY         ALLERGIES:   No Known Allergies    FAMILY HISTORY:  Family History   Problem Relation Age of Onset     Diabetes Father      Heart Disease Maternal Grandmother      Heart Disease Maternal Grandfather      Arthritis Paternal Grandmother      Heart Disease Paternal Grandfather          SOCIAL HISTORY:  Social History     Tobacco Use     Smoking status: Former Smoker     Packs/day: 0.50     Types: Cigarettes     Smokeless tobacco: Never Used   Substance Use Topics     Alcohol use: Yes     Comment: monthly     Drug use: No       ROS:   Constitutional: No fever, chills, or sweats. Weight stable.   ENT: No visual disturbance, ear ache, epistaxis, sore throat.   Cardiovascular: As per HPI.   Respiratory: No cough, hemoptysis.    GI: No nausea, vomiting, hematemesis, melena, or hematochezia.   : No hematuria.   Integument: Negative.   Psychiatric: Negative.   Hematologic:  No easy bruising, no easy bleeding.  Neuro: Negative.   Endocrinology: No significant heat or cold intolerance   Musculoskeletal: No myalgia.    Exam:  Physical Exam Elements attainable via telehealth:       Constitutional - alert and no distress    Eyes - no redness, no discharge    Respiratory - no cough, no labored breathing    Skin - no discoloration or lesions on the face or the arm.    Neurological -alert, normal speech,and affect, no tremor.     I have reviewed the  labs and personally reviewed the imaging below and made my comment in the assessment and plan.    Labs:  CBC RESULTS:   Lab Results   Component Value Date    WBC 10.6 08/05/2020    RBC 4.00 08/05/2020    HGB 12.2 08/05/2020    HCT 38.1 08/05/2020    MCV 95 08/05/2020    MCH 30.5 08/05/2020    MCHC 32.0 08/05/2020    RDW 14.7 08/05/2020     08/05/2020       BMP RESULTS:  Lab Results   Component Value Date     08/05/2020    POTASSIUM 4.2 08/05/2020    CHLORIDE 111 (H) 08/05/2020    CO2 22 08/05/2020    ANIONGAP 8 08/05/2020     (H) 08/05/2020    BUN 41 (H) 08/05/2020    CR 1.46 (H) 08/05/2020    GFRESTIMATED 40 (L) 08/05/2020    GFRESTBLACK 46 (L) 08/05/2020    JESSICA 9.0 08/05/2020        Echocardiogram [facility] 2/19/2020  Bi-plane Calculated EF: 60-65%   Normal LV size and systolic function with estimated ejection fraction of 60-65%.   No regional wall motion abnormalities noted.   Mild left ventricular hypertrophy.   The right ventricle is normal in size and function.   Trace aortic regurgitation.   Mild tricuspid regurgitation.   Estimated pulmonary artery pressure of 30 mmHg + RA pressure.   Ascending aorta measures 4.3 cm.   Dilated IVC size, >50% collapse with respiration.    7/22/20: CT chest:     1. No pulmonary embolus appreciated.  2. Enlargement of the ascending aorta measuring up to 4.5 cm.  Attention on follow-up.  3. Scattered sub-3 mm solid pulmonary nodules. Consider optional  follow-up with CT chest in 12 months if patient is considered high  risk for cancer as per Fleischner 2017 guidelines.      EKG 7/22/2020 sinus bradycardia otherwise normal.    Assessment and Plan:    Ms. Flavia Brice is a 55 year old  female with PMH significant for granulomatosis with polyangiitis (GPA) diagnosed in 2012, CKD stage III, history of heart failure with preserved ejection fraction, former smoker and aneurysm of the ascending aorta.    1.  Aneurysm of the ascending aorta at 4.5 cm:  Patient has multiple risk factors for ascending aortic dilatation like history of uncontrolled blood pressure, former smoker for 20 pack years and autoimmune disease.  Patient's blood pressure now appear well-controlled on losartan hydrochlorothiazide but I did recommend home monitoring.  She recently quit smoking.  She is well controlled on prednisone and rituximab with regards GPA.  Patient needs monitoring of ascending aorta size which can be done with echocardiogram, CTA or MRA.  I will order an echo in 6 months to see if there is any change in aorta size.  If there is further significant dilation in aorta size in 6 months she will require CTA or MRA.    2.  History of heart failure with preserved ejection fraction: Patient was first diagnosed with HFpEF in 2012.  She was found to have worsening renal function and underwent hemodialysis for a period of time.  Renal function stabilized with immunosuppression of GPA.  Patient is currently asymptomatic from cardiac standpoint.  Most recent echo on 2/2020 showed well-preserved biventricular function with no valvular disease.  Mild LVH was noted.  Ascending aorta was measured at 4.3 cm.  Patient denies weight change or lower extremity edema.  Patient has normal functional capacity.  She is able to walk with no exertional symptoms.  Recommended no medication changes but to continue low-salt diet.    3.  CKD stage III: Related to GPA.    4.  Hypertension: Patient on losartan hydrochlorothiazide 100/12.5 mg.  Clinic blood pressure appear well-controlled.  Continue current medication.  Recommended home monitoring.    Plan:  Transthoracic echocardiogram in 6 months to recheck aorta size  Continue losartan hydrochlorothiazide 100/12.5 mg.  Monitor blood pressure at home and report to us in a week.    Return to clinic based on imaging results.    A total of  21 minutes spent face-to-face through video encounter today with greater than 50% of the time spent in counseling and  coordinating cares of the issues above.     Please donot hesitate to contact me if you have any questions or concerns. Again, thank you for allowing me to participate in the care of your patient.    Carroll MENDEZ MD  [facility] Division of Cardiology  Pager [phone]     Wanda Moreira MD

## 2020-09-01 NOTE — LETTER
"9/1/2020      RE: Flavia Brice  [address] [facility] [id number]       Dear Colleague,    Thank you for the opportunity to participate in the care of your patient, Flavia Brice, at the [facility] at [facility]. Please see a copy of my visit note below.    Flavia Brice is a 55 year old female who is being evaluated via a billable video visit.      The patient has been notified of following:     \"This video visit will be conducted via a call between you and your physician/provider. We have found that certain health care needs can be provided without the need for an in-person physical exam.  This service lets us provide the care you need with a video conversation.  If a prescription is necessary we can send it directly to your pharmacy.  If lab work is needed we can place an order for that and you can then stop by our lab to have the test done at a later time.    Video visits are billed at different rates depending on your insurance coverage.  Please reach out to your insurance provider with any questions.    If during the course of the call the physician/provider feels a video visit is not appropriate, you will not be charged for this service.\"    Patient has given verbal consent for Video visit? Yes    How would you like to obtain your AVS? Rachelhar    Patient would like the video invitation sent by: Send to e-mail at: [email or web address]      Video Start Time:9:00 am    Video-Visit Details    Type of service:  Video Visit    Video End Time (time video stopped): 9:21 (video encounter duration 21 minutes)    Originating Location (pt. Location): Home    Distant Location (provider location):  [facility]     Mode of Communication:  Video Conference via [employer]    HPI: Ms. Flavia Brice is a 55 year old  female with PMH significant for " granulomatosis with polyangiitis (GPA) diagnosed in 2012, history of heart failure with preserved ejection fraction, CKD stage III, former smoker and aneurysm of the ascending aorta.    Patient is referred to cardiology by Dr. Moreira from rheumatology for aneurysm of the ascending aorta at 4.5 cm detected by CT of the chest on 7/22/2020.  The CT was performed for possible PE which did not show any evidence of pulmonary embolism.    Patient was last seen by rheumatology on 8/5/2020 by Dr. Moreira and was noted to have GPA with pulmonary involvement, pericarditis/effusion and necrotizing crescentic nephritis.  Patient is currently on prednisone.  Patient was recommended to start on rituximab.    Patient was admitted to the outside hospital in 2012 few times with presumed heart failure, worsening renal function and she was found to have GPA. She was on hemodialysis for some time.  She did follow-up with cardiology with regards to heart failure with preserved ejection fraction.  She saw cardiology last time in 2012.  She was on diuretics for a period of time.  Patient did not follow-up with rheumatology up until recently.  In February of this year she presented with chest pain, shortness of breath and joint pain.  She was started on prednisone and recently on rituximab which improved her symptoms significantly.  Currently patient denies joint pain, chest pain, shortness of breath, lower extremity edema, PND or orthopnea.  She is able to walk with no difficulty.    Patient quit smoking in February of this year.  She has a 20-pack-year history of smoking.  No family history of premature coronary artery disease or thoracic aortic aneurysm. Patient is on lisinopril hydrochlorothiazide for hypertension.  She has been on this medication for 3 years now.  No diabetes.    Prior CT in 2012 showed ascending aorta size at 4 cm.  Echocardiogram in February 2020 from [facility] showed normal biventricular function.  Ascending  aorta was measured at 4.3 cm.  Patient has no family history of thoracic aneurysm with or without rupture.      Most recent labs on 8/5/2020 showed normal CBC with CKD stage III with creatinine of 1.46.  Electrolytes are normal.    Medications, personal, family, and social history reviewed with patient and revised.    PAST MEDICAL HISTORY:  No past medical history on file.    CURRENT MEDICATIONS:  Current Outpatient Medications   Medication Sig Dispense Refill     losartan-hydrochlorothiazide (HYZAAR) 100-12.5 MG tablet Take 1 tablet by mouth daily 90 tablet 1     predniSONE (DELTASONE) 10 MG tablet Prednisone: 20mg daily x2weeks, then 15mg daily x2weeks, then 10mg daily thereafter 63 tablet 0       PAST SURGICAL HISTORY:  Past Surgical History:   Procedure Laterality Date     COLONOSCOPY WITH CO2 INSUFFLATION N/A 2/18/2019    Procedure: COLONOSCOPY WITH CO2 INSUFFLATION;  Surgeon: Javier Guillen MD;  Location: [facility] OR     GALLBLADDER SURGERY         ALLERGIES:   No Known Allergies    FAMILY HISTORY:  Family History   Problem Relation Age of Onset     Diabetes Father      Heart Disease Maternal Grandmother      Heart Disease Maternal Grandfather      Arthritis Paternal Grandmother      Heart Disease Paternal Grandfather          SOCIAL HISTORY:  Social History     Tobacco Use     Smoking status: Former Smoker     Packs/day: 0.50     Types: Cigarettes     Smokeless tobacco: Never Used   Substance Use Topics     Alcohol use: Yes     Comment: monthly     Drug use: No       ROS:   Constitutional: No fever, chills, or sweats. Weight stable.   ENT: No visual disturbance, ear ache, epistaxis, sore throat.   Cardiovascular: As per HPI.   Respiratory: No cough, hemoptysis.    GI: No nausea, vomiting, hematemesis, melena, or hematochezia.   : No hematuria.   Integument: Negative.   Psychiatric: Negative.   Hematologic:  No easy bruising, no easy bleeding.  Neuro: Negative.   Endocrinology: No significant heat or cold  intolerance   Musculoskeletal: No myalgia.    Exam:  Physical Exam Elements attainable via telehealth:       Constitutional - alert and no distress    Eyes - no redness, no discharge    Respiratory - no cough, no labored breathing    Skin - no discoloration or lesions on the face or the arm.    Neurological -alert, normal speech,and affect, no tremor.     I have reviewed the labs and personally reviewed the imaging below and made my comment in the assessment and plan.    Labs:  CBC RESULTS:   Lab Results   Component Value Date    WBC 10.6 08/05/2020    RBC 4.00 08/05/2020    HGB 12.2 08/05/2020    HCT 38.1 08/05/2020    MCV 95 08/05/2020    MCH 30.5 08/05/2020    MCHC 32.0 08/05/2020    RDW 14.7 08/05/2020     08/05/2020       BMP RESULTS:  Lab Results   Component Value Date     08/05/2020    POTASSIUM 4.2 08/05/2020    CHLORIDE 111 (H) 08/05/2020    CO2 22 08/05/2020    ANIONGAP 8 08/05/2020     (H) 08/05/2020    BUN 41 (H) 08/05/2020    CR 1.46 (H) 08/05/2020    GFRESTIMATED 40 (L) 08/05/2020    GFRESTBLACK 46 (L) 08/05/2020    JESSICA 9.0 08/05/2020        Echocardiogram [facility] 2/19/2020  Bi-plane Calculated EF: 60-65%   Normal LV size and systolic function with estimated ejection fraction of 60-65%.   No regional wall motion abnormalities noted.   Mild left ventricular hypertrophy.   The right ventricle is normal in size and function.   Trace aortic regurgitation.   Mild tricuspid regurgitation.   Estimated pulmonary artery pressure of 30 mmHg + RA pressure.   Ascending aorta measures 4.3 cm.   Dilated IVC size, >50% collapse with respiration.    7/22/20: CT chest:     1. No pulmonary embolus appreciated.  2. Enlargement of the ascending aorta measuring up to 4.5 cm.  Attention on follow-up.  3. Scattered sub-3 mm solid pulmonary nodules. Consider optional  follow-up with CT chest in 12 months if patient is considered high  risk for cancer as per Fleischner 2017 guidelines.      EKG  7/22/2020 sinus bradycardia otherwise normal.    Assessment and Plan:    Ms. Flavia Brice is a 55 year old  female with PMH significant for granulomatosis with polyangiitis (GPA) diagnosed in 2012, CKD stage III, history of heart failure with preserved ejection fraction, former smoker and aneurysm of the ascending aorta.    1.  Aneurysm of the ascending aorta at 4.5 cm: Patient has multiple risk factors for ascending aortic dilatation like history of uncontrolled blood pressure, former smoker for 20 pack years and autoimmune disease.  Patient's blood pressure now appear well-controlled on losartan hydrochlorothiazide but I did recommend home monitoring.  She recently quit smoking.  She is well controlled on prednisone and rituximab with regards GPA.  Patient needs monitoring of ascending aorta size which can be done with echocardiogram, CTA or MRA.  I will order an echo in 6 months to see if there is any change in aorta size.  If there is further significant dilation in aorta size in 6 months she will require CTA or MRA.    2.  History of heart failure with preserved ejection fraction: Patient was first diagnosed with HFpEF in 2012.  She was found to have worsening renal function and underwent hemodialysis for a period of time.  Renal function stabilized with immunosuppression of GPA.  Patient is currently asymptomatic from cardiac standpoint.  Most recent echo on 2/2020 showed well-preserved biventricular function with no valvular disease.  Mild LVH was noted.  Ascending aorta was measured at 4.3 cm.  Patient denies weight change or lower extremity edema.  Patient has normal functional capacity.  She is able to walk with no exertional symptoms.  Recommended no medication changes but to continue low-salt diet.    3.  CKD stage III: Related to GPA.    4.  Hypertension: Patient on losartan hydrochlorothiazide 100/12.5 mg.  Clinic blood pressure appear well-controlled.  Continue current medication.  Recommended  home monitoring.    Plan:  Transthoracic echocardiogram in 6 months to recheck aorta size  Continue losartan hydrochlorothiazide 100/12.5 mg.  Monitor blood pressure at home and report to us in a week.    Return to clinic based on imaging results.    A total of  21 minutes spent face-to-face through video encounter today with greater than 50% of the time spent in counseling and coordinating cares of the issues above.     Please donot hesitate to contact me if you have any questions or concerns. Again, thank you for allowing me to participate in the care of your patient.    Carroll MENDEZ MD  [facility] Division of Cardiology  Pager [phone]     Wanda Moreira MD        Please do not hesitate to contact me if you have any questions/concerns.     Sincerely,     Carroll Mendez MD

## 2020-09-04 ENCOUNTER — INFUSION THERAPY VISIT (OUTPATIENT)
Dept: INFUSION THERAPY | Facility: CLINIC | Age: 55
End: 2020-09-04
Attending: INTERNAL MEDICINE
Payer: COMMERCIAL

## 2020-09-04 VITALS
OXYGEN SATURATION: 93 % | DIASTOLIC BLOOD PRESSURE: 91 MMHG | BODY MASS INDEX: 32.16 KG/M2 | TEMPERATURE: 99.1 F | HEART RATE: 63 BPM | RESPIRATION RATE: 16 BRPM | WEIGHT: 195.4 LBS | SYSTOLIC BLOOD PRESSURE: 146 MMHG

## 2020-09-04 DIAGNOSIS — M31.31 GRANULOMATOSIS WITH POLYANGIITIS WITH RENAL INVOLVEMENT (H): Primary | ICD-10-CM

## 2020-09-04 PROCEDURE — 96367 TX/PROPH/DG ADDL SEQ IV INF: CPT

## 2020-09-04 PROCEDURE — 96413 CHEMO IV INFUSION 1 HR: CPT

## 2020-09-04 PROCEDURE — 25000128 H RX IP 250 OP 636: Performed by: INTERNAL MEDICINE

## 2020-09-04 PROCEDURE — 96415 CHEMO IV INFUSION ADDL HR: CPT

## 2020-09-04 PROCEDURE — 25800030 ZZH RX IP 258 OP 636: Performed by: INTERNAL MEDICINE

## 2020-09-04 PROCEDURE — 96375 TX/PRO/DX INJ NEW DRUG ADDON: CPT

## 2020-09-04 PROCEDURE — 25000132 ZZH RX MED GY IP 250 OP 250 PS 637: Performed by: INTERNAL MEDICINE

## 2020-09-04 RX ORDER — HEPARIN SODIUM (PORCINE) LOCK FLUSH IV SOLN 100 UNIT/ML 100 UNIT/ML
5 SOLUTION INTRAVENOUS
Status: CANCELLED | OUTPATIENT
Start: 2020-09-04

## 2020-09-04 RX ORDER — METHYLPREDNISOLONE SODIUM SUCCINATE 125 MG/2ML
125 INJECTION, POWDER, LYOPHILIZED, FOR SOLUTION INTRAMUSCULAR; INTRAVENOUS ONCE
Status: CANCELLED
Start: 2020-09-04

## 2020-09-04 RX ORDER — ACETAMINOPHEN 325 MG/1
650 TABLET ORAL ONCE
Status: COMPLETED | OUTPATIENT
Start: 2020-09-04 | End: 2020-09-04

## 2020-09-04 RX ORDER — ACETAMINOPHEN 325 MG/1
650 TABLET ORAL ONCE
Status: CANCELLED
Start: 2020-09-04

## 2020-09-04 RX ORDER — HEPARIN SODIUM,PORCINE 10 UNIT/ML
5 VIAL (ML) INTRAVENOUS
Status: CANCELLED | OUTPATIENT
Start: 2020-09-04

## 2020-09-04 RX ORDER — METHYLPREDNISOLONE SODIUM SUCCINATE 125 MG/2ML
125 INJECTION, POWDER, LYOPHILIZED, FOR SOLUTION INTRAMUSCULAR; INTRAVENOUS ONCE
Status: COMPLETED | OUTPATIENT
Start: 2020-09-04 | End: 2020-09-04

## 2020-09-04 RX ADMIN — RITUXIMAB 1000 MG: 10 INJECTION, SOLUTION INTRAVENOUS at 08:52

## 2020-09-04 RX ADMIN — SODIUM CHLORIDE 250 ML: 9 INJECTION, SOLUTION INTRAVENOUS at 08:17

## 2020-09-04 RX ADMIN — ACETAMINOPHEN 650 MG: 325 TABLET, FILM COATED ORAL at 08:17

## 2020-09-04 RX ADMIN — DIPHENHYDRAMINE HYDROCHLORIDE 50 MG: 50 INJECTION, SOLUTION INTRAMUSCULAR; INTRAVENOUS at 08:33

## 2020-09-04 RX ADMIN — METHYLPREDNISOLONE SODIUM SUCCINATE 125 MG: 125 INJECTION, POWDER, FOR SOLUTION INTRAMUSCULAR; INTRAVENOUS at 08:17

## 2020-09-04 ASSESSMENT — PAIN SCALES - GENERAL: PAINLEVEL: NO PAIN (0)

## 2020-09-04 NOTE — PROGRESS NOTES
Infusion Nursing Note:  Flavia Ele Brice presents today for Rituxan.    Patient seen by provider today: No   present during visit today: Not Applicable.    Note: N/A.    Intravenous Access:  Peripheral IV placed.    Treatment Conditions:  Not Applicable.      Post Infusion Assessment:  Patient tolerated infusion without incident.  Blood return noted pre and post infusion.  Site patent and intact, free from redness, edema or discomfort.  No evidence of extravasations.  Access discontinued per protocol.       Discharge Plan:   Discharge instructions reviewed with: Patient.  Patient and/or family verbalized understanding of discharge instructions and all questions answered.  Copy of AVS reviewed with patient and/or family.  Patient will return 9/9/20  for next appointment.  Patient discharged in stable condition accompanied by: self.  Departure Mode: Ambulatory.    Shad Carreno RN    ~~~ NOTE: If the patient answers yes to any of the questions below, hold the infusion and contact ordering provider or on-call provider.    1. Have you recently had an elevated temperature, fever, chills, productive cough, coughing for 3 weeks or longer or hemoptysis,  abnormal vital signs, night sweats,  chest pain or have you noticed a decrease in your appetite, unexplained weight loss or fatigue? No  2. Do you have any open wounds or new incisions? No  3. Do you have any recent or upcoming hospitalizations, surgeries or dental procedures? No  4. Do you currently have or recently have had any signs of illness or infection or are you on any antibiotics? No  5. Have you had any new, sudden or worsening abdominal pain? No  6. Have you or anyone in your household received a live vaccination in the past 4 weeks? Please note:  No live vaccines while on biologic/chemotherapy until 6 months after the last treatment.  Patient can receive the flu vaccine (shot only) and the pneumovax.  It is optimal for the patient to get  these vaccines mid cycle, but they can be given at any time as long as it is not on the day of the infusion. Yes, Varicella for an infant  7. Have you recently been diagnosed with any new nervous system diseases (ie. Multiple sclerosis, Guillain Gilboa, seizures, neurological changes) or cancer diagnosis? Are you on any form of radiation or chemotherapy? No  8. Are you pregnant or breast feeding or do you have plans of pregnancy in the future? No  9. Have you been having any signs of worsening depression or suicidal ideations?  (benlysta only) No  10. Have there been any other new onset medical symptoms? No

## 2020-09-09 ENCOUNTER — VIRTUAL VISIT (OUTPATIENT)
Dept: RHEUMATOLOGY | Facility: CLINIC | Age: 55
End: 2020-09-09
Payer: COMMERCIAL

## 2020-09-09 DIAGNOSIS — M31.31 GRANULOMATOSIS WITH POLYANGIITIS WITH RENAL INVOLVEMENT (H): Primary | ICD-10-CM

## 2020-09-09 DIAGNOSIS — Z79.899 HIGH RISK MEDICATIONS (NOT ANTICOAGULANTS) LONG-TERM USE: ICD-10-CM

## 2020-09-09 DIAGNOSIS — R76.8 POSITIVE ANA (ANTINUCLEAR ANTIBODY): ICD-10-CM

## 2020-09-09 DIAGNOSIS — I71.20 THORACIC AORTIC ANEURYSM WITHOUT RUPTURE (H): ICD-10-CM

## 2020-09-09 DIAGNOSIS — M06.4 INFLAMMATORY POLYARTHROPATHY (H): ICD-10-CM

## 2020-09-09 DIAGNOSIS — R79.89 ELEVATED SERUM CREATININE: ICD-10-CM

## 2020-09-09 PROCEDURE — 99214 OFFICE O/P EST MOD 30 MIN: CPT | Mod: 95 | Performed by: INTERNAL MEDICINE

## 2020-09-09 RX ORDER — PREDNISONE 2.5 MG/1
TABLET ORAL
Qty: 126 TABLET | Refills: 0 | Status: SHIPPED | OUTPATIENT
Start: 2020-09-09 | End: 2020-10-22

## 2020-09-09 NOTE — PROGRESS NOTES
"Flavia Brice is a 55 year old female who is being evaluated via a billable video visit.      The patient has been notified of following:     \"This video visit will be conducted via a call between you and your physician/provider. We have found that certain health care needs can be provided without the need for an in-person physical exam.  This service lets us provide the care you need with a video conversation.  If a prescription is necessary we can send it directly to your pharmacy.  If lab work is needed we can place an order for that and you can then stop by our lab to have the test done at a later time.    Video visits are billed at different rates depending on your insurance coverage.  Please reach out to your insurance provider with any questions.    If during the course of the call the physician/provider feels a video visit is not appropriate, you will not be charged for this service.\"    Patient has given verbal consent for Video visit? Yes  How would you like to obtain your AVS? MyChart  If you are dropped from the video visit, the video invite should be resent to: Text to cell phone: [phone]  Will anyone else be joining your video visit? No      Rheumatology Video Visit      Flavia Brice MRN# [id number]   YOB: 1965 Age: 55 year old      Date of visit: 9/09/20   PCP: Monet Ackerman    Chief Complaint   Patient presents with:  Granulomatosis    Assessment and Plan     1.  Granulomatosis with polyangiitis: Dx'd 2012 by Dr. Wesley at [facility] when she presented with pulmonary involvement, pericarditis/effusion, borderline aneurysmal dilation of the ascending aorta and biopsy proven necrotizing crescentic nephritis.  Went into remission with cytoxan and prednisone, then maintained on AZA for 2 yrs per patient, stopped when lost to follow-up. Recurrence in 2020 with YOLI, pleuritic chest pain, inflammatory arthritis (MCPs, PIPs, shoulders, knees) that has responded well to " prednisone.  On 8/5/2020 prednisone was increased and tapered and is now on prednisone 10 mg daily; rituximab 1g IV received on 8/21/2020 & 9/4/2020.  Doing well at this time.  Continue to reduce prednisone.  Recheck labs.    - Reduce prednisone to 10 mg daily x2 weeks, then 7.5 mg daily x2 weeks, then 5 mg daily thereafter   - Labs within 2 weeks: CBC, CMP, ESR, CRP, UA, Uprotein:creatinine    2. Inflammatory arthritis: Related to GPA.  See #1    3. Elevated serum creatinine: Related to GPA.  See #1    4. Positive MALLORY (antinuclear antibody): Additional work-up was negative for SSA, SSB, RNP, Smith, dsDNA; complement C3 and C4 were not low.  Symptoms are most likely related to GPA; no MALLORY-associated rheumatologic disorder identified    5.  Aneurysm of the ascending aorta at 4.5 cm; history of heart failure with preserved ejection fraction : Has established with Dr. Locke (cardiology)    6. Cytoxan use history: needs periodic UA to eval for hematuria, due to increased risk for bladder cancer.     7.  Vaccinations: Vaccinations reviewed with Ms. Brice.  Risks and benefits of vaccinations were discussed.  Ideal to wait until January for vaccines based on Rituximab dosing date but considering our upcoming flu season, I have recommended that she get the flu shot in late October  - Influenza: encouraged yearly vaccination     # Relevant labs and imaging were reviewed with the patient    # High risk medication toxicity monitoring: discussion and labs reviewed; appropriate labs ordered. See above.  Instructed that if confirmed to have COVID-19 or exposure to someone with confirmed COVID-19 to call this clinic for directions on DMARD management.    # Note that this is a virtual visit to reduce the risk of COVID-19 exposure during this current pandemic.      # Considered to be at high risk of complications from the COVID-19 virus.  It is recommended to limit contact with other people and if possible to work remotely or provide  a leave of absence to reduce the risk for COVID-19.      Ms. Brice verbalized agreement with and understanding of the rational for the diagnosis and treatment plan.  All questions were answered to best of my ability and the patient's satisfaction. Ms. Brice was advised to contact the clinic with any questions that may arise after the clinic visit.      Thank you for involving me in the care of the patient    Return to clinic: 6 weeks      HPI   Flavia Brice is a 55 year old female with a past medical history significant for hypertension, depression, and granulomatosis with polyangiitis who is seen for follow-up of granulomatosis with polyangiitis    10/31/2013 [facility] rheumatology clinic note by Dr. Ann Wesley documents severe WI-3 ANCA positive GPA with pulmonary involvement, pericarditis/effusion, borderline aneurysmal dilation of the ascending aorta, necrotizing crescentic nephritis.  Kidney biopsy has confirmed diagnosis in the past.  Has received Solu-Medrol x3, Cytoxan x1 at 1300 mg, on prednisone 60-80 mg daily.  Deteriorated quickly despite those interventions and was hospitalized again, requiring plasmapheresis and dialysis.  Was seeing Dr. Velazquez from [facility] and was again placed on Cytoxan 15 mg/kg every 3 weeks.  Has seen ENT but there was no clear sinus involvement.  She was then transitioned after 7 infusions of Cytoxan to Imuran on 11/2012 and has been doing well on that.      7/11/2020 [facility] hospitalization note: Acute kidney injury, migratory polyarthritis, chronic diastolic heart failure.  Left knee and left hip pain.  Right shoulder pain that migrated to the left shoulder.  No joint swelling.  No fevers.  In the past she had flare of her joint pain that she was treated with prednisone.  Prior to this ED evaluation she reportedly used prednisone 20 mg without improvement.    7/22/2020: [facility] emergency department visit for shortness of breath.  Notes  history of granulomatosis with polyangiitis, diastolic heart failure, a sending thoracic aortic aneurysm from 4.6 cm on chest CT 2/18/2020), migratory polyarthritis, acute kidney injury, and hypertension.  Coronavirus test negative.  Chest CT negative for pneumonia.  O2 saturation normal.  Advised to follow-up with her PCP and cardiology.    8/5/2020:  Ms. Brice reported that she was dx'd with GPA by Dr. Wesley. She was treated and in remission; was on AZA for a year and no issues for a while. Lost to follow-up with Dr. Wesley.  Then 1 year ago started having more shortness of breath and found to have more lung nodules.  Recalls heart failure and kidney failure when first dx'd.  Told to see cardiology to follow the aneurysm.  Then in Jan 2020 started to have pain in her shoulders, knees ankles. Pain was so bad that she went to [facility]; found to have YOLI.  Until able to have this rheumatology evaluation, she says that she was given prednisone to help the joint pain; currently on 5mg daily of prednisone; felt much better when on prednisone 30mg daily. Has been dealing with right knee swelling; the prednisone helped with this.  Joint pains are worse in the AM; improve with time and activity. Morning stiffness is very mild while on prednisone 5mg daily.  No hematuria. No hemoptysis.  Flavia Brice's mother was present during the clinic visit.     Today, 9/9/2020: Significant improvement with prednisone and rituximab.  No longer with swelling around her ankles.  Hand swelling/pain/stiffness has resolved.  She feels a little puffy in her face but no actual swelling.  She has followed with her cardiologist and they are going to monitor the thoracic aortic aneurysm.  Tolerated rituximab infusion well.  Currently on prednisone 10 mg daily.  Not going into work at this time; she is taking a leave of absence.    Denies fevers, chills, nausea, vomiting. Occasional constipation or diarrhea. No abdominal pain. No chest  pain/pressure, palpitations, or shortness of breath currently; but was seen 3 times recently in the ED for chest pain with deep breaths.  No LE swelling.  No oral or nasal sores.  No rash. No sicca symptoms. No photosensitivity or photophobia. No eye pain or redness. No history of inflammatory eye disease.  No history of DVT, pulmonary embolism, or miscarriage.   No history of serositis.  No history of Raynaud's Phenomenon.  No known seizure disorder.  No known renal disorder.      Tobacco: quit smoking in Feb 2020  EtOH: no more than 1 drink per month  Drugs: none  Occupation: manager at [employer]    ROS   GEN: No fevers, chills, night sweats, or weight change  SKIN: No itching, rashes, sores  HEENT: No epistaxis. No oral or nasal ulcers.  CV: See HPI  PULM: No shortness of breath, wheeze, cough now; see HPI  GI: No nausea, vomiting, constipation, diarrhea. No blood in stool. No abdominal pain.  : No blood in urine.  MSK: See HPI.  NEURO: No numbness, tingling, or weakness.  ENDO: No heat/cold intolerance.  EXT: No LE swelling  PSYCH: Negative    Active Problem List     Patient Active Problem List   Diagnosis     Wegener's granulomatosis (H)     CARDIOVASCULAR SCREENING; LDL GOAL LESS THAN 130     Overweight     Advanced directives, counseling/discussion     Tobacco abuse     Hypertension goal BP (blood pressure) < 140/90     Tobacco use disorder     Situational depression     Granulomatosis with polyangiitis (H)     Past Medical History   History reviewed. No pertinent past medical history.  Past Surgical History     Past Surgical History:   Procedure Laterality Date     COLONOSCOPY WITH CO2 INSUFFLATION N/A 2/18/2019    Procedure: COLONOSCOPY WITH CO2 INSUFFLATION;  Surgeon: Javier Guillen MD;  Location: [facility] OR     GALLBLADDER SURGERY       Allergy   No Known Allergies  Current Medication List     Current Outpatient Medications   Medication Sig     losartan-hydrochlorothiazide (HYZAAR) 100-12.5 MG  "tablet Take 1 tablet by mouth daily     predniSONE (DELTASONE) 10 MG tablet Prednisone: 20mg daily x2weeks, then 15mg daily x2weeks, then 10mg daily thereafter     No current facility-administered medications for this visit.          Social History   See HPI    Family History     Family History   Problem Relation Age of Onset     Diabetes Father      Heart Disease Maternal Grandmother      Heart Disease Maternal Grandfather      Arthritis Paternal Grandmother      Heart Disease Paternal Grandfather      Grandmother: rheumatoid arthritis    Physical Exam     Temp Readings from Last 3 Encounters:   09/04/20 99.1  F (37.3  C) (Tympanic)   08/21/20 97.2  F (36.2  C) (Tympanic)   07/27/20 98.9  F (37.2  C) (Tympanic)     BP Readings from Last 5 Encounters:   09/04/20 (!) 146/91   08/21/20 123/74   07/27/20 132/76   07/22/20 (!) 146/84   02/28/20 (!) 146/85     Pulse Readings from Last 1 Encounters:   09/04/20 63     Resp Readings from Last 1 Encounters:   09/04/20 16     Estimated body mass index is 32.16 kg/m  as calculated from the following:    Height as of 2/28/20: 1.66 m (5' 5.35\").    Weight as of 9/4/20: 88.6 kg (195 lb 6.4 oz).      GEN: NAD. Healthy appearing adult.   HEENT: MMM.  Anicteric, noninjected sclera. No obvious external lesions of the ear and nose. Hearing intact.  PULM: No increased work of breathing; no use of accessory muscles.  MSK: MCPs, PIPs, DIPs, wrists, and elbows without swelling.   SKIN: No rash or jaundice seen  PSYCH: Alert. Appropriate.      Labs / Imaging (select studies)     RF/CCP  Recent Labs   Lab Test 08/05/20  1453   CCPIGG <1   RHF <7     MALLORY  Recent Labs   Lab Test 08/05/20  1453   CAMMIE Positive*   ANAP1 HOMOGENEOUS   ANAT1 1:160     RNP/Sm/SSA/SSB  Recent Labs   Lab Test 08/11/20  1402   RNPIGG <0.2   SMIGG <0.2   SSAIGG <0.2   SSBIGG <0.2     dsDNA  Recent Labs   Lab Test 08/11/20  1402   DNA <1     C3/C4  Recent Labs   Lab Test 08/11/20  1402   L6FZLDD 133   U9XKIOF 41* "     Antiphospholipid Antibodies  Recent Labs   Lab Test 08/11/20  1402   B2GPG 0.8   B2GPM <2.9   CARDG <1.6   CARDM 0.8   LUPINT Negative     ANCA  Recent Labs   Lab Test 08/05/20  1453   PR3IGG 4.8*   MPOIGG <0.2     IgG  Recent Labs   Lab Test 08/05/20  1453   IGG 1,009      IGM 32*     CBC  Recent Labs   Lab Test 08/05/20  1453 07/22/20  1117 02/27/14  1148   WBC 10.6 12.5* 6.0   RBC 4.00 3.94 3.85   HGB 12.2 12.0 12.4   HCT 38.1 38.8 35.3   MCV 95 99 92   RDW 14.7 13.9 13.2    315 228   MCH 30.5 30.5 32.2   MCHC 32.0 30.9* 35.0   NEUTROPHIL 76.3 77.3 60.1   LYMPH 16.2 11.4 29.3   MONOCYTE 6.6 9.4 8.3   EOSINOPHIL 0.6 1.0 1.7   BASOPHIL 0.3 0.4 0.6   ANEU 8.1 9.7* 3.6   ALYM 1.7 1.4 1.8   MARCOS 0.7 1.2 0.5   AEOS 0.1 0.1 0.1   ABAS 0.0 0.1 0.0     CMP  Recent Labs   Lab Test 08/05/20  1453 07/22/20  1117 02/28/20  1044 03/26/18  1058 10/27/17  1541 02/27/14  1148  10/29/13 02/28/13    137 141 142 140 138   < >  --   --    POTASSIUM 4.2 4.3 4.6 4.1 3.7 3.8  --  3.6  --    CHLORIDE 111* 108 109 110* 105 101   < >  --   --    CO2 22 27 26 21 28 26   < >  --   --    ANIONGAP 8 2* 6 11 7 11   < >  --   --    * 92 125* 68* 103* 79  --  107*  --    BUN 41* 29 33* 23 33* 23   < >  --   --    CR 1.46* 1.17* 1.36* 1.08* 1.42* 1.21*  --  1.16 1.46   GFRESTIMATED 40* 52* 43* 53* 39* 47*  --  49.7 3.81   GFRESTBLACK 46* 60* 50* 64 47* 57*  --  >60.0 46.1   JESSICA 9.0 9.6 9.2 8.6 9.0 9.6   < >  --   --    BILITOTAL 0.3  --   --   --   --  0.5  --   --   --    ALBUMIN 3.7  --   --  3.6 4.0 4.3  --   --   --    PROTTOTAL 7.3  --   --   --   --  7.0  --   --   --    ALKPHOS 84  --   --   --   --  73  --   --   --    AST 9  --   --   --   --  29  --  27 19   ALT 26  --   --   --   --  39  --  29 30    < > = values in this interval not displayed.     HgA1c  Recent Labs   Lab Test 10/27/17  1541 10/29/13 02/28/13   A1C 5.8 6.0 6.0     Calcium/VitaminD  Recent Labs   Lab Test 08/05/20  1453 07/22/20  1117  02/28/20  1044  02/27/14  1148   JESSICA 9.0 9.6 9.2   < > 9.6   VITDT  --   --   --   --  28*    < > = values in this interval not displayed.     ESR/CRP  Recent Labs   Lab Test 08/05/20  1453   SED 9   CRP 5.7     CK/Aldolase  Recent Labs   Lab Test 08/11/20  1402   CKT 79     TSH/T4  Recent Labs   Lab Test 03/26/18  1058   TSH 1.44     Hepatitis B  Recent Labs   Lab Test 08/05/20  1453   HBCAB Nonreactive   HEPBANG Nonreactive     Hepatitis C  Recent Labs   Lab Test 08/05/20  1453 03/26/18  1058   HCVAB Nonreactive Nonreactive     Lyme ab screening  Recent Labs   Lab Test 08/05/20  1453   LYMEGM 0.10       HIV Screening  Recent Labs   Lab Test 08/05/20  1453   HIAGAB Nonreactive     UA  Recent Labs   Lab Test 08/05/20  1453 07/27/20  0938   COLOR Yellow Yellow   APPEARANCE Clear Clear   URINEGLC Negative Negative   URINEBILI Negative Negative   SG 1.025 1.015   URINEPH 5.0 5.0   PROTEIN Negative Trace*   UROBILINOGEN 0.2 0.2   NITRITE Negative Negative   UBLD Trace* Small*   LEUKEST Negative Negative   WBCU 0 - 5 0 - 5   RBCU O - 2 2-5*   SQUAMOUSEPI Few Few   BACTERIA Few* Few*   MUCOUS Present*  --      Urine Microscopic  Recent Labs   Lab Test 08/05/20  1453 07/27/20  0938   WBCU 0 - 5 0 - 5   RBCU O - 2 2-5*   SQUAMOUSEPI Few Few   BACTERIA Few* Few*   MUCOUS Present*  --      Urine Protein  Recent Labs   Lab Test 08/05/20  1453   UTP 0.30   UTPG 0.37*   UCRR 81       Recent Results (from the past 744 hour(s))   XR Chest Port 1 View    Narrative    Portable chest    INDICATION: Chest pain    COMPARISON: 12/1/2017    FINDINGS: Heart size and shape appear normal. There is atherosclerotic  calcification at the aortic arch. Lungs and pulmonary vascularity  appear normal. Bony structures appear grossly intact.      Impression    IMPRESSION: Aortic atherosclerosis.    MERCEDES MEDRANO MD   CT Chest Pulmonary Embolism w Contrast    Narrative    EXAMINATION: CTA pulmonary angiogram, 7/22/2020 1:41 PM     COMPARISON:  None.    HISTORY: PE suspected, intermediate prob, positive D-dimer; chest pain    TECHNIQUE: Volumetric helical acquisition of CT images of the chest  from the lung apices to the kidneys were acquired after the  administration of 80 mL of Isovue-370 IV contrast. Non-Flash technique  with shallow inspiratory breath hold technique.  Post-processed  multiplanar and/or MIP reformations were obtained, archived to PACS  and used in interpretation of this study.     FINDINGS:      Contrast bolus is: adequate.  Exam is negative for acute pulmonary  embolism.       The largest right ventricle transaxial diameter is (measured from  endocardium to endocardium): 4.6 cm   The largest left ventricle transaxial diameter is (measured from  endocardium to endocardium): 4.7 cm  RV/LV ratio is: 1.0 (if ratio greater than 1.1 then sign is suspicious  for right heart strain)  Reflux of contrast into the IVC? yes  Paradoxical bowing of the interventricular septum to the left? no    Chest:     Thyroid is unremarkable. Heart is not enlarged. No pericardial  effusion. No significant coronary artery calcifications. The ascending  aorta is enlarged measuring up to 4.5 cm. The main pulmonary artery is  normal in caliber.    No mediastinal or hilar lymphadenopathy.    Trachea and central airways are clear. Mild basilar predominant  peribronchovascular groundglass opacities likely related to shallow  breath hold. No pleural effusion or pneumothorax.    Scattered pulmonary nodules. For example:  -Solid pulmonary nodule in the posterior right lower lobe measuring 2  mm (series 9, image 106).  -Solid pulmonary nodule in the inferomedial right lower lobe measuring  2.5 mm (series 9, image 89)    Abdomen: Visualized abdomen is limited.. Small sliding-type hiatal  hernia.    Bones and Soft Tissues: No suspicious osseous lesion. No suspicious  mass.  Mild degenerative changes of the thoracic spine.        Impression    IMPRESSION:   1. No pulmonary  embolus appreciated.    2. Enlargement of the ascending aorta measuring up to 4.5 cm.  Attention on follow-up.    3. Scattered sub-3 mm solid pulmonary nodules. Consider optional  follow-up with CT chest in 12 months if patient is considered high  risk for cancer as per Fleischner 2017 guidelines.    In the event of a positive result for acute pulmonary embolism  resulting in right heart strain, consider calling the   [facility] hospital  for PERT (Pulmonary Embolism Response Team)  Activation?    PERT -- Pulmonary Embolism Response Team (Multidisciplinary team  including cardiology, interventional radiology, critical care,  hematology)    I have personally reviewed the examination and initial interpretation  and I agree with the findings.    OFELIA HUERTAS MD                       Immunization History     Immunization History   Administered Date(s) Administered     Influenza Vaccine IM > 6 months Valent IIV4 10/31/2013, 02/19/2020     Pneumococcal 23 valent 04/25/2008, 06/28/2012     Tdap (Adacel,Boostrix) 07/02/2012          Chart documentation done in part with Dragon Voice recognition Software. Although reviewed after completion, some word and grammatical error may remain.          Video-Visit Details    Type of service:  Video Visit    Video Start Time: 2:25 PM  Video End Time: 2:40 PM    Originating Location (pt. Location): Home in [place]    Distant Location (provider location):  Home    Platform used for Video Visit: Iman Moreira MD

## 2020-09-16 DIAGNOSIS — M31.31 GRANULOMATOSIS WITH POLYANGIITIS WITH RENAL INVOLVEMENT (H): ICD-10-CM

## 2020-09-16 LAB
ALBUMIN SERPL-MCNC: 3.4 G/DL (ref 3.4–5)
ALBUMIN UR-MCNC: 30 MG/DL
ALP SERPL-CCNC: 78 U/L (ref 40–150)
ALT SERPL W P-5'-P-CCNC: 28 U/L (ref 0–50)
ANION GAP SERPL CALCULATED.3IONS-SCNC: 5 MMOL/L (ref 3–14)
APPEARANCE UR: CLEAR
AST SERPL W P-5'-P-CCNC: 8 U/L (ref 0–45)
BACTERIA #/AREA URNS HPF: ABNORMAL /HPF
BASOPHILS # BLD AUTO: 0 10E9/L (ref 0–0.2)
BASOPHILS NFR BLD AUTO: 0.5 %
BILIRUB SERPL-MCNC: 0.3 MG/DL (ref 0.2–1.3)
BILIRUB UR QL STRIP: NEGATIVE
BUN SERPL-MCNC: 32 MG/DL (ref 7–30)
CALCIUM SERPL-MCNC: 9 MG/DL (ref 8.5–10.1)
CHLORIDE SERPL-SCNC: 108 MMOL/L (ref 94–109)
CO2 SERPL-SCNC: 27 MMOL/L (ref 20–32)
COLOR UR AUTO: YELLOW
CREAT SERPL-MCNC: 1.33 MG/DL (ref 0.52–1.04)
CREAT UR-MCNC: 153 MG/DL
CRP SERPL-MCNC: <2.9 MG/L (ref 0–8)
DIFFERENTIAL METHOD BLD: NORMAL
EOSINOPHIL # BLD AUTO: 0.2 10E9/L (ref 0–0.7)
EOSINOPHIL NFR BLD AUTO: 2.3 %
ERYTHROCYTE [DISTWIDTH] IN BLOOD BY AUTOMATED COUNT: 14.5 % (ref 10–15)
ERYTHROCYTE [SEDIMENTATION RATE] IN BLOOD BY WESTERGREN METHOD: 12 MM/H (ref 0–30)
GFR SERPL CREATININE-BSD FRML MDRD: 44 ML/MIN/{1.73_M2}
GLUCOSE SERPL-MCNC: 120 MG/DL (ref 70–99)
GLUCOSE UR STRIP-MCNC: NEGATIVE MG/DL
HCT VFR BLD AUTO: 38.7 % (ref 35–47)
HGB BLD-MCNC: 12.2 G/DL (ref 11.7–15.7)
HGB UR QL STRIP: NEGATIVE
KETONES UR STRIP-MCNC: NEGATIVE MG/DL
LEUKOCYTE ESTERASE UR QL STRIP: NEGATIVE
LYMPHOCYTES # BLD AUTO: 1.7 10E9/L (ref 0.8–5.3)
LYMPHOCYTES NFR BLD AUTO: 20.5 %
MCH RBC QN AUTO: 30.7 PG (ref 26.5–33)
MCHC RBC AUTO-ENTMCNC: 31.5 G/DL (ref 31.5–36.5)
MCV RBC AUTO: 97 FL (ref 78–100)
MONOCYTES # BLD AUTO: 0.8 10E9/L (ref 0–1.3)
MONOCYTES NFR BLD AUTO: 9.8 %
NEUTROPHILS # BLD AUTO: 5.4 10E9/L (ref 1.6–8.3)
NEUTROPHILS NFR BLD AUTO: 66.9 %
NITRATE UR QL: NEGATIVE
NON-SQ EPI CELLS #/AREA URNS LPF: ABNORMAL /LPF
PH UR STRIP: 5 PH (ref 5–7)
PLATELET # BLD AUTO: 268 10E9/L (ref 150–450)
POTASSIUM SERPL-SCNC: 3.9 MMOL/L (ref 3.4–5.3)
PROT SERPL-MCNC: 6.9 G/DL (ref 6.8–8.8)
PROT UR-MCNC: 0.43 G/L
PROT/CREAT 24H UR: 0.28 G/G CR (ref 0–0.2)
RBC # BLD AUTO: 3.98 10E12/L (ref 3.8–5.2)
RBC #/AREA URNS AUTO: ABNORMAL /HPF
SODIUM SERPL-SCNC: 140 MMOL/L (ref 133–144)
SOURCE: ABNORMAL
SP GR UR STRIP: 1.02 (ref 1–1.03)
UROBILINOGEN UR STRIP-ACNC: 0.2 EU/DL (ref 0.2–1)
WBC # BLD AUTO: 8.1 10E9/L (ref 4–11)
WBC #/AREA URNS AUTO: ABNORMAL /HPF

## 2020-09-16 PROCEDURE — 81001 URINALYSIS AUTO W/SCOPE: CPT | Performed by: INTERNAL MEDICINE

## 2020-09-16 PROCEDURE — 85025 COMPLETE CBC W/AUTO DIFF WBC: CPT | Performed by: INTERNAL MEDICINE

## 2020-09-16 PROCEDURE — 86140 C-REACTIVE PROTEIN: CPT | Performed by: INTERNAL MEDICINE

## 2020-09-16 PROCEDURE — 36415 COLL VENOUS BLD VENIPUNCTURE: CPT | Performed by: INTERNAL MEDICINE

## 2020-09-16 PROCEDURE — 80053 COMPREHEN METABOLIC PANEL: CPT | Performed by: INTERNAL MEDICINE

## 2020-09-16 PROCEDURE — 85652 RBC SED RATE AUTOMATED: CPT | Performed by: INTERNAL MEDICINE

## 2020-09-16 PROCEDURE — 84156 ASSAY OF PROTEIN URINE: CPT | Performed by: INTERNAL MEDICINE

## 2020-10-21 ENCOUNTER — VIRTUAL VISIT (OUTPATIENT)
Dept: RHEUMATOLOGY | Facility: CLINIC | Age: 55
End: 2020-10-21
Payer: COMMERCIAL

## 2020-10-21 DIAGNOSIS — M31.31 GRANULOMATOSIS WITH POLYANGIITIS WITH RENAL INVOLVEMENT (H): Primary | ICD-10-CM

## 2020-10-21 DIAGNOSIS — M19.042 PRIMARY OSTEOARTHRITIS OF BOTH HANDS: ICD-10-CM

## 2020-10-21 DIAGNOSIS — M19.041 PRIMARY OSTEOARTHRITIS OF BOTH HANDS: ICD-10-CM

## 2020-10-21 DIAGNOSIS — M06.4 INFLAMMATORY POLYARTHROPATHY (H): ICD-10-CM

## 2020-10-21 DIAGNOSIS — Z79.899 HIGH RISK MEDICATIONS (NOT ANTICOAGULANTS) LONG-TERM USE: ICD-10-CM

## 2020-10-21 DIAGNOSIS — H93.13 TINNITUS OF BOTH EARS: ICD-10-CM

## 2020-10-21 PROCEDURE — 99214 OFFICE O/P EST MOD 30 MIN: CPT | Mod: 95 | Performed by: INTERNAL MEDICINE

## 2020-10-21 RX ORDER — AZATHIOPRINE 50 MG/1
TABLET ORAL
Qty: 173 TABLET | Refills: 0 | Status: SHIPPED | OUTPATIENT
Start: 2020-10-21 | End: 2020-12-09

## 2020-10-22 RX ORDER — PREDNISONE 5 MG/1
5 TABLET ORAL DAILY
Qty: 90 TABLET | Refills: 0 | Status: SHIPPED | OUTPATIENT
Start: 2020-10-22 | End: 2020-12-09

## 2020-10-22 NOTE — PROGRESS NOTES
"Flavia Brice is a 55 year old female who is being evaluated via a billable video visit.      The patient has been notified of following:     \"This video visit will be conducted via a call between you and your physician/provider. We have found that certain health care needs can be provided without the need for an in-person physical exam.  This service lets us provide the care you need with a video conversation.  If a prescription is necessary we can send it directly to your pharmacy.  If lab work is needed we can place an order for that and you can then stop by our lab to have the test done at a later time.    Video visits are billed at different rates depending on your insurance coverage.  Please reach out to your insurance provider with any questions.    If during the course of the call the physician/provider feels a video visit is not appropriate, you will not be charged for this service.\"    Patient has given verbal consent for Video visit? Yes  How would you like to obtain your AVS? MyChart  If you are dropped from the video visit, the video invite should be resent to: [phone]  Will anyone else be joining your video visit? No    Rheumatology Video Visit      Flavia Brice MRN# [id number]   YOB: 1965 Age: 55 year old      Date of visit: 10/21/20   PCP: Monet Ackerman    Chief Complaint   Patient presents with:  RECHECK: Granulomatosis with polyangiitis with renal involvement    Assessment and Plan     1.  Granulomatosis with polyangiitis: Dx'd 2012 by Dr. Wesley at [facility] when she presented with pulmonary involvement, pericarditis/effusion, borderline aneurysmal dilation of the ascending aorta and biopsy proven necrotizing crescentic nephritis.  Went into remission with cytoxan and prednisone, then maintained on AZA for 2 yrs per patient, stopped when lost to follow-up. Recurrence in 2020 with YOLI, pleuritic chest pain, inflammatory arthritis (MCPs, PIPs, shoulders, knees) " that has responded well to prednisone.  On 8/5/2020 prednisone was increased and tapered and is now on prednisone  7.5mg daily; rituximab 1g IV received on 8/21/2020 & 9/4/2020.  With prednisone dose reduction she has had worsening inflammatory (knees, MCPs) and degenerative joint symptoms (DIPs).  For the inflammatory joint symptoms we discussed methotrexate and azathioprine; she had tolerated azathioprine in the past so we will start that now.    - Start azathioprine 50 mg daily x7 days, then 100 mg daily thereafter  - Reduce prednisone to 5 mg daily  - Plan to re-dose of rituximab in February/March 2021  - Labs in 2 weeks: CBC, creatinine, hepatic panel, TPMT  - Labs on week 4 and 8: CBC, Cr, Hepatic Panel  - Labs in 3 months: CBC, Creatinine, Hepatic Panel, ESR, CRP    # Azathioprine (Imuran) Risks and Benefits.  The risks and benefits of azathioprine were discussed in detail and the patient verbalized understanding.  The risks discussed include, but are not limited to, the risk for hypersensitivity, anaphylaxis, anaphylactoid reactions, the increased risk for malignancy, leukopenia, thrombocytopenia, anemia, hepatotoxicity (including increases in alkaline phosphatase, bilirubin, and transaminases), myalgia, infection, and fever.       2. Inflammatory arthritis: Related to GPA.  See #1    3. Elevated serum creatinine: Related to GPA.  See #1    4. Positive MALLORY (antinuclear antibody): Additional work-up was negative for SSA, SSB, RNP, Smith, dsDNA; complement C3 and C4 were not low.  Symptoms are most likely related to GPA; no MALLORY-associated rheumatologic disorder identified    5.  Aneurysm of the ascending aorta at 4.5 cm; history of heart failure with preserved ejection fraction : Has established with Dr. Locke (cardiology)    6. Cytoxan use history: needs periodic UA to eval for hematuria, due to increased risk for bladder cancer.     8.  Osteoarthritis: Affecting the DIPs.  Mild symptoms this time.  Consider  hand therapy referral in the future if needed    9.  Tinnitus: Reportedly worsened in the last 6 months.  Refer to ENT for evaluation  - ENT referral    10.  Vaccinations: Vaccinations reviewed with Ms. Brice.  Risks and benefits of vaccinations were discussed.  Ideal to wait until January for vaccines based on Rituximab dosing date but considering our upcoming flu season, I have recommended that she get the flu shot in late October  - Influenza: encouraged yearly vaccination     # Relevant labs and imaging were reviewed with the patient    # High risk medication toxicity monitoring: discussion and labs reviewed; appropriate labs ordered. See above.  Instructed that if confirmed to have COVID-19 or exposure to someone with confirmed COVID-19 to call this clinic for directions on DMARD management.    # Note that this is a virtual visit to reduce the risk of COVID-19 exposure during this current pandemic.      # Considered to be at high risk of complications from the COVID-19 virus.  It is recommended to limit contact with other people and if possible to work remotely or provide a leave of absence to reduce the risk for COVID-19.      Ms. Brice verbalized agreement with and understanding of the rational for the diagnosis and treatment plan.  All questions were answered to best of my ability and the patient's satisfaction. Ms. Brice was advised to contact the clinic with any questions that may arise after the clinic visit.      Thank you for involving me in the care of the patient    Return to clinic: 6 weeks      HPI   Flavia Ele Brice is a 55 year old female with a past medical history significant for hypertension, depression, and granulomatosis with polyangiitis who is seen for follow-up of granulomatosis with polyangiitis    10/31/2013 [facility] rheumatology clinic note by Dr. Ann Wesley documents severe MO-3 ANCA positive GPA with pulmonary involvement, pericarditis/effusion, borderline aneurysmal  dilation of the ascending aorta, necrotizing crescentic nephritis.  Kidney biopsy has confirmed diagnosis in the past.  Has received Solu-Medrol x3, Cytoxan x1 at 1300 mg, on prednisone 60-80 mg daily.  Deteriorated quickly despite those interventions and was hospitalized again, requiring plasmapheresis and dialysis.  Was seeing Dr. Velazquez from [facility] and was again placed on Cytoxan 15 mg/kg every 3 weeks.  Has seen ENT but there was no clear sinus involvement.  She was then transitioned after 7 infusions of Cytoxan to Imuran on 11/2012 and has been doing well on that.      7/11/2020 [facility] hospitalization note: Acute kidney injury, migratory polyarthritis, chronic diastolic heart failure.  Left knee and left hip pain.  Right shoulder pain that migrated to the left shoulder.  No joint swelling.  No fevers.  In the past she had flare of her joint pain that she was treated with prednisone.  Prior to this ED evaluation she reportedly used prednisone 20 mg without improvement.    7/22/2020: [facility] emergency department visit for shortness of breath.  Notes history of granulomatosis with polyangiitis, diastolic heart failure, a sending thoracic aortic aneurysm from 4.6 cm on chest CT 2/18/2020), migratory polyarthritis, acute kidney injury, and hypertension.  Coronavirus test negative.  Chest CT negative for pneumonia.  O2 saturation normal.  Advised to follow-up with her PCP and cardiology.    8/5/2020:  Ms. Brice reported that she was dx'd with GPA by Dr. Wesley. She was treated and in remission; was on AZA for a year and no issues for a while. Lost to follow-up with Dr. Wesley.  Then 1 year ago started having more shortness of breath and found to have more lung nodules.  Recalls heart failure and kidney failure when first dx'd.  Told to see cardiology to follow the aneurysm.  Then in Jan 2020 started to have pain in her shoulders, knees ankles. Pain was so bad that she went to [facility]; found  to have YOLI.  Until able to have this rheumatology evaluation, she says that she was given prednisone to help the joint pain; currently on 5mg daily of prednisone; felt much better when on prednisone 30mg daily. Has been dealing with right knee swelling; the prednisone helped with this.  Joint pains are worse in the AM; improve with time and activity. Morning stiffness is very mild while on prednisone 5mg daily.  No hematuria. No hemoptysis.  Flavia Brice's mother was present during the clinic visit.     9/9/2020: Significant improvement with prednisone and rituximab.  No longer with swelling around her ankles.  Hand swelling/pain/stiffness has resolved.  She feels a little puffy in her face but no actual swelling.  She has followed with her cardiologist and they are going to monitor the thoracic aortic aneurysm.  Tolerated rituximab infusion well.  Currently on prednisone 10 mg daily.  Not going into work at this time; she is taking a leave of absence.    Today: Improved.  Now with mild pain at her MCPs, DIPs, and knees.  MCP and knee pain is better with activity, worse in the morning.  DIP pain is worse with activity.  Joint pains started again with dose reduction of prednisone.  Has been on azathioprine and tolerated well in the past.    Denies fevers, chills, nausea, vomiting. Occasional constipation or diarrhea. No abdominal pain. No chest pain/pressure, palpitations, or shortness of breath at this time.  No LE swelling.  No oral or nasal sores.  No rash. No sicca symptoms. No photosensitivity or photophobia. No eye pain or redness. No history of inflammatory eye disease.  No history of DVT, pulmonary embolism, or miscarriage.   No history of serositis.  No history of Raynaud's Phenomenon.  No known seizure disorder.  No known renal disorder.      Tobacco: quit smoking in Feb 2020  EtOH: no more than 1 drink per month  Drugs: none  Occupation: manager at [employer]    ROS   GEN: No fevers, chills,  night sweats, or weight change  SKIN: No itching, rashes, sores  HEENT: No epistaxis. No oral or nasal ulcers.  CV: See HPI  PULM: No shortness of breath, wheeze, cough currently  GI: No nausea, vomiting, constipation, diarrhea. No blood in stool. No abdominal pain.  : No blood in urine.  MSK: See HPI.  NEURO: No numbness, tingling, or weakness.  ENDO: No heat/cold intolerance.  EXT: No LE swelling  PSYCH: Negative    Active Problem List     Patient Active Problem List   Diagnosis     Wegener's granulomatosis (H)     CARDIOVASCULAR SCREENING; LDL GOAL LESS THAN 130     Overweight     Advanced directives, counseling/discussion     Tobacco abuse     Hypertension goal BP (blood pressure) < 140/90     Tobacco use disorder     Situational depression     Granulomatosis with polyangiitis (H)     Past Medical History     Past Medical History:   Diagnosis Date     Tinnitus      Past Surgical History     Past Surgical History:   Procedure Laterality Date     COLONOSCOPY WITH CO2 INSUFFLATION N/A 2/18/2019    Procedure: COLONOSCOPY WITH CO2 INSUFFLATION;  Surgeon: Javier Guillen MD;  Location: [facility] OR     GALLBLADDER SURGERY       Allergy   No Known Allergies  Current Medication List     Current Outpatient Medications   Medication Sig     losartan-hydrochlorothiazide (HYZAAR) 100-12.5 MG tablet Take 1 tablet by mouth daily     predniSONE (DELTASONE) 2.5 MG tablet Take 4 tablets (10 mg) by mouth daily for 14 days, THEN 3 tablets (7.5 mg) daily for 14 days, THEN 2 tablets (5 mg) daily for 14 days.     No current facility-administered medications for this visit.          Social History   See HPI    Family History     Family History   Problem Relation Age of Onset     Diabetes Father      Heart Disease Maternal Grandmother      Heart Disease Maternal Grandfather      Arthritis Paternal Grandmother      Heart Disease Paternal Grandfather      Grandmother: rheumatoid arthritis    Physical Exam     Temp Readings from Last 3  "Encounters:   09/04/20 99.1  F (37.3  C) (Tympanic)   08/21/20 97.2  F (36.2  C) (Tympanic)   07/27/20 98.9  F (37.2  C) (Tympanic)     BP Readings from Last 5 Encounters:   09/04/20 (!) 146/91   08/21/20 123/74   07/27/20 132/76   07/22/20 (!) 146/84   02/28/20 (!) 146/85     Pulse Readings from Last 1 Encounters:   09/04/20 63     Resp Readings from Last 1 Encounters:   09/04/20 16     Estimated body mass index is 32.16 kg/m  as calculated from the following:    Height as of 2/28/20: 1.66 m (5' 5.35\").    Weight as of 9/4/20: 88.6 kg (195 lb 6.4 oz).      GEN: NAD. Healthy appearing adult.   HEENT: MMM.  Anicteric, noninjected sclera. No obvious external lesions of the ear and nose. Hearing intact.  PULM: No increased work of breathing; no use of accessory muscles.  MSK: Mild swelling of bilateral second over third MCPs.  PIPs without swelling.  Small Heberden's nodes present.  Wrists without swelling.     SKIN: No rash or jaundice seen  PSYCH: Alert. Appropriate.      Labs / Imaging (select studies)     RF/CCP  Recent Labs   Lab Test 08/05/20  1453   CCPIGG <1   RHF <7     MALLORY  Recent Labs   Lab Test 08/05/20  1453   CAMMIE Positive*   ANAP1 HOMOGENEOUS   ANAT1 1:160     RNP/Sm/SSA/SSB  Recent Labs   Lab Test 08/11/20  1402   RNPIGG <0.2   SMIGG <0.2   SSAIGG <0.2   SSBIGG <0.2     dsDNA  Recent Labs   Lab Test 08/11/20  1402   DNA <1     C3/C4  Recent Labs   Lab Test 08/11/20  1402   P1YNHBI 133   L0TISXX 41*     Antiphospholipid Antibodies  Recent Labs   Lab Test 08/11/20  1402   B2GPG 0.8   B2GPM <2.9   CARDG <1.6   CARDM 0.8   LUPINT Negative     ANCA  Recent Labs   Lab Test 08/05/20  1453   PR3IGG 4.8*   MPOIGG <0.2     IgG  Recent Labs   Lab Test 08/05/20  1453   IGG 1,009      IGM 32*     CBC  Recent Labs   Lab Test 09/16/20  1438 08/05/20  1453 07/22/20  1117   WBC 8.1 10.6 12.5*   RBC 3.98 4.00 3.94   HGB 12.2 12.2 12.0   HCT 38.7 38.1 38.8   MCV 97 95 99   RDW 14.5 14.7 13.9    365 315   MCH " 30.7 30.5 30.5   MCHC 31.5 32.0 30.9*   NEUTROPHIL 66.9 76.3 77.3   LYMPH 20.5 16.2 11.4   MONOCYTE 9.8 6.6 9.4   EOSINOPHIL 2.3 0.6 1.0   BASOPHIL 0.5 0.3 0.4   ANEU 5.4 8.1 9.7*   ALYM 1.7 1.7 1.4   MARCOS 0.8 0.7 1.2   AEOS 0.2 0.1 0.1   ABAS 0.0 0.0 0.1     CMP  Recent Labs   Lab Test 09/16/20  1438 08/05/20  1453 07/22/20  1117 02/28/20  1044 03/26/18  1058 10/27/17  1541 02/27/14  1148 10/29/13  0000 10/29/13    141 137 141 142 140 138   < >  --    POTASSIUM 3.9 4.2 4.3 4.6 4.1 3.7 3.8  --  3.6   CHLORIDE 108 111* 108 109 110* 105 101   < >  --    CO2 27 22 27 26 21 28 26   < >  --    ANIONGAP 5 8 2* 6 11 7 11   < >  --    * 137* 92 125* 68* 103* 79  --  107*   BUN 32* 41* 29 33* 23 33* 23   < >  --    CR 1.33* 1.46* 1.17* 1.36* 1.08* 1.42* 1.21*  --  1.16   GFRESTIMATED 44* 40* 52* 43* 53* 39* 47*  --  49.7   GFRESTBLACK 51* 46* 60* 50* 64 47* 57*  --  >60.0   JESSICA 9.0 9.0 9.6 9.2 8.6 9.0 9.6   < >  --    BILITOTAL 0.3 0.3  --   --   --   --  0.5  --   --    ALBUMIN 3.4 3.7  --   --  3.6 4.0 4.3   < >  --    PROTTOTAL 6.9 7.3  --   --   --   --  7.0  --   --    ALKPHOS 78 84  --   --   --   --  73  --   --    AST 8 9  --   --   --   --  29  --  27   ALT 28 26  --   --   --   --  39  --  29    < > = values in this interval not displayed.     HgA1c  Recent Labs   Lab Test 10/27/17  1541 10/29/13 02/28/13   A1C 5.8 6.0 6.0     Calcium/VitaminD  Recent Labs   Lab Test 09/16/20  1438 08/05/20  1453 07/22/20  1117 02/27/14  1148 02/27/14  1148   JESSICA 9.0 9.0 9.6   < > 9.6   VITDT  --   --   --   --  28*    < > = values in this interval not displayed.     ESR/CRP  Recent Labs   Lab Test 09/16/20  1438 08/05/20  1453   SED 12 9   CRP <2.9 5.7     CK/Aldolase  Recent Labs   Lab Test 08/11/20  1402   CKT 79     TSH/T4  Recent Labs   Lab Test 03/26/18  1058   TSH 1.44     Lipid Panel  Recent Labs   Lab Test 03/26/18  1058 02/27/14  1148   CHOL 188 192   TRIG 93 160*   HDL 87 74   LDL 82 87   VLDL  --  32*    CHOLHDLRATIO  --  2.6   NHDL 101  --      Hepatitis B  Recent Labs   Lab Test 08/05/20  1453   HBCAB Nonreactive   HEPBANG Nonreactive     Hepatitis C  Recent Labs   Lab Test 08/05/20  1453 03/26/18  1058   HCVAB Nonreactive Nonreactive     Lyme ab screening  Recent Labs   Lab Test 08/05/20  1453   LYMEGM 0.10     HIV Screening  Recent Labs   Lab Test 08/05/20  1453   HIAGAB Nonreactive     UA  Recent Labs   Lab Test 09/16/20  1442 08/05/20  1453 07/27/20  0938   COLOR Yellow Yellow Yellow   APPEARANCE Clear Clear Clear   URINEGLC Negative Negative Negative   URINEBILI Negative Negative Negative   SG 1.025 1.025 1.015   URINEPH 5.0 5.0 5.0   PROTEIN 30* Negative Trace*   UROBILINOGEN 0.2 0.2 0.2   NITRITE Negative Negative Negative   UBLD Negative Trace* Small*   LEUKEST Negative Negative Negative   WBCU 0 - 5 0 - 5 0 - 5   RBCU O - 2 O - 2 2-5*   SQUAMOUSEPI Few Few Few   BACTERIA Few* Few* Few*   MUCOUS  --  Present*  --      Urine Microscopic  Recent Labs   Lab Test 09/16/20  1442 08/05/20  1453 07/27/20  0938   WBCU 0 - 5 0 - 5 0 - 5   RBCU O - 2 O - 2 2-5*   SQUAMOUSEPI Few Few Few   BACTERIA Few* Few* Few*   MUCOUS  --  Present*  --      Urine Protein  Recent Labs   Lab Test 09/16/20  1442 08/05/20  1453   UTP 0.43 0.30   UTPG 0.28* 0.37*   UCRR 153 81       Recent Results (from the past 744 hour(s))   XR Chest Port 1 View    Narrative    Portable chest    INDICATION: Chest pain    COMPARISON: 12/1/2017    FINDINGS: Heart size and shape appear normal. There is atherosclerotic  calcification at the aortic arch. Lungs and pulmonary vascularity  appear normal. Bony structures appear grossly intact.      Impression    IMPRESSION: Aortic atherosclerosis.    MERCEDES MEDRANO MD   CT Chest Pulmonary Embolism w Contrast    Narrative    EXAMINATION: CTA pulmonary angiogram, 7/22/2020 1:41 PM     COMPARISON: None.    HISTORY: PE suspected, intermediate prob, positive D-dimer; chest pain    TECHNIQUE: Volumetric  helical acquisition of CT images of the chest  from the lung apices to the kidneys were acquired after the  administration of 80 mL of Isovue-370 IV contrast. Non-Flash technique  with shallow inspiratory breath hold technique.  Post-processed  multiplanar and/or MIP reformations were obtained, archived to PACS  and used in interpretation of this study.     FINDINGS:      Contrast bolus is: adequate.  Exam is negative for acute pulmonary  embolism.       The largest right ventricle transaxial diameter is (measured from  endocardium to endocardium): 4.6 cm   The largest left ventricle transaxial diameter is (measured from  endocardium to endocardium): 4.7 cm  RV/LV ratio is: 1.0 (if ratio greater than 1.1 then sign is suspicious  for right heart strain)  Reflux of contrast into the IVC? yes  Paradoxical bowing of the interventricular septum to the left? no    Chest:     Thyroid is unremarkable. Heart is not enlarged. No pericardial  effusion. No significant coronary artery calcifications. The ascending  aorta is enlarged measuring up to 4.5 cm. The main pulmonary artery is  normal in caliber.    No mediastinal or hilar lymphadenopathy.    Trachea and central airways are clear. Mild basilar predominant  peribronchovascular groundglass opacities likely related to shallow  breath hold. No pleural effusion or pneumothorax.    Scattered pulmonary nodules. For example:  -Solid pulmonary nodule in the posterior right lower lobe measuring 2  mm (series 9, image 106).  -Solid pulmonary nodule in the inferomedial right lower lobe measuring  2.5 mm (series 9, image 89)    Abdomen: Visualized abdomen is limited.. Small sliding-type hiatal  hernia.    Bones and Soft Tissues: No suspicious osseous lesion. No suspicious  mass.  Mild degenerative changes of the thoracic spine.        Impression    IMPRESSION:   1. No pulmonary embolus appreciated.    2. Enlargement of the ascending aorta measuring up to 4.5 cm.  Attention on  follow-up.    3. Scattered sub-3 mm solid pulmonary nodules. Consider optional  follow-up with CT chest in 12 months if patient is considered high  risk for cancer as per Fleischner 2017 guidelines.    In the event of a positive result for acute pulmonary embolism  resulting in right heart strain, consider calling the   [facility] hospital  for PERT (Pulmonary Embolism Response Team)  Activation?    PERT -- Pulmonary Embolism Response Team (Multidisciplinary team  including cardiology, interventional radiology, critical care,  hematology)    I have personally reviewed the examination and initial interpretation  and I agree with the findings.    OFELIA HUERTAS MD                       Immunization History     Immunization History   Administered Date(s) Administered     Influenza Vaccine IM > 6 months Valent IIV4 10/31/2013, 02/19/2020     Pneumococcal 23 valent 04/25/2008, 06/28/2012     Tdap (Adacel,Boostrix) 07/02/2012          Chart documentation done in part with Dragon Voice recognition Software. Although reviewed after completion, some word and grammatical error may remain.    Video-Visit Details    Type of service:  Video Visit    Video Start Time: 2:20 PM  Video End Time: 2:35 PM    Originating Location (pt. Location): Home, in [place]    Distant Location (provider location):  Home    Platform used for Video Visit: Iman Moreira MD

## 2020-10-29 ENCOUNTER — OFFICE VISIT (OUTPATIENT)
Dept: AUDIOLOGY | Facility: CLINIC | Age: 55
End: 2020-10-29
Payer: COMMERCIAL

## 2020-10-29 ENCOUNTER — OFFICE VISIT (OUTPATIENT)
Dept: OTOLARYNGOLOGY | Facility: CLINIC | Age: 55
End: 2020-10-29
Attending: INTERNAL MEDICINE
Payer: COMMERCIAL

## 2020-10-29 VITALS — HEIGHT: 66 IN | BODY MASS INDEX: 31.34 KG/M2 | WEIGHT: 195 LBS

## 2020-10-29 DIAGNOSIS — H90.3 SNHL (SENSORY-NEURAL HEARING LOSS), ASYMMETRICAL: ICD-10-CM

## 2020-10-29 DIAGNOSIS — H93.13 TINNITUS OF BOTH EARS: ICD-10-CM

## 2020-10-29 DIAGNOSIS — H61.21 IMPACTED CERUMEN OF RIGHT EAR: ICD-10-CM

## 2020-10-29 DIAGNOSIS — H90.41 SENSORINEURAL HEARING LOSS (SNHL) OF RIGHT EAR WITH UNRESTRICTED HEARING OF LEFT EAR: Primary | ICD-10-CM

## 2020-10-29 DIAGNOSIS — H93.13 TINNITUS, BILATERAL: Primary | ICD-10-CM

## 2020-10-29 PROCEDURE — 92504 EAR MICROSCOPY EXAMINATION: CPT | Performed by: OTOLARYNGOLOGY

## 2020-10-29 PROCEDURE — 92550 TYMPANOMETRY & REFLEX THRESH: CPT | Performed by: AUDIOLOGIST

## 2020-10-29 PROCEDURE — 99207 PR NO CHARGE LOS: CPT | Performed by: AUDIOLOGIST

## 2020-10-29 PROCEDURE — 99213 OFFICE O/P EST LOW 20 MIN: CPT | Mod: 25 | Performed by: OTOLARYNGOLOGY

## 2020-10-29 PROCEDURE — 92557 COMPREHENSIVE HEARING TEST: CPT | Performed by: AUDIOLOGIST

## 2020-10-29 ASSESSMENT — MIFFLIN-ST. JEOR: SCORE: 1491.26

## 2020-10-29 NOTE — LETTER
10/29/2020         RE: Flavia Brice  [address] [facility] [address] [address]        Dear Colleague,    Thank you for referring your patient, Flavia Brice, to the [facility]. Please see a copy of my visit note below.    I am seeing this patient in consultation for tinnitus at the request of the provider Dr. Moreira    Chief Complaint - Tinnitus    History of Present Illness - Flavia Brice is a 55 year old female who presents to me today with ringing in the ears.  It has been present and noticeable for approximately 1 year. It's both ears. Sometimes has clicking (intermittent). She feels hearing is okay. There is no history of chronic ear disease or ear surgery. With regards to recreational, , and work-related noise exposure she doesn't have much. + family history of hearing loss in parents and grandparents and sister. No regular use of aspirin or NSAIDS. She has wegener's granulomatosis. She was in a flair 2/2020 and she took rituximab. She does get sinus infections. She treats these with antibiotics, prednisone, and nose spray. Sinus and nose isn't bothersome without flairs.     Past Medical History -   Patient Active Problem List   Diagnosis     Wegener's granulomatosis (H)     CARDIOVASCULAR SCREENING; LDL GOAL LESS THAN 130     Overweight     Advanced directives, counseling/discussion     Tobacco abuse     Hypertension goal BP (blood pressure) < 140/90     Tobacco use disorder     Situational depression     Granulomatosis with polyangiitis (H)       Current Medications -   Current Outpatient Medications:      azaTHIOprine (IMURAN) 50 MG tablet, Azathioprine 50mg daily x7days, then 100mg daily thereafter, Disp: 173 tablet, Rfl: 0     losartan-hydrochlorothiazide (HYZAAR) 100-12.5 MG tablet, Take 1 tablet by mouth daily, Disp: 90 tablet, Rfl: 1     predniSONE (DELTASONE) 5 MG tablet, Take 1 tablet (5 mg) by mouth daily, Disp: 90 tablet, Rfl:  "0    Allergies - No Known Allergies    Social History -   Social History     Socioeconomic History     Marital status:      Spouse name: Not on file     Number of children: Not on file     Years of education: Not on file     Highest education level: Not on file   Occupational History     Not on file   Social Needs     Financial resource strain: Not on file     Food insecurity     Worry: Not on file     Inability: Not on file     Transportation needs     Medical: Not on file     Non-medical: Not on file   Tobacco Use     Smoking status: Former Smoker     Packs/day: 0.50     Types: Cigarettes     Smokeless tobacco: Never Used   Substance and Sexual Activity     Alcohol use: Yes     Comment: monthly     Drug use: No     Sexual activity: Yes     Partners: Male   Lifestyle     Physical activity     Days per week: Not on file     Minutes per session: Not on file     Stress: Not on file   Relationships     Social connections     Talks on phone: Not on file     Gets together: Not on file     Attends Confucianism service: Not on file     Active member of club or organization: Not on file     Attends meetings of clubs or organizations: Not on file     Relationship status: Not on file     Intimate partner violence     Fear of current or ex partner: Not on file     Emotionally abused: Not on file     Physically abused: Not on file     Forced sexual activity: Not on file   Other Topics Concern     Parent/sibling w/ CABG, MI or angioplasty before 65F 55M? Not Asked   Social History Narrative     Not on file       Family History -   Family History   Problem Relation Age of Onset     Diabetes Father      Heart Disease Maternal Grandmother      Heart Disease Maternal Grandfather      Arthritis Paternal Grandmother      Heart Disease Paternal Grandfather      Review of Systems - As per HPI and PMHx, otherwise 7 system review of the head and neck is negative.    Physical Exam  Ht 1.676 m (5' 6\")   Wt 88.5 kg (195 lb)   BMI 31.47 " kg/m    General - The patient is in no distress. Alert and oriented to person and place, answers questions and cooperates with examination appropriately.   Neurologic - CN II-XII are grossly intact. No focal neurologic deficits.   Voice and Breathing - The patient was breathing comfortably without the use of accessory muscles. There was no wheezing, stridor, or stertor.  The patients voice was clear and strong.  Ears - right cerumen impaction. I laid the patient supine and used the otomicroscope. I used a curette to clean the right ear. I removed a large ball of wax and all of the wax. Once clean, the tympanic membranes are normal in appearance, bony landmarks are intact.  No retraction, perforation, or masses. No fluid or purulence was seen in the external canal or the middle ear. No evidence of infection of the middle ear or external canal, cerumen was normal in appearance.  Eyes - Extraocular movements intact.  Sclera were not icteric or injected, conjunctiva were pink and moist.  Mouth - Examination of the oral cavity showed pink, healthy oral mucosa. No lesions or ulcerations noted. The tongue was mobile and midline.  Throat - The walls of the oropharynx were smooth, symmetric, and had no lesions or ulcerations. The uvula was midline on elevation.  Tonsils 2-3+ right, 1-2+ left, tonsil stone right.   Neck - Soft, non-tender. Palpation of the occipital, submental, submandibular, internal jugular chain, and supraclavicular nodes did not demonstrate any abnormal lymph nodes or masses. No parotid masses. Palpation of the thyroid was soft and smooth, with no nodules or goiter appreciated.  The trachea was mobile and midline.        Audiologic Studies - An audiogram and tympanogram were performed today as part of the evaluation and personally reviewed. The tympanogram shows a normal Type A curve, with normal canal volume and middle ear pressure.  There is no sign of eustachian tube dysfunction or middle ear effusion.   The audiogram showed borderline hearing loss left and mild hearing loss right with notching at 4k Hz.    Assessment and Plan - Flavia Brice is a 55 year old female who presents to me today with subjective tinnitus, likely due to sensorineural hearing loss. I think the hearing loss is more likely due to age and family history with possible noise exposure right than Wegener's. I recommend repeat audiogram in 1 year to check for change.      We discussed sinus irrigations and to return for sinonasal flairs, worsening hearing or tinnitus.     Discussed were steps that can be taken to mask the noise, such as a low volume de-tuned radio, a fan in the background, and/or hearing aids.  Correlation with stress, anxiety, and depression were also discussed.  The patient was also cautioned on the numerous expensive non-pharmaceutical options that are advertised, and have no proven benefit.      Michael Georges MD  Otolaryngology  [facility]          Again, thank you for allowing me to participate in the care of your patient.        Sincerely,        Michael Georges MD

## 2020-10-29 NOTE — PROGRESS NOTES
I am seeing this patient in consultation for tinnitus at the request of the provider Dr. Moreira    Chief Complaint - Tinnitus    History of Present Illness - Flavia Brice is a 55 year old female who presents to me today with ringing in the ears.  It has been present and noticeable for approximately 1 year. It's both ears. Sometimes has clicking (intermittent). She feels hearing is okay. There is no history of chronic ear disease or ear surgery. With regards to recreational, , and work-related noise exposure she doesn't have much. + family history of hearing loss in parents and grandparents and sister. No regular use of aspirin or NSAIDS. She has wegener's granulomatosis. She was in a flair 2/2020 and she took rituximab. She does get sinus infections. She treats these with antibiotics, prednisone, and nose spray. Sinus and nose isn't bothersome without flairs.     Past Medical History -   Patient Active Problem List   Diagnosis     Wegener's granulomatosis (H)     CARDIOVASCULAR SCREENING; LDL GOAL LESS THAN 130     Overweight     Advanced directives, counseling/discussion     Tobacco abuse     Hypertension goal BP (blood pressure) < 140/90     Tobacco use disorder     Situational depression     Granulomatosis with polyangiitis (H)       Current Medications -   Current Outpatient Medications:      azaTHIOprine (IMURAN) 50 MG tablet, Azathioprine 50mg daily x7days, then 100mg daily thereafter, Disp: 173 tablet, Rfl: 0     losartan-hydrochlorothiazide (HYZAAR) 100-12.5 MG tablet, Take 1 tablet by mouth daily, Disp: 90 tablet, Rfl: 1     predniSONE (DELTASONE) 5 MG tablet, Take 1 tablet (5 mg) by mouth daily, Disp: 90 tablet, Rfl: 0    Allergies - No Known Allergies    Social History -   Social History     Socioeconomic History     Marital status:      Spouse name: Not on file     Number of children: Not on file     Years of education: Not on file     Highest education level: Not on file  "  Occupational History     Not on file   Social Needs     Financial resource strain: Not on file     Food insecurity     Worry: Not on file     Inability: Not on file     Transportation needs     Medical: Not on file     Non-medical: Not on file   Tobacco Use     Smoking status: Former Smoker     Packs/day: 0.50     Types: Cigarettes     Smokeless tobacco: Never Used   Substance and Sexual Activity     Alcohol use: Yes     Comment: monthly     Drug use: No     Sexual activity: Yes     Partners: Male   Lifestyle     Physical activity     Days per week: Not on file     Minutes per session: Not on file     Stress: Not on file   Relationships     Social connections     Talks on phone: Not on file     Gets together: Not on file     Attends Anabaptist service: Not on file     Active member of club or organization: Not on file     Attends meetings of clubs or organizations: Not on file     Relationship status: Not on file     Intimate partner violence     Fear of current or ex partner: Not on file     Emotionally abused: Not on file     Physically abused: Not on file     Forced sexual activity: Not on file   Other Topics Concern     Parent/sibling w/ CABG, MI or angioplasty before 65F 55M? Not Asked   Social History Narrative     Not on file       Family History -   Family History   Problem Relation Age of Onset     Diabetes Father      Heart Disease Maternal Grandmother      Heart Disease Maternal Grandfather      Arthritis Paternal Grandmother      Heart Disease Paternal Grandfather      Review of Systems - As per HPI and PMHx, otherwise 7 system review of the head and neck is negative.    Physical Exam  Ht 1.676 m (5' 6\")   Wt 88.5 kg (195 lb)   BMI 31.47 kg/m    General - The patient is in no distress. Alert and oriented to person and place, answers questions and cooperates with examination appropriately.   Neurologic - CN II-XII are grossly intact. No focal neurologic deficits.   Voice and Breathing - The patient was " breathing comfortably without the use of accessory muscles. There was no wheezing, stridor, or stertor.  The patients voice was clear and strong.  Ears - right cerumen impaction. I laid the patient supine and used the otomicroscope. I used a curette to clean the right ear. I removed a large ball of wax and all of the wax. Once clean, the tympanic membranes are normal in appearance, bony landmarks are intact.  No retraction, perforation, or masses. No fluid or purulence was seen in the external canal or the middle ear. No evidence of infection of the middle ear or external canal, cerumen was normal in appearance.  Eyes - Extraocular movements intact.  Sclera were not icteric or injected, conjunctiva were pink and moist.  Mouth - Examination of the oral cavity showed pink, healthy oral mucosa. No lesions or ulcerations noted. The tongue was mobile and midline.  Throat - The walls of the oropharynx were smooth, symmetric, and had no lesions or ulcerations. The uvula was midline on elevation.  Tonsils 2-3+ right, 1-2+ left, tonsil stone right.   Neck - Soft, non-tender. Palpation of the occipital, submental, submandibular, internal jugular chain, and supraclavicular nodes did not demonstrate any abnormal lymph nodes or masses. No parotid masses. Palpation of the thyroid was soft and smooth, with no nodules or goiter appreciated.  The trachea was mobile and midline.        Audiologic Studies - An audiogram and tympanogram were performed today as part of the evaluation and personally reviewed. The tympanogram shows a normal Type A curve, with normal canal volume and middle ear pressure.  There is no sign of eustachian tube dysfunction or middle ear effusion.  The audiogram showed borderline hearing loss left and mild hearing loss right with notching at 4k Hz.    Assessment and Plan - Flavia Brice is a 55 year old female who presents to me today with subjective tinnitus, likely due to sensorineural hearing loss. I  think the hearing loss is more likely due to age and family history with possible noise exposure right than Wegener's. I recommend repeat audiogram in 1 year to check for change.      We discussed sinus irrigations and to return for sinonasal flairs, worsening hearing or tinnitus.     Discussed were steps that can be taken to mask the noise, such as a low volume de-tuned radio, a fan in the background, and/or hearing aids.  Correlation with stress, anxiety, and depression were also discussed.  The patient was also cautioned on the numerous expensive non-pharmaceutical options that are advertised, and have no proven benefit.      Michael Georges MD  Otolaryngology  [facility]

## 2020-10-29 NOTE — PROGRESS NOTES
AUDIOLOGY REPORT:    Patient was referred from ENT by Dr. Georges for audiology evaluation. The patient reports constant ringing tinnitus with occasional spontaneous clicking noises since last year. She reports that she has an autoimmune disease that is sometimes associated with hearing loss, and when she was previously tested she was told that she had a little bit of hearing loss. The patient has not noticed any recent changes in hearing. She reports that she had immunotherapy treatment in August to September of this year, and it did not affect the tinnitus. The patient reports an occasional sensation of something in her ears, but does not have ear pain or pressure. The patient reports that she does not have a history of loud noise exposure.    Testing:    Otoscopy:   Otoscopic exam indicates ears are clear of cerumen bilaterally     Tympanograms:    RIGHT: normal eardrum mobility     LEFT:   normal eardrum mobility    Reflexes (reported by stimulus ear):  RIGHT: Ipsilateral is present at normal levels  RIGHT: Contralateral is present at normal levels  LEFT:   Ipsilateral is present at normal levels  LEFT:   Contralateral is present at normal levels    Thresholds:   Pure Tone Thresholds assessed using conventional audiometry with good reliability from 250-8000 Hz bilaterally using insert earphones and circumaural headphones     RIGHT:  normal hearing sensitivity with the exception of moderate sensorineural hearing loss at 4000 Hz and mild sensorineural hearing loss at 8000 Hz    LEFT:    normal hearing sensitivity at all tested frequencies    Speech Reception Threshold:    RIGHT: 10 dB HL    LEFT:   10 dB HL  Results are in agreement with pure tone average.     Word Recognition Score:     RIGHT: 96% at 60 dB HL using NU-6 recorded word list.    LEFT:   96% at 55 dB HL using NU-6 recorded word list.    Discussed results with the patient.     Patient was returned to ENT for follow up.     Tony Cordero,  CCC-A  Licensed Audiologist #[id number]  10/29/2020

## 2020-11-04 DIAGNOSIS — M06.4 INFLAMMATORY POLYARTHROPATHY (H): ICD-10-CM

## 2020-11-04 DIAGNOSIS — Z79.899 HIGH RISK MEDICATIONS (NOT ANTICOAGULANTS) LONG-TERM USE: ICD-10-CM

## 2020-11-04 DIAGNOSIS — M31.31 GRANULOMATOSIS WITH POLYANGIITIS WITH RENAL INVOLVEMENT (H): ICD-10-CM

## 2020-11-04 LAB
ALBUMIN SERPL-MCNC: 3.5 G/DL (ref 3.4–5)
ALP SERPL-CCNC: 82 U/L (ref 40–150)
ALT SERPL W P-5'-P-CCNC: 30 U/L (ref 0–50)
AST SERPL W P-5'-P-CCNC: 10 U/L (ref 0–45)
BASOPHILS # BLD AUTO: 0.1 10E9/L (ref 0–0.2)
BASOPHILS NFR BLD AUTO: 0.5 %
BILIRUB DIRECT SERPL-MCNC: <0.1 MG/DL (ref 0–0.2)
BILIRUB SERPL-MCNC: 0.3 MG/DL (ref 0.2–1.3)
CREAT SERPL-MCNC: 1.35 MG/DL (ref 0.52–1.04)
DIFFERENTIAL METHOD BLD: NORMAL
EOSINOPHIL # BLD AUTO: 0.2 10E9/L (ref 0–0.7)
EOSINOPHIL NFR BLD AUTO: 1.8 %
ERYTHROCYTE [DISTWIDTH] IN BLOOD BY AUTOMATED COUNT: 14.1 % (ref 10–15)
GFR SERPL CREATININE-BSD FRML MDRD: 44 ML/MIN/{1.73_M2}
HCT VFR BLD AUTO: 38.7 % (ref 35–47)
HGB BLD-MCNC: 12.6 G/DL (ref 11.7–15.7)
LYMPHOCYTES # BLD AUTO: 1.8 10E9/L (ref 0.8–5.3)
LYMPHOCYTES NFR BLD AUTO: 19.4 %
MCH RBC QN AUTO: 31.2 PG (ref 26.5–33)
MCHC RBC AUTO-ENTMCNC: 32.6 G/DL (ref 31.5–36.5)
MCV RBC AUTO: 96 FL (ref 78–100)
MONOCYTES # BLD AUTO: 0.9 10E9/L (ref 0–1.3)
MONOCYTES NFR BLD AUTO: 9.6 %
NEUTROPHILS # BLD AUTO: 6.4 10E9/L (ref 1.6–8.3)
NEUTROPHILS NFR BLD AUTO: 68.7 %
PLATELET # BLD AUTO: 311 10E9/L (ref 150–450)
PROT SERPL-MCNC: 7 G/DL (ref 6.8–8.8)
RBC # BLD AUTO: 4.04 10E12/L (ref 3.8–5.2)
WBC # BLD AUTO: 9.4 10E9/L (ref 4–11)

## 2020-11-04 PROCEDURE — 99000 SPECIMEN HANDLING OFFICE-LAB: CPT | Performed by: INTERNAL MEDICINE

## 2020-11-04 PROCEDURE — 80076 HEPATIC FUNCTION PANEL: CPT | Performed by: INTERNAL MEDICINE

## 2020-11-04 PROCEDURE — 82565 ASSAY OF CREATININE: CPT | Performed by: INTERNAL MEDICINE

## 2020-11-04 PROCEDURE — 85025 COMPLETE CBC W/AUTO DIFF WBC: CPT | Performed by: INTERNAL MEDICINE

## 2020-11-04 PROCEDURE — 36415 COLL VENOUS BLD VENIPUNCTURE: CPT | Performed by: INTERNAL MEDICINE

## 2020-11-04 PROCEDURE — 82657 ENZYME CELL ACTIVITY: CPT | Mod: 90 | Performed by: INTERNAL MEDICINE

## 2020-11-07 ENCOUNTER — ALLIED HEALTH/NURSE VISIT (OUTPATIENT)
Dept: NURSING | Facility: CLINIC | Age: 55
End: 2020-11-07
Payer: COMMERCIAL

## 2020-11-07 DIAGNOSIS — Z23 NEED FOR PROPHYLACTIC VACCINATION AND INOCULATION AGAINST INFLUENZA: Primary | ICD-10-CM

## 2020-11-07 PROCEDURE — 90471 IMMUNIZATION ADMIN: CPT

## 2020-11-07 PROCEDURE — 99207 PR NO CHARGE NURSE ONLY: CPT

## 2020-11-07 PROCEDURE — 90682 RIV4 VACC RECOMBINANT DNA IM: CPT

## 2020-11-09 LAB — TPMT BLD-CCNC: 25.4 U/ML (ref 24–44)

## 2020-11-19 DIAGNOSIS — Z79.899 HIGH RISK MEDICATIONS (NOT ANTICOAGULANTS) LONG-TERM USE: ICD-10-CM

## 2020-11-19 DIAGNOSIS — M06.4 INFLAMMATORY POLYARTHROPATHY (H): ICD-10-CM

## 2020-11-19 DIAGNOSIS — M31.31 GRANULOMATOSIS WITH POLYANGIITIS WITH RENAL INVOLVEMENT (H): ICD-10-CM

## 2020-11-19 LAB
ALBUMIN SERPL-MCNC: 3.7 G/DL (ref 3.4–5)
ALP SERPL-CCNC: 78 U/L (ref 40–150)
ALT SERPL W P-5'-P-CCNC: 33 U/L (ref 0–50)
AST SERPL W P-5'-P-CCNC: 13 U/L (ref 0–45)
BASOPHILS # BLD AUTO: 0.1 10E9/L (ref 0–0.2)
BASOPHILS NFR BLD AUTO: 0.6 %
BILIRUB DIRECT SERPL-MCNC: <0.1 MG/DL (ref 0–0.2)
BILIRUB SERPL-MCNC: 0.3 MG/DL (ref 0.2–1.3)
CREAT SERPL-MCNC: 1.45 MG/DL (ref 0.52–1.04)
DIFFERENTIAL METHOD BLD: NORMAL
EOSINOPHIL # BLD AUTO: 0.1 10E9/L (ref 0–0.7)
EOSINOPHIL NFR BLD AUTO: 1.3 %
ERYTHROCYTE [DISTWIDTH] IN BLOOD BY AUTOMATED COUNT: 13.9 % (ref 10–15)
GFR SERPL CREATININE-BSD FRML MDRD: 40 ML/MIN/{1.73_M2}
HCT VFR BLD AUTO: 40.8 % (ref 35–47)
HGB BLD-MCNC: 13.1 G/DL (ref 11.7–15.7)
LYMPHOCYTES # BLD AUTO: 1.8 10E9/L (ref 0.8–5.3)
LYMPHOCYTES NFR BLD AUTO: 20.5 %
MCH RBC QN AUTO: 30.6 PG (ref 26.5–33)
MCHC RBC AUTO-ENTMCNC: 32.1 G/DL (ref 31.5–36.5)
MCV RBC AUTO: 95 FL (ref 78–100)
MONOCYTES # BLD AUTO: 0.9 10E9/L (ref 0–1.3)
MONOCYTES NFR BLD AUTO: 10.3 %
NEUTROPHILS # BLD AUTO: 5.8 10E9/L (ref 1.6–8.3)
NEUTROPHILS NFR BLD AUTO: 67.3 %
PLATELET # BLD AUTO: 300 10E9/L (ref 150–450)
PROT SERPL-MCNC: 7.4 G/DL (ref 6.8–8.8)
RBC # BLD AUTO: 4.28 10E12/L (ref 3.8–5.2)
WBC # BLD AUTO: 8.6 10E9/L (ref 4–11)

## 2020-11-19 PROCEDURE — 85025 COMPLETE CBC W/AUTO DIFF WBC: CPT | Performed by: INTERNAL MEDICINE

## 2020-11-19 PROCEDURE — 80076 HEPATIC FUNCTION PANEL: CPT | Performed by: INTERNAL MEDICINE

## 2020-11-19 PROCEDURE — 82565 ASSAY OF CREATININE: CPT | Performed by: INTERNAL MEDICINE

## 2020-11-19 PROCEDURE — 36415 COLL VENOUS BLD VENIPUNCTURE: CPT | Performed by: INTERNAL MEDICINE

## 2020-12-03 DIAGNOSIS — M06.4 INFLAMMATORY POLYARTHROPATHY (H): ICD-10-CM

## 2020-12-03 DIAGNOSIS — Z79.899 HIGH RISK MEDICATIONS (NOT ANTICOAGULANTS) LONG-TERM USE: ICD-10-CM

## 2020-12-03 DIAGNOSIS — M31.31 GRANULOMATOSIS WITH POLYANGIITIS WITH RENAL INVOLVEMENT (H): ICD-10-CM

## 2020-12-03 LAB
ALBUMIN SERPL-MCNC: 3.9 G/DL (ref 3.4–5)
ALP SERPL-CCNC: 80 U/L (ref 40–150)
ALT SERPL W P-5'-P-CCNC: 37 U/L (ref 0–50)
AST SERPL W P-5'-P-CCNC: 16 U/L (ref 0–45)
BASOPHILS # BLD AUTO: 0 10E9/L (ref 0–0.2)
BASOPHILS NFR BLD AUTO: 0.5 %
BILIRUB DIRECT SERPL-MCNC: <0.1 MG/DL (ref 0–0.2)
BILIRUB SERPL-MCNC: 0.4 MG/DL (ref 0.2–1.3)
CREAT SERPL-MCNC: 1.41 MG/DL (ref 0.52–1.04)
DIFFERENTIAL METHOD BLD: NORMAL
EOSINOPHIL # BLD AUTO: 0.1 10E9/L (ref 0–0.7)
EOSINOPHIL NFR BLD AUTO: 1.3 %
ERYTHROCYTE [DISTWIDTH] IN BLOOD BY AUTOMATED COUNT: 14.2 % (ref 10–15)
GFR SERPL CREATININE-BSD FRML MDRD: 41 ML/MIN/{1.73_M2}
HCT VFR BLD AUTO: 41.3 % (ref 35–47)
HGB BLD-MCNC: 13.1 G/DL (ref 11.7–15.7)
LYMPHOCYTES # BLD AUTO: 1.5 10E9/L (ref 0.8–5.3)
LYMPHOCYTES NFR BLD AUTO: 17.7 %
MCH RBC QN AUTO: 30.7 PG (ref 26.5–33)
MCHC RBC AUTO-ENTMCNC: 31.7 G/DL (ref 31.5–36.5)
MCV RBC AUTO: 97 FL (ref 78–100)
MONOCYTES # BLD AUTO: 0.6 10E9/L (ref 0–1.3)
MONOCYTES NFR BLD AUTO: 6.9 %
NEUTROPHILS # BLD AUTO: 6.1 10E9/L (ref 1.6–8.3)
NEUTROPHILS NFR BLD AUTO: 73.6 %
PLATELET # BLD AUTO: 347 10E9/L (ref 150–450)
PROT SERPL-MCNC: 7.5 G/DL (ref 6.8–8.8)
RBC # BLD AUTO: 4.27 10E12/L (ref 3.8–5.2)
WBC # BLD AUTO: 8.3 10E9/L (ref 4–11)

## 2020-12-03 PROCEDURE — 80076 HEPATIC FUNCTION PANEL: CPT | Performed by: INTERNAL MEDICINE

## 2020-12-03 PROCEDURE — 82565 ASSAY OF CREATININE: CPT | Performed by: INTERNAL MEDICINE

## 2020-12-03 PROCEDURE — 36415 COLL VENOUS BLD VENIPUNCTURE: CPT | Performed by: INTERNAL MEDICINE

## 2020-12-03 PROCEDURE — 85025 COMPLETE CBC W/AUTO DIFF WBC: CPT | Performed by: INTERNAL MEDICINE

## 2020-12-08 RX ORDER — ASCORBIC ACID 100 MG
TABLET,CHEWABLE ORAL
COMMUNITY

## 2020-12-08 NOTE — PROGRESS NOTES
"Flavia Brice is a 55 year old female who is being evaluated via a billable video visit.      The patient has been notified of following:     \"This video visit will be conducted via a call between you and your physician/provider. We have found that certain health care needs can be provided without the need for an in-person physical exam.  This service lets us provide the care you need with a video conversation.  If a prescription is necessary we can send it directly to your pharmacy.  If lab work is needed we can place an order for that and you can then stop by our lab to have the test done at a later time.    Video visits are billed at different rates depending on your insurance coverage.  Please reach out to your insurance provider with any questions.    If during the course of the call the physician/provider feels a video visit is not appropriate, you will not be charged for this service.\"    Patient has given verbal consent for Video visit? Yes  How would you like to obtain your AVS? MyChart  If you are dropped from the video visit, the video invite should be resent to: Text to cell phone: [phone]  Will anyone else be joining your video visit? No      Maura Newell CMA Rheumatology  12/8/2020 1:54 PM      Rheumatology Video Visit      Flavia Brice MRN# [id number]   YOB: 1965 Age: 55 year old      Date of visit: 12/09/20   PCP: Monet Ackerman    Chief Complaint   Patient presents with:  Granulomatosis    Assessment and Plan     1.  Granulomatosis with polyangiitis: Dx'd 2012 by Dr. Wesley at [facility] when she presented with pulmonary involvement, pericarditis/effusion, borderline aneurysmal dilation of the ascending aorta and biopsy proven necrotizing crescentic nephritis.  Went into remission with cytoxan and prednisone, then maintained on AZA for 2 yrs per patient, stopped when lost to follow-up. Recurrence in 2020 with YOLI, pleuritic chest pain, inflammatory arthritis " (MCPs, PIPs, shoulders, knees) that has responded well to prednisone.  She was tx'd with prednisone and rituximab 1g IV received on 8/21/2020 & 9/4/2020.  Then presented with more inflammatory joint symptoms; azathioprine was started.  Currently on azathioprine 100 mg daily and prednisone 5 mg daily.  Plan to redose rituximab in February/March 2021.  Advised that she call to schedule the rituximab infusions.  Reduce prednisone from 5 mg daily to 2.5 mg daily x30 days, then stop.  Continue azathioprine.  Mild shortness of breath when going up the stairs without other associated symptoms that is suspected to be due to deconditioning at this point; following with cardiology advised to repeat an echocardiogram in spring 2021; chest CT has been reviewed; check PFTs.  - Continue azathioprine 100 mg daily  - Reduce prednisone to 2.5 mg daily x30 days, then stop  - Plan to re-dose of rituximab in February/March 2021; advised that she call to schedule  - Labs in early January: CBC, Creatinine, Hepatic Panel, ESR, CRP  - Labs in April: CBC, CMP, ESR, CRP, UA, UProtein:creatinine  - PFTs and 6MW ordered; asked that she call to schedule    2. Inflammatory arthritis: Related to GPA.  See #1    3. Elevated serum creatinine: Related to GPA.  See #1     4. Positive MALLORY (antinuclear antibody): Additional work-up was negative for SSA, SSB, RNP, Smith, dsDNA; complement C3 and C4 were not low.  Symptoms are most likely related to GPA; no MALLORY-associated rheumatologic disorder identified    5.  Aneurysm of the ascending aorta at 4.5 cm; history of heart failure with preserved ejection fraction : Has established with Dr. Locke (cardiology)    6. Cytoxan use history: needs periodic UA to eval for hematuria, due to increased risk for bladder cancer.     8.  Osteoarthritis: Affecting the DIPs.  Mild symptoms this time.  Consider hand therapy referral in the future if needed    9.  Work/short-term disability: Doing much better at this time and  advised that she continue to try to work remotely or take a leave of absence given immunocompromise status in the setting of COVID-19 pandemic but considering that her disease is much better controlled the disease may not qualify her for short-term disability at this point based on disease alone.  Ideally should limit contact with other people as she is at a high risk for complications from coronavirus if she were to get it    # Relevant labs and imaging were reviewed with the patient    # High risk medication toxicity monitoring: discussion and labs reviewed; appropriate labs ordered. See above.  Instructed that if confirmed to have COVID-19 or exposure to someone with confirmed COVID-19 to call this clinic for directions on DMARD management.    # Note that this is a virtual visit to reduce the risk of COVID-19 exposure during this current pandemic.      # Considered to be at high risk of complications from the COVID-19 virus.  It is recommended to limit contact with other people and if possible to work remotely or provide a leave of absence to reduce the risk for COVID-19.      Ms. Brice verbalized agreement with and understanding of the rational for the diagnosis and treatment plan.  All questions were answered to best of my ability and the patient's satisfaction. Ms. Brice was advised to contact the clinic with any questions that may arise after the clinic visit.      Thank you for involving me in the care of the patient    Return to clinic: April 2021      BILLY Noriegaben Brice is a 55 year old female with a past medical history significant for hypertension, depression, and granulomatosis with polyangiitis who is seen for follow-up of granulomatosis with polyangiitis    10/31/2013 [facility] rheumatology clinic note by Dr. 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  Kidney biopsy has confirmed diagnosis in the past.  Has received Solu-Medrol x3, Cytoxan x1 at 1300 mg, on prednisone 60-80 mg daily.  Deteriorated quickly despite those interventions and was hospitalized again, requiring plasmapheresis and dialysis.  Was seeing Dr. Velazquez from [facility] and was again placed on Cytoxan 15 mg/kg every 3 weeks.  Has seen ENT but there was no clear sinus involvement.  She was then transitioned after 7 infusions of Cytoxan to Imuran on 11/2012 and has been doing well on that.      7/11/2020 [facility] hospitalization note: Acute kidney injury, migratory polyarthritis, chronic diastolic heart failure.  Left knee and left hip pain.  Right shoulder pain that migrated to the left shoulder.  No joint swelling.  No fevers.  In the past she had flare of her joint pain that she was treated with prednisone.  Prior to this ED evaluation she reportedly used prednisone 20 mg without improvement.    7/22/2020: [facility] emergency department visit for shortness of breath.  Notes history of granulomatosis with polyangiitis, diastolic heart failure, a sending thoracic aortic aneurysm from 4.6 cm on chest CT 2/18/2020), migratory polyarthritis, acute kidney injury, and hypertension.  Coronavirus test negative.  Chest CT negative for pneumonia.  O2 saturation normal.  Advised to follow-up with her PCP and cardiology.    8/5/2020:  Ms. Brice reported that she was dx'd with GPA by Dr. Wesley. She was treated and in remission; was on AZA for a year and no issues for a while. Lost to follow-up with Dr. Wesley.  Then 1 year ago started having more shortness of breath and found to have more lung nodules.  Recalls heart failure and kidney failure when first dx'd.  Told to see cardiology to follow the aneurysm.  Then in Jan 2020 started to have pain in her shoulders, knees ankles. Pain was so bad that she went to [facility]; found to have YOLI.  Until able to have this rheumatology  evaluation, she says that she was given prednisone to help the joint pain; currently on 5mg daily of prednisone; felt much better when on prednisone 30mg daily. Has been dealing with right knee swelling; the prednisone helped with this.  Joint pains are worse in the AM; improve with time and activity. Morning stiffness is very mild while on prednisone 5mg daily.  No hematuria. No hemoptysis.  Flavia Brice's mother was present during the clinic visit.     9/9/2020: Significant improvement with prednisone and rituximab.  No longer with swelling around her ankles.  Hand swelling/pain/stiffness has resolved.  She feels a little puffy in her face but no actual swelling.  She has followed with her cardiologist and they are going to monitor the thoracic aortic aneurysm.  Tolerated rituximab infusion well.  Currently on prednisone 10 mg daily.  Not going into work at this time; she is taking a leave of absence.    10/21/2020: Improved.  Now with mild pain at her MCPs, DIPs, and knees.  MCP and knee pain is better with activity, worse in the morning.  DIP pain is worse with activity.  Joint pains started again with dose reduction of prednisone.  Has been on azathioprine and tolerated well in the past.    Today, 12/9/2020: Significant improvement with regard to her joint pain.  No joint pain now.  No morning stiffness.  No gelling phenomenon.  Tolerating azathioprine well.  Continues on prednisone 5mg daily.  Mild shortness of breath when walking up stairs that has been stable; no associated chest pain or pressure, palpitations, lightheadedness, dizziness, or nausea and the mild shortness of breath resolves quickly when she rests.  No shortness of breath at rest or currently.  Following with cardiology and plans to repeat her echocardiogram in March 2021.  Last echocardiogram was in February 2020.    Denies fevers, chills, nausea, vomiting. Occasional constipation or diarrhea. No abdominal pain. No chest pain/pressure,  palpitations, or shortness of breath at this time.  No LE swelling.  No oral or nasal sores.  No rash. No sicca symptoms. No photosensitivity or photophobia. No eye pain or redness. No history of inflammatory eye disease.  No history of DVT, pulmonary embolism, or miscarriage.   No history of serositis.  No history of Raynaud's Phenomenon.  No known seizure disorder.  No known renal disorder.      Tobacco: quit smoking in Feb 2020  EtOH: no more than 1 drink per month  Drugs: none  Occupation: manager at [employer]    ROS   GEN: No fevers, chills, night sweats, or weight change  SKIN: No itching, rashes, sores  HEENT: No epistaxis. No oral or nasal ulcers.  CV: See HPI  PULM: No shortness of breath, wheeze, cough currently  GI: No nausea, vomiting, constipation, diarrhea. No blood in stool. No abdominal pain.  : No blood in urine.  MSK: See HPI.  NEURO: No numbness, tingling, or weakness.  ENDO: No heat/cold intolerance.  EXT: No LE swelling   PSYCH: Negative    Active Problem List     Patient Active Problem List   Diagnosis     Wegener's granulomatosis (H)     CARDIOVASCULAR SCREENING; LDL GOAL LESS THAN 130     Overweight     Advanced directives, counseling/discussion     Tobacco abuse     Hypertension goal BP (blood pressure) < 140/90     Tobacco use disorder     Situational depression     Granulomatosis with polyangiitis (H)     Past Medical History     Past Medical History:   Diagnosis Date     Tinnitus      Past Surgical History     Past Surgical History:   Procedure Laterality Date     COLONOSCOPY WITH CO2 INSUFFLATION N/A 2/18/2019    Procedure: COLONOSCOPY WITH CO2 INSUFFLATION;  Surgeon: Javier Guillen MD;  Location: [facility] OR     GALLBLADDER SURGERY       Allergy   No Known Allergies  Current Medication List     Current Outpatient Medications   Medication Sig     Ascorbic Acid (VITAMIN C) 100 MG CHEW      azaTHIOprine (IMURAN) 50 MG tablet Azathioprine 50mg daily x7days, then 100mg daily thereafter  "    losartan-hydrochlorothiazide (HYZAAR) 100-12.5 MG tablet Take 1 tablet by mouth daily     Multiple Vitamins-Minerals (ZINC PO)      predniSONE (DELTASONE) 5 MG tablet Take 1 tablet (5 mg) by mouth daily     VITAMIN D PO      No current facility-administered medications for this visit.          Social History   See HPI    Family History     Family History   Problem Relation Age of Onset     Diabetes Father      Heart Disease Maternal Grandmother      Heart Disease Maternal Grandfather      Arthritis Paternal Grandmother      Heart Disease Paternal Grandfather      Grandmother: rheumatoid arthritis    Physical Exam     Temp Readings from Last 3 Encounters:   09/04/20 99.1  F (37.3  C) (Tympanic)   08/21/20 97.2  F (36.2  C) (Tympanic)   07/27/20 98.9  F (37.2  C) (Tympanic)     BP Readings from Last 5 Encounters:   09/04/20 (!) 146/91   08/21/20 123/74   07/27/20 132/76   07/22/20 (!) 146/84   02/28/20 (!) 146/85     Pulse Readings from Last 1 Encounters:   09/04/20 63     Resp Readings from Last 1 Encounters:   09/04/20 16     Estimated body mass index is 31.47 kg/m  as calculated from the following:    Height as of 10/29/20: 1.676 m (5' 6\").    Weight as of 10/29/20: 88.5 kg (195 lb).      GEN: NAD. Healthy appearing adult.   HEENT: MMM.  Anicteric, noninjected sclera. No obvious external lesions of the ear and nose. Hearing intact.  PULM: No increased work of breathing  MSK:  Hands and wrists without swelling.    SKIN: No rash or jaundice seen  PSYCH: Alert. Appropriate.        Labs / Imaging (select studies)   RF/CCP  Recent Labs   Lab Test 08/05/20  1453   CCPIGG <1   RHF <7     MALLORY  Recent Labs   Lab Test 08/05/20  1453   CAMMIE Positive*   ANAP1 HOMOGENEOUS   ANAT1 1:160     RNP/Sm/SSA/SSB  Recent Labs   Lab Test 08/11/20  1402   RNPIGG <0.2   SMIGG <0.2   SSAIGG <0.2   SSBIGG <0.2     dsDNA  Recent Labs   Lab Test 08/11/20  1402   DNA <1     C3/C4  Recent Labs   Lab Test 08/11/20  1402   F5ECWOW 133 "   I1ZBSZD 41*     Antiphospholipid Antibodies  Recent Labs   Lab Test 08/11/20  1402   B2GPG 0.8   B2GPM <2.9   CARDG <1.6   CARDM 0.8   LUPINT Negative     ANCA  Recent Labs   Lab Test 08/05/20  1453   PR3IGG 4.8*   MPOIGG <0.2     IgG  Recent Labs   Lab Test 08/05/20  1453   IGG 1,009      IGM 32*     CBC  Recent Labs   Lab Test 12/03/20  1128 11/19/20  1123 11/04/20  1509   WBC 8.3 8.6 9.4   RBC 4.27 4.28 4.04   HGB 13.1 13.1 12.6   HCT 41.3 40.8 38.7   MCV 97 95 96   RDW 14.2 13.9 14.1    300 311   MCH 30.7 30.6 31.2   MCHC 31.7 32.1 32.6   NEUTROPHIL 73.6 67.3 68.7   LYMPH 17.7 20.5 19.4   MONOCYTE 6.9 10.3 9.6   EOSINOPHIL 1.3 1.3 1.8   BASOPHIL 0.5 0.6 0.5   ANEU 6.1 5.8 6.4   ALYM 1.5 1.8 1.8   MARCOS 0.6 0.9 0.9   AEOS 0.1 0.1 0.2   ABAS 0.0 0.1 0.1     CMP  Recent Labs   Lab Test 12/03/20  1128 11/19/20  1123 11/04/20  1509 09/16/20  1438 08/05/20  1453 07/22/20  1117 02/28/20  1044 03/26/18  1058 10/27/17  1541   NA  --   --   --  140 141 137 141 142 140   POTASSIUM  --   --   --  3.9 4.2 4.3 4.6 4.1 3.7   CHLORIDE  --   --   --  108 111* 108 109 110* 105   CO2  --   --   --  27 22 27 26 21 28   ANIONGAP  --   --   --  5 8 2* 6 11 7   GLC  --   --   --  120* 137* 92 125* 68* 103*   BUN  --   --   --  32* 41* 29 33* 23 33*   CR 1.41* 1.45* 1.35* 1.33* 1.46* 1.17* 1.36* 1.08* 1.42*   GFRESTIMATED 41* 40* 44* 44* 40* 52* 43* 53* 39*   GFRESTBLACK 48* 46* 50* 51* 46* 60* 50* 64 47*   JESSICA  --   --   --  9.0 9.0 9.6 9.2 8.6 9.0   BILITOTAL 0.4 0.3 0.3 0.3 0.3  --   --   --   --    ALBUMIN 3.9 3.7 3.5 3.4 3.7  --   --  3.6 4.0   PROTTOTAL 7.5 7.4 7.0 6.9 7.3  --   --   --   --    ALKPHOS 80 78 82 78 84  --   --   --   --    AST 16 13 10 8 9  --   --   --   --    ALT 37 33 30 28 26  --   --   --   --      HgA1c  Recent Labs   Lab Test 10/27/17  1541 10/29/13 02/28/13   A1C 5.8 6.0 6.0     Calcium/VitaminD  Recent Labs   Lab Test 09/16/20  1438 08/05/20  1453 07/22/20  1117 02/27/14  1148  02/27/14  1148   JESSICA 9.0 9.0 9.6   < > 9.6   VITDT  --   --   --   --  28*    < > = values in this interval not displayed.     ESR/CRP  Recent Labs   Lab Test 09/16/20  1438 08/05/20  1453   SED 12 9   CRP <2.9 5.7     CK/Aldolase  Recent Labs   Lab Test 08/11/20  1402   CKT 79     TSH/T4  Recent Labs   Lab Test 03/26/18  1058   TSH 1.44     Lipid Panel  Recent Labs   Lab Test 03/26/18  1058 02/27/14  1148   CHOL 188 192   TRIG 93 160*   HDL 87 74   LDL 82 87   VLDL  --  32*   CHOLHDLRATIO  --  2.6   NHDL 101  --      Hepatitis B  Recent Labs   Lab Test 08/05/20  1453   HBCAB Nonreactive   HEPBANG Nonreactive     Hepatitis C  Recent Labs   Lab Test 08/05/20  1453 03/26/18  1058   HCVAB Nonreactive Nonreactive     Lyme ab screening  Recent Labs   Lab Test 08/05/20  1453   LYMEGM 0.10     HIV Screening  Recent Labs   Lab Test 08/05/20  1453   HIAGAB Nonreactive     UA  Recent Labs   Lab Test 09/16/20  1442 08/05/20  1453 07/27/20  0938   COLOR Yellow Yellow Yellow   APPEARANCE Clear Clear Clear   URINEGLC Negative Negative Negative   URINEBILI Negative Negative Negative   SG 1.025 1.025 1.015   URINEPH 5.0 5.0 5.0   PROTEIN 30* Negative Trace*   UROBILINOGEN 0.2 0.2 0.2   NITRITE Negative Negative Negative   UBLD Negative Trace* Small*   LEUKEST Negative Negative Negative   WBCU 0 - 5 0 - 5 0 - 5   RBCU O - 2 O - 2 2-5*   SQUAMOUSEPI Few Few Few   BACTERIA Few* Few* Few*   MUCOUS  --  Present*  --      Urine Microscopic  Recent Labs   Lab Test 09/16/20  1442 08/05/20  1453 07/27/20  0938   WBCU 0 - 5 0 - 5 0 - 5   RBCU O - 2 O - 2 2-5*   SQUAMOUSEPI Few Few Few   BACTERIA Few* Few* Few*   MUCOUS  --  Present*  --      Urine Protein  Recent Labs   Lab Test 09/16/20  1442 08/05/20  1453   UTP 0.43 0.30   UTPG 0.28* 0.37*   UCRR 153 81       Recent Results (from the past 744 hour(s))   XR Chest Port 1 View    Narrative    Portable chest    INDICATION: Chest pain    COMPARISON: 12/1/2017    FINDINGS: Heart size and shape  appear normal. There is atherosclerotic  calcification at the aortic arch. Lungs and pulmonary vascularity  appear normal. Bony structures appear grossly intact.      Impression    IMPRESSION: Aortic atherosclerosis.    MERCEDES MEDRANO MD   CT Chest Pulmonary Embolism w Contrast    Narrative    EXAMINATION: CTA pulmonary angiogram, 7/22/2020 1:41 PM     COMPARISON: None.    HISTORY: PE suspected, intermediate prob, positive D-dimer; chest pain    TECHNIQUE: Volumetric helical acquisition of CT images of the chest  from the lung apices to the kidneys were acquired after the  administration of 80 mL of Isovue-370 IV contrast. Non-Flash technique  with shallow inspiratory breath hold technique.  Post-processed  multiplanar and/or MIP reformations were obtained, archived to PACS  and used in interpretation of this study.     FINDINGS:      Contrast bolus is: adequate.  Exam is negative for acute pulmonary  embolism.       The largest right ventricle transaxial diameter is (measured from  endocardium to endocardium): 4.6 cm   The largest left ventricle transaxial diameter is (measured from  endocardium to endocardium): 4.7 cm  RV/LV ratio is: 1.0 (if ratio greater than 1.1 then sign is suspicious  for right heart strain)  Reflux of contrast into the IVC? yes  Paradoxical bowing of the interventricular septum to the left? no    Chest:     Thyroid is unremarkable. Heart is not enlarged. No pericardial  effusion. No significant coronary artery calcifications. The ascending  aorta is enlarged measuring up to 4.5 cm. The main pulmonary artery is  normal in caliber.    No mediastinal or hilar lymphadenopathy.    Trachea and central airways are clear. Mild basilar predominant  peribronchovascular groundglass opacities likely related to shallow  breath hold. No pleural effusion or pneumothorax.    Scattered pulmonary nodules. For example:  -Solid pulmonary nodule in the posterior right lower lobe measuring 2  mm (series 9,  image 106).  -Solid pulmonary nodule in the inferomedial right lower lobe measuring  2.5 mm (series 9, image 89)    Abdomen: Visualized abdomen is limited.. Small sliding-type hiatal  hernia.    Bones and Soft Tissues: No suspicious osseous lesion. No suspicious  mass.  Mild degenerative changes of the thoracic spine.        Impression    IMPRESSION:   1. No pulmonary embolus appreciated.    2. Enlargement of the ascending aorta measuring up to 4.5 cm.  Attention on follow-up.    3. Scattered sub-3 mm solid pulmonary nodules. Consider optional  follow-up with CT chest in 12 months if patient is considered high  risk for cancer as per Fleischner 2017 guidelines.    In the event of a positive result for acute pulmonary embolism  resulting in right heart strain, consider calling the   [facility] hospital  for PERT (Pulmonary Embolism Response Team)  Activation?    PERT -- Pulmonary Embolism Response Team (Multidisciplinary team  including cardiology, interventional radiology, critical care,  hematology)    I have personally reviewed the examination and initial interpretation  and I agree with the findings.    OFELIA HUERTAS MD                       Immunization History     Immunization History   Administered Date(s) Administered     Influenza Quad, Recombinant, p-free (RIV4) 11/07/2020     Influenza Vaccine IM > 6 months Valent IIV4 10/31/2013, 02/19/2020     Pneumococcal 23 valent 04/25/2008, 06/28/2012     Tdap (Adacel,Boostrix) 07/02/2012          Chart documentation done in part with Dragon Voice recognition Software. Although reviewed after completion, some word and grammatical error may remain.      Video-Visit Details    Type of service:  Video Visit    Video Start Time: 7:50 AM  Video End Time: 8:05 AM    Originating Location (pt. Location): [place], [place]    Distant Location (provider location):  Home    Platform used for Video Visit: Iman Moreira MD

## 2020-12-09 ENCOUNTER — VIRTUAL VISIT (OUTPATIENT)
Dept: RHEUMATOLOGY | Facility: CLINIC | Age: 55
End: 2020-12-09
Payer: COMMERCIAL

## 2020-12-09 DIAGNOSIS — M31.31 GRANULOMATOSIS WITH POLYANGIITIS WITH RENAL INVOLVEMENT (H): Primary | ICD-10-CM

## 2020-12-09 DIAGNOSIS — M19.042 PRIMARY OSTEOARTHRITIS OF BOTH HANDS: ICD-10-CM

## 2020-12-09 DIAGNOSIS — M19.041 PRIMARY OSTEOARTHRITIS OF BOTH HANDS: ICD-10-CM

## 2020-12-09 DIAGNOSIS — Z79.899 HIGH RISK MEDICATIONS (NOT ANTICOAGULANTS) LONG-TERM USE: ICD-10-CM

## 2020-12-09 DIAGNOSIS — R06.02 SHORTNESS OF BREATH: ICD-10-CM

## 2020-12-09 DIAGNOSIS — Z92.21 HISTORY OF CYTOXAN EXPOSURE: ICD-10-CM

## 2020-12-09 DIAGNOSIS — M06.4 INFLAMMATORY POLYARTHROPATHY (H): ICD-10-CM

## 2020-12-09 PROCEDURE — 99214 OFFICE O/P EST MOD 30 MIN: CPT | Mod: 95 | Performed by: INTERNAL MEDICINE

## 2020-12-09 RX ORDER — METHYLPREDNISOLONE SODIUM SUCCINATE 125 MG/2ML
125 INJECTION, POWDER, LYOPHILIZED, FOR SOLUTION INTRAMUSCULAR; INTRAVENOUS ONCE
Status: CANCELLED
Start: 2021-02-21

## 2020-12-09 RX ORDER — DIPHENHYDRAMINE HCL 25 MG
50 CAPSULE ORAL ONCE
Status: CANCELLED
Start: 2021-02-21

## 2020-12-09 RX ORDER — HEPARIN SODIUM (PORCINE) LOCK FLUSH IV SOLN 100 UNIT/ML 100 UNIT/ML
5 SOLUTION INTRAVENOUS
Status: CANCELLED | OUTPATIENT
Start: 2021-02-21

## 2020-12-09 RX ORDER — HEPARIN SODIUM,PORCINE 10 UNIT/ML
5 VIAL (ML) INTRAVENOUS
Status: CANCELLED | OUTPATIENT
Start: 2021-02-21

## 2020-12-09 RX ORDER — ACETAMINOPHEN 325 MG/1
650 TABLET ORAL ONCE
Status: CANCELLED
Start: 2021-02-21

## 2020-12-09 RX ORDER — PREDNISONE 2.5 MG/1
2.5 TABLET ORAL DAILY
Qty: 30 TABLET | Refills: 0 | Status: SHIPPED | OUTPATIENT
Start: 2020-12-09 | End: 2021-01-08

## 2020-12-09 RX ORDER — AZATHIOPRINE 50 MG/1
100 TABLET ORAL DAILY
Qty: 180 TABLET | Refills: 1 | Status: SHIPPED | OUTPATIENT
Start: 2020-12-09 | End: 2021-07-07

## 2020-12-14 ENCOUNTER — HEALTH MAINTENANCE LETTER (OUTPATIENT)
Age: 55
End: 2020-12-14

## 2021-01-06 ENCOUNTER — OFFICE VISIT (OUTPATIENT)
Dept: NURSING | Facility: CLINIC | Age: 56
End: 2021-01-06
Attending: INTERNAL MEDICINE
Payer: COMMERCIAL

## 2021-01-06 VITALS — HEIGHT: 66 IN | WEIGHT: 219.9 LBS | BODY MASS INDEX: 35.34 KG/M2

## 2021-01-06 DIAGNOSIS — R06.02 SHORTNESS OF BREATH: ICD-10-CM

## 2021-01-06 DIAGNOSIS — M31.31 GRANULOMATOSIS WITH POLYANGIITIS WITH RENAL INVOLVEMENT (H): ICD-10-CM

## 2021-01-06 LAB
6 MIN WALK (FT): 1610 FT
6 MIN WALK (M): 491 M

## 2021-01-06 PROCEDURE — 94729 DIFFUSING CAPACITY: CPT | Performed by: INTERNAL MEDICINE

## 2021-01-06 PROCEDURE — 94375 RESPIRATORY FLOW VOLUME LOOP: CPT | Performed by: INTERNAL MEDICINE

## 2021-01-06 PROCEDURE — 94726 PLETHYSMOGRAPHY LUNG VOLUMES: CPT | Performed by: INTERNAL MEDICINE

## 2021-01-06 PROCEDURE — 99207 PR DROP WITH A PROCEDURE: CPT | Performed by: INTERNAL MEDICINE

## 2021-01-06 PROCEDURE — 94618 PULMONARY STRESS TESTING: CPT | Performed by: INTERNAL MEDICINE

## 2021-01-06 ASSESSMENT — MIFFLIN-ST. JEOR: SCORE: 1596.27

## 2021-01-06 NOTE — PROGRESS NOTES
PFT Lab Note: Complete PFT (jennie, DLCO, pleth) with MIP/MEP and 6 minute walk done per Dr. Moreira.

## 2021-01-08 ENCOUNTER — TELEPHONE (OUTPATIENT)
Dept: FAMILY MEDICINE | Facility: CLINIC | Age: 56
End: 2021-01-08

## 2021-01-08 LAB
DLCOUNC-%PRED-PRE: 85 %
DLCOUNC-PRE: 18.58 ML/MIN/MMHG
DLCOUNC-PRED: 21.61 ML/MIN/MMHG
ERV-%PRED-PRE: 17 %
ERV-PRE: 0.09 L
ERV-PRED: 0.51 L
EXPTIME-PRE: 7 SEC
FEF2575-%PRED-PRE: 83 %
FEF2575-PRE: 2.12 L/SEC
FEF2575-PRED: 2.53 L/SEC
FEFMAX-%PRED-PRE: 69 %
FEFMAX-PRE: 4.71 L/SEC
FEFMAX-PRED: 6.73 L/SEC
FEV1-%PRED-PRE: 85 %
FEV1-PRE: 2.33 L
FEV1FEV6-PRE: 79 %
FEV1FEV6-PRED: 81 %
FEV1FVC-PRE: 79 %
FEV1FVC-PRED: 80 %
FEV1SVC-PRE: 78 %
FEV1SVC-PRED: 77 %
FIFMAX-PRE: 3.72 L/SEC
FRCPLETH-%PRED-PRE: 77 %
FRCPLETH-PRE: 2.14 L
FRCPLETH-PRED: 2.78 L
FVC-%PRED-PRE: 85 %
FVC-PRE: 2.96 L
FVC-PRED: 3.47 L
IC-%PRED-PRE: 95 %
IC-PRE: 2.9 L
IC-PRED: 3.03 L
MEP-PRE: 82 CMH2O
MIP-PRE: -76 CMH2O
RVPLETH-%PRED-PRE: 107 %
RVPLETH-PRE: 2.05 L
RVPLETH-PRED: 1.91 L
TLCPLETH-%PRED-PRE: 97 %
TLCPLETH-PRE: 5.05 L
TLCPLETH-PRED: 5.19 L
VA-%PRED-PRE: 85 %
VA-PRE: 4.4 L
VC-%PRED-PRE: 84 %
VC-PRE: 2.99 L
VC-PRED: 3.55 L

## 2021-01-08 NOTE — TELEPHONE ENCOUNTER
Called and spoke with patient. Patient is requesting that lab results from 11/19/20 and 12/3/20, chart notes from 12/9/20, and PFT and 6 minute walk test be faxed to patient's short-term disability company: [employer], Sharda Екатерина Myers f: [phone]. Patient is looking to go back to work on 1/24/21 and is planning on stopping by the [facility] next week to have Dr. Moreira fill out a form. RN faxed lab results and chart notes. Advised patient that PFT and walk test have not finalized yet. RN will fax once final results arrive.    Vel Koo RN....1/8/2021 2:18 PM

## 2021-01-11 NOTE — TELEPHONE ENCOUNTER
Faxed PFT and 6 minute walk results to short-term disability company.    Vel Koo RN....1/11/2021 3:58 PM

## 2021-01-12 ENCOUNTER — TELEPHONE (OUTPATIENT)
Dept: RHEUMATOLOGY | Facility: CLINIC | Age: 56
End: 2021-01-12

## 2021-01-12 NOTE — TELEPHONE ENCOUNTER
Forms brought to me by Amira for Dr. Moreira to fill out. Forms have been given to Dr. Moreira.  Maura Newell [facility] Rheumatology  1/12/2021 11:56 AM

## 2021-01-14 ENCOUNTER — TELEPHONE (OUTPATIENT)
Dept: RHEUMATOLOGY | Facility: CLINIC | Age: 56
End: 2021-01-14

## 2021-01-14 NOTE — TELEPHONE ENCOUNTER
Patient is looking for the results of her PFT, Oximetry and 6 minute walk test. Done on 1/6/2021.    Maura Newell CMA Rheumatology  1/14/2021 10:11 AM

## 2021-01-15 NOTE — TELEPHONE ENCOUNTER
Called and discussed results with patient.  Patient verbalized understanding and has no questions.    Vel Koo RN....1/15/2021 4:23 PM

## 2021-01-15 NOTE — TELEPHONE ENCOUNTER
RN: Please call to notify Flavia Brice that her pulmonary function test is mostly normal and 6-minute walk was normal.       Ronaldo Moreira MD  1/15/2021 4:03 PM

## 2021-01-25 DIAGNOSIS — Z12.31 ENCOUNTER FOR SCREENING MAMMOGRAM FOR BREAST CANCER: ICD-10-CM

## 2021-01-25 PROCEDURE — 77067 SCR MAMMO BI INCL CAD: CPT | Mod: TC | Performed by: RADIOLOGY

## 2021-02-12 ENCOUNTER — TELEPHONE (OUTPATIENT)
Dept: CARDIOLOGY | Facility: CLINIC | Age: 56
End: 2021-02-12

## 2021-02-12 NOTE — TELEPHONE ENCOUNTER
Left a message for patient to call back to to reschedule Echo due to now being in a stress test spot.    TERELL Zamudio

## 2021-03-02 ENCOUNTER — ANCILLARY PROCEDURE (OUTPATIENT)
Dept: CARDIOLOGY | Facility: CLINIC | Age: 56
End: 2021-03-02
Attending: INTERNAL MEDICINE
Payer: COMMERCIAL

## 2021-03-02 DIAGNOSIS — M31.31 GRANULOMATOSIS WITH POLYANGIITIS WITH RENAL INVOLVEMENT (H): ICD-10-CM

## 2021-03-02 DIAGNOSIS — I71.20 THORACIC AORTIC ANEURYSM WITHOUT RUPTURE (H): ICD-10-CM

## 2021-03-02 PROCEDURE — 93306 TTE W/DOPPLER COMPLETE: CPT | Performed by: STUDENT IN AN ORGANIZED HEALTH CARE EDUCATION/TRAINING PROGRAM

## 2021-03-02 PROCEDURE — 99207 PR STATISTIC IV PUSH SINGLE INITIAL SUBSTANCE: CPT | Performed by: STUDENT IN AN ORGANIZED HEALTH CARE EDUCATION/TRAINING PROGRAM

## 2021-03-02 RX ADMIN — Medication 7 ML: at 10:25

## 2021-03-03 DIAGNOSIS — I71.20 THORACIC AORTIC ANEURYSM WITHOUT RUPTURE (H): Primary | ICD-10-CM

## 2021-03-09 ENCOUNTER — MYC MEDICAL ADVICE (OUTPATIENT)
Dept: RHEUMATOLOGY | Facility: CLINIC | Age: 56
End: 2021-03-09

## 2021-03-10 NOTE — TELEPHONE ENCOUNTER
Last office visit note from 12/9/20 states: Plan to re-dose of rituximab in February/March 2021; advised that she call to schedule. Patient states she cannot schedule rituximab until late March-mid April. Patient is wondering if she needs to reschedule her rheumatology appointment on 4/9/21 because of this. Forwarded to Dr. Moreira to decide.    Vel TAY RN....3/10/2021 9:37 AM

## 2021-03-12 NOTE — TELEPHONE ENCOUNTER
Patient returned the call and was informed of Dr. Moreira's message below. Patient verbalized understanding and reports she plans to get the Moderna vaccine next week at her work. She is also going to keep her follow up apt with Dr. Moreira in April.    Vel TAY RN....3/12/2021 2:09 PM

## 2021-03-12 NOTE — TELEPHONE ENCOUNTER
Called patient and she answered and stated she is at work in the middle of a crisis. She stated she will call me back.    Vel TAY RN....3/12/2021 1:48 PM

## 2021-03-12 NOTE — TELEPHONE ENCOUNTER
RN: Please call to notify Flavia Brice that if she is able to get the COVID-19 vaccine now when she should do so.  If she gets the 1 dose Reece & Reece vaccine then she may get the first dose of rituximab administered 2 weeks after the vaccine is received.  If she gets the 2 dose vaccine from either Moderna or Pfizer, then the first of the 2 rituximab infusions should be 2 weeks after the second COVID-19 vaccine.      I would prefer that she get the COVID-19 vaccine now and delay rituximab.  If she gets this cycle of rituximab before getting the COVID-19 vaccine, then the vaccine will need to be delayed at least 4 months from the last rituximab infusion.     Ronaldo Moreira MD  3/12/2021 12:57 PM

## 2021-03-16 ENCOUNTER — MYC MEDICAL ADVICE (OUTPATIENT)
Dept: RHEUMATOLOGY | Facility: CLINIC | Age: 56
End: 2021-03-16

## 2021-04-08 ENCOUNTER — VIRTUAL VISIT (OUTPATIENT)
Dept: RHEUMATOLOGY | Facility: CLINIC | Age: 56
End: 2021-04-08
Payer: COMMERCIAL

## 2021-04-08 DIAGNOSIS — M06.4 INFLAMMATORY POLYARTHROPATHY (H): ICD-10-CM

## 2021-04-08 DIAGNOSIS — M31.31 GRANULOMATOSIS WITH POLYANGIITIS WITH RENAL INVOLVEMENT (H): Primary | ICD-10-CM

## 2021-04-08 DIAGNOSIS — Z79.899 HIGH RISK MEDICATIONS (NOT ANTICOAGULANTS) LONG-TERM USE: ICD-10-CM

## 2021-04-08 PROCEDURE — 99214 OFFICE O/P EST MOD 30 MIN: CPT | Mod: GT | Performed by: INTERNAL MEDICINE

## 2021-04-08 NOTE — PATIENT INSTRUCTIONS
RHEUMATOLOGY    Dr. Ronaldo Moreira    [facility]  [address] [place] [address]    Our new phone number for Rheumatology is [phone], this number will be able to help you schedule appointments for Dr. Moreira or if you have any message you would like sent to us.    Thank you for choosing [facility]!    Maura Newell [facility] Rheumatology

## 2021-04-08 NOTE — PROGRESS NOTES
Flavia Brice  is a 56 year old year old female who is being evaluated via a billable video visit.      How would you like to obtain your AVS? MyChart  If the video visit is dropped, the invitation should be resent by: Text to cell phone: [phone]  Will anyone else be joining your video visit? No    Rheumatology Video Visit      Flavia Brice MRN# [id number]   YOB: 1965 Age: 56 year old      Date of visit: 4/08/21   PCP: Monet Ackerman    Chief Complaint   Patient presents with:  Granulomatosis with polyangiitis with renal involvement    Assessment and Plan     1.  Granulomatosis with polyangiitis: Dx'd 2012 by Dr. Wesley at [facility] when she presented with pulmonary involvement, pericarditis/effusion, borderline aneurysmal dilation of the ascending aorta and biopsy proven necrotizing crescentic nephritis.  Went into remission with cytoxan and prednisone, then maintained on AZA for 2 yrs per patient, stopped when lost to follow-up. Recurrence in 2020 with YOLI, pleuritic chest pain, inflammatory arthritis (MCPs, PIPs, shoulders, knees) that has responded well to prednisone.  She was tx'd with prednisone and rituximab 1g IV received on 8/21/2020 & 9/4/2020.  Then presented with more inflammatory joint symptoms; azathioprine was started.  Currently on azathioprine 100 mg daily and rituximab.  Previous plan to redose rituximab in February/March 2021 but this has been delayed secondary to getting the COVID-19 vaccine; she is not going to get the COVID-19 vaccine now until late April.  Feeling more achy, both inflammatory and degenerative symptoms, but overall has worsened with a longer time.  Between now and her last rituximab infusion.  Therefore, anticipate more improvement with redosing rituximab.  Not using prednisone now because she is receiving the COVID-19 vaccine and we do not want to reduce vaccine efficacy.  Chronic illness, progressive.    - Continue azathioprine 100 mg daily  -  Plan to re-dose of rituximab 1 g IV every 2 weeks for a total of 2 doses, starting 2 weeks after the second COVID-19 vaccine  - Labs now and in 3 months: CBC, CMP, ESR, CRP, UA, Uprotein:creatinine    High risk medication requiring intensive toxicity monitoring at least quarterly: labs ordered include CBC, Creatinine, Hepatic panel to monitor for cytopenia and hepatotoxicity; checking creatinine as it affects clearance of medication.       2. Inflammatory arthritis: Related to GPA.  See #1    3. Elevated serum creatinine: Related to GPA.  See #1     4. Positive MALLORY (antinuclear antibody): Additional work-up was negative for SSA, SSB, RNP, Smith, dsDNA; complement C3 and C4 were not low.  Symptoms are most likely related to GPA; no MALLORY-associated rheumatologic disorder identified    5.  Aneurysm of the ascending aorta at 4.5 cm; history of heart failure with preserved ejection fraction : Has established with Dr. Locke (cardiology)    6. Cytoxan use history: needs periodic UA to eval for hematuria, due to increased risk for bladder cancer.     8.  Osteoarthritis: Affecting the DIPs.  Mild symptoms this time.  Consider hand therapy referral in the future if needed    # At this time the COVID-19 vaccine (currently available from Pfizer, Moderna, and DeciZium) is recommended based on our knowledge of this vaccine and vaccines in the past.  Based on our current knowledge there is no preference for one COVID-19 vaccine over another. Note that there is no data currently available to specifically establish safety and efficacy for these vaccines in immunocompromised people.    She has delayed rituximab secondary to the COVID-19 vaccine as previously instructed     Total minutes spent in evaluation with patient, documentation, , and review of pertinent studies and chart notes: 16     Ms. Brice verbalized agreement with and understanding of the rational for the diagnosis and treatment plan.  All questions were  answered to best of my ability and the patient's satisfaction. Ms. Brice was advised to contact the clinic with any questions that may arise after the clinic visit.      Thank you for involving me in the care of the patient    Return to clinic: 3 months      HPI   Flavia Brice is a 56 year old female with a past medical history significant for hypertension, depression, and granulomatosis with polyangiitis who is seen for follow-up of granulomatosis with polyangiitis    [facility] by Dr. Ann Wesley documents severe [place]-3 ANCA positive GPA with pulmonary involvement, pericarditis/effusion, borderline aneurysmal dilation of the ascending aorta, necrotizing crescentic nephritis.  Kidney biopsy has confirmed diagnosis in the past.  Has received Solu-Medrol x3, Cytoxan x1 at 1300 mg, on prednisone 60-80 mg daily.  Deteriorated quickly despite those interventions and was hospitalized again, requiring plasmapheresis and dialysis.  Was seeing Dr. Velazquez from [facility] and was again placed on Cytoxan 15 mg/kg every 3 weeks.  Has seen ENT but there was no clear sinus involvement.  She was then transitioned after 7 infusions of Cytoxan to Imuran on 11/2012 and has been doing well on that.      7/11/2020 [facility] hospitalization note: Acute kidney injury, migratory polyarthritis, chronic diastolic heart failure.  Left knee and left hip pain.  Right shoulder pain that migrated to the left shoulder.  No joint swelling.  No fevers.  In the past she had flare of her joint pain that she was treated with prednisone.  Prior to this ED evaluation she reportedly used prednisone 20 mg without improvement.    7/22/2020: [facility] emergency department visit for shortness of breath.  Notes history of granulomatosis with polyangiitis, diastolic heart failure, a sending thoracic aortic aneurysm from 4.6 cm on chest CT 2/18/2020), migratory polyarthritis, acute kidney injury, and  hypertension.  Coronavirus test negative.  Chest CT negative for pneumonia.  O2 saturation normal.  Advised to follow-up with her PCP and cardiology.    8/5/2020:  Ms. Brice reported that she was dx'd with GPA by Dr. Wesley. She was treated and in remission; was on AZA for a year and no issues for a while. Lost to follow-up with Dr. Wesley.  Then 1 year ago started having more shortness of breath and found to have more lung nodules.  Recalls heart failure and kidney failure when first dx'd.  Told to see cardiology to follow the aneurysm.  Then in Jan 2020 started to have pain in her shoulders, knees ankles. Pain was so bad that she went to [facility]; found to have YOLI.  Until able to have this rheumatology evaluation, she says that she was given prednisone to help the joint pain; currently on 5mg daily of prednisone; felt much better when on prednisone 30mg daily. Has been dealing with right knee swelling; the prednisone helped with this.  Joint pains are worse in the AM; improve with time and activity. Morning stiffness is very mild while on prednisone 5mg daily.  No hematuria. No hemoptysis.  Flavia Brice's mother was present during the clinic visit.     9/9/2020: Significant improvement with prednisone and rituximab.  No longer with swelling around her ankles.  Hand swelling/pain/stiffness has resolved.  She feels a little puffy in her face but no actual swelling.  She has followed with her cardiologist and they are going to monitor the thoracic aortic aneurysm.  Tolerated rituximab infusion well.  Currently on prednisone 10 mg daily.  Not going into work at this time; she is taking a leave of absence.    10/21/2020: Improved.  Now with mild pain at her MCPs, DIPs, and knees.  MCP and knee pain is better with activity, worse in the morning.  DIP pain is worse with activity.  Joint pains started again with dose reduction of prednisone.  Has been on azathioprine and tolerated well in the past.    12/9/2020:  Significant improvement with regard to her joint pain.  No joint pain now.  No morning stiffness.  No gelling phenomenon.  Tolerating azathioprine well.  Continues on prednisone 5mg daily.  Mild shortness of breath when walking up stairs that has been stable; no associated chest pain or pressure, palpitations, lightheadedness, dizziness, or nausea and the mild shortness of breath resolves quickly when she rests.  No shortness of breath at rest or currently.  Following with cardiology and plans to repeat her echocardiogram in March 2021.  Last echocardiogram was in February 2020.    Today, 4/8/2021: More joint aches and pains and she attributes this to being off of prednisone and having to delay her rituximab infusion secondary to the COVID-19 vaccine.  She has rescheduled the first of the 2 rituximab infusions to be 2 weeks after the second COVID-19 vaccine.  Knees ache more at the end of the day.  No cough, chest pain, shortness of breath.  Has seen cardiology and she says that her aortic aneurysm is stable.    Denies fevers, chills, nausea, vomiting. Occasional constipation or diarrhea. No abdominal pain. No chest pain/pressure, palpitations, or shortness of breath at this time.  No LE swelling.  No oral or nasal sores.  No rash. No sicca symptoms. No photosensitivity or photophobia. No eye pain or redness. No history of inflammatory eye disease.  No history of DVT, pulmonary embolism, or miscarriage.   No history of serositis.  No history of Raynaud's Phenomenon.  No known seizure disorder.  No known renal disorder.      Tobacco: quit smoking in Feb 2020  EtOH: no more than 1 drink per month  Drugs: none  Occupation: manager at [employer]    ROS   GEN: No fevers, chills, night sweats, or weight change  SKIN: No itching, rashes, sores  HEENT: No epistaxis. No oral or nasal ulcers.  CV: See HPI  PULM: No shortness of breath, wheeze, cough currently  GI: No nausea, vomiting, constipation, diarrhea. No blood  in stool. No abdominal pain.  : No blood in urine.  MSK: See HPI.  NEURO: No numbness, tingling, or weakness.  ENDO: No heat/cold intolerance.  EXT: No LE swelling   PSYCH: Negative    Active Problem List     Patient Active Problem List   Diagnosis     Wegener's granulomatosis (H)     CARDIOVASCULAR SCREENING; LDL GOAL LESS THAN 130     Overweight     Advanced directives, counseling/discussion     Tobacco abuse     Hypertension goal BP (blood pressure) < 140/90     Tobacco use disorder     Situational depression     Granulomatosis with polyangiitis (H)     Past Medical History     Past Medical History:   Diagnosis Date     Tinnitus      Past Surgical History     Past Surgical History:   Procedure Laterality Date     COLONOSCOPY WITH CO2 INSUFFLATION N/A 2/18/2019    Procedure: COLONOSCOPY WITH CO2 INSUFFLATION;  Surgeon: Javier Guillen MD;  Location: [facility] OR     GALLBLADDER SURGERY       Allergy   No Known Allergies  Current Medication List     Current Outpatient Medications   Medication Sig     Ascorbic Acid (VITAMIN C) 100 MG CHEW      azaTHIOprine (IMURAN) 50 MG tablet Take 2 tablets (100 mg) by mouth daily     losartan-hydrochlorothiazide (HYZAAR) 100-12.5 MG tablet Take 1 tablet by mouth daily     Multiple Vitamins-Minerals (ZINC PO)      VITAMIN D PO      No current facility-administered medications for this visit.          Social History   See HPI    Family History     Family History   Problem Relation Age of Onset     Diabetes Father      Heart Disease Maternal Grandmother      Heart Disease Maternal Grandfather      Arthritis Paternal Grandmother      Heart Disease Paternal Grandfather      Grandmother: rheumatoid arthritis    Physical Exam     Temp Readings from Last 3 Encounters:   09/04/20 99.1  F (37.3  C) (Tympanic)   08/21/20 97.2  F (36.2  C) (Tympanic)   07/27/20 98.9  F (37.2  C) (Tympanic)     BP Readings from Last 5 Encounters:   09/04/20 (!) 146/91   08/21/20 123/74   07/27/20 132/76   07/22/20  "(!) 146/84   02/28/20 (!) 146/85     Pulse Readings from Last 1 Encounters:   09/04/20 63     Resp Readings from Last 1 Encounters:   09/04/20 16     Estimated body mass index is 36.04 kg/m  as calculated from the following:    Height as of 1/6/21: 1.664 m (5' 5.5\").    Weight as of 1/6/21: 99.7 kg (219 lb 14.4 oz).      GEN: NAD.  Overweight  HEENT: MMM.  Anicteric, noninjected sclera. No obvious external lesions of the ear and nose. Hearing intact.  PULM: No increased work of breathing  MSK:  Hands and wrists without swelling.    SKIN: No rash or jaundice seen  PSYCH: Alert. Appropriate.        Labs / Imaging (select studies)     RF/CCP  Recent Labs   Lab Test 08/05/20  1453   CCPIGG <1   RHF <7     MALLORY  Recent Labs   Lab Test 08/05/20  1453   CAMMIE Positive*   ANAP1 HOMOGENEOUS   ANAT1 1:160     RNP/Sm/SSA/SSB  Recent Labs   Lab Test 08/11/20  1402   RNPIGG <0.2   SMIGG <0.2   SSAIGG <0.2   SSBIGG <0.2     dsDNA  Recent Labs   Lab Test 08/11/20  1402   DNA <1     C3/C4  Recent Labs   Lab Test 08/11/20  1402   X8SENWC 133   H4NJYRB 41*     Antiphospholipid Antibodies  Recent Labs   Lab Test 08/11/20  1402   B2GPG 0.8   B2GPM <2.9   CARDG <1.6   CARDM 0.8   LUPINT Negative     ANCA  Recent Labs   Lab Test 08/05/20  1453   PR3IGG 4.8*   MPOIGG <0.2     IgG  Recent Labs   Lab Test 08/05/20  1453   IGG 1,009      IGM 32*     CBC  Recent Labs   Lab Test 12/03/20  1128 11/19/20  1123 11/04/20  1509   WBC 8.3 8.6 9.4   RBC 4.27 4.28 4.04   HGB 13.1 13.1 12.6   HCT 41.3 40.8 38.7   MCV 97 95 96   RDW 14.2 13.9 14.1    300 311   MCH 30.7 30.6 31.2   MCHC 31.7 32.1 32.6   NEUTROPHIL 73.6 67.3 68.7   LYMPH 17.7 20.5 19.4   MONOCYTE 6.9 10.3 9.6   EOSINOPHIL 1.3 1.3 1.8   BASOPHIL 0.5 0.6 0.5   ANEU 6.1 5.8 6.4   ALYM 1.5 1.8 1.8   MARCOS 0.6 0.9 0.9   AEOS 0.1 0.1 0.2   ABAS 0.0 0.1 0.1     CMP  Recent Labs   Lab Test 12/03/20  1128 11/19/20  1123 11/04/20  1509 09/16/20  1438 08/05/20  1453 07/22/20  1117 " 02/28/20  1044 03/26/18  1058 10/27/17  1541   NA  --   --   --  140 141 137 141 142 140   POTASSIUM  --   --   --  3.9 4.2 4.3 4.6 4.1 3.7   CHLORIDE  --   --   --  108 111* 108 109 110* 105   CO2  --   --   --  27 22 27 26 21 28   ANIONGAP  --   --   --  5 8 2* 6 11 7   GLC  --   --   --  120* 137* 92 125* 68* 103*   BUN  --   --   --  32* 41* 29 33* 23 33*   CR 1.41* 1.45* 1.35* 1.33* 1.46* 1.17* 1.36* 1.08* 1.42*   GFRESTIMATED 41* 40* 44* 44* 40* 52* 43* 53* 39*   GFRESTBLACK 48* 46* 50* 51* 46* 60* 50* 64 47*   JESSICA  --   --   --  9.0 9.0 9.6 9.2 8.6 9.0   BILITOTAL 0.4 0.3 0.3 0.3 0.3  --   --   --   --    ALBUMIN 3.9 3.7 3.5 3.4 3.7  --   --  3.6 4.0   PROTTOTAL 7.5 7.4 7.0 6.9 7.3  --   --   --   --    ALKPHOS 80 78 82 78 84  --   --   --   --    AST 16 13 10 8 9  --   --   --   --    ALT 37 33 30 28 26  --   --   --   --      HgA1c  Recent Labs   Lab Test 10/27/17  1541 10/29/13 02/28/13   A1C 5.8 6.0 6.0     Calcium/VitaminD  Recent Labs   Lab Test 09/16/20  1438 08/05/20  1453 07/22/20  1117 02/27/14  1148 02/27/14  1148   JESSICA 9.0 9.0 9.6   < > 9.6   VITDT  --   --   --   --  28*    < > = values in this interval not displayed.     ESR/CRP  Recent Labs   Lab Test 09/16/20  1438 08/05/20  1453   SED 12 9   CRP <2.9 5.7     CK/Aldolase  Recent Labs   Lab Test 08/11/20  1402   CKT 79     TSH/T4  Recent Labs   Lab Test 03/26/18  1058   TSH 1.44     Lipid Panel  Recent Labs   Lab Test 03/26/18  1058 02/27/14  1148   CHOL 188 192   TRIG 93 160*   HDL 87 74   LDL 82 87   VLDL  --  32*   CHOLHDLRATIO  --  2.6   NHDL 101  --      Hepatitis B  Recent Labs   Lab Test 08/05/20  1453   HBCAB Nonreactive   HEPBANG Nonreactive     Hepatitis C  Recent Labs   Lab Test 08/05/20  1453 03/26/18  1058   HCVAB Nonreactive Nonreactive     Lyme ab screening  Recent Labs   Lab Test 08/05/20  1453   LYMEGM 0.10     Tuberculosis Screening  Recent Labs   Lab Test 08/05/20  1453   TBRST Negative     HIV Screening  Recent Labs   Lab  Test 08/05/20  1453   HIAGAB Nonreactive     UA  Recent Labs   Lab Test 09/16/20  1442 08/05/20  1453 07/27/20  0938   COLOR Yellow Yellow Yellow   APPEARANCE Clear Clear Clear   URINEGLC Negative Negative Negative   URINEBILI Negative Negative Negative   SG 1.025 1.025 1.015   URINEPH 5.0 5.0 5.0   PROTEIN 30* Negative Trace*   UROBILINOGEN 0.2 0.2 0.2   NITRITE Negative Negative Negative   UBLD Negative Trace* Small*   LEUKEST Negative Negative Negative   WBCU 0 - 5 0 - 5 0 - 5   RBCU O - 2 O - 2 2-5*   SQUAMOUSEPI Few Few Few   BACTERIA Few* Few* Few*   MUCOUS  --  Present*  --      Urine Microscopic  Recent Labs   Lab Test 09/16/20  1442 08/05/20  1453 07/27/20  0938   WBCU 0 - 5 0 - 5 0 - 5   RBCU O - 2 O - 2 2-5*   SQUAMOUSEPI Few Few Few   BACTERIA Few* Few* Few*   MUCOUS  --  Present*  --      Urine Protein  Recent Labs   Lab Test 09/16/20  1442 08/05/20  1453   UTP 0.43 0.30   UTPG 0.28* 0.37*   UCRR 153 81     Recent Results (from the past 744 hour(s))   XR Chest Port 1 View    Narrative    Portable chest    INDICATION: Chest pain    COMPARISON: 12/1/2017    FINDINGS: Heart size and shape appear normal. There is atherosclerotic  calcification at the aortic arch. Lungs and pulmonary vascularity  appear normal. Bony structures appear grossly intact.      Impression    IMPRESSION: Aortic atherosclerosis.    MERCEDES MEDRANO MD   CT Chest Pulmonary Embolism w Contrast    Narrative    EXAMINATION: CTA pulmonary angiogram, 7/22/2020 1:41 PM     COMPARISON: None.    HISTORY: PE suspected, intermediate prob, positive D-dimer; chest pain    TECHNIQUE: Volumetric helical acquisition of CT images of the chest  from the lung apices to the kidneys were acquired after the  administration of 80 mL of Isovue-370 IV contrast. Non-Flash technique  with shallow inspiratory breath hold technique.  Post-processed  multiplanar and/or MIP reformations were obtained, archived to PACS  and used in interpretation of this study.      FINDINGS:      Contrast bolus is: adequate.  Exam is negative for acute pulmonary  embolism.       The largest right ventricle transaxial diameter is (measured from  endocardium to endocardium): 4.6 cm   The largest left ventricle transaxial diameter is (measured from  endocardium to endocardium): 4.7 cm  RV/LV ratio is: 1.0 (if ratio greater than 1.1 then sign is suspicious  for right heart strain)  Reflux of contrast into the IVC? yes  Paradoxical bowing of the interventricular septum to the left? no    Chest:     Thyroid is unremarkable. Heart is not enlarged. No pericardial  effusion. No significant coronary artery calcifications. The ascending  aorta is enlarged measuring up to 4.5 cm. The main pulmonary artery is  normal in caliber.    No mediastinal or hilar lymphadenopathy.    Trachea and central airways are clear. Mild basilar predominant  peribronchovascular groundglass opacities likely related to shallow  breath hold. No pleural effusion or pneumothorax.    Scattered pulmonary nodules. For example:  -Solid pulmonary nodule in the posterior right lower lobe measuring 2  mm (series 9, image 106).  -Solid pulmonary nodule in the inferomedial right lower lobe measuring  2.5 mm (series 9, image 89)    Abdomen: Visualized abdomen is limited.. Small sliding-type hiatal  hernia.    Bones and Soft Tissues: No suspicious osseous lesion. No suspicious  mass.  Mild degenerative changes of the thoracic spine.        Impression    IMPRESSION:   1. No pulmonary embolus appreciated.    2. Enlargement of the ascending aorta measuring up to 4.5 cm.  Attention on follow-up.    3. Scattered sub-3 mm solid pulmonary nodules. Consider optional  follow-up with CT chest in 12 months if patient is considered high  risk for cancer as per Fleischner 2017 guidelines.    In the event of a positive result for acute pulmonary embolism  resulting in right heart strain, consider calling the   [facility] hospital  for PERT  (Pulmonary Embolism Response Team)  Activation?    PERT -- Pulmonary Embolism Response Team (Multidisciplinary team  including cardiology, interventional radiology, critical care,  hematology)    I have personally reviewed the examination and initial interpretation  and I agree with the findings.    OFELIA HUERTAS MD                       Immunization History     Immunization History   Administered Date(s) Administered     Influenza Quad, Recombinant, p-free (RIV4) 11/07/2020     Influenza Vaccine IM > 6 months Valent IIV4 10/31/2013, 02/19/2020     Pneumococcal 23 valent 04/25/2008, 06/28/2012     Tdap (Adacel,Boostrix) 07/02/2012          Chart documentation done in part with Dragon Voice recognition Software. Although reviewed after completion, some word and grammatical error may remain.        Video-Visit Details    Type of service:  Video Visit    Video Start Time: 7:49 AM    Video End Time:8:00 AM    Originating Location (pt. Location): Home    Distant Location (provider location):  [facility] in [place], [place]    Platform used for Video Visit: Rusty Moreira MD

## 2021-04-18 ENCOUNTER — HEALTH MAINTENANCE LETTER (OUTPATIENT)
Age: 56
End: 2021-04-18

## 2021-04-24 DIAGNOSIS — Z79.899 HIGH RISK MEDICATIONS (NOT ANTICOAGULANTS) LONG-TERM USE: ICD-10-CM

## 2021-04-24 DIAGNOSIS — Z92.21 HISTORY OF CYTOXAN EXPOSURE: ICD-10-CM

## 2021-04-24 DIAGNOSIS — M06.4 INFLAMMATORY POLYARTHROPATHY (H): ICD-10-CM

## 2021-04-24 DIAGNOSIS — M31.31 GRANULOMATOSIS WITH POLYANGIITIS WITH RENAL INVOLVEMENT (H): ICD-10-CM

## 2021-04-24 LAB
ALBUMIN UR-MCNC: NEGATIVE MG/DL
APPEARANCE UR: CLEAR
BASOPHILS # BLD AUTO: 0 10E9/L (ref 0–0.2)
BASOPHILS NFR BLD AUTO: 0.6 %
BILIRUB UR QL STRIP: NEGATIVE
COLOR UR AUTO: YELLOW
CREAT UR-MCNC: 71 MG/DL
DIFFERENTIAL METHOD BLD: ABNORMAL
EOSINOPHIL # BLD AUTO: 0.2 10E9/L (ref 0–0.7)
EOSINOPHIL NFR BLD AUTO: 4.4 %
ERYTHROCYTE [DISTWIDTH] IN BLOOD BY AUTOMATED COUNT: 13.7 % (ref 10–15)
ERYTHROCYTE [SEDIMENTATION RATE] IN BLOOD BY WESTERGREN METHOD: 10 MM/H (ref 0–30)
GLUCOSE UR STRIP-MCNC: NEGATIVE MG/DL
HCT VFR BLD AUTO: 37.6 % (ref 35–47)
HGB BLD-MCNC: 11.7 G/DL (ref 11.7–15.7)
HGB UR QL STRIP: NEGATIVE
KETONES UR STRIP-MCNC: NEGATIVE MG/DL
LEUKOCYTE ESTERASE UR QL STRIP: NEGATIVE
LYMPHOCYTES # BLD AUTO: 1 10E9/L (ref 0.8–5.3)
LYMPHOCYTES NFR BLD AUTO: 20.4 %
MCH RBC QN AUTO: 30.6 PG (ref 26.5–33)
MCHC RBC AUTO-ENTMCNC: 31.1 G/DL (ref 31.5–36.5)
MCV RBC AUTO: 98 FL (ref 78–100)
MONOCYTES # BLD AUTO: 0.6 10E9/L (ref 0–1.3)
MONOCYTES NFR BLD AUTO: 11.1 %
NEUTROPHILS # BLD AUTO: 3.2 10E9/L (ref 1.6–8.3)
NEUTROPHILS NFR BLD AUTO: 63.5 %
NITRATE UR QL: NEGATIVE
PH UR STRIP: 5 PH (ref 5–7)
PLATELET # BLD AUTO: 297 10E9/L (ref 150–450)
PROT UR-MCNC: 0.11 G/L
PROT/CREAT 24H UR: 0.16 G/G CR (ref 0–0.2)
RBC # BLD AUTO: 3.82 10E12/L (ref 3.8–5.2)
SOURCE: NORMAL
SP GR UR STRIP: 1.02 (ref 1–1.03)
UROBILINOGEN UR STRIP-ACNC: 0.2 EU/DL (ref 0.2–1)
WBC # BLD AUTO: 5.1 10E9/L (ref 4–11)

## 2021-04-24 PROCEDURE — 84156 ASSAY OF PROTEIN URINE: CPT | Performed by: INTERNAL MEDICINE

## 2021-04-24 PROCEDURE — 81003 URINALYSIS AUTO W/O SCOPE: CPT | Performed by: INTERNAL MEDICINE

## 2021-04-24 PROCEDURE — 36415 COLL VENOUS BLD VENIPUNCTURE: CPT | Performed by: INTERNAL MEDICINE

## 2021-04-24 PROCEDURE — 85025 COMPLETE CBC W/AUTO DIFF WBC: CPT | Performed by: INTERNAL MEDICINE

## 2021-04-24 PROCEDURE — 86140 C-REACTIVE PROTEIN: CPT | Performed by: INTERNAL MEDICINE

## 2021-04-24 PROCEDURE — 80053 COMPREHEN METABOLIC PANEL: CPT | Performed by: INTERNAL MEDICINE

## 2021-04-24 PROCEDURE — 85652 RBC SED RATE AUTOMATED: CPT | Performed by: INTERNAL MEDICINE

## 2021-04-26 LAB
ALBUMIN SERPL-MCNC: 3.5 G/DL (ref 3.4–5)
ALP SERPL-CCNC: 67 U/L (ref 40–150)
ALT SERPL W P-5'-P-CCNC: 62 U/L (ref 0–50)
ANION GAP SERPL CALCULATED.3IONS-SCNC: 5 MMOL/L (ref 3–14)
AST SERPL W P-5'-P-CCNC: 22 U/L (ref 0–45)
BILIRUB SERPL-MCNC: 0.4 MG/DL (ref 0.2–1.3)
BUN SERPL-MCNC: 26 MG/DL (ref 7–30)
CALCIUM SERPL-MCNC: 9.5 MG/DL (ref 8.5–10.1)
CHLORIDE SERPL-SCNC: 108 MMOL/L (ref 94–109)
CO2 SERPL-SCNC: 28 MMOL/L (ref 20–32)
CREAT SERPL-MCNC: 1.5 MG/DL (ref 0.52–1.04)
CRP SERPL-MCNC: 9.6 MG/L (ref 0–8)
GFR SERPL CREATININE-BSD FRML MDRD: 38 ML/MIN/{1.73_M2}
GLUCOSE SERPL-MCNC: 114 MG/DL (ref 70–99)
POTASSIUM SERPL-SCNC: 4.2 MMOL/L (ref 3.4–5.3)
PROT SERPL-MCNC: 6.7 G/DL (ref 6.8–8.8)
SODIUM SERPL-SCNC: 141 MMOL/L (ref 133–144)

## 2021-04-27 ENCOUNTER — DOCUMENTATION ONLY (OUTPATIENT)
Dept: RHEUMATOLOGY | Facility: CLINIC | Age: 56
End: 2021-04-27

## 2021-04-27 NOTE — PROGRESS NOTES
Insurance wanting to administer rituximab 500mg IV every 2 weeks for 2 doses for GPA despite disease improved but not completely controlled at this point.  Advised that she continue with her plan for rituximab 1g IV every 2 weeks for 2 doses.       Letter written    Ronaldo Moreira MD  4/27/2021 2:14 PM

## 2021-04-28 ENCOUNTER — INFUSION THERAPY VISIT (OUTPATIENT)
Dept: INFUSION THERAPY | Facility: CLINIC | Age: 56
End: 2021-04-28
Attending: INTERNAL MEDICINE
Payer: COMMERCIAL

## 2021-04-28 ENCOUNTER — HOSPITAL ENCOUNTER (OUTPATIENT)
Dept: LAB | Facility: CLINIC | Age: 56
Discharge: HOME | End: 2021-04-28
Attending: INTERNAL MEDICINE | Admitting: INTERNAL MEDICINE
Payer: COMMERCIAL

## 2021-04-28 VITALS — TEMPERATURE: 97.9 F | SYSTOLIC BLOOD PRESSURE: 124 MMHG | HEART RATE: 72 BPM | DIASTOLIC BLOOD PRESSURE: 68 MMHG

## 2021-04-28 DIAGNOSIS — M31.31 GRANULOMATOSIS WITH POLYANGIITIS WITH RENAL INVOLVEMENT (H): Primary | ICD-10-CM

## 2021-04-28 LAB
CD19 CELLS # BLD: 3 CELLS/UL (ref 107–698)
CD19 CELLS NFR BLD: <1 % (ref 6–27)

## 2021-04-28 PROCEDURE — 258N000003 HC RX IP 258 OP 636: Performed by: INTERNAL MEDICINE

## 2021-04-28 PROCEDURE — 96360 HYDRATION IV INFUSION INIT: CPT

## 2021-04-28 PROCEDURE — 82784 ASSAY IGA/IGD/IGG/IGM EACH: CPT | Performed by: INTERNAL MEDICINE

## 2021-04-28 PROCEDURE — 96413 CHEMO IV INFUSION 1 HR: CPT

## 2021-04-28 PROCEDURE — 86355 B CELLS TOTAL COUNT: CPT | Performed by: INTERNAL MEDICINE

## 2021-04-28 PROCEDURE — 36415 COLL VENOUS BLD VENIPUNCTURE: CPT | Performed by: INTERNAL MEDICINE

## 2021-04-28 PROCEDURE — 250N000013 HC RX MED GY IP 250 OP 250 PS 637: Performed by: INTERNAL MEDICINE

## 2021-04-28 PROCEDURE — 96375 TX/PRO/DX INJ NEW DRUG ADDON: CPT

## 2021-04-28 PROCEDURE — 250N000011 HC RX IP 250 OP 636: Performed by: INTERNAL MEDICINE

## 2021-04-28 PROCEDURE — 96415 CHEMO IV INFUSION ADDL HR: CPT

## 2021-04-28 RX ORDER — HEPARIN SODIUM (PORCINE) LOCK FLUSH IV SOLN 100 UNIT/ML 100 UNIT/ML
5 SOLUTION INTRAVENOUS
Status: CANCELLED | OUTPATIENT
Start: 2021-04-28

## 2021-04-28 RX ORDER — DIPHENHYDRAMINE HCL 50 MG
50 CAPSULE ORAL ONCE
Status: COMPLETED | OUTPATIENT
Start: 2021-04-28 | End: 2021-04-28

## 2021-04-28 RX ORDER — METHYLPREDNISOLONE SODIUM SUCCINATE 125 MG/2ML
125 INJECTION, POWDER, LYOPHILIZED, FOR SOLUTION INTRAMUSCULAR; INTRAVENOUS ONCE
Status: COMPLETED | OUTPATIENT
Start: 2021-04-28 | End: 2021-04-28

## 2021-04-28 RX ORDER — HEPARIN SODIUM,PORCINE 10 UNIT/ML
5 VIAL (ML) INTRAVENOUS
Status: CANCELLED | OUTPATIENT
Start: 2021-04-28

## 2021-04-28 RX ORDER — DIPHENHYDRAMINE HCL 50 MG
50 CAPSULE ORAL ONCE
Status: CANCELLED
Start: 2021-04-28

## 2021-04-28 RX ORDER — ACETAMINOPHEN 325 MG/1
650 TABLET ORAL ONCE
Status: CANCELLED
Start: 2021-04-28

## 2021-04-28 RX ORDER — ACETAMINOPHEN 325 MG/1
650 TABLET ORAL ONCE
Status: COMPLETED | OUTPATIENT
Start: 2021-04-28 | End: 2021-04-28

## 2021-04-28 RX ORDER — METHYLPREDNISOLONE SODIUM SUCCINATE 125 MG/2ML
125 INJECTION, POWDER, LYOPHILIZED, FOR SOLUTION INTRAMUSCULAR; INTRAVENOUS ONCE
Status: CANCELLED
Start: 2021-04-28

## 2021-04-28 RX ADMIN — SODIUM CHLORIDE 250 ML: 9 INJECTION, SOLUTION INTRAVENOUS at 08:50

## 2021-04-28 RX ADMIN — ACETAMINOPHEN 650 MG: 325 TABLET, FILM COATED ORAL at 08:51

## 2021-04-28 RX ADMIN — DIPHENHYDRAMINE HYDROCHLORIDE 50 MG: 50 CAPSULE ORAL at 08:52

## 2021-04-28 RX ADMIN — METHYLPREDNISOLONE SODIUM SUCCINATE 125 MG: 125 INJECTION, POWDER, FOR SOLUTION INTRAMUSCULAR; INTRAVENOUS at 08:53

## 2021-04-28 RX ADMIN — RITUXIMAB 1000 MG: 10 INJECTION, SOLUTION INTRAVENOUS at 09:36

## 2021-04-28 NOTE — PROGRESS NOTES
Infusion Nursing Note:  Flavia Ele Brice presents today for Rituxan.    Patient seen by provider today: No   present during visit today: Not Applicable.    Note:   Patient did meet criteria for an asymptomatic covid-19 PCR test in infusion today. Patient declined covid-19 test.     Intravenous Access:  Peripheral IV placed.    Treatment Conditions:  Biological Infusion Checklist:  ~~~ NOTE: If the patient answers yes to any of the questions below, hold the infusion and contact ordering provider or on-call provider.    1. Have you recently had an elevated temperature, fever, chills, productive cough, coughing for 3 weeks or longer or hemoptysis, abnormal vital signs, night sweats,  chest pain or have you noticed a decrease in your appetite, unexplained weight loss or fatigue? No  2. Do you have any open wounds or new incisions? No  3. Do you have any recent or upcoming hospitalizations, surgeries or dental procedures? No  4. Do you currently have or recently have had any signs of illness or infection or are you on any antibiotics? No  5. Have you had any new, sudden or worsening abdominal pain? No  6. Have you or anyone in your household received a live vaccination in the past 4 weeks? Please note:  No live vaccines while on biologic/chemotherapy until 6 months after the last treatment.  Patient can receive the flu vaccine (shot only) and the pneumovax.  It is optimal for the patient to get these vaccines mid cycle, but they can be given at any time as long as it is not on the day of the infusion. No  7. Have you recently been diagnosed with any new nervous system diseases (ie. Multiple sclerosis, Guillain [place], seizures, neurological changes) or cancer diagnosis? No  8. Are you on any form of radiation or chemotherapy? No  9. Are you pregnant or breast feeding or do you have plans of pregnancy in the future? No  10. Have you been having any signs of worsening depression or suicidal ideations?   (benlysta only) No  11. Have there been any other new onset medical symptoms? No        Post Infusion Assessment:  Patient tolerated infusion without incident.  Blood return noted pre and post infusion.  Site patent and intact, free from redness, edema or discomfort.  No evidence of extravasations.  Access discontinued per protocol.       Discharge Plan:   Patient discharged in stable condition accompanied by: self.  Departure Mode: Ambulatory.    Vickie Hastings RN

## 2021-04-29 LAB
IGA SERPL-MCNC: 116 MG/DL (ref 84–499)
IGG SERPL-MCNC: 863 MG/DL (ref 610–1616)
IGM SERPL-MCNC: 18 MG/DL (ref 35–242)

## 2021-07-06 NOTE — PATIENT INSTRUCTIONS
RHEUMATOLOGY    Dr. Ronaldo Moreira    [facility]  [address] [place] [address]    Our new phone number for Rheumatology is [phone], this number will be able to help you schedule appointments for Dr. Moreira or if you have any message you would like sent to us.    Thank you for choosing [facility]!    [facility]

## 2021-07-07 ENCOUNTER — OFFICE VISIT (OUTPATIENT)
Dept: RHEUMATOLOGY | Facility: CLINIC | Age: 56
End: 2021-07-07
Payer: COMMERCIAL

## 2021-07-07 VITALS
HEART RATE: 64 BPM | DIASTOLIC BLOOD PRESSURE: 97 MMHG | HEIGHT: 66 IN | SYSTOLIC BLOOD PRESSURE: 163 MMHG | OXYGEN SATURATION: 98 % | BODY MASS INDEX: 32.47 KG/M2 | WEIGHT: 202 LBS

## 2021-07-07 DIAGNOSIS — M06.4 INFLAMMATORY POLYARTHROPATHY (H): ICD-10-CM

## 2021-07-07 DIAGNOSIS — M31.31 GRANULOMATOSIS WITH POLYANGIITIS WITH RENAL INVOLVEMENT (H): Primary | ICD-10-CM

## 2021-07-07 DIAGNOSIS — Z79.899 HIGH RISK MEDICATIONS (NOT ANTICOAGULANTS) LONG-TERM USE: ICD-10-CM

## 2021-07-07 LAB
ALBUMIN SERPL-MCNC: 3.6 G/DL (ref 3.4–5)
ALBUMIN UR-MCNC: ABNORMAL MG/DL
ALP SERPL-CCNC: 75 U/L (ref 40–150)
ALT SERPL W P-5'-P-CCNC: 49 U/L (ref 0–50)
ANION GAP SERPL CALCULATED.3IONS-SCNC: 4 MMOL/L (ref 3–14)
APPEARANCE UR: CLEAR
AST SERPL W P-5'-P-CCNC: 21 U/L (ref 0–45)
BASOPHILS # BLD AUTO: 0 10E9/L (ref 0–0.2)
BASOPHILS NFR BLD AUTO: 0.8 %
BILIRUB SERPL-MCNC: 0.2 MG/DL (ref 0.2–1.3)
BILIRUB UR QL STRIP: NEGATIVE
BUN SERPL-MCNC: 34 MG/DL (ref 7–30)
CALCIUM SERPL-MCNC: 9.6 MG/DL (ref 8.5–10.1)
CHLORIDE SERPL-SCNC: 112 MMOL/L (ref 94–109)
CO2 SERPL-SCNC: 26 MMOL/L (ref 20–32)
COLOR UR AUTO: YELLOW
CREAT SERPL-MCNC: 1.44 MG/DL (ref 0.52–1.04)
CREAT UR-MCNC: 111 MG/DL
CRP SERPL-MCNC: 6.2 MG/L (ref 0–8)
DIFFERENTIAL METHOD BLD: ABNORMAL
EOSINOPHIL # BLD AUTO: 0.3 10E9/L (ref 0–0.7)
EOSINOPHIL NFR BLD AUTO: 5 %
ERYTHROCYTE [DISTWIDTH] IN BLOOD BY AUTOMATED COUNT: 14.9 % (ref 10–15)
ERYTHROCYTE [SEDIMENTATION RATE] IN BLOOD BY WESTERGREN METHOD: 11 MM/H (ref 0–30)
GFR SERPL CREATININE-BSD FRML MDRD: 40 ML/MIN/{1.73_M2}
GLUCOSE SERPL-MCNC: 103 MG/DL (ref 70–99)
GLUCOSE UR STRIP-MCNC: NEGATIVE MG/DL
HCT VFR BLD AUTO: 38.8 % (ref 35–47)
HGB BLD-MCNC: 12.2 G/DL (ref 11.7–15.7)
HGB UR QL STRIP: NEGATIVE
HYALINE CASTS #/AREA URNS LPF: ABNORMAL /LPF
KETONES UR STRIP-MCNC: NEGATIVE MG/DL
LEUKOCYTE ESTERASE UR QL STRIP: NEGATIVE
LYMPHOCYTES # BLD AUTO: 1.1 10E9/L (ref 0.8–5.3)
LYMPHOCYTES NFR BLD AUTO: 22.9 %
MCH RBC QN AUTO: 30.7 PG (ref 26.5–33)
MCHC RBC AUTO-ENTMCNC: 31.4 G/DL (ref 31.5–36.5)
MCV RBC AUTO: 98 FL (ref 78–100)
MONOCYTES # BLD AUTO: 0.6 10E9/L (ref 0–1.3)
MONOCYTES NFR BLD AUTO: 11.2 %
NEUTROPHILS # BLD AUTO: 3 10E9/L (ref 1.6–8.3)
NEUTROPHILS NFR BLD AUTO: 60.1 %
NITRATE UR QL: NEGATIVE
PH UR STRIP: 5 PH (ref 5–7)
PLATELET # BLD AUTO: 304 10E9/L (ref 150–450)
POTASSIUM SERPL-SCNC: 4.7 MMOL/L (ref 3.4–5.3)
PROT SERPL-MCNC: 7.2 G/DL (ref 6.8–8.8)
PROT UR-MCNC: 0.3 G/L
PROT/CREAT 24H UR: 0.27 G/G CR (ref 0–0.2)
RBC # BLD AUTO: 3.98 10E12/L (ref 3.8–5.2)
RBC #/AREA URNS AUTO: ABNORMAL /HPF
SODIUM SERPL-SCNC: 142 MMOL/L (ref 133–144)
SOURCE: ABNORMAL
SP GR UR STRIP: 1.02 (ref 1–1.03)
UROBILINOGEN UR STRIP-ACNC: 0.2 EU/DL (ref 0.2–1)
WBC # BLD AUTO: 5 10E9/L (ref 4–11)
WBC #/AREA URNS AUTO: ABNORMAL /HPF

## 2021-07-07 PROCEDURE — 80053 COMPREHEN METABOLIC PANEL: CPT | Performed by: INTERNAL MEDICINE

## 2021-07-07 PROCEDURE — 85025 COMPLETE CBC W/AUTO DIFF WBC: CPT | Performed by: INTERNAL MEDICINE

## 2021-07-07 PROCEDURE — 86140 C-REACTIVE PROTEIN: CPT | Performed by: INTERNAL MEDICINE

## 2021-07-07 PROCEDURE — 84156 ASSAY OF PROTEIN URINE: CPT | Performed by: INTERNAL MEDICINE

## 2021-07-07 PROCEDURE — 99214 OFFICE O/P EST MOD 30 MIN: CPT | Performed by: INTERNAL MEDICINE

## 2021-07-07 PROCEDURE — 81001 URINALYSIS AUTO W/SCOPE: CPT | Performed by: INTERNAL MEDICINE

## 2021-07-07 PROCEDURE — 85652 RBC SED RATE AUTOMATED: CPT | Performed by: INTERNAL MEDICINE

## 2021-07-07 PROCEDURE — 36415 COLL VENOUS BLD VENIPUNCTURE: CPT | Performed by: INTERNAL MEDICINE

## 2021-07-07 RX ORDER — NALOXONE HYDROCHLORIDE 0.4 MG/ML
0.2 INJECTION, SOLUTION INTRAMUSCULAR; INTRAVENOUS; SUBCUTANEOUS
Status: CANCELLED | OUTPATIENT
Start: 2021-10-28

## 2021-07-07 RX ORDER — AZATHIOPRINE 50 MG/1
100 TABLET ORAL DAILY
Qty: 180 TABLET | Refills: 1 | Status: SHIPPED | OUTPATIENT
Start: 2021-07-07 | End: 2021-10-19

## 2021-07-07 RX ORDER — HEPARIN SODIUM (PORCINE) LOCK FLUSH IV SOLN 100 UNIT/ML 100 UNIT/ML
5 SOLUTION INTRAVENOUS
Status: CANCELLED | OUTPATIENT
Start: 2021-10-28

## 2021-07-07 RX ORDER — ACETAMINOPHEN 325 MG/1
650 TABLET ORAL ONCE
Status: CANCELLED
Start: 2021-10-28

## 2021-07-07 RX ORDER — METHYLPREDNISOLONE SODIUM SUCCINATE 125 MG/2ML
125 INJECTION, POWDER, LYOPHILIZED, FOR SOLUTION INTRAMUSCULAR; INTRAVENOUS ONCE
Status: CANCELLED
Start: 2021-10-28

## 2021-07-07 RX ORDER — METHYLPREDNISOLONE SODIUM SUCCINATE 125 MG/2ML
125 INJECTION, POWDER, LYOPHILIZED, FOR SOLUTION INTRAMUSCULAR; INTRAVENOUS
Status: CANCELLED
Start: 2021-10-28

## 2021-07-07 RX ORDER — DIPHENHYDRAMINE HCL 25 MG
50 CAPSULE ORAL ONCE
Status: CANCELLED
Start: 2021-10-28

## 2021-07-07 RX ORDER — ALBUTEROL SULFATE 0.83 MG/ML
2.5 SOLUTION RESPIRATORY (INHALATION)
Status: CANCELLED | OUTPATIENT
Start: 2021-10-28

## 2021-07-07 RX ORDER — DIPHENHYDRAMINE HYDROCHLORIDE 50 MG/ML
50 INJECTION INTRAMUSCULAR; INTRAVENOUS
Status: CANCELLED
Start: 2021-10-28

## 2021-07-07 RX ORDER — ALBUTEROL SULFATE 90 UG/1
1-2 AEROSOL, METERED RESPIRATORY (INHALATION)
Status: CANCELLED
Start: 2021-10-28

## 2021-07-07 RX ORDER — HEPARIN SODIUM,PORCINE 10 UNIT/ML
5 VIAL (ML) INTRAVENOUS
Status: CANCELLED | OUTPATIENT
Start: 2021-10-28

## 2021-07-07 RX ORDER — EPINEPHRINE 1 MG/ML
0.3 INJECTION, SOLUTION, CONCENTRATE INTRAVENOUS EVERY 5 MIN PRN
Status: CANCELLED | OUTPATIENT
Start: 2021-10-28

## 2021-07-07 RX ORDER — MEPERIDINE HYDROCHLORIDE 25 MG/ML
25 INJECTION INTRAMUSCULAR; INTRAVENOUS; SUBCUTANEOUS EVERY 30 MIN PRN
Status: CANCELLED | OUTPATIENT
Start: 2021-10-28

## 2021-07-07 ASSESSMENT — MIFFLIN-ST. JEOR: SCORE: 1510.08

## 2021-07-07 NOTE — NURSING NOTE
Blood pressure rechecked after visit    163/97  Maura Newell CMA Rheumatology  7/7/2021 8:39 AM

## 2021-07-07 NOTE — PROGRESS NOTES
Rheumatology Clinic Visit      Flavia Brice MRN# [id number]   YOB: 1965 Age: 56 year old      Date of visit: 7/07/21   PCP: Monet Ackerman    Chief Complaint   Patient presents with:  Granulomatosis with polyangiitis with renal involvement: has only had one infusion due to insurance. Has little bouts of pain that travels    Assessment and Plan     1.  Granulomatosis with polyangiitis: [facility] with pulmonary involvement, pericarditis/effusion, borderline aneurysmal dilation of the ascending aorta and biopsy proven necrotizing crescentic nephritis.  Went into remission with cytoxan and prednisone, then maintained on AZA for 2 yrs per patient, stopped when lost to follow-up. Recurrence in 2020 with YOLI, pleuritic chest pain, inflammatory arthritis (MCPs, PIPs, shoulders, knees) that has responded well to prednisone.  She was tx'd with prednisone and rituximab 1g IV received on 8/21/2020 & 9/4/2020, (4/28/2021 delayed for the COVID-19 vaccine) & (no 2nd dose).    Currently on azathioprine 100 mg daily and rituximab.  Continue on combination of azathioprine and rituximab.  Note that she did not receive her second dose of rituximab because her insurance had initially denied it, as they requested truxima 500mg IV [id number] instead of 1000 mg IV every 2 weeks; this was not appropriate and therefore I appealed which required a [id number] discussion where I was told by the  that they have had a lot of issues with this dose of rituximab and a lot of people have been calling in about these denials; it was ultimately approved and I was advised that I will likely need to repeat peer to peer discussions for insurance approval of each time with [employer] until they fix this problem.  Chronic illness, stable.    - Continue azathioprine 100 mg daily  - Re-dose of rituximab (truxima) 1 g IV every 2 weeks for a total of 2 doses, starting  10/28/2021  - Labs now and in 3 months: CBC, CMP, ESR, CRP, UA, Uprotein:creatinine    High risk medication requiring intensive toxicity monitoring at least quarterly: labs ordered include CBC, Creatinine, Hepatic panel to monitor for cytopenia and hepatotoxicity; checking creatinine as it affects clearance of medication.      2. Inflammatory arthritis: Related to GPA.  See #1    3. Elevated serum creatinine: Related to GPA.  See #1     4. Positive MALLORY (antinuclear antibody): Additional work-up was negative for SSA, SSB, RNP, Smith, dsDNA; complement C3 and C4 were not low.  Symptoms are most likely related to GPA; no MALLORY-associated rheumatologic disorder identified    5.  Aneurysm of the ascending aorta at 4.5 cm; history of heart failure with preserved ejection fraction : following with Dr. Locke (cardiology)    6. Cytoxan use history: needs periodic UA to eval for hematuria, due to increased risk for bladder cancer with cytoxan exposure    8.  Osteoarthritis: Affecting the DIPs.  Mild symptoms this time.  Consider hand therapy referral in the future if needed    - COVID-19: has received the Moderna COVID-19 vaccine on 3/19/2021 and 4/16/2021    Total minutes spent in evaluation with patient, documentation, , and review of pertinent studies and chart notes: 24     Ms. Brice verbalized agreement with and understanding of the rational for the diagnosis and treatment plan.  All questions were answered to best of my ability and the patient's satisfaction. Ms. Brice was advised to contact the clinic with any questions that may arise after the clinic visit.      Thank you for involving me in the care of the patient    Return to clinic: 3 months      HPI   Flavia Ele Brice is a 56 year old female with a past medical history significant for hypertension, depression, and granulomatosis with polyangiitis who is seen for follow-up of granulomatosis with polyangiitis    10/31/2013 [facility] rheumatology  clinic note by Dr. Ann Wesley documents severe AZ-3 ANCA positive GPA with pulmonary involvement, pericarditis/effusion, borderline aneurysmal dilation of the ascending aorta, necrotizing crescentic nephritis.  Kidney biopsy has confirmed diagnosis in the past.  Has received Solu-Medrol x3, Cytoxan x1 at 1300 mg, on prednisone 60-80 mg daily.  Deteriorated quickly despite those interventions and was hospitalized again, requiring plasmapheresis and dialysis.  Was seeing Dr. Velazquez from [facility] and was again placed on Cytoxan 15 mg/kg every 3 weeks.  Has seen ENT but there was no clear sinus involvement.  She was then transitioned after 7 infusions of Cytoxan to Imuran on 11/2012 and has been doing well on that.      7/11/2020 [facility] hospitalization note: Acute kidney injury, migratory polyarthritis, chronic diastolic heart failure.  Left knee and left hip pain.  Right shoulder pain that migrated to the left shoulder.  No joint swelling.  No fevers.  In the past she had flare of her joint pain that she was treated with prednisone.  Prior to this ED evaluation she reportedly used prednisone 20 mg without improvement.    7/22/2020: [facility] emergency department visit for shortness of breath.  Notes history of granulomatosis with polyangiitis, diastolic heart failure, a sending thoracic aortic aneurysm from 4.6 cm on chest CT 2/18/2020), migratory polyarthritis, acute kidney injury, and hypertension.  Coronavirus test negative.  Chest CT negative for pneumonia.  O2 saturation normal.  Advised to follow-up with her PCP and cardiology.    8/5/2020:  Ms. Brice reported that she was dx'd with GPA by Dr. Wesley. She was treated and in remission; was on AZA for a year and no issues for a while. Lost to follow-up with Dr. Wesley.  Then 1 year ago started having more shortness of breath and found to have more lung nodules.  Recalls heart failure and kidney failure when first dx'd.  Told to see cardiology to  follow the aneurysm.  Then in Jan 2020 started to have pain in her shoulders, knees ankles. Pain was so bad that she went to [facility]; found to have YOLI.  Until able to have this rheumatology evaluation, she says that she was given prednisone to help the joint pain; currently on 5mg daily of prednisone; felt much better when on prednisone 30mg daily. Has been dealing with right knee swelling; the prednisone helped with this.  Joint pains are worse in the AM; improve with time and activity. Morning stiffness is very mild while on prednisone 5mg daily.  No hematuria. No hemoptysis.  Flavia Brice's mother was present during the clinic visit.     9/9/2020: Significant improvement with prednisone and rituximab.  No longer with swelling around her ankles.  Hand swelling/pain/stiffness has resolved.  She feels a little puffy in her face but no actual swelling.  She has followed with her cardiologist and they are going to monitor the thoracic aortic aneurysm.  Tolerated rituximab infusion well.  Currently on prednisone 10 mg daily.  Not going into work at this time; she is taking a leave of absence.    10/21/2020: Improved.  Now with mild pain at her MCPs, DIPs, and knees.  MCP and knee pain is better with activity, worse in the morning.  DIP pain is worse with activity.  Joint pains started again with dose reduction of prednisone.  Has been on azathioprine and tolerated well in the past.    12/9/2020: Significant improvement with regard to her joint pain.  No joint pain now.  No morning stiffness.  No gelling phenomenon.  Tolerating azathioprine well.  Continues on prednisone 5mg daily.  Mild shortness of breath when walking up stairs that has been stable; no associated chest pain or pressure, palpitations, lightheadedness, dizziness, or nausea and the mild shortness of breath resolves quickly when she rests.  No shortness of breath at rest or currently.  Following with cardiology and plans to repeat her  echocardiogram in March 2021.  Last echocardiogram was in February 2020.    4/8/2021: More joint aches and pains and she attributes this to being off of prednisone and having to delay her rituximab infusion secondary to the COVID-19 vaccine.  She has rescheduled the first of the 2 rituximab infusions to be 2 weeks after the second COVID-19 vaccine.  Knees ache more at the end of the day.  No cough, chest pain, shortness of breath.  Has seen cardiology and she says that her aortic aneurysm is stable.    Today, 7/7/2021:  doing well this time.  Mild aches of the PIPs and DIPs with more activity that improves with rest.  Usually has pain that travels around but in general is doing okay.  Only received 1 of 2 rituximab doses because her insurance had denied it; she then got a letter about a month later stating that rituximab had been approved.  Tolerating azathioprine and rituximab well.  Overall happy with how well she is doing.  No blood in urine or stool    Denies fevers, chills, nausea, vomiting. Occasional constipation or diarrhea. No abdominal pain. No chest pain/pressure, palpitations, or shortness of breath at this time.  No LE swelling.  No oral or nasal sores.  No rash. No sicca symptoms. No photosensitivity or photophobia. No eye pain or redness. No history of inflammatory eye disease.  No history of DVT, pulmonary embolism, or miscarriage.   No history of serositis.  No history of Raynaud's Phenomenon.      Tobacco: quit smoking in Feb 2020  EtOH: no more than 1 drink per month  Drugs: none  Occupation: manager at [employer]    ROS   12 point review of system was completed and negative except as noted in the HPI     Active Problem List     Patient Active Problem List   Diagnosis     Wegener's granulomatosis (H)     CARDIOVASCULAR SCREENING; LDL GOAL LESS THAN 130     Overweight     Advanced directives, counseling/discussion     Tobacco abuse     Hypertension goal BP (blood pressure) < 140/90      "Tobacco use disorder     Situational depression     Granulomatosis with polyangiitis (H)     Past Medical History     Past Medical History:   Diagnosis Date     Tinnitus      Past Surgical History     Past Surgical History:   Procedure Laterality Date     COLONOSCOPY WITH CO2 INSUFFLATION N/A 2/18/2019    Procedure: COLONOSCOPY WITH CO2 INSUFFLATION;  Surgeon: Javier Guillen MD;  Location: [facility] OR     GALLBLADDER SURGERY       Allergy   No Known Allergies  Current Medication List     Current Outpatient Medications   Medication Sig     Ascorbic Acid (VITAMIN C) 100 MG CHEW      azaTHIOprine (IMURAN) 50 MG tablet Take 2 tablets (100 mg) by mouth daily     losartan-hydrochlorothiazide (HYZAAR) 100-12.5 MG tablet Take 1 tablet by mouth daily Due for follow up     Multiple Vitamins-Minerals (ZINC PO)      VITAMIN D PO      No current facility-administered medications for this visit.          Social History   See HPI    Family History     Family History   Problem Relation Age of Onset     Diabetes Father      Heart Disease Maternal Grandmother      Heart Disease Maternal Grandfather      Arthritis Paternal Grandmother      Heart Disease Paternal Grandfather      Grandmother: rheumatoid arthritis    Physical Exam     Temp Readings from Last 3 Encounters:   04/28/21 97.9  F (36.6  C) (Oral)   09/04/20 99.1  F (37.3  C) (Tympanic)   08/21/20 97.2  F (36.2  C) (Tympanic)     BP Readings from Last 5 Encounters:   07/07/21 (!) 165/83   04/28/21 124/68   09/04/20 (!) 146/91   08/21/20 123/74   07/27/20 132/76     Pulse Readings from Last 1 Encounters:   07/07/21 64     Resp Readings from Last 1 Encounters:   09/04/20 16     Estimated body mass index is 33.1 kg/m  as calculated from the following:    Height as of this encounter: 1.664 m (5' 5.5\").    Weight as of this encounter: 91.6 kg (202 lb).      GEN: NAD.  HEENT:  Anicteric, noninjected sclera. No obvious external lesions of the ear and nose. Hearing intact.  CV: S1, S2. " RRR. No m/r/g  PULM: No increased work of breathing. CTA bilaterally   MSK: MCPs, PIPs, DIPs without swelling or tenderness to palpation.  Wrists without swelling or tenderness to palpation.  Elbows and shoulders without swelling or tenderness to palpation.    Knees, ankles, and MTPs without swelling or tenderness to palpation.    SKIN: No rash or jaundice seen  PSYCH: Alert. Appropriate.      Labs / Imaging (select studies)   RF/CCP  Recent Labs   Lab Test 08/05/20  1453   CCPIGG <1   RHF <7     MALLORY  Recent Labs   Lab Test 08/05/20  1453   CAMMIE Positive*   ANAP1 HOMOGENEOUS   ANAT1 1:160     RNP/Sm/SSA/SSB  Recent Labs   Lab Test 08/11/20  1402   RNPIGG <0.2   SMIGG <0.2   SSAIGG <0.2   SSBIGG <0.2     dsDNA  Recent Labs   Lab Test 08/11/20  1402   DNA <1     C3/C4  Recent Labs   Lab Test 08/11/20  1402   Z3LKGAK 133   K9GQZFY 41*     Antiphospholipid Antibodies  Recent Labs   Lab Test 08/11/20  1402   B2GPG 0.8   B2GPM <2.9   CARDG <1.6   CARDM 0.8   LUPINT Negative     ANCA  Recent Labs   Lab Test 08/05/20  1453   PR3IGG 4.8*   MPOIGG <0.2     IgG  Recent Labs   Lab Test 04/28/21  0848 08/05/20  1453    1,009    149   IGM 18* 32*     CBC  Recent Labs   Lab Test 04/24/21  1130 12/03/20  1128 11/19/20  1123   WBC 5.1 8.3 8.6   RBC 3.82 4.27 4.28   HGB 11.7 13.1 13.1   HCT 37.6 41.3 40.8   MCV 98 97 95   RDW 13.7 14.2 13.9    347 300   MCH 30.6 30.7 30.6   MCHC 31.1* 31.7 32.1   NEUTROPHIL 63.5 73.6 67.3   LYMPH 20.4 17.7 20.5   MONOCYTE 11.1 6.9 10.3   EOSINOPHIL 4.4 1.3 1.3   BASOPHIL 0.6 0.5 0.6   ANEU 3.2 6.1 5.8   ALYM 1.0 1.5 1.8   MARCOS 0.6 0.6 0.9   AEOS 0.2 0.1 0.1   ABAS 0.0 0.0 0.1     CMP  Recent Labs   Lab Test 04/24/21  1130 12/03/20  1128 11/19/20  1123 11/04/20  1509 09/16/20  1438 08/05/20  1453 07/22/20  1117 02/28/20  1044 03/26/18  1058     --   --   --  140 141 137 141 142   POTASSIUM 4.2  --   --   --  3.9 4.2 4.3 4.6 4.1   CHLORIDE 108  --   --   --  108 111* 108 109  110*   CO2 28  --   --   --  27 22 27 26 21   ANIONGAP 5  --   --   --  5 8 2* 6 11   *  --   --   --  120* 137* 92 125* 68*   BUN 26  --   --   --  32* 41* 29 33* 23   CR 1.50* 1.41* 1.45* 1.35* 1.33* 1.46* 1.17* 1.36* 1.08*   GFRESTIMATED 38* 41* 40* 44* 44* 40* 52* 43* 53*   GFRESTBLACK 44* 48* 46* 50* 51* 46* 60* 50* 64   JESSICA 9.5  --   --   --  9.0 9.0 9.6 9.2 8.6   BILITOTAL 0.4 0.4 0.3 0.3 0.3 0.3  --   --   --    ALBUMIN 3.5 3.9 3.7 3.5 3.4 3.7  --   --  3.6   PROTTOTAL 6.7* 7.5 7.4 7.0 6.9 7.3  --   --   --    ALKPHOS 67 80 78 82 78 84  --   --   --    AST 22 16 13 10 8 9  --   --   --    ALT 62* 37 33 30 28 26  --   --   --      HgA1c  Recent Labs   Lab Test 10/27/17  1541 10/29/13   A1C 5.8 6.0     Calcium/VitaminD  Recent Labs   Lab Test 04/24/21  1130 09/16/20  1438 08/05/20  1453 02/27/14  1148 02/27/14  1148   JESSICA 9.5 9.0 9.0   < > 9.6   VITDT  --   --   --   --  28*    < > = values in this interval not displayed.     ESR/CRP  Recent Labs   Lab Test 04/24/21  1130 09/16/20  1438 08/05/20  1453   SED 10 12 9   CRP 9.6* <2.9 5.7     CK/Aldolase  Recent Labs   Lab Test 08/11/20  1402   CKT 79     TSH/T4  Recent Labs   Lab Test 03/26/18  1058   TSH 1.44     Lipid Panel  Recent Labs   Lab Test 03/26/18  1058 02/27/14  1148   CHOL 188 192   TRIG 93 160*   HDL 87 74   LDL 82 87   VLDL  --  32*   CHOLHDLRATIO  --  2.6   NHDL 101  --      Hepatitis B  Recent Labs   Lab Test 08/05/20  1453   HBCAB Nonreactive   HEPBANG Nonreactive     Hepatitis C  Recent Labs   Lab Test 08/05/20  1453 03/26/18  1058   HCVAB Nonreactive Nonreactive     Lyme ab screening  Recent Labs   Lab Test 08/05/20  1453   LYMEGM 0.10     Tuberculosis Screening  Recent Labs   Lab Test 08/05/20  1453   TBRST Negative     HIV Screening  Recent Labs   Lab Test 08/05/20  1453   HIAGAB Nonreactive     UA  Recent Labs   Lab Test 04/24/21  1137 09/16/20  1442 08/05/20  1453 07/27/20  0938   COLOR Yellow Yellow Yellow Yellow   APPEARANCE Clear  Clear Clear Clear   URINEGLC Negative Negative Negative Negative   URINEBILI Negative Negative Negative Negative   SG 1.020 1.025 1.025 1.015   URINEPH 5.0 5.0 5.0 5.0   PROTEIN Negative 30* Negative Trace*   UROBILINOGEN 0.2 0.2 0.2 0.2   NITRITE Negative Negative Negative Negative   UBLD Negative Negative Trace* Small*   LEUKEST Negative Negative Negative Negative   WBCU  --  0 - 5 0 - 5 0 - 5   RBCU  --  O - 2 O - 2 2-5*   SQUAMOUSEPI  --  Few Few Few   BACTERIA  --  Few* Few* Few*   MUCOUS  --   --  Present*  --      Urine Microscopic  Recent Labs   Lab Test 09/16/20  1442 08/05/20  1453 07/27/20  0938   WBCU 0 - 5 0 - 5 0 - 5   RBCU O - 2 O - 2 2-5*   SQUAMOUSEPI Few Few Few   BACTERIA Few* Few* Few*   MUCOUS  --  Present*  --      Urine Protein  Recent Labs   Lab Test 04/24/21  1137 09/16/20  1442 08/05/20  1453   UTP 0.11 0.43 0.30   UTPG 0.16 0.28* 0.37*   UCRR 71 153 81     Recent Results (from the past 744 hour(s))   XR Chest Port 1 View    Narrative    Portable chest    INDICATION: Chest pain    COMPARISON: 12/1/2017    FINDINGS: Heart size and shape appear normal. There is atherosclerotic  calcification at the aortic arch. Lungs and pulmonary vascularity  appear normal. Bony structures appear grossly intact.      Impression    IMPRESSION: Aortic atherosclerosis.    MERCEDES MEDRANO MD   CT Chest Pulmonary Embolism w Contrast    Narrative    EXAMINATION: CTA pulmonary angiogram, 7/22/2020 1:41 PM     COMPARISON: None.    HISTORY: PE suspected, intermediate prob, positive D-dimer; chest pain    TECHNIQUE: Volumetric helical acquisition of CT images of the chest  from the lung apices to the kidneys were acquired after the  administration of 80 mL of Isovue-370 IV contrast. Non-Flash technique  with shallow inspiratory breath hold technique.  Post-processed  multiplanar and/or MIP reformations were obtained, archived to PACS  and used in interpretation of this study.     FINDINGS:      Contrast bolus is:  adequate.  Exam is negative for acute pulmonary  embolism.       The largest right ventricle transaxial diameter is (measured from  endocardium to endocardium): 4.6 cm   The largest left ventricle transaxial diameter is (measured from  endocardium to endocardium): 4.7 cm  RV/LV ratio is: 1.0 (if ratio greater than 1.1 then sign is suspicious  for right heart strain)  Reflux of contrast into the IVC? yes  Paradoxical bowing of the interventricular septum to the left? no    Chest:     Thyroid is unremarkable. Heart is not enlarged. No pericardial  effusion. No significant coronary artery calcifications. The ascending  aorta is enlarged measuring up to 4.5 cm. The main pulmonary artery is  normal in caliber.    No mediastinal or hilar lymphadenopathy.    Trachea and central airways are clear. Mild basilar predominant  peribronchovascular groundglass opacities likely related to shallow  breath hold. No pleural effusion or pneumothorax.    Scattered pulmonary nodules. For example:  -Solid pulmonary nodule in the posterior right lower lobe measuring 2  mm (series 9, image 106).  -Solid pulmonary nodule in the inferomedial right lower lobe measuring  2.5 mm (series 9, image 89)    Abdomen: Visualized abdomen is limited.. Small sliding-type hiatal  hernia.    Bones and Soft Tissues: No suspicious osseous lesion. No suspicious  mass.  Mild degenerative changes of the thoracic spine.        Impression    IMPRESSION:   1. No pulmonary embolus appreciated.    2. Enlargement of the ascending aorta measuring up to 4.5 cm.  Attention on follow-up.    3. Scattered sub-3 mm solid pulmonary nodules. Consider optional  follow-up with CT chest in 12 months if patient is considered high  risk for cancer as per Fleischner 2017 guidelines.    In the event of a positive result for acute pulmonary embolism  resulting in right heart strain, consider calling the   [facility] hospital  for PERT (Pulmonary Embolism Response  Team)  Activation?    PERT -- Pulmonary Embolism Response Team (Multidisciplinary team  including cardiology, interventional radiology, critical care,  hematology)    I have personally reviewed the examination and initial interpretation  and I agree with the findings.    OFELIA HUERTAS MD                       Immunization History     Immunization History   Administered Date(s) Administered     Influenza Quad, Recombinant, p-free (RIV4) 11/07/2020     Influenza Vaccine IM > 6 months Valent IIV4 10/31/2013, 02/19/2020     Pneumococcal 23 valent 04/25/2008, 06/28/2012     Tdap (Adacel,Boostrix) 07/02/2012          Chart documentation done in part with Dragon Voice recognition Software. Although reviewed after completion, some word and grammatical error may remain.    Ronaldo Moreira MD

## 2021-10-02 ENCOUNTER — HEALTH MAINTENANCE LETTER (OUTPATIENT)
Age: 56
End: 2021-10-02

## 2021-10-14 ENCOUNTER — LAB (OUTPATIENT)
Dept: LAB | Facility: CLINIC | Age: 56
End: 2021-10-14
Payer: COMMERCIAL

## 2021-10-14 DIAGNOSIS — M06.4 INFLAMMATORY POLYARTHROPATHY (H): ICD-10-CM

## 2021-10-14 DIAGNOSIS — Z79.899 HIGH RISK MEDICATIONS (NOT ANTICOAGULANTS) LONG-TERM USE: ICD-10-CM

## 2021-10-14 DIAGNOSIS — M31.31 GRANULOMATOSIS WITH POLYANGIITIS WITH RENAL INVOLVEMENT (H): ICD-10-CM

## 2021-10-14 LAB
ALBUMIN SERPL-MCNC: 3.5 G/DL (ref 3.4–5)
ALBUMIN UR-MCNC: ABNORMAL MG/DL
ALP SERPL-CCNC: 84 U/L (ref 40–150)
ALT SERPL W P-5'-P-CCNC: 41 U/L (ref 0–50)
ANION GAP SERPL CALCULATED.3IONS-SCNC: 6 MMOL/L (ref 3–14)
APPEARANCE UR: CLEAR
AST SERPL W P-5'-P-CCNC: 15 U/L (ref 0–45)
BACTERIA #/AREA URNS HPF: ABNORMAL /HPF
BASOPHILS # BLD AUTO: 0 10E3/UL (ref 0–0.2)
BASOPHILS NFR BLD AUTO: 1 %
BILIRUB SERPL-MCNC: 0.4 MG/DL (ref 0.2–1.3)
BILIRUB UR QL STRIP: NEGATIVE
BUN SERPL-MCNC: 29 MG/DL (ref 7–30)
CALCIUM SERPL-MCNC: 8.9 MG/DL (ref 8.5–10.1)
CHLORIDE BLD-SCNC: 108 MMOL/L (ref 94–109)
CO2 SERPL-SCNC: 27 MMOL/L (ref 20–32)
COLOR UR AUTO: YELLOW
CREAT SERPL-MCNC: 1.43 MG/DL (ref 0.52–1.04)
CREAT UR-MCNC: 198 MG/DL
CRP SERPL-MCNC: 8.6 MG/L (ref 0–8)
EOSINOPHIL # BLD AUTO: 0.2 10E3/UL (ref 0–0.7)
EOSINOPHIL NFR BLD AUTO: 4 %
ERYTHROCYTE [DISTWIDTH] IN BLOOD BY AUTOMATED COUNT: 14.4 % (ref 10–15)
ERYTHROCYTE [SEDIMENTATION RATE] IN BLOOD BY WESTERGREN METHOD: 13 MM/HR (ref 0–30)
GFR SERPL CREATININE-BSD FRML MDRD: 41 ML/MIN/1.73M2
GLUCOSE BLD-MCNC: 116 MG/DL (ref 70–99)
GLUCOSE UR STRIP-MCNC: NEGATIVE MG/DL
HCT VFR BLD AUTO: 39 % (ref 35–47)
HGB BLD-MCNC: 12.4 G/DL (ref 11.7–15.7)
HGB UR QL STRIP: ABNORMAL
KETONES UR STRIP-MCNC: NEGATIVE MG/DL
LEUKOCYTE ESTERASE UR QL STRIP: ABNORMAL
LYMPHOCYTES # BLD AUTO: 1.7 10E3/UL (ref 0.8–5.3)
LYMPHOCYTES NFR BLD AUTO: 29 %
MCH RBC QN AUTO: 31.1 PG (ref 26.5–33)
MCHC RBC AUTO-ENTMCNC: 31.8 G/DL (ref 31.5–36.5)
MCV RBC AUTO: 98 FL (ref 78–100)
MONOCYTES # BLD AUTO: 0.7 10E3/UL (ref 0–1.3)
MONOCYTES NFR BLD AUTO: 11 %
NEUTROPHILS # BLD AUTO: 3.3 10E3/UL (ref 1.6–8.3)
NEUTROPHILS NFR BLD AUTO: 56 %
NITRATE UR QL: NEGATIVE
PH UR STRIP: 5.5 [PH] (ref 5–7)
PLATELET # BLD AUTO: 288 10E3/UL (ref 150–450)
POTASSIUM BLD-SCNC: 4.5 MMOL/L (ref 3.4–5.3)
PROT SERPL-MCNC: 7.1 G/DL (ref 6.8–8.8)
PROT UR-MCNC: 0.45 G/L
PROT/CREAT 24H UR: 0.23 G/G CR (ref 0–0.2)
RBC # BLD AUTO: 3.99 10E6/UL (ref 3.8–5.2)
RBC #/AREA URNS AUTO: ABNORMAL /HPF
SODIUM SERPL-SCNC: 141 MMOL/L (ref 133–144)
SP GR UR STRIP: >=1.03 (ref 1–1.03)
SQUAMOUS #/AREA URNS AUTO: ABNORMAL /LPF
UROBILINOGEN UR STRIP-ACNC: 0.2 E.U./DL
WBC # BLD AUTO: 6 10E3/UL (ref 4–11)
WBC #/AREA URNS AUTO: ABNORMAL /HPF

## 2021-10-14 PROCEDURE — 36415 COLL VENOUS BLD VENIPUNCTURE: CPT

## 2021-10-14 PROCEDURE — 85652 RBC SED RATE AUTOMATED: CPT

## 2021-10-14 PROCEDURE — 80053 COMPREHEN METABOLIC PANEL: CPT

## 2021-10-14 PROCEDURE — 81001 URINALYSIS AUTO W/SCOPE: CPT

## 2021-10-14 PROCEDURE — 86140 C-REACTIVE PROTEIN: CPT

## 2021-10-14 PROCEDURE — 84156 ASSAY OF PROTEIN URINE: CPT

## 2021-10-14 PROCEDURE — 85025 COMPLETE CBC W/AUTO DIFF WBC: CPT

## 2021-10-19 ENCOUNTER — VIRTUAL VISIT (OUTPATIENT)
Dept: RHEUMATOLOGY | Facility: CLINIC | Age: 56
End: 2021-10-19
Payer: COMMERCIAL

## 2021-10-19 DIAGNOSIS — M06.4 INFLAMMATORY POLYARTHROPATHY (H): ICD-10-CM

## 2021-10-19 DIAGNOSIS — M31.31 GRANULOMATOSIS WITH POLYANGIITIS WITH RENAL INVOLVEMENT (H): Primary | ICD-10-CM

## 2021-10-19 DIAGNOSIS — Z79.899 HIGH RISK MEDICATIONS (NOT ANTICOAGULANTS) LONG-TERM USE: ICD-10-CM

## 2021-10-19 PROCEDURE — 99214 OFFICE O/P EST MOD 30 MIN: CPT | Mod: GT | Performed by: INTERNAL MEDICINE

## 2021-10-19 RX ORDER — AZATHIOPRINE 50 MG/1
100 TABLET ORAL DAILY
Qty: 180 TABLET | Refills: 2 | Status: SHIPPED | OUTPATIENT
Start: 2021-10-19 | End: 2022-05-03

## 2021-10-19 NOTE — PATIENT INSTRUCTIONS
RHEUMATOLOGY    Dr. Ronaldo Moreira    [facility] [place]  [address] [place] [address]  Phone number: [phone]  Fax number: [phone]    Thank you for choosing [facility]!    Maura Newell CMA Rheumatology    -------------------    A single additional dose of the Pfizer or Moderna COVID-19 vaccine is recommended at least 28 days after the completion of the 2-dose mRNA vaccine series for patients receiving any immunosuppressive or immunomodulatory therapy. Attempts should be made to match the additional mRNA dose type to the type given in the mRNA primary series; however, if that is not feasible, a booster dose with the alternative mRNA vaccine is permitted.    Based on our current understanding (and this may change over time as we learn more), medications should be adjusted as noted below, if disease activity allows:  - NSAIDs and Acetaminophen: hold for 24 hours prior to vaccination if able to do so  - Methotrexate should be held for 1-2 weeks after the mRNA COVID-19 booster vaccine

## 2021-10-19 NOTE — PROGRESS NOTES
Flavia Brice  is a 56 year old year old female who is being evaluated via a billable video visit.      How would you like to obtain your AVS? MyChart  If the video visit is dropped, the invitation should be resent by: Text to cell phone: [phone]  Will anyone else be joining your video visit? No       Rheumatology Video Visit      Flavia Brice MRN# [id number]   YOB: 1965 Age: 56 year old      Date of visit: 10/19/21   PCP: Monet Ackerman    Chief Complaint   Patient presents with:  Granulomatosis with polyangiitis with renal involvement    Assessment and Plan     1.  Granulomatosis with polyangiitis: Dx'd 2012 by Dr. Wesley at [facility] when she presented with pulmonary involvement, pericarditis/effusion, borderline aneurysmal dilation of the ascending aorta and biopsy proven necrotizing crescentic nephritis.  Went into remission with cytoxan and prednisone, then maintained on AZA for 2 yrs per patient, stopped when lost to follow-up. Recurrence in 2020 with YOLI, pleuritic chest pain, inflammatory arthritis (MCPs, PIPs, shoulders, knees) that has responded well to prednisone.  She was tx'd with prednisone and rituximab 1g IV received on 8/21/2020 & 9/4/2020, (4/28/2021 delayed for the COVID-19 vaccine) & (no 2nd dose of rituximab).    Currently on azathioprine 100 mg daily and rituximab.  Continue on combination of azathioprine and rituximab.  She has her next rituximab infusion already scheduled; I asked that she delay this to be at least 2 weeks after receiving a third dose of the Moderna COVID-19 vaccine and the influenza vaccination.  She says that she will call the infusion center to delay the rituximab infusion.  Chronic illness, stable.    - Continue azathioprine 100 mg daily  - Re-dose of rituximab (truxima) 1 g IV every 2 weeks for a total of 2 doses, starting 10/28/2021 (but now being delayed today at least 2 weeks after receiving both influenza and COVID-19 vaccine booster  dose)  - Labs  in 3 months: CBC, CMP, ESR, CRP, UA, Uprotein:creatinine    High risk medication requiring intensive toxicity monitoring at least quarterly: labs ordered include CBC, Creatinine, Hepatic panel to monitor for cytopenia and hepatotoxicity; checking creatinine as it affects clearance of medication.      2. Inflammatory arthritis: Related to GPA.  Controlled.  See #1    3. Elevated serum creatinine: Related to GPA.  Stable.  See #1     4. Positive MALLORY (antinuclear antibody): Additional work-up was negative for SSA, SSB, RNP, Smith, dsDNA; complement C3 and C4 were not low.  Symptoms are most likely related to GPA; no MALLORY-associated rheumatologic disorder identified    5.  Aneurysm of the ascending aorta at 4.5 cm; history of heart failure with preserved ejection fraction : following with Dr. Locke (cardiology)    6. Cytoxan use history: needs periodic UA to eval for hematuria, due to increased risk for bladder cancer with cytoxan exposure    7.  Osteoarthritis: Affecting the DIPs.  Mild symptoms this time.  Consider hand therapy referral in the future if needed    8.  Vaccinations: Vaccinations reviewed with Ms. Brice.  Advised that she receive both the influenza and third dose of the Moderna COVID-19 vaccine today, and to call the infusion center to delay the rituximab infusion by at least 2 weeks, but no more than 1.5 months.  - Influenza: advised receiving  - COVID-19: has received the Moderna COVID-19 vaccine on 3/19/2021 and 4/16/2021     A single additional dose of the Pfizer or Moderna COVID-19 vaccine is recommended at least 28 days after the completion of the 2-dose mRNA vaccine series for patients receiving any immunosuppressive or immunomodulatory therapy. Attempts should be made to match the additional mRNA dose type to the type given in the mRNA primary series; however, if that is not feasible, a booster dose with the alternative mRNA vaccine is permitted.    Based on our current understanding  (and this may change over time as we learn more), medications should be adjusted as noted below, if disease activity allows:  - NSAIDs and Acetaminophen: hold for 24 hours prior to vaccination if able to do so  - Azathioprine should be held for 1 week after the COVID19 booster vaccine    Total minutes spent in evaluation with patient, documentation, , and review of pertinent studies and chart notes: 20        Ms. Brice verbalized agreement with and understanding of the rational for the diagnosis and treatment plan.  All questions were answered to best of my ability and the patient's satisfaction. Ms. Brice was advised to contact the clinic with any questions that may arise after the clinic visit.      Thank you for involving me in the care of the patient    Return to clinic: 3 months      HPI   Flaviaabigail Brice is a 56 year old female with a past medical history significant for hypertension, depression, and granulomatosis with polyangiitis who is seen for follow-up of granulomatosis with polyangiitis    10/31/2013 [facility] rheumatology clinic note by Dr. Ann Wesley documents severe PA-3 ANCA positive GPA with pulmonary involvement, pericarditis/effusion, borderline aneurysmal dilation of the ascending aorta, necrotizing crescentic nephritis.  Kidney biopsy has confirmed diagnosis in the past.  Has received Solu-Medrol x3, Cytoxan x1 at 1300 mg, on prednisone 60-80 mg daily.  Deteriorated quickly despite those interventions and was hospitalized again, requiring plasmapheresis and dialysis.  Was seeing Dr. Velazquez from [facility] and was again placed on Cytoxan 15 mg/kg every 3 weeks.  Has seen ENT but there was no clear sinus involvement.  She was then transitioned after 7 infusions of Cytoxan to Imuran on 11/2012 and has been doing well on that.      7/11/2020 [facility] hospitalization note: Acute kidney injury, migratory polyarthritis, chronic diastolic heart failure.  Left knee and left hip  pain.  Right shoulder pain that migrated to the left shoulder.  No joint swelling.  No fevers.  In the past she had flare of her joint pain that she was treated with prednisone.  Prior to this ED evaluation she reportedly used prednisone 20 mg without improvement.    7/22/2020: [facility] emergency department visit for shortness of breath.  Notes history of granulomatosis with polyangiitis, diastolic heart failure, a sending thoracic aortic aneurysm from 4.6 cm on chest CT 2/18/2020), migratory polyarthritis, acute kidney injury, and hypertension.  Coronavirus test negative.  Chest CT negative for pneumonia.  O2 saturation normal.  Advised to follow-up with her PCP and cardiology.    8/5/2020:  Ms. Brice reported that she was dx'd with GPA by Dr. Wesley. She was treated and in remission; was on AZA for a year and no issues for a while. Lost to follow-up with Dr. Wesley.  Then 1 year ago started having more shortness of breath and found to have more lung nodules.  Recalls heart failure and kidney failure when first dx'd.  Told to see cardiology to follow the aneurysm.  Then in Jan 2020 started to have pain in her shoulders, knees ankles. Pain was so bad that she went to [facility]; found to have YOLI.  Until able to have this rheumatology evaluation, she says that she was given prednisone to help the joint pain; currently on 5mg daily of prednisone; felt much better when on prednisone 30mg daily. Has been dealing with right knee swelling; the prednisone helped with this.  Joint pains are worse in the AM; improve with time and activity. Morning stiffness is very mild while on prednisone 5mg daily.  No hematuria. No hemoptysis.  Flavia Brice's mother was present during the clinic visit.     9/9/2020: Significant improvement with prednisone and rituximab.  No longer with swelling around her ankles.  Hand swelling/pain/stiffness has resolved.  She feels a little puffy in her face but no actual swelling.  She  has followed with her cardiologist and they are going to monitor the thoracic aortic aneurysm.  Tolerated rituximab infusion well.  Currently on prednisone 10 mg daily.  Not going into work at this time; she is taking a leave of absence.    10/21/2020: Improved.  Now with mild pain at her MCPs, DIPs, and knees.  MCP and knee pain is better with activity, worse in the morning.  DIP pain is worse with activity.  Joint pains started again with dose reduction of prednisone.  Has been on azathioprine and tolerated well in the past.    12/9/2020: Significant improvement with regard to her joint pain.  No joint pain now.  No morning stiffness.  No gelling phenomenon.  Tolerating azathioprine well.  Continues on prednisone 5mg daily.  Mild shortness of breath when walking up stairs that has been stable; no associated chest pain or pressure, palpitations, lightheadedness, dizziness, or nausea and the mild shortness of breath resolves quickly when she rests.  No shortness of breath at rest or currently.  Following with cardiology and plans to repeat her echocardiogram in March 2021.  Last echocardiogram was in February 2020.    4/8/2021: More joint aches and pains and she attributes this to being off of prednisone and having to delay her rituximab infusion secondary to the COVID-19 vaccine.  She has rescheduled the first of the 2 rituximab infusions to be 2 weeks after the second COVID-19 vaccine.  Knees ache more at the end of the day.  No cough, chest pain, shortness of breath.  Has seen cardiology and she says that her aortic aneurysm is stable.    7/7/2021:  doing well this time.  Mild aches of the PIPs and DIPs with more activity that improves with rest.  Usually has pain that travels around but in general is doing okay.  Only received 1 of 2 rituximab doses because her insurance had denied it; she then got a letter about a month later stating that rituximab had been approved.  Tolerating azathioprine and rituximab well.   Overall happy with how well she is doing.  No blood in urine or stool    Today, 10/19/2021: Reports that she is doing well.  She says that she is doing better than she was previously, and way better than she was before treatment.  No joint pain or stiffness.  No rash.  Tolerating azathioprine well.  No difficulty doing her daily activities.    Denies fevers, chills, nausea, vomiting. Occasional constipation or diarrhea. No abdominal pain. No chest pain/pressure, palpitations, or shortness of breath at this time.  No LE swelling.  No oral or nasal sores.  No rash. No sicca symptoms. No photosensitivity or photophobia. No eye pain or redness. No history of inflammatory eye disease.  No history of DVT, pulmonary embolism, or miscarriage.   No history of serositis.  No history of Raynaud's Phenomenon.      Tobacco: quit smoking in Feb 2020  EtOH: no more than 1 drink per month  Drugs: none  Occupation: manager at [employer]    ROS   12 point review of system was completed and negative except as noted in the HPI     Active Problem List     Patient Active Problem List   Diagnosis     Wegener's granulomatosis     CARDIOVASCULAR SCREENING; LDL GOAL LESS THAN 130     Overweight     Advanced directives, counseling/discussion     Tobacco abuse     Hypertension goal BP (blood pressure) < 140/90     Tobacco use disorder     Situational depression     Granulomatosis with polyangiitis (H)     Past Medical History     Past Medical History:   Diagnosis Date     Tinnitus      Past Surgical History     Past Surgical History:   Procedure Laterality Date     COLONOSCOPY WITH CO2 INSUFFLATION N/A 2/18/2019    Procedure: COLONOSCOPY WITH CO2 INSUFFLATION;  Surgeon: Javier Guillen MD;  Location: [facility] OR     GALLBLADDER SURGERY       Allergy   No Known Allergies  Current Medication List     Current Outpatient Medications   Medication Sig     Ascorbic Acid (VITAMIN C) 100 MG CHEW      azaTHIOprine (IMURAN) 50 MG tablet Take 2 tablets  "(100 mg) by mouth daily     losartan-hydrochlorothiazide (HYZAAR) 100-12.5 MG tablet Take 1 tablet by mouth daily Due for follow up     Multiple Vitamins-Minerals (ZINC PO)      VITAMIN D PO      No current facility-administered medications for this visit.         Social History   See HPI    Family History     Family History   Problem Relation Age of Onset     Diabetes Father      Heart Disease Maternal Grandmother      Heart Disease Maternal Grandfather      Arthritis Paternal Grandmother      Heart Disease Paternal Grandfather      Grandmother: rheumatoid arthritis    Physical Exam     Temp Readings from Last 3 Encounters:   04/28/21 97.9  F (36.6  C) (Oral)   09/04/20 99.1  F (37.3  C) (Tympanic)   08/21/20 97.2  F (36.2  C) (Tympanic)     BP Readings from Last 5 Encounters:   07/07/21 (!) 163/97   04/28/21 124/68   09/04/20 (!) 146/91   08/21/20 123/74   07/27/20 132/76     Pulse Readings from Last 1 Encounters:   07/07/21 64     Resp Readings from Last 1 Encounters:   09/04/20 16     Estimated body mass index is 33.1 kg/m  as calculated from the following:    Height as of 7/7/21: 1.664 m (5' 5.5\").    Weight as of 7/7/21: 91.6 kg (202 lb).      GEN: NAD. Healthy appearing adult.   HEENT: MMM.  Anicteric, noninjected sclera. No obvious external lesions of the ear and nose. Hearing intact.  PULM: No increased work of breathing  MSK:  Hands and wrists without swelling.   SKIN: No rash or jaundice seen  PSYCH: Alert. Appropriate.         Labs / Imaging (select studies)     RF/CCP  Recent Labs   Lab Test 08/05/20  1453   CCPIGG <1   RHF <7     MALLORY  Recent Labs   Lab Test 08/05/20  1453   CAMMIE Positive*   ANAP1 HOMOGENEOUS   ANAT1 1:160     RNP/Sm/SSA/SSB  Recent Labs   Lab Test 08/11/20  1402   RNPIGG <0.2   SMIGG <0.2   SSAIGG <0.2   SSBIGG <0.2     dsDNA  Recent Labs   Lab Test 08/11/20  1402   DNA <1     C3/C4  Recent Labs   Lab Test 08/11/20  1402   L8IFPOK 133   B8WRLTY 41*     Antiphospholipid " Antibodies  Recent Labs   Lab Test 08/11/20  1402   B2GPG 0.8   B2GPM <2.9   CARDG <1.6   CARDM 0.8   LUPINT Negative     ANCA  Recent Labs   Lab Test 08/05/20  1453   PR3IGG 4.8*   MPOIGG <0.2       IgG  Recent Labs   Lab Test 04/28/21  0848 08/05/20  1453    1,009    149   IGM 18* 32*     CBC  Recent Labs   Lab Test 10/14/21  0720 07/07/21  0818 04/24/21  1130 12/03/20  1128 12/03/20  1128   WBC 6.0 5.0 5.1   < > 8.3   RBC 3.99 3.98 3.82   < > 4.27   HGB 12.4 12.2 11.7   < > 13.1   HCT 39.0 38.8 37.6   < > 41.3   MCV 98 98 98   < > 97   RDW 14.4 14.9 13.7   < > 14.2    304 297   < > 347   MCH 31.1 30.7 30.6   < > 30.7   MCHC 31.8 31.4* 31.1*   < > 31.7   NEUTROPHIL 56 60.1 63.5   < > 73.6   LYMPH 29 22.9 20.4   < > 17.7   MONOCYTE 11 11.2 11.1   < > 6.9   EOSINOPHIL 4 5.0 4.4   < > 1.3   BASOPHIL 1 0.8 0.6   < > 0.5   ANEU  --  3.0 3.2  --  6.1   ALYM  --  1.1 1.0  --  1.5   MARCOS  --  0.6 0.6  --  0.6   AEOS  --  0.3 0.2  --  0.1   ABAS  --  0.0 0.0  --  0.0   ANEUTAUTO 3.3  --   --   --   --    ALYMPAUTO 1.7  --   --   --   --    AMONOAUTO 0.7  --   --   --   --    AEOSAUTO 0.2  --   --   --   --    ABSBASO 0.0  --   --   --   --     < > = values in this interval not displayed.     CMP  Recent Labs   Lab Test 10/14/21  0720 07/07/21  0818 04/24/21  1130 12/03/20  1128 11/19/20  1123 11/19/20  1123 11/04/20  1509 09/16/20  1438 08/05/20  1453 08/05/20  1453 07/22/20  1117 02/27/14  1148    142 141  --   --   --   --  140   < > 141 137   < >   POTASSIUM 4.5 4.7 4.2  --   --   --   --  3.9   < > 4.2 4.3   < >   CHLORIDE 108 112* 108  --   --   --   --  108   < > 111* 108   < >   CO2 27 26 28  --   --   --   --  27   < > 22 27   < >   ANIONGAP 6 4 5  --   --   --   --  5   < > 8 2*   < >   * 103* 114*  --   --   --   --  120*  --  137* 92  --    BUN 29 34* 26  --   --   --   --  32*   < > 41* 29   < >   CR 1.43* 1.44* 1.50* 1.41*   < > 1.45*   < > 1.33*   < > 1.46* 1.17*   < >    GFRESTIMATED 41* 40* 38* 41*   < > 40*   < > 44*   < > 40* 52*   < >   GFRESTBLACK  --  46* 44* 48*  --  46*   < > 51*   < > 46* 60*   < >   JESSICA 8.9 9.6 9.5  --   --   --   --  9.0   < > 9.0 9.6   < >   BILITOTAL 0.4 0.2 0.4 0.4   < > 0.3   < > 0.3   < > 0.3  --    < >   ALBUMIN 3.5 3.6 3.5 3.9   < > 3.7   < > 3.4   < > 3.7  --    < >   PROTTOTAL 7.1 7.2 6.7* 7.5   < > 7.4   < > 6.9   < > 7.3  --    < >   ALKPHOS 84 75 67 80   < > 78   < > 78   < > 84  --    < >   AST 15 21 22 16   < > 13   < > 8   < > 9  --    < >   ALT 41 49 62* 37   < > 33   < > 28   < > 26  --    < >    < > = values in this interval not displayed.     HgA1c  Recent Labs   Lab Test 10/27/17  1541 10/29/13  0000   A1C 5.8 6.0     Calcium/VitaminD  Recent Labs   Lab Test 10/14/21  0720 07/07/21  0818 04/24/21  1130 10/27/17  1541 02/27/14  1148   JESSICA 8.9 9.6 9.5   < > 9.6   VITDT  --   --   --   --  28*    < > = values in this interval not displayed.     ESR/CRP  Recent Labs   Lab Test 10/14/21  0720 07/07/21  0818 04/24/21  1130   SED 13 11 10   CRP 8.6* 6.2 9.6*     CK/Aldolase  Recent Labs   Lab Test 08/11/20  1402   CKT 79     TSH/T4  Recent Labs   Lab Test 03/26/18  1058   TSH 1.44     Lipid Panel  Recent Labs   Lab Test 03/26/18  1058 02/27/14  1148   CHOL 188 192   TRIG 93 160*   HDL 87 74   LDL 82 87   VLDL  --  32*   CHOLHDLRATIO  --  2.6   NHDL 101  --      Hepatitis B  Recent Labs   Lab Test 08/05/20  1453   HBCAB Nonreactive   HEPBANG Nonreactive     Hepatitis C  Recent Labs   Lab Test 08/05/20  1453 03/26/18  1058   HCVAB Nonreactive Nonreactive     Lyme ab screening  Recent Labs   Lab Test 08/05/20  1453   LYMEGM 0.10     Tuberculosis Screening  Recent Labs   Lab Test 08/05/20  1453   TBRST Negative     HIV Screening  Recent Labs   Lab Test 08/05/20  1453   HIAGAB Nonreactive     UA  Recent Labs   Lab Test 10/14/21  0720 07/07/21  0818 04/24/21  1137 09/16/20  1442 09/16/20  1442 08/05/20  1453 08/05/20  1453 07/27/20  0938  07/27/20  0938   COLOR Yellow Yellow Yellow   < > Yellow   < > Yellow   < > Yellow   APPEARANCE Clear Clear Clear   < > Clear   < > Clear   < > Clear   URINEGLC Negative Negative Negative   < > Negative   < > Negative   < > Negative   URINEBILI Negative Negative Negative   < > Negative   < > Negative   < > Negative   SG >=1.030 1.025 1.020   < > 1.025   < > 1.025   < > 1.015   URINEPH 5.5 5.0 5.0   < > 5.0   < > 5.0   < > 5.0   PROTEIN Trace* Trace* Negative   < > 30*   < > Negative   < > Trace*   UROBILINOGEN 0.2 0.2 0.2   < > 0.2   < > 0.2   < > 0.2   NITRITE Negative Negative Negative   < > Negative   < > Negative   < > Negative   UBLD Trace* Negative Negative   < > Negative   < > Trace*   < > Small*   LEUKEST Trace* Negative Negative   < > Negative   < > Negative   < > Negative   WBCU 0-5 0 - 5  --   --  0 - 5   < > 0 - 5   < > 0 - 5   RBCU None Seen O - 2  --   --  O - 2   < > O - 2   < > 2-5*   SQUAMOUSEPI  --   --   --   --  Few  --  Few  --  Few   BACTERIA Few*  --   --   --  Few*  --  Few*   < > Few*   MUCOUS  --   --   --   --   --   --  Present*  --   --     < > = values in this interval not displayed.     Urine Microscopic  Recent Labs   Lab Test 10/14/21  0720 07/07/21  0818 09/16/20  1442 08/05/20  1453 08/05/20  1453 07/27/20  0938 07/27/20  0938   WBCU 0-5 0 - 5 0 - 5   < > 0 - 5   < > 0 - 5   RBCU None Seen O - 2 O - 2   < > O - 2   < > 2-5*   SQUAMOUSEPI  --   --  Few  --  Few  --  Few   BACTERIA Few*  --  Few*  --  Few*   < > Few*   MUCOUS  --   --   --   --  Present*  --   --     < > = values in this interval not displayed.     Urine Protein  Recent Labs   Lab Test 10/14/21  0720 07/07/21  0818 04/24/21  1137   UTP 0.45 0.30 0.11   UTPG 0.23* 0.27* 0.16   UCRR 198 111 71     Recent Results (from the past 744 hour(s))   XR Chest Port 1 View    Narrative    Portable chest    INDICATION: Chest pain    COMPARISON: 12/1/2017    FINDINGS: Heart size and shape appear normal. There is  atherosclerotic  calcification at the aortic arch. Lungs and pulmonary vascularity  appear normal. Bony structures appear grossly intact.      Impression    IMPRESSION: Aortic atherosclerosis.    MERCEDES MEDRANO MD   CT Chest Pulmonary Embolism w Contrast    Narrative    EXAMINATION: CTA pulmonary angiogram, 7/22/2020 1:41 PM     COMPARISON: None.    HISTORY: PE suspected, intermediate prob, positive D-dimer; chest pain    TECHNIQUE: Volumetric helical acquisition of CT images of the chest  from the lung apices to the kidneys were acquired after the  administration of 80 mL of Isovue-370 IV contrast. Non-Flash technique  with shallow inspiratory breath hold technique.  Post-processed  multiplanar and/or MIP reformations were obtained, archived to PACS  and used in interpretation of this study.     FINDINGS:      Contrast bolus is: adequate.  Exam is negative for acute pulmonary  embolism.       The largest right ventricle transaxial diameter is (measured from  endocardium to endocardium): 4.6 cm   The largest left ventricle transaxial diameter is (measured from  endocardium to endocardium): 4.7 cm  RV/LV ratio is: 1.0 (if ratio greater than 1.1 then sign is suspicious  for right heart strain)  Reflux of contrast into the IVC? yes  Paradoxical bowing of the interventricular septum to the left? no    Chest:     Thyroid is unremarkable. Heart is not enlarged. No pericardial  effusion. No significant coronary artery calcifications. The ascending  aorta is enlarged measuring up to 4.5 cm. The main pulmonary artery is  normal in caliber.    No mediastinal or hilar lymphadenopathy.    Trachea and central airways are clear. Mild basilar predominant  peribronchovascular groundglass opacities likely related to shallow  breath hold. No pleural effusion or pneumothorax.    Scattered pulmonary nodules. For example:  -Solid pulmonary nodule in the posterior right lower lobe measuring 2  mm (series 9, image 106).  -Solid  pulmonary nodule in the inferomedial right lower lobe measuring  2.5 mm (series 9, image 89)    Abdomen: Visualized abdomen is limited.. Small sliding-type hiatal  hernia.    Bones and Soft Tissues: No suspicious osseous lesion. No suspicious  mass.  Mild degenerative changes of the thoracic spine.        Impression    IMPRESSION:   1. No pulmonary embolus appreciated.    2. Enlargement of the ascending aorta measuring up to 4.5 cm.  Attention on follow-up.    3. Scattered sub-3 mm solid pulmonary nodules. Consider optional  follow-up with CT chest in 12 months if patient is considered high  risk for cancer as per Fleischner 2017 guidelines.    In the event of a positive result for acute pulmonary embolism  resulting in right heart strain, consider calling the   [facility] hospital  for PERT (Pulmonary Embolism Response Team)  Activation?    PERT -- Pulmonary Embolism Response Team (Multidisciplinary team  including cardiology, interventional radiology, critical care,  hematology)    I have personally reviewed the examination and initial interpretation  and I agree with the findings.    OFELIA HUERTAS MD                       Immunization History     Immunization History   Administered Date(s) Administered     COVID-19,PF,Moderna 03/19/2021, 04/16/2021     Influenza Quad, Recombinant, pf(RIV4) (Flublok) 11/07/2020     Influenza Vaccine IM > 6 months Valent IIV4 (Alfuria,Fluzone) 10/31/2013, 02/19/2020     Pneumococcal 23 valent 04/25/2008, 06/28/2012     Tdap (Adacel,Boostrix) 07/02/2012          Chart documentation done in part with Dragon Voice recognition Software. Although reviewed after completion, some word and grammatical error may remain.    Video-Visit Details    Type of service:  Video Visit    Video Start Time: 7:52 AM    Video End Time: 8:01 AM    Originating Location (pt. Location): [place], [place]    Distant Location (provider location):  Home    Platform used for Video Visit: Rusty Moreira  MD

## 2021-10-21 ENCOUNTER — OFFICE VISIT (OUTPATIENT)
Dept: FAMILY MEDICINE | Facility: CLINIC | Age: 56
End: 2021-10-21
Payer: COMMERCIAL

## 2021-10-21 VITALS
WEIGHT: 200.6 LBS | HEART RATE: 58 BPM | RESPIRATION RATE: 16 BRPM | DIASTOLIC BLOOD PRESSURE: 80 MMHG | OXYGEN SATURATION: 97 % | TEMPERATURE: 98.6 F | BODY MASS INDEX: 32.87 KG/M2 | SYSTOLIC BLOOD PRESSURE: 142 MMHG

## 2021-10-21 DIAGNOSIS — M31.31 GRANULOMATOSIS WITH POLYANGIITIS WITH RENAL INVOLVEMENT (H): ICD-10-CM

## 2021-10-21 DIAGNOSIS — Z87.891 FORMER SMOKER: ICD-10-CM

## 2021-10-21 DIAGNOSIS — I10 HYPERTENSION GOAL BP (BLOOD PRESSURE) < 140/90: ICD-10-CM

## 2021-10-21 DIAGNOSIS — N95.1 MENOPAUSAL SYNDROME (HOT FLASHES): ICD-10-CM

## 2021-10-21 DIAGNOSIS — R63.5 WEIGHT GAIN: ICD-10-CM

## 2021-10-21 DIAGNOSIS — N18.32 STAGE 3B CHRONIC KIDNEY DISEASE (H): Primary | ICD-10-CM

## 2021-10-21 PROBLEM — F17.200 TOBACCO USE DISORDER: Status: RESOLVED | Noted: 2018-04-02 | Resolved: 2021-10-21

## 2021-10-21 PROBLEM — N18.30 CHRONIC KIDNEY DISEASE, STAGE 3 (H): Status: ACTIVE | Noted: 2021-10-21

## 2021-10-21 LAB — TSH SERPL DL<=0.005 MIU/L-ACNC: 1.1 MU/L (ref 0.4–4)

## 2021-10-21 PROCEDURE — 99214 OFFICE O/P EST MOD 30 MIN: CPT | Performed by: NURSE PRACTITIONER

## 2021-10-21 PROCEDURE — 36415 COLL VENOUS BLD VENIPUNCTURE: CPT | Performed by: NURSE PRACTITIONER

## 2021-10-21 PROCEDURE — 84443 ASSAY THYROID STIM HORMONE: CPT | Performed by: NURSE PRACTITIONER

## 2021-10-21 RX ORDER — LOSARTAN POTASSIUM AND HYDROCHLOROTHIAZIDE 25; 100 MG/1; MG/1
1 TABLET ORAL DAILY
Qty: 90 TABLET | Refills: 3 | Status: SHIPPED | OUTPATIENT
Start: 2021-10-21 | End: 2022-11-09

## 2021-10-21 RX ORDER — GABAPENTIN 300 MG/1
300 CAPSULE ORAL AT BEDTIME
Qty: 90 CAPSULE | Refills: 1 | Status: SHIPPED | OUTPATIENT
Start: 2021-10-21 | End: 2023-02-28

## 2021-10-21 RX ORDER — ZINC GLUCONATE 50 MG
50 TABLET ORAL DAILY
COMMUNITY

## 2021-10-21 ASSESSMENT — ANXIETY QUESTIONNAIRES
7. FEELING AFRAID AS IF SOMETHING AWFUL MIGHT HAPPEN: SEVERAL DAYS
GAD7 TOTAL SCORE: 4
GAD7 TOTAL SCORE: 4
3. WORRYING TOO MUCH ABOUT DIFFERENT THINGS: NOT AT ALL
8. IF YOU CHECKED OFF ANY PROBLEMS, HOW DIFFICULT HAVE THESE MADE IT FOR YOU TO DO YOUR WORK, TAKE CARE OF THINGS AT HOME, OR GET ALONG WITH OTHER PEOPLE?: NOT DIFFICULT AT ALL
4. TROUBLE RELAXING: SEVERAL DAYS
7. FEELING AFRAID AS IF SOMETHING AWFUL MIGHT HAPPEN: SEVERAL DAYS
2. NOT BEING ABLE TO STOP OR CONTROL WORRYING: NOT AT ALL
1. FEELING NERVOUS, ANXIOUS, OR ON EDGE: SEVERAL DAYS
6. BECOMING EASILY ANNOYED OR IRRITABLE: SEVERAL DAYS
GAD7 TOTAL SCORE: 4
5. BEING SO RESTLESS THAT IT IS HARD TO SIT STILL: NOT AT ALL

## 2021-10-21 ASSESSMENT — PATIENT HEALTH QUESTIONNAIRE - PHQ9
10. IF YOU CHECKED OFF ANY PROBLEMS, HOW DIFFICULT HAVE THESE PROBLEMS MADE IT FOR YOU TO DO YOUR WORK, TAKE CARE OF THINGS AT HOME, OR GET ALONG WITH OTHER PEOPLE: NOT DIFFICULT AT ALL
SUM OF ALL RESPONSES TO PHQ QUESTIONS 1-9: 5
SUM OF ALL RESPONSES TO PHQ QUESTIONS 1-9: 5

## 2021-10-21 ASSESSMENT — PAIN SCALES - GENERAL: PAINLEVEL: MILD PAIN (2)

## 2021-10-21 NOTE — PATIENT INSTRUCTIONS
"  Patient Education   High Blood Pressure and Chronic Kidney Disease (CKD)  What is high blood pressure?  For CKD patients, a normal blood pressure is 120/80 (or \"120 over 80\"). When blood pressure stays at 130/80 or higher, it is called high blood pressure (hypertension).  How are kidney disease and high blood pressure related?    More than half of the patients who have chronic kidney disease also have high blood pressure.    High blood pressure increases the chance that kidney disease will get worse.    High blood pressure is a major cause of CKD.   Damaged blood vessels send less blood to the important organs in your body, including your kidneys. Your kidneys filter waste from your blood. When your kidneys can't clean your blood as well as they should, this waste builds up in your body.    CKD can also cause high blood pressure. Your kidneys help keep your blood pressure in a healthy range. Controlling your blood pressure will keep your kidneys from getting worse. This will make CKD easier for you and your doctor to manage.  How do I treat high blood pressure and CKD?    Your doctor will give you blood pressure goals to meet and show you how to check your blood pressure at home.  ? It is important that you check your pressure as directed. High blood pressure often has no symptoms.  ? Make sure to write down the date, time and result of your readings.  ? If you take blood pressure medicine, take it before you measure blood pressure. This helps us know if your medicine is working the way it should.    Stop smoking.    Follow your diet and exercise plan.    Take all medicines as directed.  ? If you have kidney disease from diabetes or if you have protein in your urine, the best blood pressure medicines for your treatment are angiotensin converting enzyme (ACE) inhibitors or angiotensin receptor blockers (ARB).  ? If you have any side effects from your blood pressure medicine, tell your doctor. He or she may switch " you to a different medicine.  How often should I see my doctor?    Your CKD team will outline a treatment plan for you after you are diagnosed. Most patients come to the clinic 1 or 2 times per year. We'll ask you to come in more often if:  ? You start a new medicine or we change your medicine dose.  ? Your kidney function is getting worse.  ? Your blood pressure is not controlled.    At each visit, we will test your blood and urine and measure your blood pressure. (Remember to check your blood pressure at home to help guide your treatment.)    DON'T be afraid to ask questions. We are here to help you.  How to check your blood pressure at home:  1. Sit in a quiet area.  2. Remove thick clothing covering the arm.  3. Sit up straight with feet flat on the ground and legs uncrossed.  4. Wrap the cuff around your upper arm above the elbow.  5. Rest the arm you are testing on a steady surface  6. Try to relax for 5 to 10 minutes before testing your blood pressure. This will make the reading more accurate.  7. Record your blood pressure, heart rate, day and time in a blood pressure log.  8. It is best to check your blood pressure around the same time every day--about an hour after taking your medicine.  NOTE: If at any point you are having trouble taking your blood pressure, please call the clinic. One of the nurses will be happy to meet with you. The nurse will review the steps and make sure your blood pressure cuff is working.  Things to avoid when checking blood pressure    Do not check your blood pressure right after waking up in the morning--wait at least an hour.    Do not check your blood pressure after exercise or heavy activity.    Avoid food, caffeine, alcohol and tobacco 30 minutes before testing.    Do not measure your blood pressure with a full bladder.    Avoid talking while checking your blood pressure  For more information  National Kidney Foundation   www.kidney.org  3-523-063-9948  Other  resources  National Kidney Foundation   www.kidney.org  0-867-831-1534  For informational purposes only. Not to replace the advice of your health care provider.   Copyright   2016 [facility]. All rights reserved. [employer] 686177 - Rev 02/18.       Patient Education     Chronic Kidney Disease (CKD)   The role of the kidneys is to remove waste products and extra water from the blood. When the kidneys don't work as they should, waste products start to build up in the blood. This is called chronic kidney disease (CKD). CKD means that you have kidney damage or a decrease in kidney function lasting at least 3 months. CKD allows extra water, waste, and toxins to build up in the body. This can eventually become life-threatening. You might need dialysis or a kidney transplant to stay alive. This most severe form is called end stage renal disease.  Diabetes is the leading causes of chronic renal failure. Other causes include high blood pressure, hardening of the arteries (atherosclerosis), lupus, inflammation of the blood vessels (vasculitis), and past viral or bacterial infections. Certain over-the-counter pain medicines can cause renal failure when taken often over a long period of time. These include aspirin, ibuprofen, and related anti-inflammatory medicines called NSAIDs (nonsteroidal anti-inflammatory drugs).  Home care  The following guidelines will help you care for yourself at home:    If you have diabetes, talk with your healthcare provider about keeping your blood sugar under control. Ask if you need to make and changes to your diet, lifestyle, or medicines.    If you have high blood pressure:  ? Take prescribed medicine to lower your blood pressure to the recommended goal of less than 130/80.  ? Start a regular exercise program that you enjoy. Check with your healthcare provider to be sure your planned exercise program is right for you.  ? Eat less salt (sodium). Your healthcare provider can tell  you how much salt per day is safe for you.    If you are overweight, talk with your healthcare provider about a weight loss plan.    If you smoke, you must quit. Smoking makes kidney disease worse and puts you at risk for developing other serious illnesses. Talk with your healthcare provider about ways to help you quit.  For more information, visit the following links:  ? www.smokefree.gov/sites/default/files/pdf/clearing-the-air-accessible.pdf  ? www.smokefree.gov  ? www.cancer.org/healthy/stayawayfromtobacco/guidetoquittingsmoking/    Most people with CKD need to follow a special diet.  Be sure you understand yours. In general, you will need to limit protein, salt, potassium, and phosphorus. You also need to limit how much fluid you drink.     CKD is a risk factor for heart disease. Talk with your healthcare provider about any other risk factors you might have and what you can do to lessen them.    Talk with your healthcare provider about any medicines you are taking to find out if they need to be reduced or stopped.    For your own safety, check with your doctor before taking any medicines or supplements. Don't use the following over-the-counter medicines. Or consult your healthcare provider before using them:  ? Aspirin and NSAIDs such as ibuprofen or naproxen. Using acetaminophen for fever or pain is OK.  ? Laxatives and antacids containing magnesium or aluminum  ? Fleet or phospho soda enemas containing phosphorus  ? Certain stomach acid-blocking medicine such as cimetidine or ranitidine   ? Decongestants containing pseudoephedrine   ? Herbal supplements  Follow-up care  Follow up with your healthcare provider, or as advised. Contact one of the following for more information:    American Association of Kidney Patients www.aakp.org    National Kidney Foundation www.kidney.org    American Kidney Fund www.kidneyfund.org    National Kidney Disease Education Program www.nkdep.nih.gov  If an X-ray, ECG (cardiogram),  or other diagnostic test was taken, you will be told of any new findings that may affect your care.  Call 911  Call 911 if you have any of the following:    Severe weakness, dizziness, fainting, drowsiness, or confusion    Chest pain or shortness of breath    Heart beating fast, slow, or irregularly  When to seek medical advice  Call your healthcare provider right away if any of these occur:    Nausea or vomiting    Fever of 100.4 F (38 C) or higher, or as directed by your healthcare provider    Unexpected weight gain or swelling in the legs, ankles, or around the eyes    Decrease or absent urine output    New symptoms or symptoms that get worse  [employer] last reviewed this educational content on 10/1/2019    9374-2682 [employer]. All rights reserved. This information is not intended as a substitute for professional medical care. Always follow your healthcare professional's instructions.

## 2021-10-21 NOTE — PROGRESS NOTES
"  Assessment & Plan     Stage 3b chronic kidney disease (H)      Personal history of tobacco use      Granulomatosis with polyangiitis with renal involvement (H)      Hypertension goal BP (blood pressure) < 140/90    - losartan-hydrochlorothiazide (HYZAAR) 100-25 MG tablet; Take 1 tablet by mouth daily    Former smoker      Weight gain    - TSH with free T4 reflex; Future  - TSH with free T4 reflex    Menopausal syndrome (hot flashes)    - gabapentin (NEURONTIN) 300 MG capsule; Take 1 capsule (300 mg) by mouth At Bedtime    20 minutes spent on the date of the encounter doing chart review, history and exam, documentation and further activities per the note     BMI:   Estimated body mass index is 32.87 kg/m  as calculated from the following:    Height as of 7/7/21: 1.664 m (5' 5.5\").    Weight as of this encounter: 91 kg (200 lb 9.6 oz).   Weight management plan: Discussed healthy diet and exercise guidelines    See Patient Instructions: follow up as needed for rachel care questions or concerns.     Return in about 1 year (around 10/21/2022), or if symptoms worsen or fail to improve.    [facility]  [facility] RANJIT Hager is a 56 year old who presents for the following health issues  *Question about menopause treatments.    History of Present Illness       Hypertension: She presents for follow up of hypertension.  She does not check blood pressure  regularly outside of the clinic. Outside blood pressures have been over 140/90. She does not follow a low salt diet.     She eats 0-1 servings of fruits and vegetables daily.She consumes 0 sweetened beverage(s) daily.She exercises with enough effort to increase her heart rate 10 to 19 minutes per day.  She exercises with enough effort to increase her heart rate 3 or less days per week. She is missing 1 dose(s) of medications per week.     Answers for HPI/ROS submitted by the patient on 10/21/2021  If you checked off any problems, how " difficult have these problems made it for you to do your work, take care of things at home, or get along with other people?: Not difficult at all  PHQ9 TOTAL SCORE: 5  DHRUV 7 TOTAL SCORE: 4    Elizabeth Lowry, [facility]    Review of Systems   Constitutional, HEENT, cardiovascular, pulmonary, GI, , musculoskeletal, neuro, skin, endocrine and psych systems are negative, except as otherwise noted.      Objective    BP (!) 142/80 (BP Location: Left arm, Patient Position: Sitting, Cuff Size: Adult Large)   Pulse 58   Temp 98.6  F (37  C) (Tympanic)   Resp 16   Wt 91 kg (200 lb 9.6 oz)   SpO2 97%   BMI 32.87 kg/m    Body mass index is 32.87 kg/m .  Physical Exam   GENERAL: healthy, alert and no distress  RESP: lungs clear to auscultation - no rales, rhonchi or wheezes  CV: regular rate and rhythm, normal S1 S2, no S3 or S4, no murmur, click or rub, no peripheral edema and peripheral pulses strong  MS: no gross musculoskeletal defects noted, no edema  PSYCH: mentation appears normal, affect normal/bright    See orders- renal labs were just checked by rheumatology last week.

## 2021-10-21 NOTE — PROGRESS NOTES
Lung Cancer Screening Shared Decision Making Visit     Flavia Brice is not eligible for lung cancer screening on the basis of the information provided in my signed lung cancer screening order. Flavia's smoking history is below the threshold and so it is not recommended.    Patient is not currently a smoker and so we did not discuss that the only way to prevent lung cancer is to not smoke. Smoking cessation counseling was not given.    I did offer risk estimation using a calculator such as this one: Former smoker, quit smoking 2 years ago; smoked for 23 years. Does not meet screening criteria at this time    ShouldIScreen        Answers for HPI/ROS submitted by the patient on 10/21/2021  If you checked off any problems, how difficult have these problems made it for you to do your work, take care of things at home, or get along with other people?: Not difficult at all  PHQ9 TOTAL SCORE: 5  DHRUV 7 TOTAL SCORE: 4  Do you check your blood pressure regularly outside of the clinic?: No  Are your blood pressures ever more than 140 on the top number (systolic) OR more than 90 on the bottom number (diastolic)? (For example, greater than 140/90): Yes  Are you following a low salt diet?: No  How many servings of fruits and vegetables do you eat daily?: 0-1  On average, how many sweetened beverages do you drink each day (Examples: soda, juice, sweet tea, etc.  Do NOT count diet or artificially sweetened beverages)?: 0  How many minutes a day do you exercise enough to make your heart beat faster?: 10 to 19  How many days a week do you exercise enough to make your heart beat faster?: 3 or less  How many days per week do you miss taking your medication?: 1

## 2021-10-22 ASSESSMENT — ANXIETY QUESTIONNAIRES: GAD7 TOTAL SCORE: 4

## 2021-10-22 ASSESSMENT — PATIENT HEALTH QUESTIONNAIRE - PHQ9: SUM OF ALL RESPONSES TO PHQ QUESTIONS 1-9: 5

## 2021-11-11 ENCOUNTER — INFUSION THERAPY VISIT (OUTPATIENT)
Dept: INFUSION THERAPY | Facility: CLINIC | Age: 56
End: 2021-11-11
Attending: INTERNAL MEDICINE
Payer: COMMERCIAL

## 2021-11-11 ENCOUNTER — LAB (OUTPATIENT)
Dept: LAB | Facility: CLINIC | Age: 56
End: 2021-11-11
Payer: COMMERCIAL

## 2021-11-11 VITALS
TEMPERATURE: 98.2 F | BODY MASS INDEX: 32.14 KG/M2 | SYSTOLIC BLOOD PRESSURE: 128 MMHG | HEART RATE: 70 BPM | RESPIRATION RATE: 16 BRPM | WEIGHT: 196.1 LBS | OXYGEN SATURATION: 95 % | DIASTOLIC BLOOD PRESSURE: 82 MMHG

## 2021-11-11 DIAGNOSIS — M31.31 GRANULOMATOSIS WITH POLYANGIITIS WITH RENAL INVOLVEMENT (H): Primary | ICD-10-CM

## 2021-11-11 LAB
CD19 CELLS # BLD: 19 CELLS/UL (ref 107–698)
CD19 CELLS NFR BLD: 1 % (ref 6–27)
IGA SERPL-MCNC: 138 MG/DL (ref 84–499)
IGG SERPL-MCNC: 1009 MG/DL (ref 610–1616)
IGM SERPL-MCNC: 13 MG/DL (ref 35–242)

## 2021-11-11 PROCEDURE — 96413 CHEMO IV INFUSION 1 HR: CPT | Performed by: NURSE PRACTITIONER

## 2021-11-11 PROCEDURE — 36415 COLL VENOUS BLD VENIPUNCTURE: CPT | Performed by: INTERNAL MEDICINE

## 2021-11-11 PROCEDURE — 82784 ASSAY IGA/IGD/IGG/IGM EACH: CPT | Performed by: INTERNAL MEDICINE

## 2021-11-11 PROCEDURE — 96415 CHEMO IV INFUSION ADDL HR: CPT | Performed by: NURSE PRACTITIONER

## 2021-11-11 PROCEDURE — 86355 B CELLS TOTAL COUNT: CPT | Performed by: INTERNAL MEDICINE

## 2021-11-11 PROCEDURE — 96375 TX/PRO/DX INJ NEW DRUG ADDON: CPT | Performed by: NURSE PRACTITIONER

## 2021-11-11 RX ORDER — METHYLPREDNISOLONE SODIUM SUCCINATE 125 MG/2ML
125 INJECTION, POWDER, LYOPHILIZED, FOR SOLUTION INTRAMUSCULAR; INTRAVENOUS ONCE
Status: CANCELLED
Start: 2021-11-25

## 2021-11-11 RX ORDER — ALBUTEROL SULFATE 90 UG/1
1-2 AEROSOL, METERED RESPIRATORY (INHALATION)
Status: CANCELLED
Start: 2021-11-25

## 2021-11-11 RX ORDER — DIPHENHYDRAMINE HCL 25 MG
50 CAPSULE ORAL ONCE
Status: CANCELLED
Start: 2021-11-25

## 2021-11-11 RX ORDER — NALOXONE HYDROCHLORIDE 0.4 MG/ML
0.2 INJECTION, SOLUTION INTRAMUSCULAR; INTRAVENOUS; SUBCUTANEOUS
Status: CANCELLED | OUTPATIENT
Start: 2021-11-25

## 2021-11-11 RX ORDER — METHYLPREDNISOLONE SODIUM SUCCINATE 125 MG/2ML
125 INJECTION, POWDER, LYOPHILIZED, FOR SOLUTION INTRAMUSCULAR; INTRAVENOUS ONCE
Status: COMPLETED | OUTPATIENT
Start: 2021-11-11 | End: 2021-11-11

## 2021-11-11 RX ORDER — HEPARIN SODIUM,PORCINE 10 UNIT/ML
5 VIAL (ML) INTRAVENOUS
Status: CANCELLED | OUTPATIENT
Start: 2021-11-25

## 2021-11-11 RX ORDER — HEPARIN SODIUM (PORCINE) LOCK FLUSH IV SOLN 100 UNIT/ML 100 UNIT/ML
5 SOLUTION INTRAVENOUS
Status: CANCELLED | OUTPATIENT
Start: 2021-11-25

## 2021-11-11 RX ORDER — ALBUTEROL SULFATE 0.83 MG/ML
2.5 SOLUTION RESPIRATORY (INHALATION)
Status: CANCELLED | OUTPATIENT
Start: 2021-11-25

## 2021-11-11 RX ORDER — MEPERIDINE HYDROCHLORIDE 25 MG/ML
25 INJECTION INTRAMUSCULAR; INTRAVENOUS; SUBCUTANEOUS EVERY 30 MIN PRN
Status: CANCELLED | OUTPATIENT
Start: 2021-11-25

## 2021-11-11 RX ORDER — EPINEPHRINE 1 MG/ML
0.3 INJECTION, SOLUTION INTRAMUSCULAR; SUBCUTANEOUS EVERY 5 MIN PRN
Status: CANCELLED | OUTPATIENT
Start: 2021-11-25

## 2021-11-11 RX ORDER — ACETAMINOPHEN 325 MG/1
650 TABLET ORAL ONCE
Status: COMPLETED | OUTPATIENT
Start: 2021-11-11 | End: 2021-11-11

## 2021-11-11 RX ORDER — ACETAMINOPHEN 325 MG/1
650 TABLET ORAL ONCE
Status: CANCELLED
Start: 2021-11-25

## 2021-11-11 RX ORDER — EPINEPHRINE 1 MG/ML
0.3 INJECTION, SOLUTION, CONCENTRATE INTRAVENOUS EVERY 5 MIN PRN
Status: CANCELLED | OUTPATIENT
Start: 2021-11-25

## 2021-11-11 RX ORDER — DIPHENHYDRAMINE HCL 25 MG
50 CAPSULE ORAL ONCE
Status: COMPLETED | OUTPATIENT
Start: 2021-11-11 | End: 2021-11-11

## 2021-11-11 RX ORDER — METHYLPREDNISOLONE SODIUM SUCCINATE 125 MG/2ML
125 INJECTION, POWDER, LYOPHILIZED, FOR SOLUTION INTRAMUSCULAR; INTRAVENOUS
Status: CANCELLED
Start: 2021-11-25

## 2021-11-11 RX ORDER — DIPHENHYDRAMINE HYDROCHLORIDE 50 MG/ML
50 INJECTION INTRAMUSCULAR; INTRAVENOUS
Status: CANCELLED
Start: 2021-11-25

## 2021-11-11 RX ADMIN — ACETAMINOPHEN 650 MG: 325 TABLET ORAL at 08:58

## 2021-11-11 RX ADMIN — Medication 50 MG: at 08:58

## 2021-11-11 RX ADMIN — Medication 250 ML: at 08:54

## 2021-11-11 RX ADMIN — METHYLPREDNISOLONE SODIUM SUCCINATE 125 MG: 125 INJECTION INTRAMUSCULAR; INTRAVENOUS at 08:54

## 2021-11-11 ASSESSMENT — PAIN SCALES - GENERAL: PAINLEVEL: NO PAIN (0)

## 2021-11-11 NOTE — PROGRESS NOTES
Infusion Nursing Note:  Flavia Marlow Brice presents today for Truxima.    Patient seen by provider today: No   present during visit today: Not Applicable.    Note: Pt denies any medical concerns. The pt reports tolerating their treatments well.  .    Truxima infused at:  100 ml/hr x 30 min  200 ml/hr x 30 min  300 ml/hr x 30 min  400 ml/hr for remainder of infusion.     Intravenous Access:  Peripheral IV placed.    Treatment Conditions:  Biological Infusion Checklist:  ~~~ NOTE: If the patient answers yes to any of the questions below, hold the infusion and contact ordering provider or on-call provider.    1. Have you recently had an elevated temperature, fever, chills, productive cough, coughing for 3 weeks or longer or hemoptysis, abnormal vital signs, night sweats,  chest pain or have you noticed a decrease in your appetite, unexplained weight loss or fatigue? No  2. Do you have any open wounds or new incisions? No  3. Do you have any recent or upcoming hospitalizations, surgeries or dental procedures? No  4. Do you currently have or recently have had any signs of illness or infection or are you on any antibiotics? No  5. Have you had any new, sudden or worsening abdominal pain? No  6. Have you or anyone in your household received a live vaccination in the past 4 weeks? Please note:  No live vaccines while on biologic/chemotherapy until 6 months after the last treatment.  Patient can receive the flu vaccine (shot only) and the pneumovax.  It is optimal for the patient to get these vaccines mid cycle, but they can be given at any time as long as it is not on the day of the infusion. No  7. Have you recently been diagnosed with any new nervous system diseases (ie. Multiple sclerosis, Guillain [place], seizures, neurological changes) or cancer diagnosis? No  8. Are you on any form of radiation or chemotherapy? No  9. Are you pregnant or breast feeding or do you have plans of pregnancy in the future?  No  10. Have you been having any signs of worsening depression or suicidal ideations?  (benlysta only) No  11. Have there been any other new onset medical symptoms? No      Post Infusion Assessment:  Patient tolerated infusion without incident.  Site patent and intact, free from redness, edema or discomfort.  No evidence of extravasations.  Access discontinued per protocol.  Biologic Infusion Post Education: Call the triage nurse at your clinic or seek medical attention if you have chills and/or temperature greater than or equal to 100.5, uncontrolled nausea/vomiting, diarrhea, constipation, dizziness, shortness of breath, chest pain, heart palpitations, weakness or any other new or concerning symptoms, questions or concerns.  You cannot have any live virus vaccines prior to or during treatment or up to 6 months post infusion.  If you have an upcoming surgery, medical procedure or dental procedure during treatment, this should be discussed with your ordering physician and your surgeon/dentist.  If you are having any concerning symptom, if you are unsure if you should get your next infusion or wish to speak to a provider before your next infusion, please call your care coordinator or triage nurse at your clinic to notify them so we can adequately serve you.       Discharge Plan:   AVS to patient via [email or web address].  Patient will return 11/30/21 for next appointment.   Patient discharged in stable condition accompanied by: self - mom is .  Departure Mode: Ambulatory.      Amanda Orlando RN

## 2021-11-30 ENCOUNTER — INFUSION THERAPY VISIT (OUTPATIENT)
Dept: INFUSION THERAPY | Facility: CLINIC | Age: 56
End: 2021-11-30
Attending: INTERNAL MEDICINE
Payer: COMMERCIAL

## 2021-11-30 VITALS
HEART RATE: 65 BPM | OXYGEN SATURATION: 96 % | WEIGHT: 196.6 LBS | DIASTOLIC BLOOD PRESSURE: 78 MMHG | SYSTOLIC BLOOD PRESSURE: 119 MMHG | BODY MASS INDEX: 32.22 KG/M2 | TEMPERATURE: 98.2 F | RESPIRATION RATE: 18 BRPM

## 2021-11-30 DIAGNOSIS — M31.31 GRANULOMATOSIS WITH POLYANGIITIS WITH RENAL INVOLVEMENT (H): Primary | ICD-10-CM

## 2021-11-30 PROCEDURE — 96375 TX/PRO/DX INJ NEW DRUG ADDON: CPT | Performed by: NURSE PRACTITIONER

## 2021-11-30 PROCEDURE — 96415 CHEMO IV INFUSION ADDL HR: CPT | Performed by: NURSE PRACTITIONER

## 2021-11-30 PROCEDURE — 96413 CHEMO IV INFUSION 1 HR: CPT | Performed by: NURSE PRACTITIONER

## 2021-11-30 PROCEDURE — 99207 PR NO CHARGE LOS: CPT

## 2021-11-30 RX ORDER — HEPARIN SODIUM (PORCINE) LOCK FLUSH IV SOLN 100 UNIT/ML 100 UNIT/ML
5 SOLUTION INTRAVENOUS
Status: CANCELLED | OUTPATIENT
Start: 2021-12-09

## 2021-11-30 RX ORDER — ALBUTEROL SULFATE 0.83 MG/ML
2.5 SOLUTION RESPIRATORY (INHALATION)
Status: CANCELLED | OUTPATIENT
Start: 2021-12-09

## 2021-11-30 RX ORDER — HEPARIN SODIUM,PORCINE 10 UNIT/ML
5 VIAL (ML) INTRAVENOUS
Status: CANCELLED | OUTPATIENT
Start: 2021-12-09

## 2021-11-30 RX ORDER — METHYLPREDNISOLONE SODIUM SUCCINATE 125 MG/2ML
125 INJECTION, POWDER, LYOPHILIZED, FOR SOLUTION INTRAMUSCULAR; INTRAVENOUS ONCE
Status: COMPLETED | OUTPATIENT
Start: 2021-11-30 | End: 2021-11-30

## 2021-11-30 RX ORDER — ALBUTEROL SULFATE 90 UG/1
1-2 AEROSOL, METERED RESPIRATORY (INHALATION)
Status: CANCELLED
Start: 2021-12-09

## 2021-11-30 RX ORDER — DIPHENHYDRAMINE HCL 25 MG
50 CAPSULE ORAL ONCE
Status: CANCELLED
Start: 2021-12-09

## 2021-11-30 RX ORDER — DIPHENHYDRAMINE HYDROCHLORIDE 50 MG/ML
50 INJECTION INTRAMUSCULAR; INTRAVENOUS
Status: CANCELLED
Start: 2021-12-09

## 2021-11-30 RX ORDER — DIPHENHYDRAMINE HCL 25 MG
50 CAPSULE ORAL ONCE
Status: COMPLETED | OUTPATIENT
Start: 2021-11-30 | End: 2021-11-30

## 2021-11-30 RX ORDER — MEPERIDINE HYDROCHLORIDE 25 MG/ML
25 INJECTION INTRAMUSCULAR; INTRAVENOUS; SUBCUTANEOUS EVERY 30 MIN PRN
Status: CANCELLED | OUTPATIENT
Start: 2021-12-09

## 2021-11-30 RX ORDER — NALOXONE HYDROCHLORIDE 0.4 MG/ML
0.2 INJECTION, SOLUTION INTRAMUSCULAR; INTRAVENOUS; SUBCUTANEOUS
Status: CANCELLED | OUTPATIENT
Start: 2021-12-09

## 2021-11-30 RX ORDER — EPINEPHRINE 1 MG/ML
0.3 INJECTION, SOLUTION INTRAMUSCULAR; SUBCUTANEOUS EVERY 5 MIN PRN
Status: CANCELLED | OUTPATIENT
Start: 2021-12-09

## 2021-11-30 RX ORDER — METHYLPREDNISOLONE SODIUM SUCCINATE 125 MG/2ML
125 INJECTION, POWDER, LYOPHILIZED, FOR SOLUTION INTRAMUSCULAR; INTRAVENOUS
Status: CANCELLED
Start: 2021-12-09

## 2021-11-30 RX ORDER — METHYLPREDNISOLONE SODIUM SUCCINATE 125 MG/2ML
125 INJECTION, POWDER, LYOPHILIZED, FOR SOLUTION INTRAMUSCULAR; INTRAVENOUS ONCE
Status: CANCELLED
Start: 2021-12-09

## 2021-11-30 RX ORDER — ACETAMINOPHEN 325 MG/1
650 TABLET ORAL ONCE
Status: CANCELLED
Start: 2021-12-09

## 2021-11-30 RX ORDER — ACETAMINOPHEN 325 MG/1
650 TABLET ORAL ONCE
Status: COMPLETED | OUTPATIENT
Start: 2021-11-30 | End: 2021-11-30

## 2021-11-30 RX ADMIN — ACETAMINOPHEN 650 MG: 325 TABLET ORAL at 08:50

## 2021-11-30 RX ADMIN — METHYLPREDNISOLONE SODIUM SUCCINATE 125 MG: 125 INJECTION INTRAMUSCULAR; INTRAVENOUS at 09:07

## 2021-11-30 RX ADMIN — Medication 50 MG: at 08:49

## 2021-11-30 RX ADMIN — Medication 250 ML: at 08:58

## 2021-11-30 NOTE — PROGRESS NOTES
"Infusion Nursing Note:  Flavia Ele Brice presents today for D15 of Truxima   Patient seen by provider today: No   present during visit today: Not Applicable.    Note: Pt states she is doing well, denies any problems with her prior treatments, tolerating infusion starting at the 100 ml/hr every 30 minutes increasing rate to - 200 ml/hr, 300 ml/hr and 400 ml/hr for remainder of the infusion.  Pt states she will discuss with Dr Moreira her next appointments as \"the doctor might have me come sooner than 6 months depending on my response.\"      Intravenous Access:  Peripheral IV placed - Right FA 24 gauge x 1 attempt.    Treatment Conditions:  Biological Infusion Checklist:  ~~~ NOTE: If the patient answers yes to any of the questions below, hold the infusion and contact ordering provider or on-call provider.    1. Have you recently had an elevated temperature, fever, chills, productive cough, coughing for 3 weeks or longer or hemoptysis, abnormal vital signs, night sweats,  chest pain or have you noticed a decrease in your appetite, unexplained weight loss or fatigue? No  2. Do you have any open wounds or new incisions? No  3. Do you have any recent or upcoming hospitalizations, surgeries or dental procedures? No  4. Do you currently have or recently have had any signs of illness or infection or are you on any antibiotics? No  5. Have you had any new, sudden or worsening abdominal pain? No  6. Have you or anyone in your household received a live vaccination in the past 4 weeks? Please note:  No live vaccines while on biologic/chemotherapy until 6 months after the last treatment.  Patient can receive the flu vaccine (shot only) and the pneumovax.  It is optimal for the patient to get these vaccines mid cycle, but they can be given at any time as long as it is not on the day of the infusion. No  7. Have you recently been diagnosed with any new nervous system diseases (ie. Multiple sclerosis, Guillain " [place], seizures, neurological changes) or cancer diagnosis? No  8. Are you on any form of radiation or chemotherapy? No  9. Are you pregnant or breast feeding or do you have plans of pregnancy in the future? No  10. Have you been having any signs of worsening depression or suicidal ideations?  (benlysta only) No  11. Have there been any other new onset medical symptoms? No        Post Infusion Assessment:  Patient tolerated infusion without incident.  Blood return noted pre and post infusion.  Site patent and intact, free from redness, edema or discomfort.  No evidence of extravasations.  Access discontinued per protocol.  Biologic Infusion Post Education: Call the triage nurse at your clinic or seek medical attention if you have chills and/or temperature greater than or equal to 100.5, uncontrolled nausea/vomiting, diarrhea, constipation, dizziness, shortness of breath, chest pain, heart palpitations, weakness or any other new or concerning symptoms, questions or concerns.  You cannot have any live virus vaccines prior to or during treatment or up to 6 months post infusion.  If you have an upcoming surgery, medical procedure or dental procedure during treatment, this should be discussed with your ordering physician and your surgeon/dentist.  If you are having any concerning symptom, if you are unsure if you should get your next infusion or wish to speak to a provider before your next infusion, please call your care coordinator or triage nurse at your clinic to notify them so we can adequately serve you.       Discharge Plan:   Pt to follow up with Dr Moreira on 01/15/2022 - will make more appointments for 6 months interval.  Discharge instructions reviewed with: Patient.  Patient and/or family verbalized understanding of discharge instructions and all questions answered.  Patient discharged in stable condition accompanied by: self.  Departure Mode: Ambulatory.      Brit Story, RN

## 2022-01-23 ENCOUNTER — LAB (OUTPATIENT)
Dept: LAB | Facility: CLINIC | Age: 57
End: 2022-01-23
Payer: COMMERCIAL

## 2022-01-23 DIAGNOSIS — M31.31 GRANULOMATOSIS WITH POLYANGIITIS WITH RENAL INVOLVEMENT (H): ICD-10-CM

## 2022-01-23 DIAGNOSIS — Z79.899 HIGH RISK MEDICATIONS (NOT ANTICOAGULANTS) LONG-TERM USE: ICD-10-CM

## 2022-01-23 DIAGNOSIS — Z13.220 SCREENING FOR HYPERLIPIDEMIA: Primary | ICD-10-CM

## 2022-01-23 LAB
BASOPHILS # BLD AUTO: 0 10E3/UL (ref 0–0.2)
BASOPHILS NFR BLD AUTO: 0 %
CREAT UR-MCNC: 104 MG/DL
EOSINOPHIL # BLD AUTO: 0.2 10E3/UL (ref 0–0.7)
EOSINOPHIL NFR BLD AUTO: 3 %
ERYTHROCYTE [DISTWIDTH] IN BLOOD BY AUTOMATED COUNT: 13.8 % (ref 10–15)
ERYTHROCYTE [SEDIMENTATION RATE] IN BLOOD BY WESTERGREN METHOD: 11 MM/HR (ref 0–30)
HCT VFR BLD AUTO: 41.6 % (ref 35–47)
HGB BLD-MCNC: 13.1 G/DL (ref 11.7–15.7)
LYMPHOCYTES # BLD AUTO: 1.4 10E3/UL (ref 0.8–5.3)
LYMPHOCYTES NFR BLD AUTO: 22 %
MCH RBC QN AUTO: 30.8 PG (ref 26.5–33)
MCHC RBC AUTO-ENTMCNC: 31.5 G/DL (ref 31.5–36.5)
MCV RBC AUTO: 98 FL (ref 78–100)
MONOCYTES # BLD AUTO: 0.8 10E3/UL (ref 0–1.3)
MONOCYTES NFR BLD AUTO: 12 %
NEUTROPHILS # BLD AUTO: 4 10E3/UL (ref 1.6–8.3)
NEUTROPHILS NFR BLD AUTO: 63 %
PLATELET # BLD AUTO: 293 10E3/UL (ref 150–450)
PROT UR-MCNC: 0.13 G/L
PROT/CREAT 24H UR: 0.13 G/G CR (ref 0–0.2)
RBC # BLD AUTO: 4.25 10E6/UL (ref 3.8–5.2)
WBC # BLD AUTO: 6.3 10E3/UL (ref 4–11)

## 2022-01-23 PROCEDURE — 36415 COLL VENOUS BLD VENIPUNCTURE: CPT

## 2022-01-23 PROCEDURE — 80061 LIPID PANEL: CPT

## 2022-01-23 PROCEDURE — 80053 COMPREHEN METABOLIC PANEL: CPT

## 2022-01-23 PROCEDURE — 85025 COMPLETE CBC W/AUTO DIFF WBC: CPT

## 2022-01-23 PROCEDURE — 84156 ASSAY OF PROTEIN URINE: CPT

## 2022-01-23 PROCEDURE — 86140 C-REACTIVE PROTEIN: CPT

## 2022-01-23 PROCEDURE — 85652 RBC SED RATE AUTOMATED: CPT

## 2022-01-24 LAB
ALBUMIN SERPL-MCNC: 3.8 G/DL (ref 3.4–5)
ALP SERPL-CCNC: 80 U/L (ref 40–150)
ALT SERPL W P-5'-P-CCNC: 37 U/L (ref 0–50)
ANION GAP SERPL CALCULATED.3IONS-SCNC: 9 MMOL/L (ref 3–14)
AST SERPL W P-5'-P-CCNC: 14 U/L (ref 0–45)
BILIRUB SERPL-MCNC: 0.3 MG/DL (ref 0.2–1.3)
BUN SERPL-MCNC: 33 MG/DL (ref 7–30)
CALCIUM SERPL-MCNC: 9 MG/DL (ref 8.5–10.1)
CHLORIDE BLD-SCNC: 106 MMOL/L (ref 94–109)
CHOLEST SERPL-MCNC: 192 MG/DL
CO2 SERPL-SCNC: 26 MMOL/L (ref 20–32)
CREAT SERPL-MCNC: 1.32 MG/DL (ref 0.52–1.04)
CRP SERPL-MCNC: 5.8 MG/L (ref 0–8)
FASTING STATUS PATIENT QL REPORTED: YES
GFR SERPL CREATININE-BSD FRML MDRD: 47 ML/MIN/1.73M2
GLUCOSE BLD-MCNC: 172 MG/DL (ref 70–99)
HDLC SERPL-MCNC: 74 MG/DL
LDLC SERPL CALC-MCNC: 94 MG/DL
NONHDLC SERPL-MCNC: 118 MG/DL
POTASSIUM BLD-SCNC: 4.2 MMOL/L (ref 3.4–5.3)
PROT SERPL-MCNC: 7.2 G/DL (ref 6.8–8.8)
SODIUM SERPL-SCNC: 141 MMOL/L (ref 133–144)
TRIGL SERPL-MCNC: 118 MG/DL

## 2022-01-25 ENCOUNTER — VIRTUAL VISIT (OUTPATIENT)
Dept: RHEUMATOLOGY | Facility: CLINIC | Age: 57
End: 2022-01-25
Payer: COMMERCIAL

## 2022-01-25 DIAGNOSIS — Z11.59 SCREENING FOR VIRAL DISEASE: ICD-10-CM

## 2022-01-25 DIAGNOSIS — R73.9 HYPERGLYCEMIA: ICD-10-CM

## 2022-01-25 DIAGNOSIS — Z79.899 HIGH RISK MEDICATIONS (NOT ANTICOAGULANTS) LONG-TERM USE: ICD-10-CM

## 2022-01-25 DIAGNOSIS — M31.31 GRANULOMATOSIS WITH POLYANGIITIS WITH RENAL INVOLVEMENT (H): Primary | ICD-10-CM

## 2022-01-25 PROCEDURE — 99214 OFFICE O/P EST MOD 30 MIN: CPT | Mod: GT | Performed by: INTERNAL MEDICINE

## 2022-01-25 RX ORDER — HEPARIN SODIUM (PORCINE) LOCK FLUSH IV SOLN 100 UNIT/ML 100 UNIT/ML
5 SOLUTION INTRAVENOUS
Status: CANCELLED | OUTPATIENT
Start: 2022-05-11

## 2022-01-25 RX ORDER — ACETAMINOPHEN 325 MG/1
650 TABLET ORAL ONCE
Status: CANCELLED
Start: 2022-05-11

## 2022-01-25 RX ORDER — METHYLPREDNISOLONE SODIUM SUCCINATE 125 MG/2ML
125 INJECTION, POWDER, LYOPHILIZED, FOR SOLUTION INTRAMUSCULAR; INTRAVENOUS ONCE
Status: CANCELLED | OUTPATIENT
Start: 2022-05-11

## 2022-01-25 RX ORDER — NALOXONE HYDROCHLORIDE 0.4 MG/ML
0.2 INJECTION, SOLUTION INTRAMUSCULAR; INTRAVENOUS; SUBCUTANEOUS
Status: CANCELLED | OUTPATIENT
Start: 2022-05-11

## 2022-01-25 RX ORDER — DIPHENHYDRAMINE HYDROCHLORIDE 50 MG/ML
50 INJECTION INTRAMUSCULAR; INTRAVENOUS
Status: CANCELLED
Start: 2022-05-11

## 2022-01-25 RX ORDER — EPINEPHRINE 1 MG/ML
0.3 INJECTION, SOLUTION, CONCENTRATE INTRAVENOUS EVERY 5 MIN PRN
Status: CANCELLED | OUTPATIENT
Start: 2022-05-11

## 2022-01-25 RX ORDER — HEPARIN SODIUM,PORCINE 10 UNIT/ML
5 VIAL (ML) INTRAVENOUS
Status: CANCELLED | OUTPATIENT
Start: 2022-05-11

## 2022-01-25 RX ORDER — DIPHENHYDRAMINE HCL 25 MG
50 CAPSULE ORAL ONCE
Status: CANCELLED
Start: 2022-05-11

## 2022-01-25 RX ORDER — ALBUTEROL SULFATE 90 UG/1
1-2 AEROSOL, METERED RESPIRATORY (INHALATION)
Status: CANCELLED
Start: 2022-05-11

## 2022-01-25 RX ORDER — ALBUTEROL SULFATE 0.83 MG/ML
2.5 SOLUTION RESPIRATORY (INHALATION)
Status: CANCELLED | OUTPATIENT
Start: 2022-05-11

## 2022-01-25 RX ORDER — METHYLPREDNISOLONE SODIUM SUCCINATE 125 MG/2ML
125 INJECTION, POWDER, LYOPHILIZED, FOR SOLUTION INTRAMUSCULAR; INTRAVENOUS
Status: CANCELLED
Start: 2022-05-11

## 2022-01-25 RX ORDER — MEPERIDINE HYDROCHLORIDE 25 MG/ML
25 INJECTION INTRAMUSCULAR; INTRAVENOUS; SUBCUTANEOUS EVERY 30 MIN PRN
Status: CANCELLED | OUTPATIENT
Start: 2022-05-11

## 2022-01-25 NOTE — PROGRESS NOTES
Flavia Brice  is a 57 year old year old female who is being evaluated via a billable video visit.      How would you like to obtain your AVS? MyChart  If the video visit is dropped, the invitation should be resent by: Text to cell phone: [phone]  Will anyone else be joining your video visit? No     Rheumatology Video Visit      Flavia Brice MRN# [id number]   YOB: 1965 Age: 57 year old      Date of visit: 1/25/22   PCP: Monet Ackerman    Chief Complaint   Patient presents with:  Granulomatosis with polyangiitis with renal involvement    Assessment and Plan     1.  Granulomatosis with polyangiitis: Dx'd 2012 by Dr. Wesley at [facility] when she presented with pulmonary involvement, pericarditis/effusion, borderline aneurysmal dilation of the ascending aorta and biopsy proven necrotizing crescentic nephritis.  Went into remission with cytoxan and prednisone, then maintained on AZA for 2 yrs per patient, stopped when lost to follow-up. Recurrence in 2020 with YOLI, pleuritic chest pain, inflammatory arthritis (MCPs, PIPs, shoulders, knees) that has responded well to prednisone.  She was tx'd with prednisone and rituximab 1g IV received on 8/21/2020 & 9/4/2020, (4/28/2021 delayed for the COVID-19 vaccine) & (no 2nd dose of rituximab), 11/11/2021 & 11/30/2021.    Currently on azathioprine 100 mg daily and rituximab.  Continue on combination of azathioprine and rituximab.  Doing well at this time.  The next Covid vaccination should be in March, and the next rituximab infusion cycle will start May 11, 2022; advised that she call to schedule the vaccination and infusion.   Chronic illness, stable.    - Continue azathioprine 100 mg daily  - Re-dose of rituximab (truxima) 1 g IV every 2 weeks for a total of 2 doses, starting 5/11/2022   - Labs  in 3 months: CBC, CMP, ESR, CRP, hepatitis B, QuantiFERON-TB gold plus UA, Uprotein:creatinine  - Lab within 1 week of rituximab infusion: COVID-19  PCR    High risk medication requiring intensive toxicity monitoring at least quarterly: labs ordered include CBC, Creatinine, Hepatic panel to monitor for cytopenia and hepatotoxicity; checking creatinine as it affects clearance of medication.     # Rituximab (Rituxan) Risks and Benefits: The risks and benefits of rituximab were discussed in detail and the patient verbalized understanding.  The risks discussed include, but are not limited to, the risk for hypersensitivity, anaphylaxis, anaphylactoid reactions, fatal infusion reactions, an increased risk for serious infections leading to hospitalization or death, severe mucocutaneous reactions, reactivation of hepatitis B, and development of progressive multifocal leukoencephalopathy (PML) resulting in death.  The most common adverse reactions are infections, nasopharyngitis, urinary tract infections, nausea, diarrhea, headache, muscle spasms, and anemia.  It was discussed that the medication would need to be discontinued if a serious infection develops.  It was discussed that live vaccinations should not be received while using rituximab or within 30 days prior to starting rituximab.  Higher risk for complications from COVID-19 infection when on rituximab was discussed.  I encouraged reviewing the package insert and asking any questions about the medication.       2. Inflammatory arthritis: Related to GPA.  Controlled.  See #1    3. Elevated serum creatinine: Related to GPA.  Stable.  See #1     4. Positive MALLORY (antinuclear antibody): Additional work-up was negative for SSA, SSB, RNP, Smith, dsDNA; complement C3 and C4 were not low.  Symptoms are most likely related to GPA; no MALLORY-associated rheumatologic disorder identified    5.  Aneurysm of the ascending aorta at 4.5 cm; history of heart failure with preserved ejection fraction : following with Dr. Locke (cardiology)    6. Cytoxan use history: needs periodic UA to eval for hematuria, due to increased risk for  bladder cancer with cytoxan exposure    7.  Osteoarthritis: Affecting the DIPs.  Mild symptoms this time.  Consider hand therapy referral in the future if needed    8.  Hyperglycemia: Recheck labs now and if still elevated that she needs to see her PCP for management.   - labs now: Fasting glucose, hemoglobin A1c    9. Vaccinations: Vaccinations reviewed with Ms. Brice.  Advised that she receive both the influenza and third dose of the Moderna COVID-19 vaccine today, and to call the infusion center to delay the rituximab infusion by at least 2 weeks, but no more than 1.5 months.  - Influenza: up to date  - COVID-19: has received the Moderna COVID-19 vaccine on 3/19/2021 and 4/16/2021 and 10/19/2021; 4th mRNA COVID19 vaccination recommended to be received on or shortly after 3/19/2022.  This vaccination needs to be at least 4 weeks prior to the next rituximab infusion.  Hold azathioprine for 1 week after vaccination.    We discussed Evusheld as a COVID-19 preexposure treatment and she will consider this.  She is on a B-cell depletion therapy so is a good candidate for this.  Risks and potential side effects of Evusheld were reviewed. Evusheld use is allowed under emergency use authorization.  If she desires to get Evusheld then she may apply through the [place] Department of Health in the website address was given to her.    She is using a surgical mask at work currently and I advised that if she is unable to work remotely then advised that she wear eye protection and an N95, KN95, or similar to reduce the risk for COVID-19 infection.     Total minutes spent in evaluation with patient, documentation, , and review of pertinent studies and chart notes: 23     Ms. Brice verbalized agreement with and understanding of the rational for the diagnosis and treatment plan.  All questions were answered to best of my ability and the patient's satisfaction. Ms. Brice was advised to contact the clinic with any  questions that may arise after the clinic visit.      Thank you for involving me in the care of the patient    Return to clinic: 3 months      HPI   Flavia Brice is a 57 year old female with a past medical history significant for hypertension, depression, and granulomatosis with polyangiitis who is seen for follow-up of granulomatosis with polyangiitis    10/31/2013 [facility] rheumatology clinic note by Dr. Ann Wesley documents severe AK-3 ANCA positive GPA with pulmonary involvement, pericarditis/effusion, borderline aneurysmal dilation of the ascending aorta, necrotizing crescentic nephritis.  Kidney biopsy has confirmed diagnosis in the past.  Has received Solu-Medrol x3, Cytoxan x1 at 1300 mg, on prednisone 60-80 mg daily.  Deteriorated quickly despite those interventions and was hospitalized again, requiring plasmapheresis and dialysis.  Was seeing Dr. Velaqzuez from [facility] and was again placed on Cytoxan 15 mg/kg every 3 weeks.  Has seen ENT but there was no clear sinus involvement.  She was then transitioned after 7 infusions of Cytoxan to Imuran on 11/2012 and has been doing well on that.      7/11/2020 [facility] hospitalization note: Acute kidney injury, migratory polyarthritis, chronic diastolic heart failure.  Left knee and left hip pain.  Right shoulder pain that migrated to the left shoulder.  No joint swelling.  No fevers.  In the past she had flare of her joint pain that she was treated with prednisone.  Prior to this ED evaluation she reportedly used prednisone 20 mg without improvement.    7/22/2020: [facility] emergency department visit for shortness of breath.  Notes history of granulomatosis with polyangiitis, diastolic heart failure, a sending thoracic aortic aneurysm from 4.6 cm on chest CT 2/18/2020), migratory polyarthritis, acute kidney injury, and hypertension.  Coronavirus test negative.  Chest CT negative for pneumonia.  O2 saturation normal.  Advised to  follow-up with her PCP and cardiology.    8/5/2020:  Ms. Brice reported that she was dx'd with GPA by Dr. Wesley. She was treated and in remission; was on AZA for a year and no issues for a while. Lost to follow-up with Dr. Wesley.  Then 1 year ago started having more shortness of breath and found to have more lung nodules.  Recalls heart failure and kidney failure when first dx'd.  Told to see cardiology to follow the aneurysm.  Then in Jan 2020 started to have pain in her shoulders, knees ankles. Pain was so bad that she went to [facility]; found to have YOLI.  Until able to have this rheumatology evaluation, she says that she was given prednisone to help the joint pain; currently on 5mg daily of prednisone; felt much better when on prednisone 30mg daily. Has been dealing with right knee swelling; the prednisone helped with this.  Joint pains are worse in the AM; improve with time and activity. Morning stiffness is very mild while on prednisone 5mg daily.  No hematuria. No hemoptysis.  Flavia Brice's mother was present during the clinic visit.     9/9/2020: Significant improvement with prednisone and rituximab.  No longer with swelling around her ankles.  Hand swelling/pain/stiffness has resolved.  She feels a little puffy in her face but no actual swelling.  She has followed with her cardiologist and they are going to monitor the thoracic aortic aneurysm.  Tolerated rituximab infusion well.  Currently on prednisone 10 mg daily.  Not going into work at this time; she is taking a leave of absence.    10/21/2020: Improved.  Now with mild pain at her MCPs, DIPs, and knees.  MCP and knee pain is better with activity, worse in the morning.  DIP pain is worse with activity.  Joint pains started again with dose reduction of prednisone.  Has been on azathioprine and tolerated well in the past.    12/9/2020: Significant improvement with regard to her joint pain.  No joint pain now.  No morning stiffness.  No gelling  phenomenon.  Tolerating azathioprine well.  Continues on prednisone 5mg daily.  Mild shortness of breath when walking up stairs that has been stable; no associated chest pain or pressure, palpitations, lightheadedness, dizziness, or nausea and the mild shortness of breath resolves quickly when she rests.  No shortness of breath at rest or currently.  Following with cardiology and plans to repeat her echocardiogram in March 2021.  Last echocardiogram was in February 2020.    4/8/2021: More joint aches and pains and she attributes this to being off of prednisone and having to delay her rituximab infusion secondary to the COVID-19 vaccine.  She has rescheduled the first of the 2 rituximab infusions to be 2 weeks after the second COVID-19 vaccine.  Knees ache more at the end of the day.  No cough, chest pain, shortness of breath.  Has seen cardiology and she says that her aortic aneurysm is stable.    7/7/2021:  doing well this time.  Mild aches of the PIPs and DIPs with more activity that improves with rest.  Usually has pain that travels around but in general is doing okay.  Only received 1 of 2 rituximab doses because her insurance had denied it; she then got a letter about a month later stating that rituximab had been approved.  Tolerating azathioprine and rituximab well.  Overall happy with how well she is doing.  No blood in urine or stool    10/19/2021: Reports that she is doing well.  She says that she is doing better than she was previously, and way better than she was before treatment.  No joint pain or stiffness.  No rash.  Tolerating azathioprine well.  No difficulty doing her daily activities.    Today, 1/25/2022: feels the best she has in a long time.  No joint pain or swelling. Morning stiffness for <20.  No rash.  No black or bloody stools.  No cough, chest pain, or shortness of breath.  No hemoptysis.  No hematemesis.  Using a surgical mask at work.    Denies fevers, chills, nausea, vomiting. Occasional  constipation or diarrhea. No abdominal pain. No chest pain/pressure, palpitations, or shortness of breath at this time.  No LE swelling.  No oral or nasal sores.  No rash. No sicca symptoms. No photosensitivity or photophobia. No eye pain or redness. No history of inflammatory eye disease.  No history of DVT, pulmonary embolism, or miscarriage.   No history of serositis.  No history of Raynaud's Phenomenon.      Tobacco: quit smoking in Feb 2020  EtOH: no more than 1 drink per month  Drugs: none  Occupation: manager at [employer]    ROS   12 point review of system was completed and negative except as noted in the HPI     Active Problem List     Patient Active Problem List   Diagnosis     Wegener's granulomatosis     CARDIOVASCULAR SCREENING; LDL GOAL LESS THAN 130     Overweight     Advanced directives, counseling/discussion     Hypertension goal BP (blood pressure) < 140/90     Situational depression     Granulomatosis with polyangiitis (H)     Chronic kidney disease, stage 3     Former smoker     Menopausal syndrome (hot flashes)     Past Medical History     Past Medical History:   Diagnosis Date     Tinnitus      Past Surgical History     Past Surgical History:   Procedure Laterality Date     COLONOSCOPY WITH CO2 INSUFFLATION N/A 2/18/2019    Procedure: COLONOSCOPY WITH CO2 INSUFFLATION;  Surgeon: Javier Guillen MD;  Location: [facility] OR     GALLBLADDER SURGERY       Allergy   No Known Allergies  Current Medication List     Current Outpatient Medications   Medication Sig     Ascorbic Acid (VITAMIN C) 100 MG CHEW      azaTHIOprine (IMURAN) 50 MG tablet Take 2 tablets (100 mg) by mouth daily     gabapentin (NEURONTIN) 300 MG capsule Take 1 capsule (300 mg) by mouth At Bedtime     losartan-hydrochlorothiazide (HYZAAR) 100-25 MG tablet Take 1 tablet by mouth daily     Multiple Vitamins-Minerals (ZINC PO)      VITAMIN D PO      zinc gluconate 50 MG tablet Take 50 mg by mouth daily     No current  "facility-administered medications for this visit.         Social History   See HPI    Family History     Family History   Problem Relation Age of Onset     Diabetes Father      Heart Disease Maternal Grandmother      Heart Disease Maternal Grandfather      Arthritis Paternal Grandmother      Heart Disease Paternal Grandfather      Grandmother: rheumatoid arthritis    Physical Exam     Temp Readings from Last 3 Encounters:   11/30/21 98.2  F (36.8  C) (Oral)   11/11/21 98.2  F (36.8  C) (Oral)   10/21/21 98.6  F (37  C) (Tympanic)     BP Readings from Last 5 Encounters:   11/30/21 119/78   11/11/21 128/82   10/21/21 (!) 142/80   07/07/21 (!) 163/97   04/28/21 124/68     Pulse Readings from Last 1 Encounters:   11/30/21 65     Resp Readings from Last 1 Encounters:   11/30/21 18     Estimated body mass index is 32.22 kg/m  as calculated from the following:    Height as of 7/7/21: 1.664 m (5' 5.5\").    Weight as of 11/30/21: 89.2 kg (196 lb 9.6 oz).    GEN: NAD. Healthy appearing adult.   HEENT: MMM.  Anicteric, noninjected sclera. No obvious external lesions of the ear and nose. Hearing intact.  PULM: No increased work of breathing  MSK:  Hands and wrists without swelling.   SKIN: No rash or jaundice seen  PSYCH: Alert. Appropriate.      Labs / Imaging (select studies)     RF/CCP  Recent Labs   Lab Test 08/05/20  1453   CCPIGG <1   RHF <7     MALLORY  Recent Labs   Lab Test 08/05/20  1453   CAMMIE Positive*   ANAP1 HOMOGENEOUS   ANAT1 1:160     RNP/Sm/SSA/SSB  Recent Labs   Lab Test 08/11/20  1402   RNPIGG <0.2   SMIGG <0.2   SSAIGG <0.2   SSBIGG <0.2     dsDNA  Recent Labs   Lab Test 08/11/20  1402   DNA <1     C3/C4  Recent Labs   Lab Test 08/11/20  1402   L3OCHTK 133   Y6GBUJL 41*     Antiphospholipid Antibodies  Recent Labs   Lab Test 08/11/20  1402   B2GPG 0.8   B2GPM <2.9   CARDG <1.6   CARDM 0.8   LUPINT Negative     ANCA  Recent Labs   Lab Test 08/05/20  1453   PR3IGG 4.8*   MPOIGG <0.2     IgG  Recent Labs   Lab " Test 11/11/21  0804 04/28/21  0848 08/05/20  1453   IGG 1,009 863 1,009    116 149   IGM 13* 18* 32*     CBC  Recent Labs   Lab Test 01/23/22  1024 10/14/21  0720 07/07/21  0818 04/24/21  1130 12/03/20  1128   WBC 6.3 6.0 5.0 5.1 8.3   RBC 4.25 3.99 3.98 3.82 4.27   HGB 13.1 12.4 12.2 11.7 13.1   HCT 41.6 39.0 38.8 37.6 41.3   MCV 98 98 98 98 97   RDW 13.8 14.4 14.9 13.7 14.2    288 304 297 347   MCH 30.8 31.1 30.7 30.6 30.7   MCHC 31.5 31.8 31.4* 31.1* 31.7   NEUTROPHIL 63 56 60.1 63.5 73.6   LYMPH 22 29 22.9 20.4 17.7   MONOCYTE 12 11 11.2 11.1 6.9   EOSINOPHIL 3 4 5.0 4.4 1.3   BASOPHIL 0 1 0.8 0.6 0.5   ANEU  --   --  3.0 3.2 6.1   ALYM  --   --  1.1 1.0 1.5   MARCOS  --   --  0.6 0.6 0.6   AEOS  --   --  0.3 0.2 0.1   ABAS  --   --  0.0 0.0 0.0   ANEUTAUTO 4.0 3.3  --   --   --    ALYMPAUTO 1.4 1.7  --   --   --    AMONOAUTO 0.8 0.7  --   --   --    AEOSAUTO 0.2 0.2  --   --   --    ABSBASO 0.0 0.0  --   --   --      CMP  Recent Labs   Lab Test 01/23/22  1024 10/14/21  0720 07/07/21  0818 04/24/21  1130 12/03/20  1128 11/19/20  1123 11/04/20  1509 09/16/20  1438 08/05/20  1453    141 142 141  --   --   --  140 141   POTASSIUM 4.2 4.5 4.7 4.2  --   --   --  3.9 4.2   CHLORIDE 106 108 112* 108  --   --   --  108 111*   CO2 26 27 26 28  --   --   --  27 22   ANIONGAP 9 6 4 5  --   --   --  5 8   * 116* 103* 114*  --   --   --  120* 137*   BUN 33* 29 34* 26  --   --   --  32* 41*   CR 1.32* 1.43* 1.44* 1.50* 1.41* 1.45*   < > 1.33* 1.46*   GFRESTIMATED 47* 41* 40* 38* 41* 40*   < > 44* 40*   GFRESTBLACK  --   --  46* 44* 48* 46*   < > 51* 46*   JESSICA 9.0 8.9 9.6 9.5  --   --   --  9.0 9.0   BILITOTAL 0.3 0.4 0.2 0.4 0.4 0.3   < > 0.3 0.3   ALBUMIN 3.8 3.5 3.6 3.5 3.9 3.7   < > 3.4 3.7   PROTTOTAL 7.2 7.1 7.2 6.7* 7.5 7.4   < > 6.9 7.3   ALKPHOS 80 84 75 67 80 78   < > 78 84   AST 14 15 21 22 16 13   < > 8 9   ALT 37 41 49 62* 37 33   < > 28 26    < > = values in this interval not displayed.      HgA1c  Recent Labs   Lab Test 10/27/17  1541   A1C 5.8     Calcium/VitaminD  Recent Labs   Lab Test 01/23/22  1024 10/14/21  0720 07/07/21  0818 10/27/17  1541 02/27/14  1148   JESSICA 9.0 8.9 9.6   < > 9.6   VITDT  --   --   --   --  28*    < > = values in this interval not displayed.     ESR/CRP  Recent Labs   Lab Test 01/23/22  1024 10/14/21  0720 07/07/21  0818   SED 11 13 11   CRP 5.8 8.6* 6.2     CK/Aldolase  Recent Labs   Lab Test 08/11/20  1402   CKT 79     TSH/T4  Recent Labs   Lab Test 10/21/21  0920 03/26/18  1058   TSH 1.10 1.44     Lipid Panel  Recent Labs   Lab Test 01/23/22  1024 03/26/18  1058 02/27/14  1148   CHOL 192 188 192   TRIG 118 93 160*   HDL 74 87 74   LDL 94 82 87   VLDL  --   --  32*   CHOLHDLRATIO  --   --  2.6   NHDL 118 101  --      Hepatitis B  Recent Labs   Lab Test 08/05/20  1453   HBCAB Nonreactive   HEPBANG Nonreactive     Hepatitis C  Recent Labs   Lab Test 08/05/20  1453 03/26/18  1058   HCVAB Nonreactive Nonreactive     Lyme ab screening  Recent Labs   Lab Test 08/05/20  1453   LYMEGM 0.10     Tuberculosis Screening  Recent Labs   Lab Test 08/05/20  1453   TBRST Negative     HIV Screening  Recent Labs   Lab Test 08/05/20  1453   HIAGAB Nonreactive     UA  Recent Labs   Lab Test 10/14/21  0720 07/07/21  0818 04/24/21  1137 09/16/20  1442 08/05/20  1453 07/27/20  0938   COLOR Yellow Yellow Yellow Yellow Yellow Yellow   APPEARANCE Clear Clear Clear Clear Clear Clear   URINEGLC Negative Negative Negative Negative Negative Negative   URINEBILI Negative Negative Negative Negative Negative Negative   SG >=1.030 1.025 1.020 1.025 1.025 1.015   URINEPH 5.5 5.0 5.0 5.0 5.0 5.0   PROTEIN Trace* Trace* Negative 30* Negative Trace*   UROBILINOGEN 0.2 0.2 0.2 0.2 0.2 0.2   NITRITE Negative Negative Negative Negative Negative Negative   UBLD Trace* Negative Negative Negative Trace* Small*   LEUKEST Trace* Negative Negative Negative Negative Negative   WBCU 0-5 0 - 5  --  0 - 5 0 - 5 0 - 5    RBCU None Seen O - 2  --  O - 2 O - 2 2-5*   SQUAMOUSEPI  --   --   --  Few Few Few   BACTERIA Few*  --   --  Few* Few* Few*   MUCOUS  --   --   --   --  Present*  --      Urine Microscopic  Recent Labs   Lab Test 10/14/21  0720 07/07/21  0818 09/16/20  1442 08/05/20  1453 07/27/20  0938   WBCU 0-5 0 - 5 0 - 5 0 - 5 0 - 5   RBCU None Seen O - 2 O - 2 O - 2 2-5*   SQUAMOUSEPI  --   --  Few Few Few   BACTERIA Few*  --  Few* Few* Few*   MUCOUS  --   --   --  Present*  --      Urine Protein  Recent Labs   Lab Test 01/23/22  1024 10/14/21  0720 07/07/21  0818   UTP 0.13 0.45 0.30   UTPG 0.13 0.23* 0.27*   UCRR 104 198 111     Recent Results (from the past 744 hour(s))   XR Chest Port 1 View    Narrative    Portable chest    INDICATION: Chest pain    COMPARISON: 12/1/2017    FINDINGS: Heart size and shape appear normal. There is atherosclerotic  calcification at the aortic arch. Lungs and pulmonary vascularity  appear normal. Bony structures appear grossly intact.      Impression    IMPRESSION: Aortic atherosclerosis.    MERCEDES MEDRANO MD   CT Chest Pulmonary Embolism w Contrast    Narrative    EXAMINATION: CTA pulmonary angiogram, 7/22/2020 1:41 PM     COMPARISON: None.    HISTORY: PE suspected, intermediate prob, positive D-dimer; chest pain    TECHNIQUE: Volumetric helical acquisition of CT images of the chest  from the lung apices to the kidneys were acquired after the  administration of 80 mL of Isovue-370 IV contrast. Non-Flash technique  with shallow inspiratory breath hold technique.  Post-processed  multiplanar and/or MIP reformations were obtained, archived to PACS  and used in interpretation of this study.     FINDINGS:      Contrast bolus is: adequate.  Exam is negative for acute pulmonary  embolism.       The largest right ventricle transaxial diameter is (measured from  endocardium to endocardium): 4.6 cm   The largest left ventricle transaxial diameter is (measured from  endocardium to endocardium):  4.7 cm  RV/LV ratio is: 1.0 (if ratio greater than 1.1 then sign is suspicious  for right heart strain)  Reflux of contrast into the IVC? yes  Paradoxical bowing of the interventricular septum to the left? no    Chest:     Thyroid is unremarkable. Heart is not enlarged. No pericardial  effusion. No significant coronary artery calcifications. The ascending  aorta is enlarged measuring up to 4.5 cm. The main pulmonary artery is  normal in caliber.    No mediastinal or hilar lymphadenopathy.    Trachea and central airways are clear. Mild basilar predominant  peribronchovascular groundglass opacities likely related to shallow  breath hold. No pleural effusion or pneumothorax.    Scattered pulmonary nodules. For example:  -Solid pulmonary nodule in the posterior right lower lobe measuring 2  mm (series 9, image 106).  -Solid pulmonary nodule in the inferomedial right lower lobe measuring  2.5 mm (series 9, image 89)    Abdomen: Visualized abdomen is limited.. Small sliding-type hiatal  hernia.    Bones and Soft Tissues: No suspicious osseous lesion. No suspicious  mass.  Mild degenerative changes of the thoracic spine.        Impression    IMPRESSION:   1. No pulmonary embolus appreciated.    2. Enlargement of the ascending aorta measuring up to 4.5 cm.  Attention on follow-up.    3. Scattered sub-3 mm solid pulmonary nodules. Consider optional  follow-up with CT chest in 12 months if patient is considered high  risk for cancer as per Fleischner 2017 guidelines.    In the event of a positive result for acute pulmonary embolism  resulting in right heart strain, consider calling the   [facility] hospital  for PERT (Pulmonary Embolism Response Team)  Activation?    PERT -- Pulmonary Embolism Response Team (Multidisciplinary team  including cardiology, interventional radiology, critical care,  hematology)    I have personally reviewed the examination and initial interpretation  and I agree with the findings.    OFELIA  MD ALEKSANDAR                       Immunization History     Immunization History   Administered Date(s) Administered     COVID-19,PF,Moderna 03/19/2021, 04/16/2021, 10/19/2021     Influenza Quad, Recombinant, pf(RIV4) (Flublok) 11/07/2020, 10/19/2021     Influenza Vaccine IM > 6 months Valent IIV4 (Alfuria,Fluzone) 10/31/2013, 02/19/2020     Pneumococcal 23 valent 04/25/2008, 06/28/2012     Tdap (Adacel,Boostrix) 07/02/2012          Chart documentation done in part with Dragon Voice recognition Software. Although reviewed after completion, some word and grammatical error may remain.      Video-Visit Details    Type of service:  Video Visit    Video Start Time: 8:46 AM    Video End Time:8:58 AM    Originating Location (pt. Location): [place], [place]    Distant Location (provider location):  Home    Platform used for Video Visit: Rusty Moreira MD

## 2022-03-07 ENCOUNTER — ANCILLARY PROCEDURE (OUTPATIENT)
Dept: CARDIOLOGY | Facility: CLINIC | Age: 57
End: 2022-03-07
Attending: INTERNAL MEDICINE
Payer: COMMERCIAL

## 2022-03-07 DIAGNOSIS — I71.20 THORACIC AORTIC ANEURYSM WITHOUT RUPTURE (H): ICD-10-CM

## 2022-03-07 LAB — LVEF ECHO: NORMAL

## 2022-03-07 PROCEDURE — 93306 TTE W/DOPPLER COMPLETE: CPT | Performed by: STUDENT IN AN ORGANIZED HEALTH CARE EDUCATION/TRAINING PROGRAM

## 2022-03-07 PROCEDURE — 99207 PR STATISTIC IV PUSH SINGLE INITIAL SUBSTANCE: CPT | Performed by: STUDENT IN AN ORGANIZED HEALTH CARE EDUCATION/TRAINING PROGRAM

## 2022-03-07 RX ADMIN — Medication 7 ML: at 09:56

## 2022-03-08 DIAGNOSIS — I10 HYPERTENSION GOAL BP (BLOOD PRESSURE) < 140/90: ICD-10-CM

## 2022-03-08 DIAGNOSIS — I71.20 THORACIC AORTIC ANEURYSM WITHOUT RUPTURE (H): Primary | ICD-10-CM

## 2022-03-14 ENCOUNTER — TELEPHONE (OUTPATIENT)
Dept: ONCOLOGY | Facility: CLINIC | Age: 57
End: 2022-03-14
Payer: COMMERCIAL

## 2022-03-21 NOTE — TELEPHONE ENCOUNTER
2nd attempt to reach the patient and schedule D1 and D15 Rituxan(not new).    Writer Rosetta.    [facility]   [phone]

## 2022-04-06 ENCOUNTER — VIRTUAL VISIT (OUTPATIENT)
Dept: FAMILY MEDICINE | Facility: CLINIC | Age: 57
End: 2022-04-06
Payer: COMMERCIAL

## 2022-04-06 DIAGNOSIS — U07.1 COVID-19 VIRUS INFECTION: Primary | ICD-10-CM

## 2022-04-06 PROCEDURE — 99214 OFFICE O/P EST MOD 30 MIN: CPT | Mod: GT | Performed by: PHYSICIAN ASSISTANT

## 2022-04-06 NOTE — PROGRESS NOTES
Alley is a 57 year old who is being evaluated via a billable video visit.      How would you like to obtain your AVS? MyChart  If the video visit is dropped, the invitation should be resent by: Text to cell phone: [phone]  Will anyone else be joining your video visit? No    Video Start Time: 10:48 AM    Assessment & Plan   Problem List Items Addressed This Visit    None     Visit Diagnoses     COVID-19 virus infection    -  Primary    Relevant Medications    nirmatrelvir and ritonavir (PAXLOVID) therapy pack    Other Relevant Orders    COVID-19 GetWell Loop Referral         Impression is likely mild COVID-19. Appears well and non-toxic and I have low suspicion for impending airway obstruction or respiratory distress. Meet criteria for antivirals. She will push p.o. fluids, use over-the-counter meds for symptoms, complete a course of Paxlovid and follow-up with us in 2 weeks if not improving or urgent care/the ER if symptoms worsen/change at any time.    DDx and Dx discussed with and explained to the pt to their satisfaction.  All questions were answered at this time. Pt expressed understanding of and agreement with this dx, tx, and plan. No further workup warranted and standard medication warnings given. I have given the patient a list of pertinent indications for re-evaluation. Will go to the Emergency Department if symptoms worsen or new concerning symptoms arise. Patient left the call in no apparent distress.     38 minutes spent on the date of the encounter doing chart review, history and exam, documentation and further activities per the note     See Patient Instructions    Return in about 2 weeks (around 4/20/2022) for a recheck of your symptoms if not improving, or call 911/go to an ER anytime if worsening.    KARL Mike  [facility] RANJIT Hager is a 57 year old who presents for the following health issues     HPI   Patient tested positive at home  yesterday.  Patient states she is high risk and would like to discuss treatment    Description: sore throat, chills, fever, diarrhea , no appetite,   Duration: chills began on 4/3/22  Treatments tried: fever reducers, water,        COVID-19 Symptom Review  How many days ago did these symptoms start? 3    Are any of the following symptoms significant for you?    New or worsening difficulty breathing? No    Worsening cough? Yes, it's a dry cough.     Fever or chills? Yes, the highest temperature was 100.5F    Headache: YES    Sore throat: YES    Chest pain: no    Diarrhea: YES    Body aches? YES    What treatments has patient tried? Acetaminophen and Decongestant - oral   Does patient live in a nursing home, group home, or shelter? no  Does patient have a way to get food/medications during quarantined? Yes, I have a friend or family member who can help me.       MASSBP Score 4/6/2022   Age Greater than or equal to 65 years 0   BMI greater than or equal to 35 kg/m2 0   Has Diabetes Mellitus 0   Has Chronic Kidney Disease 3   Has Cardiovascular Disease and 55 years or older 2   Has Chronic Respiratory Disease and 55 years or older 0   Has Hypertension and 55 years or older 1   Is Immunocompromised 4   Is Pregnant 0   Member of BIP community (Black/, /, ,  or , or  or [place] Native)  0   MASSBP Score 10       Review of Systems   Constitutional, HEENT, cardiovascular, pulmonary, gi and gu systems are negative, except as otherwise noted.      Objective           Vitals:  No vitals were obtained today due to virtual visit.    Physical Exam   GENERAL: Healthy, alert and no distress  EYES: Eyes grossly normal to inspection.  No discharge or erythema, or obvious scleral/conjunctival abnormalities.  RESP: No audible wheeze, cough, or visible cyanosis.  No visible retractions or increased work of breathing.    SKIN: Visible skin clear. No  significant rash, abnormal pigmentation or lesions.  NEURO: Cranial nerves grossly intact.  Mentation and speech appropriate for age.  PSYCH: Mentation appears normal, affect normal/bright, judgement and insight intact, normal speech and appearance well-groomed.       Video-Visit Details    Type of service:  Video Visit    Video End Time:11:17 AM    Originating Location (pt. Location): Home    [facility]     Platform used for Video Visit: Iman

## 2022-04-06 NOTE — PATIENT INSTRUCTIONS
Gabe Hager,    Thank you for allowing [facility] to manage your care.    If you develop worsening/changing symptoms at any time, please go to the emergency department for evaluation.    I ordered some lab work, please go to the laboratory to get your studies.    I sent your prescriptions to your pharmacy.    Drink 8-10 glasses of fluid daily to stay well-hydrated.    For your pain, please use Ibuprofen 400mg four times daily with food. Max ibuprofen 3,200mg/24 hours    If you have any questions or concerns, please feel free to call us at [phone]    Sincerely,    Bharathi Spencer PA-C    Did you know?      You can schedule a video visit for follow-up appointments as well as future appointments for certain conditions.  Please see the below link.     [email or web address]    If you have not already done so,  I encourage you to sign up for [facility] ([email or web address]).  This will allow you to review your results, securely communicate with a provider, and schedule virtual visits as well.      Patient Education   Discharge Instructions for COVID-19 Patients  You were tested for COVID-19. Your result was positive. This means you do have COVID-19 (coronavirus). Follow the instructions below. If you were tested for an upcoming procedure, you should also contact your provider for next steps.   Is there medicine to treat COVID-19?  Yes, there are two kinds of treatment: Antiviral (virus-killing) medicine and antibody treatment (called monoclonal antibodies). These have been proven safe and effective. They may make you feel better faster, keep you out of the hospital, and prevent death.   It's very important to take them early in your illness before you get worse.   Who should take this medicine?   These treatments are for people who are not in the hospital, but they're at risk of getting very sick from COVID. This includes people who:    Are over age 65    Are  "members of the [facility] community (black, indigenous and people of color)    Are overweight (body mass index is over 25)    Are inactive (don't exercise)    Are pregnant    Smoke or vape (now or in the past)    Have a disability    Have any of the following health problems: Diabetes, high blood pressure, cancer, heart problems, liver disease, lung disease, kidney disease, sickle cell disease, cystic fibrosis (CF), dementia and other neurological (brain) diseases, HIV, thalassemia or tuberculosis    Have ever had a stroke, organ transplant or blood cell transplant    Have a mental health problem or substance abuse disorder (drugs, alcohol)    Have a weak immune system (are immuno-compromised)  If your risk is high, we strongly urge you to get treated as soon as possible.     If symptoms started in the last 5 days: You may qualify for pills (antiviral medicines like Paxlovid and molnupiravir). To schedule an appointment to discuss COVID-19 treatment, you can:  ? Call your family clinic. Or, dial [phone] ([phone]) and press [phone].  ? Go to [email or web address] (click \"Schedule your appointment\").  ? Request an appointment on [facility] (select COVID-19 Treatment\").    If your symptoms started in the last 7 days: You may qualify for antibody shots. Fill out a request form at [email or web address].[email or web address]. (If you don't read and write in English, or you need help with the form, call your family clinic.) Not everyone is chosen for this treatment. If you're selected, someone will contact you within 1 to 2 business days.  How can I take care of myself at home?  1. Get lots of rest.  2. Drink extra fluids (unless a doctor has told you not to).  3. Take acetaminophen (Tylenol) for fever or pain. (If you have liver or kidney problems, first ask your family doctor if it's safe to take acetaminophen.  ? Adults may take either:    650 mg acetaminophen (two 325 mg pills) every 4 to 6 hours as needed " (but no more than 10 pills per day), or     1,000 mg acetaminophen (two 500 mg pills) every 6 hours as needed (but no more than 6 pills per day).    Note: Don't take more than 3,000 mg of acetaminophen (Tylenol) in one day. Acetaminophen is found in many medicines (both prescribed and over-the-counter medicines). Read all labels to be sure you don't take too much.  ? For children:    Check the acetaminophen (Tylenol) bottle to find out the right dose (based on their age or weight.)    Don't give children more than1,625 mg of acetaminophen in one day.  4. Know when to call 911. Emergency warning signs include:  ? Trouble breathing or shortness of breath  ? Pain or pressure in the chest that doesn't go away  ? Feeling confused like you haven't felt before, or not being able to wake up  ? Bluish-colored lips or face  If you have other health problems (like cancer, heart failure, an organ transplant or severe kidney disease): Call your specialty clinic if you don't feel better in the next 2 days.  How can I protect others?  If you DO have symptoms:    Stay home and away from others (self-isolate):  ? For at least 5 days after your symptoms started, AND UNTIL  ? You've had no fever for 24 hours--with no medicine that reduces fever, AND  ? Your other symptoms (such as a cough) are better.    Wear a mask or face covering for 10 full days anytime you're around other people.  If you DON'T have symptoms:    Stay at home and away from others   for at least 5 days after your positive test.    Wear a mask or face covering for 10 full days anytime you're around other people.  If you plan to visit a clinic or hospital, please check their visitor guidelines before you arrive--healthcare sites may have different rules. If you were tested because you're going to have surgery or another procedure, contact your care team for next steps.  If you were severely ill from COVID-19 or have a weak immune system: stay at home and away from  others for at least 10 days. Ask your doctor about other actions you should take.   During self-isolation    Stay home until it's safe to be around others.    At home, stay away from other people and pets. Or, wear a well-fitting mask when you need to be around others.    Monitor your symptoms. If you have any emergency warning signs (including trouble breathing), seek emergency medical care right away.    Stay in a separate room from other household members, if possible.    Use a separate bathroom, if possible.    Improve ventilation at home, if possible.    Don't share personal household items, like cups, towels, and utensils.  When can I go back to work?  You should NOT go back to work until you meet the guidelines above for ending your home isolation. You don't need to be re-tested for COVID-19 before going back to work. Studies show that you won't spread the virus if it's been at least   10 days since your symptoms started (or 20 days if you have a weak immune system).  Employers, schools and daycares: This document serves as formal notice of medical guidelines before your employee or student can return to work or school. They must meet the above guidelines before going back in person.   Where can I get more information?    [facility] - About COVID-19:   [email or web address]    CDC - What to Do If You're Sick:   www.cdc.gov/coronavirus/2019-ncov/about/steps-when-sick.html    CDC - Ending Home Isolation:   www.cdc.gov/coronavirus/2019-ncov/your-health/quarantine-isolation.html    [facility] Clinical trials (COVID-19 research studies):  [email or web address]    For informational purposes only. Not to replace the advice of your health care provider. Clinically reviewed by Dr. Caleb Guerrero.   Copyright   2020 [employer] [facility]. All rights reserved. [employer] 129000 - REV 03/10/22.

## 2022-04-16 ENCOUNTER — LAB (OUTPATIENT)
Dept: LAB | Facility: CLINIC | Age: 57
End: 2022-04-16
Payer: COMMERCIAL

## 2022-04-16 DIAGNOSIS — M31.31 GRANULOMATOSIS WITH POLYANGIITIS WITH RENAL INVOLVEMENT (H): ICD-10-CM

## 2022-04-16 DIAGNOSIS — Z79.899 HIGH RISK MEDICATIONS (NOT ANTICOAGULANTS) LONG-TERM USE: ICD-10-CM

## 2022-04-16 DIAGNOSIS — R73.9 HYPERGLYCEMIA: ICD-10-CM

## 2022-04-16 LAB
ALBUMIN UR-MCNC: NEGATIVE MG/DL
APPEARANCE UR: CLEAR
BASOPHILS # BLD AUTO: 0 10E3/UL (ref 0–0.2)
BASOPHILS NFR BLD AUTO: 0 %
BILIRUB UR QL STRIP: NEGATIVE
COLOR UR AUTO: YELLOW
CREAT UR-MCNC: 114 MG/DL
EOSINOPHIL # BLD AUTO: 0.1 10E3/UL (ref 0–0.7)
EOSINOPHIL NFR BLD AUTO: 3 %
ERYTHROCYTE [DISTWIDTH] IN BLOOD BY AUTOMATED COUNT: 13.3 % (ref 10–15)
ERYTHROCYTE [SEDIMENTATION RATE] IN BLOOD BY WESTERGREN METHOD: 13 MM/HR (ref 0–30)
GLUCOSE UR STRIP-MCNC: NEGATIVE MG/DL
HBA1C MFR BLD: 6.1 % (ref 0–5.6)
HCT VFR BLD AUTO: 36.7 % (ref 35–47)
HGB BLD-MCNC: 11.8 G/DL (ref 11.7–15.7)
HGB UR QL STRIP: NEGATIVE
KETONES UR STRIP-MCNC: NEGATIVE MG/DL
LEUKOCYTE ESTERASE UR QL STRIP: NEGATIVE
LYMPHOCYTES # BLD AUTO: 1.4 10E3/UL (ref 0.8–5.3)
LYMPHOCYTES NFR BLD AUTO: 26 %
MCH RBC QN AUTO: 30.8 PG (ref 26.5–33)
MCHC RBC AUTO-ENTMCNC: 32.2 G/DL (ref 31.5–36.5)
MCV RBC AUTO: 96 FL (ref 78–100)
MONOCYTES # BLD AUTO: 0.7 10E3/UL (ref 0–1.3)
MONOCYTES NFR BLD AUTO: 14 %
NEUTROPHILS # BLD AUTO: 3.1 10E3/UL (ref 1.6–8.3)
NEUTROPHILS NFR BLD AUTO: 58 %
NITRATE UR QL: NEGATIVE
PH UR STRIP: 5.5 [PH] (ref 5–7)
PLATELET # BLD AUTO: 330 10E3/UL (ref 150–450)
PROT UR-MCNC: 0.16 G/L
PROT/CREAT 24H UR: 0.14 G/G CR (ref 0–0.2)
RBC # BLD AUTO: 3.83 10E6/UL (ref 3.8–5.2)
SP GR UR STRIP: 1.02 (ref 1–1.03)
UROBILINOGEN UR STRIP-ACNC: 0.2 E.U./DL
WBC # BLD AUTO: 5.4 10E3/UL (ref 4–11)

## 2022-04-16 PROCEDURE — 86140 C-REACTIVE PROTEIN: CPT

## 2022-04-16 PROCEDURE — 36415 COLL VENOUS BLD VENIPUNCTURE: CPT

## 2022-04-16 PROCEDURE — 83036 HEMOGLOBIN GLYCOSYLATED A1C: CPT

## 2022-04-16 PROCEDURE — 80053 COMPREHEN METABOLIC PANEL: CPT

## 2022-04-16 PROCEDURE — 85025 COMPLETE CBC W/AUTO DIFF WBC: CPT

## 2022-04-16 PROCEDURE — 81003 URINALYSIS AUTO W/O SCOPE: CPT

## 2022-04-16 PROCEDURE — 84156 ASSAY OF PROTEIN URINE: CPT

## 2022-04-16 PROCEDURE — 85652 RBC SED RATE AUTOMATED: CPT

## 2022-04-16 PROCEDURE — 87340 HEPATITIS B SURFACE AG IA: CPT

## 2022-04-16 PROCEDURE — 86481 TB AG RESPONSE T-CELL SUSP: CPT

## 2022-04-16 PROCEDURE — 86704 HEP B CORE ANTIBODY TOTAL: CPT

## 2022-04-18 LAB
ALBUMIN SERPL-MCNC: 3.7 G/DL (ref 3.4–5)
ALP SERPL-CCNC: 85 U/L (ref 40–150)
ALT SERPL W P-5'-P-CCNC: 51 U/L (ref 0–50)
ANION GAP SERPL CALCULATED.3IONS-SCNC: 8 MMOL/L (ref 3–14)
AST SERPL W P-5'-P-CCNC: 18 U/L (ref 0–45)
BILIRUB SERPL-MCNC: 0.5 MG/DL (ref 0.2–1.3)
BUN SERPL-MCNC: 39 MG/DL (ref 7–30)
CALCIUM SERPL-MCNC: 9.3 MG/DL (ref 8.5–10.1)
CHLORIDE BLD-SCNC: 112 MMOL/L (ref 94–109)
CO2 SERPL-SCNC: 24 MMOL/L (ref 20–32)
CREAT SERPL-MCNC: 1.58 MG/DL (ref 0.52–1.04)
CRP SERPL-MCNC: 3.1 MG/L (ref 0–8)
GAMMA INTERFERON BACKGROUND BLD IA-ACNC: 0.14 IU/ML
GFR SERPL CREATININE-BSD FRML MDRD: 38 ML/MIN/1.73M2
GLUCOSE BLD-MCNC: 125 MG/DL (ref 70–99)
HBV CORE AB SERPL QL IA: NONREACTIVE
HBV SURFACE AG SERPL QL IA: NONREACTIVE
M TB IFN-G BLD-IMP: NEGATIVE
M TB IFN-G CD4+ BCKGRND COR BLD-ACNC: 9.86 IU/ML
MITOGEN IGNF BCKGRD COR BLD-ACNC: -0.01 IU/ML
MITOGEN IGNF BCKGRD COR BLD-ACNC: -0.01 IU/ML
POTASSIUM BLD-SCNC: 4 MMOL/L (ref 3.4–5.3)
PROT SERPL-MCNC: 7 G/DL (ref 6.8–8.8)
QUANTIFERON MITOGEN: 10 IU/ML
QUANTIFERON NIL TUBE: 0.14 IU/ML
QUANTIFERON TB1 TUBE: 0.13 IU/ML
QUANTIFERON TB2 TUBE: 0.13
SODIUM SERPL-SCNC: 144 MMOL/L (ref 133–144)

## 2022-05-03 ENCOUNTER — VIRTUAL VISIT (OUTPATIENT)
Dept: RHEUMATOLOGY | Facility: CLINIC | Age: 57
End: 2022-05-03
Payer: COMMERCIAL

## 2022-05-03 DIAGNOSIS — Z11.59 SCREENING FOR VIRAL DISEASE: ICD-10-CM

## 2022-05-03 DIAGNOSIS — R73.9 HYPERGLYCEMIA: Primary | ICD-10-CM

## 2022-05-03 DIAGNOSIS — M31.31 GRANULOMATOSIS WITH POLYANGIITIS WITH RENAL INVOLVEMENT (H): ICD-10-CM

## 2022-05-03 DIAGNOSIS — M06.4 INFLAMMATORY POLYARTHROPATHY (H): ICD-10-CM

## 2022-05-03 DIAGNOSIS — Z79.899 HIGH RISK MEDICATIONS (NOT ANTICOAGULANTS) LONG-TERM USE: ICD-10-CM

## 2022-05-03 PROCEDURE — 99214 OFFICE O/P EST MOD 30 MIN: CPT | Mod: GT | Performed by: INTERNAL MEDICINE

## 2022-05-03 RX ORDER — AZATHIOPRINE 50 MG/1
100 TABLET ORAL DAILY
Qty: 180 TABLET | Refills: 2 | Status: SHIPPED | OUTPATIENT
Start: 2022-05-03 | End: 2023-02-28

## 2022-05-03 NOTE — Clinical Note
Hello infusion finance team: Flavia Brice had rituxan last year and is still dealing with bills from that.  Per patient the PA had  at the time she received the rituxan last year, and her insurance preferred [employer]. She says that her insurance was going to pay for it, but now they put is on H?REL and subsequently she has a bill.  I asked that she speak with you for this very high bill, and I asked that she contact rituxan financial assistance.  Whatever help you can provide would be greatly appreciated.  Thanks! Ronaldo Moreira

## 2022-05-03 NOTE — PROGRESS NOTES
Flavia Brice  is a 57 year old year old who is being evaluated via a billable video visit.      How would you like to obtain your AVS? MyChart  If the video visit is dropped, the invitation should be resent by: Text to cell phone: [phone]   Will anyone else be joining your video visit? No     Rheumatology Video Visit      Flavia Brice MRN# [id number]   YOB: 1965 Age: 57 year old      Date of visit: 5/03/22   PCP: Monet Ackerman    Chief Complaint   Patient presents with:  Granulomatosis with polyangiitis with renal involvement     Assessment and Plan     1.  Granulomatosis with polyangiitis: Dx'd 2012 by Dr. Wesley at [facility] when she presented with pulmonary involvement, pericarditis/effusion, borderline aneurysmal dilation of the ascending aorta and biopsy proven necrotizing crescentic nephritis.  Went into remission with cytoxan and prednisone, then maintained on AZA for 2 yrs per patient, stopped when lost to follow-up. Recurrence in 2020 with YOLI, pleuritic chest pain, inflammatory arthritis (MCPs, PIPs, shoulders, knees) that has responded well to prednisone.  She was tx'd with prednisone and rituximab 1g IV received on 8/21/2020 & 9/4/2020, (4/28/2021 delayed for the COVID-19 vaccine) & (no 2nd dose of rituximab), 11/11/2021 & 11/30/2021.    Currently on azathioprine 100 mg daily and rituximab.  Continue on combination of azathioprine and rituximab.  Doing well at this time.  All COVID19 symptoms have been resolved for >2 weeks so advised that she proceed with Rituximab if COVID19 PCR is negative. Also advised Evusheld, 2 mo after COVID19 infection and 2 week apart from rituximab infusion. Risks of rituximab and Evusheld reviewed.     Chronic illness, stable.    - Continue azathioprine 100 mg daily  - Re-dose of rituximab (truxima) 1 g IV every 2 weeks for a total of 2 doses, starting 5/11/2022   - Labs  in 3 months: CBC, CMP, ESR, CRP, hepatitis B, QuantiFERON-TB gold plus  UA, Uprotein:creatinine  - Lab within 1 week of rituximab infusion: COVID-19 PCR    High risk medication re  quiring intensive toxicity monitoring at least quarterly: labs ordered include CBC, Creatinine, Hepatic panel to monitor for cytopenia and hepatotoxicity; checking creatinine as it affects clearance of medication.     # Rituximab (Rituxan) Risks and Benefits: The risks and benefits of rituximab were discussed in detail and the patient verbalized understanding.  The risks discussed include, but are not limited to, the risk for hypersensitivity, anaphylaxis, anaphylactoid reactions, fatal infusion reactions, an increased risk for serious infections leading to hospitalization or death, severe mucocutaneous reactions, reactivation of hepatitis B, and development of progressive multifocal leukoencephalopathy (PML) resulting in death.  The most common adverse reactions are infections, nasopharyngitis, urinary tract infections, nausea, diarrhea, headache, muscle spasms, and anemia.  It was discussed that the medication would need to be discontinued if a serious infection develops.  It was discussed that live vaccinations should not be received while using rituximab or within 30 days prior to starting rituximab.  Higher risk for complications from COVID-19 infection when on rituximab was discussed.  I encouraged reviewing the package insert and asking any questions about the medication.       2. Inflammatory arthritis: Related to GPA.  See #1    3. Elevated serum creatinine: Related to GPA.  Higher recently. Recheck..  See #1     4. Positive MALLORY (antinuclear antibody): Additional work-up was negative for SSA, SSB, RNP, Smith, dsDNA; complement C3 and C4 were not low.  Symptoms are most likely related to GPA; no MALLORY-associated rheumatologic disorder identified    5.  Aneurysm of the ascending aorta at 4.5 cm; history of heart failure with preserved ejection fraction : following with Dr. Locke (cardiology)    6. Cytoxan  use history: needs periodic UA to eval for hematuria, due to increased risk for bladder cancer with cytoxan exposure    7.  Osteoarthritis: Affecting the DIPs.  Mild symptoms this time.  Consider hand therapy referral in the future if needed    8.  Hyperglycemia: encouraged weight loss. Refer to dietitian. Advised to follow-up with PCP    9. Vaccinations: Vaccinations reviewed with Ms. Brice.    - Influenza: up to date  - COVID-19: has received the Moderna COVID-19 vaccine on 3/19/2021 and 4/16/2021 and 10/19/2021; 4th mRNA COVID19 vaccination recommended to be received 4 mo after rituximab     Continue precaution with KN95 or N95 mask.    10. Cost of rituxan from 2021: reportedly being billed for 2021 rituxan that insurance was going to cover but then said that they are not. Advised that she speak with Rituxan financial assistance and  [employer] infusion financial assistance.  Now receiving Truxima that is covered by her insurance per patient.      Total minutes spent in evaluation with patient, documentation, , and review of pertinent studies and chart notes: 28     Ms. Brice verbalized agreement with and understanding of the rational for the diagnosis and treatment plan.  All questions were answered to best of my ability and the patient's satisfaction. Ms. Brice was advised to contact the clinic with any questions that may arise after the clinic visit.      Thank you for involving me in the care of the patient    Return to clinic: 3 months      HPI   Flavia Brice is a 57 year old female with a past medical history significant for hypertension, depression, and granulomatosis with polyangiitis who is seen for follow-up of granulomatosis with polyangiitis    10/31/2013 [facility] rheumatology clinic note by Dr. Ann Wesley documents severe MI-3 ANCA positive GPA with pulmonary involvement, pericarditis/effusion, borderline aneurysmal dilation of the ascending aorta, necrotizing  crescentic nephritis.  Kidney biopsy has confirmed diagnosis in the past.  Has received Solu-Medrol x3, Cytoxan x1 at 1300 mg, on prednisone 60-80 mg daily.  Deteriorated quickly despite those interventions and was hospitalized again, requiring plasmapheresis and dialysis.  Was seeing Dr. Velazquez from [facility] and was again placed on Cytoxan 15 mg/kg every 3 weeks.  Has seen ENT but there was no clear sinus involvement.  She was then transitioned after 7 infusions of Cytoxan to Imuran on 11/2012 and has been doing well on that.      7/11/2020 [facility] hospitalization note: Acute kidney injury, migratory polyarthritis, chronic diastolic heart failure.  Left knee and left hip pain.  Right shoulder pain that migrated to the left shoulder.  No joint swelling.  No fevers.  In the past she had flare of her joint pain that she was treated with prednisone.  Prior to this ED evaluation she reportedly used prednisone 20 mg without improvement.    7/22/2020: [facility] emergency department visit for shortness of breath.  Notes history of granulomatosis with polyangiitis, diastolic heart failure, a sending thoracic aortic aneurysm from 4.6 cm on chest CT 2/18/2020), migratory polyarthritis, acute kidney injury, and hypertension.  Coronavirus test negative.  Chest CT negative for pneumonia.  O2 saturation normal.  Advised to follow-up with her PCP and cardiology.    8/5/2020:  Ms. Brice reported that she was dx'd with GPA by Dr. Wesley. She was treated and in remission; was on AZA for a year and no issues for a while. Lost to follow-up with Dr. Wesley.  Then 1 year ago started having more shortness of breath and found to have more lung nodules.  Recalls heart failure and kidney failure when first dx'd.  Told to see cardiology to follow the aneurysm.  Then in Jan 2020 started to have pain in her shoulders, knees ankles. Pain was so bad that she went to [facility]; found to have YOLI.  Until able to have this  rheumatology evaluation, she says that she was given prednisone to help the joint pain; currently on 5mg daily of prednisone; felt much better when on prednisone 30mg daily. Has been dealing with right knee swelling; the prednisone helped with this.  Joint pains are worse in the AM; improve with time and activity. Morning stiffness is very mild while on prednisone 5mg daily.  No hematuria. No hemoptysis.  Flavia Brcie's mother was present during the clinic visit.     9/9/2020: Significant improvement with prednisone and rituximab.  No longer with swelling around her ankles.  Hand swelling/pain/stiffness has resolved.  She feels a little puffy in her face but no actual swelling.  She has followed with her cardiologist and they are going to monitor the thoracic aortic aneurysm.  Tolerated rituximab infusion well.  Currently on prednisone 10 mg daily.  Not going into work at this time; she is taking a leave of absence.    10/21/2020: Improved.  Now with mild pain at her MCPs, DIPs, and knees.  MCP and knee pain is better with activity, worse in the morning.  DIP pain is worse with activity.  Joint pains started again with dose reduction of prednisone.  Has been on azathioprine and tolerated well in the past.    12/9/2020: Significant improvement with regard to her joint pain.  No joint pain now.  No morning stiffness.  No gelling phenomenon.  Tolerating azathioprine well.  Continues on prednisone 5mg daily.  Mild shortness of breath when walking up stairs that has been stable; no associated chest pain or pressure, palpitations, lightheadedness, dizziness, or nausea and the mild shortness of breath resolves quickly when she rests.  No shortness of breath at rest or currently.  Following with cardiology and plans to repeat her echocardiogram in March 2021.  Last echocardiogram was in February 2020.    4/8/2021: More joint aches and pains and she attributes this to being off of prednisone and having to delay her  rituximab infusion secondary to the COVID-19 vaccine.  She has rescheduled the first of the 2 rituximab infusions to be 2 weeks after the second COVID-19 vaccine.  Knees ache more at the end of the day.  No cough, chest pain, shortness of breath.  Has seen cardiology and she says that her aortic aneurysm is stable.    7/7/2021:  doing well this time.  Mild aches of the PIPs and DIPs with more activity that improves with rest.  Usually has pain that travels around but in general is doing okay.  Only received 1 of 2 rituximab doses because her insurance had denied it; she then got a letter about a month later stating that rituximab had been approved.  Tolerating azathioprine and rituximab well.  Overall happy with how well she is doing.  No blood in urine or stool    10/19/2021: Reports that she is doing well.  She says that she is doing better than she was previously, and way better than she was before treatment.  No joint pain or stiffness.  No rash.  Tolerating azathioprine well.  No difficulty doing her daily activities.     1/25/2022: feels the best she has in a long time.  No joint pain or swelling. Morning stiffness for <20.  No rash.  No black or bloody stools.  No cough, chest pain, or shortness of breath.  No hemoptysis.  No hematemesis.  Using a surgical mask at work.    Today, 5/4/2022: had covid infxn, resolved >2wks ago. Fasting for labs recently. Joints more achy; due to rtx.  Tolerating aza well. No joint swelling. Morning stiffness <30 min. +gelling. No hemoptysis.  No blood in urine.     Denies fevers, chills, nausea, vomiting. Occasional constipation or diarrhea. No abdominal pain. No chest pain/pressure, palpitations, or shortness of breath at this time.  No LE swelling.  No oral or nasal sores.  No rash. No sicca symptoms. No photosensitivity or photophobia. No eye pain or redness. No history of inflammatory eye disease.  No history of DVT, pulmonary embolism, or miscarriage.   No history of  serositis.  No history of Raynaud's Phenomenon.      Tobacco: quit smoking in Feb 2020  EtOH: no more than 1 drink per month  Drugs: none  Occupation: manager at [employer]    ROS   12 point review of system was completed and negative except as noted in the HPI     Active Problem List     Patient Active Problem List   Diagnosis     Wegener's granulomatosis     CARDIOVASCULAR SCREENING; LDL GOAL LESS THAN 130     Overweight     Advanced directives, counseling/discussion     Hypertension goal BP (blood pressure) < 140/90     Situational depression     Granulomatosis with polyangiitis (H)     Chronic kidney disease, stage 3     Former smoker     Menopausal syndrome (hot flashes)     Past Medical History     Past Medical History:   Diagnosis Date     Tinnitus      Past Surgical History     Past Surgical History:   Procedure Laterality Date     COLONOSCOPY WITH CO2 INSUFFLATION N/A 2/18/2019    Procedure: COLONOSCOPY WITH CO2 INSUFFLATION;  Surgeon: Javier Guillen MD;  Location: [facility] OR     GALLBLADDER SURGERY       Allergy   No Known Allergies  Current Medication List     Current Outpatient Medications   Medication Sig     Ascorbic Acid (VITAMIN C) 100 MG CHEW      azaTHIOprine (IMURAN) 50 MG tablet Take 2 tablets (100 mg) by mouth daily     gabapentin (NEURONTIN) 300 MG capsule Take 1 capsule (300 mg) by mouth At Bedtime     losartan-hydrochlorothiazide (HYZAAR) 100-25 MG tablet Take 1 tablet by mouth daily     Multiple Vitamins-Minerals (ZINC PO)      nirmatrelvir and ritonavir (PAXLOVID) therapy pack Take 2 tablets by mouth 2 times daily Take 1 tablet of Nirmatelvir and 1 tablet of Ritonavir twice daily for 5 days     VITAMIN D PO      zinc gluconate 50 MG tablet Take 50 mg by mouth daily     No current facility-administered medications for this visit.         Social History   See HPI    Family History     Family History   Problem Relation Age of Onset     Diabetes Father      Heart Disease Maternal Grandmother  "     Heart Disease Maternal Grandfather      Arthritis Paternal Grandmother      Heart Disease Paternal Grandfather      Grandmother: rheumatoid arthritis    Physical Exam     Temp Readings from Last 3 Encounters:   11/30/21 98.2  F (36.8  C) (Oral)   11/11/21 98.2  F (36.8  C) (Oral)   10/21/21 98.6  F (37  C) (Tympanic)     BP Readings from Last 5 Encounters:   11/30/21 119/78   11/11/21 128/82   10/21/21 (!) 142/80   07/07/21 (!) 163/97   04/28/21 124/68     Pulse Readings from Last 1 Encounters:   11/30/21 65     Resp Readings from Last 1 Encounters:   11/30/21 18     Estimated body mass index is 32.22 kg/m  as calculated from the following:    Height as of 7/7/21: 1.664 m (5' 5.5\").    Weight as of 11/30/21: 89.2 kg (196 lb 9.6 oz).    GEN: NAD. Healthy appearing adult.   HEENT: MMM.  Anicteric, noninjected sclera. No obvious external lesions of the ear and nose. Hearing intact.  PULM: No increased work of breathing  MSK:  Hands and wrists without swelling.   SKIN: No rash or jaundice seen  PSYCH: Alert. Appropriate.      Labs / Imaging (select studies)     RF/CCP  Recent Labs   Lab Test 08/05/20  1453   CCPIGG <1   RHF <7     MALLORY  Recent Labs   Lab Test 08/05/20  1453   CAMMIE Positive*   ANAP1 HOMOGENEOUS   ANAT1 1:160     RNP/Sm/SSA/SSB  Recent Labs   Lab Test 08/11/20  1402   RNPIGG <0.2   SMIGG <0.2   SSAIGG <0.2   SSBIGG <0.2     dsDNA  Recent Labs   Lab Test 08/11/20  1402   DNA <1     C3/C4  Recent Labs   Lab Test 08/11/20  1402   X7YWVGG 133   M7NVRII 41*     Antiphospholipid Antibodies  Recent Labs   Lab Test 08/11/20  1402   B2GPG 0.8   B2GPM <2.9   CARDG <1.6   CARDM 0.8   LUPINT Negative     ANCA  Recent Labs   Lab Test 08/05/20  1453   PR3IGG 4.8*   MPOIGG <0.2     IgG  Recent Labs   Lab Test 11/11/21  0804 04/28/21  0848 08/05/20  1453   IGG 1,009 863 1,009    116 149   IGM 13* 18* 32*     CBC  Recent Labs   Lab Test 04/16/22  1236 01/23/22  1024 10/14/21  0720 07/07/21  0818 04/24/21  1130 " 12/03/20  1128   WBC 5.4 6.3 6.0 5.0 5.1 8.3   RBC 3.83 4.25 3.99 3.98 3.82 4.27   HGB 11.8 13.1 12.4 12.2 11.7 13.1   HCT 36.7 41.6 39.0 38.8 37.6 41.3   MCV 96 98 98 98 98 97   RDW 13.3 13.8 14.4 14.9 13.7 14.2    293 288 304 297 347   MCH 30.8 30.8 31.1 30.7 30.6 30.7   MCHC 32.2 31.5 31.8 31.4* 31.1* 31.7   NEUTROPHIL 58 63 56 60.1 63.5 73.6   LYMPH 26 22 29 22.9 20.4 17.7   MONOCYTE 14 12 11 11.2 11.1 6.9   EOSINOPHIL 3 3 4 5.0 4.4 1.3   BASOPHIL 0 0 1 0.8 0.6 0.5   ANEU  --   --   --  3.0 3.2 6.1   ALYM  --   --   --  1.1 1.0 1.5   MARCOS  --   --   --  0.6 0.6 0.6   AEOS  --   --   --  0.3 0.2 0.1   ABAS  --   --   --  0.0 0.0 0.0   ANEUTAUTO 3.1 4.0 3.3  --   --   --    ALYMPAUTO 1.4 1.4 1.7  --   --   --    AMONOAUTO 0.7 0.8 0.7  --   --   --    AEOSAUTO 0.1 0.2 0.2  --   --   --    ABSBASO 0.0 0.0 0.0  --   --   --      CMP  Recent Labs   Lab Test 04/16/22  1236 01/23/22  1024 10/14/21  0720 07/07/21  0818 04/24/21  1130 12/03/20  1128 11/19/20  1123 11/04/20  1509 09/16/20  1438    141 141 142 141  --   --   --  140   POTASSIUM 4.0 4.2 4.5 4.7 4.2  --   --   --  3.9   CHLORIDE 112* 106 108 112* 108  --   --   --  108   CO2 24 26 27 26 28  --   --   --  27   ANIONGAP 8 9 6 4 5  --   --   --  5   * 172* 116* 103* 114*  --   --   --  120*   BUN 39* 33* 29 34* 26  --   --   --  32*   CR 1.58* 1.32* 1.43* 1.44* 1.50* 1.41* 1.45*   < > 1.33*   GFRESTIMATED 38* 47* 41* 40* 38* 41* 40*   < > 44*   GFRESTBLACK  --   --   --  46* 44* 48* 46*   < > 51*   JESSICA 9.3 9.0 8.9 9.6 9.5  --   --   --  9.0   BILITOTAL 0.5 0.3 0.4 0.2 0.4 0.4 0.3   < > 0.3   ALBUMIN 3.7 3.8 3.5 3.6 3.5 3.9 3.7   < > 3.4   PROTTOTAL 7.0 7.2 7.1 7.2 6.7* 7.5 7.4   < > 6.9   ALKPHOS 85 80 84 75 67 80 78   < > 78   AST 18 14 15 21 22 16 13   < > 8   ALT 51* 37 41 49 62* 37 33   < > 28    < > = values in this interval not displayed.     HgA1c  Recent Labs   Lab Test 04/16/22  1236 10/27/17  1541   A1C 6.1* 5.8      Calcium/VitaminD  Recent Labs   Lab Test 04/16/22  1236 01/23/22  1024 10/14/21  0720 10/27/17  1541 02/27/14  1148   JESSICA 9.3 9.0 8.9   < > 9.6   VITDT  --   --   --   --  28*    < > = values in this interval not displayed.     ESR/CRP  Recent Labs   Lab Test 04/16/22  1236 01/23/22  1024 10/14/21  0720   SED 13 11 13   CRP 3.1 5.8 8.6*     CK/Aldolase  Recent Labs   Lab Test 08/11/20  1402   CKT 79     TSH/T4  Recent Labs   Lab Test 10/21/21  0920 03/26/18  1058   TSH 1.10 1.44     Lipid Panel  Recent Labs   Lab Test 01/23/22  1024 03/26/18  1058 02/27/14  1148   CHOL 192 188 192   TRIG 118 93 160*   HDL 74 87 74   LDL 94 82 87   VLDL  --   --  32*   CHOLHDLRATIO  --   --  2.6   NHDL 118 101  --      Hepatitis B  Recent Labs   Lab Test 04/16/22  1236 08/05/20  1453   HBCAB Nonreactive Nonreactive   HEPBANG Nonreactive Nonreactive     Hepatitis C  Recent Labs   Lab Test 08/05/20  1453 03/26/18  1058   HCVAB Nonreactive Nonreactive     Lyme ab screening  Recent Labs   Lab Test 08/05/20  1453   LYMEGM 0.10     Tuberculosis Screening  Recent Labs   Lab Test 04/16/22  1236 08/05/20  1453   TBRES Negative  --    TBRST  --  Negative     HIV Screening  Recent Labs   Lab Test 08/05/20  1453   HIAGAB Nonreactive     UA  Recent Labs   Lab Test 04/16/22  1236 10/14/21  0720 07/07/21  0818 04/24/21  1137 09/16/20  1442 08/05/20  1453 07/27/20  0938   COLOR Yellow Yellow Yellow   < > Yellow Yellow Yellow   APPEARANCE Clear Clear Clear   < > Clear Clear Clear   URINEGLC Negative Negative Negative   < > Negative Negative Negative   URINEBILI Negative Negative Negative   < > Negative Negative Negative   SG 1.025 >=1.030 1.025   < > 1.025 1.025 1.015   URINEPH 5.5 5.5 5.0   < > 5.0 5.0 5.0   PROTEIN Negative Trace* Trace*   < > 30* Negative Trace*   UROBILINOGEN 0.2 0.2 0.2   < > 0.2 0.2 0.2   NITRITE Negative Negative Negative   < > Negative Negative Negative   UBLD Negative Trace* Negative   < > Negative Trace* Small*    LEUKEST Negative Trace* Negative   < > Negative Negative Negative   WBCU  --  0-5 0 - 5  --  0 - 5 0 - 5 0 - 5   RBCU  --  None Seen O - 2  --  O - 2 O - 2 2-5*   SQUAMOUSEPI  --   --   --   --  Few Few Few   BACTERIA  --  Few*  --   --  Few* Few* Few*   MUCOUS  --   --   --   --   --  Present*  --     < > = values in this interval not displayed.     Urine Microscopic  Recent Labs   Lab Test 10/14/21  0720 07/07/21  0818 09/16/20  1442 08/05/20  1453 07/27/20  0938   WBCU 0-5 0 - 5 0 - 5 0 - 5 0 - 5   RBCU None Seen O - 2 O - 2 O - 2 2-5*   SQUAMOUSEPI  --   --  Few Few Few   BACTERIA Few*  --  Few* Few* Few*   MUCOUS  --   --   --  Present*  --      Urine Protein  Recent Labs   Lab Test 04/16/22  1236 01/23/22  1024 10/14/21  0720   UTP 0.16 0.13 0.45   UTPG 0.14 0.13 0.23*   UCRR 114 104 198         Immunization History     Immunization History   Administered Date(s) Administered     COVID-19,PF,Moderna 03/19/2021, 04/16/2021, 10/19/2021     Influenza Quad, Recombinant, pf(RIV4) (Flublok) 11/07/2020, 10/19/2021     Influenza Vaccine IM > 6 months Valent IIV4 (Alfuria,Fluzone) 10/31/2013, 02/19/2020     Pneumococcal 23 valent 04/25/2008, 06/28/2012     Tdap (Adacel,Boostrix) 07/02/2012          Chart documentation done in part with Dragon Voice recognition Software. Although reviewed after completion, some word and grammatical error may remain.      Video-Visit Details    Type of service:  Video Visit    Video Start Time: 7:31 AM    Video End Time: 7:53 AM    Originating Location (pt. Location): [place], [place]    Distant Location (provider location):  [place]    Platform used for Video Visit: Iman Moreira MD

## 2022-05-03 NOTE — PATIENT INSTRUCTIONS
RHEUMATOLOGY    Dr. Ronaldo Moreira    [address] [place] [address]  Phone number: [phone]  Fax number: [phone]      Thank you for choosing [facility]!    Maura Newell CMA Rheumatology

## 2022-05-14 ENCOUNTER — HEALTH MAINTENANCE LETTER (OUTPATIENT)
Age: 57
End: 2022-05-14

## 2022-06-28 ENCOUNTER — HOSPITAL ENCOUNTER (OUTPATIENT)
Dept: NUTRITION | Facility: CLINIC | Age: 57
Discharge: HOME | End: 2022-06-28
Attending: INTERNAL MEDICINE | Admitting: INTERNAL MEDICINE
Payer: COMMERCIAL

## 2022-06-28 DIAGNOSIS — R73.9 HYPERGLYCEMIA: ICD-10-CM

## 2022-06-28 PROCEDURE — 97802 MEDICAL NUTRITION INDIV IN: CPT | Mod: GT,95

## 2022-06-28 NOTE — PROGRESS NOTES
"[facility] NUTRITION SERVICES  Medical Nutrition Therapy    Visit Type: Initial Assessment    Flavia Brice, 57 year old referred by Ronaldo Moreira MD for MNT related to R73.9 (ICD-10-CM) - Hyperglycemia        Nutrition Assessment:  Anthropometrics  Height:   Ht Readings from Last 1 Encounters:   07/07/21 1.664 m (5' 5.5\")         BMI:     Weight Status:  Obesity Grade I BMI 30-34.9   Weight:   Wt Readings from Last 1 Encounters:   11/30/21 89.2 kg (196 lb 9.6 oz)       UBW:    190lbs (estimated weight stated per pt)    IBW:  127.5lbs (58.0kg)   IBW %:    154%         Weight History:   Wt Readings from Last 30 Encounters:   11/30/21 89.2 kg (196 lb 9.6 oz)   11/11/21 89 kg (196 lb 1.6 oz)   10/21/21 91 kg (200 lb 9.6 oz)   07/07/21 91.6 kg (202 lb)   01/06/21 99.7 kg (219 lb 14.4 oz)   10/29/20 88.5 kg (195 lb)   09/04/20 88.6 kg (195 lb 6.4 oz)   07/27/20 84.3 kg (185 lb 12.8 oz)   07/22/20 77.1 kg (170 lb)   02/28/20 77.1 kg (170 lb)   03/26/18 83.2 kg (183 lb 6.4 oz)   12/01/17 79 kg (174 lb 3.2 oz)   10/27/17 78.5 kg (173 lb)   02/27/14 82.6 kg (182 lb)         Goal Weight:   150lbs     Patient stated she gained 60lbs during COVID and while off of work and sick for 4 months. Has since lost 20lbsn. Has been cutting portions in half during meals.       Nutrition History    PMH:   Overweight, hypertension, CKD3, Wegener's granulomatosis    Labs: Hgb A1c 6.1, UN 39, Cr 1.58, GFR 38, ALT 51      Meds: reviewed    Supplements: reviewed      Social/Environmental:   -average sleep per night: 6.5 hours (sometimes less may not fall asleep until Midnight 12:30a)  -level of stress: Doesn't feel that she has a lot of stress. (10-15%) Sometimes wants to go home and has to stay at work to complete tasks.       Food Record:   -2 meals per day, 1 snacks per day  -Bfast (8:00am): [employer] coffee (Caramel Macchiato)  -Lunch (2:00pm): w/ team, sometimes [employer] sometimes something from the frozen section at " [employer]  -Dinner (7:00p): pork chops, tacos,   -snacks: night-time snack (9:00p) (1-2 piece of cupcake snacks)  -beverages:   -take out 2 per week- [employer] (grilled chicken w/ french fries) restaurant    Nutritional Details:   -Food allergies: nkfa  -Food sensitivities: none  -GI concerns: none, does have more than 1 BMs per day (2-3 day)  -Appetite: Per pt better than it should be. Pt stated she wouldn't eat as much if her  didn't eat as much. Doesn't eat small portions.   -Pace of eating: fast eater, unless is in the conversation. Usually the first one done.   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, w/ grandson playground, walks  Activity type: 15,000-16,000 steps (walking around store and lifting), 5000 steps 1 day  Limitations: has a rest day on the weekends       ASSESSED MALNUTRITION STATUS  % Weight Loss:  Weight loss does not meet criteria for malnutrition as it was intentional  % Intake:  Decreased intake does not meet criteria for malnutrition as pt is still meeting estimated nutrition needs  Subcutaneous Fat Loss:  Did not assess  Muscle Loss:  Did not assess  Fluid Retention:  Did not assess    Malnutrition Diagnosis:  Patient does not meet two of the above criteria necessary for diagnosing malnutrition        Nutrition Diagnosis:  Food and nutrition-related knowledge deficit related to hyperglycemia as evidenced by Hgb A1c 6.1 and BMI 32.2      Nutrition Intervention:  Nutrition Prescription Summary: Follow MyPlate style of eating, limit added sugars to < 40g/day, limit saturated fat to < 15g/day, continue active lifestyle, increase fiber intake from whole fruits and vegetables and whole grains.      Nutrition Education (Content):  -Discussed My  Plate meal planning, importance of eating balanced breakfast in the morning    Provided the following handouts: MyPlate Guide      Nutrition Prescription: Macronutrient and Micronutrient details   Energy: 1893 kcals/day ([place] Jeor)    Justification:  (maintenance and activity factor 1.3) Protein: 36-39 g Pro/day (0.55-0.6g/kg)    Justification:  (CKD3)   Fluid: 1975 mL/day   (30 mL/kg)     Justification:  (maintenance)   Fat:   < 15g saturate fat daily       Carbohydrate:   < 40g added sugars daily       Fiber: Women (>50 years): 21 grams per day                      Patient Engagement:   Assessed learning needs and learning preference: No.  Teaching Method(s) used: Booklet / Handout  Explanation    Nutrition Education (Application):  a)  Discussed current eating plans / recommended alternative food choices    b)  Patient verbalizes understanding of diet by adhering to diet recommendations     Anticipate > 80% compliance   Stage of Change:  contemplation  Additional: Very motivated      Nutrition Goals:  1) Go to the gym on Thursdays to get steps in starting this Thursday.   2) Order black coffee from [employer] instead of the Caramel Macchiato starting tomorrow.   3) Will start MyPlate meal planning 7 days per week starting tomorrow (Eat oatmeal with fruit and nuts to have as breakfast in the morining 5x/week, and eggs with toast and fruit 2x/week)      Nutrition Follow Up / Monitoring:   Progress towards goals will be monitored and evaluated per protocol and Practice Guidelines, Food intake, Fluid/beverage intake, Weight, Food and Nutrition Knowledge/Skill, Beliefs and attitudes, Readiness to change nutrition-related behavior and Biochemical data    Nutrition Recommendations:  Patient to follow-up with RD in 8 weeks.  Patient has RD contact information to call/email if needed. RD contact: [phone]      Start time: 8:50a  End time: 9:30a    Total Time Duration: 40 min.

## 2022-08-02 ENCOUNTER — VIRTUAL VISIT (OUTPATIENT)
Dept: RHEUMATOLOGY | Facility: CLINIC | Age: 57
End: 2022-08-02
Payer: COMMERCIAL

## 2022-08-02 DIAGNOSIS — M31.31 GRANULOMATOSIS WITH POLYANGIITIS WITH RENAL INVOLVEMENT (H): Primary | ICD-10-CM

## 2022-08-02 DIAGNOSIS — Z11.59 SCREENING FOR VIRAL DISEASE: ICD-10-CM

## 2022-08-02 DIAGNOSIS — M06.4 INFLAMMATORY POLYARTHROPATHY (H): ICD-10-CM

## 2022-08-02 DIAGNOSIS — Z79.899 HIGH RISK MEDICATIONS (NOT ANTICOAGULANTS) LONG-TERM USE: ICD-10-CM

## 2022-08-02 PROCEDURE — 99214 OFFICE O/P EST MOD 30 MIN: CPT | Mod: GT | Performed by: INTERNAL MEDICINE

## 2022-08-02 NOTE — PROGRESS NOTES
Flavia Brice  is a 57 year old year old who is being evaluated via a billable video visit.      How would you like to obtain your AVS? MyChart  If the video visit is dropped, the invitation should be resent by: Text to cell phone: [phone]   Will anyone else be joining your video visit? No     Rheumatology Video Visit      Flavia Brice MRN# [id number]   YOB: 1965 Age: 57 year old      Date of visit: 8/02/22   PCP: Monet Ackerman    Chief Complaint   Patient presents with:  Granulomatosis with polyangiitis with renal involvement: Has not had infusion yet.    Assessment and Plan     1.  Granulomatosis with polyangiitis: Dx'd 2012 by Dr. Wesley at [facility] when she presented with pulmonary involvement, pericarditis/effusion, borderline aneurysmal dilation of the ascending aorta and biopsy proven necrotizing crescentic nephritis.  Went into remission with cytoxan and prednisone, then maintained on AZA for 2 yrs per patient, stopped when lost to follow-up. Recurrence in 2020 with YOLI, pleuritic chest pain, inflammatory arthritis (MCPs, PIPs, shoulders, knees) that has responded well to prednisone.  She was tx'd with prednisone and rituximab 1g IV received on 8/21/2020 & 9/4/2020, 4/28/2021 (delayed for the COVID-19 vaccine) & (no 2nd dose of rituximab), 11/11/2021 & 11/30/2021.    Currently on azathioprine 100 mg daily and rituximab.  Continue on combination of azathioprine and rituximab.  Or active arthritis symptoms likely due to delay of getting rituximab.  Advised COVID-19 vaccination now, then Evusheld to be 2 weeks afterward.  Discussed the importance of COVID-19 vaccination and Evusheld with regard to rituximab timing.  Chronic illness, progressive.    - Continue azathioprine 100 mg daily  - Re-dose of rituximab (truxima) 1 g IV every 2 weeks for a total of 2 doses, as scheduled to be on 9/1/2022 at 9/15/2022  - Labs now and in 3 months: CBC, CMP, ESR, CRP, UA  - Lab within 1 week  of rituximab infusion: COVID-19 PCR    High risk medication requiring intensive toxicity monitoring at least quarterly: labs ordered include CBC, Creatinine, Hepatic panel to monitor for cytopenia and hepatotoxicity; checking creatinine as it affects clearance of medication.     # Rituximab (Rituxan) Risks and Benefits: The risks and benefits of rituximab were discussed in detail and the patient verbalized understanding.  The risks discussed include, but are not limited to, the risk for hypersensitivity, anaphylaxis, anaphylactoid reactions, fatal infusion reactions, an increased risk for serious infections leading to hospitalization or death, severe mucocutaneous reactions, reactivation of hepatitis B, and development of progressive multifocal leukoencephalopathy (PML) resulting in death.  The most common adverse reactions are infections, nasopharyngitis, urinary tract infections, nausea, diarrhea, headache, muscle spasms, and anemia.  It was discussed that the medication would need to be discontinued if a serious infection develops.  It was discussed that live vaccinations should not be received while using rituximab or within 30 days prior to starting rituximab.  Higher risk for complications from COVID-19 infection when on rituximab was discussed.  I encouraged reviewing the package insert and asking any questions about the medication.       2. Inflammatory arthritis: Related to GPA.  See #1    3. Elevated serum creatinine: Related to GPA.      4. Positive MALLORY (antinuclear antibody): Additional work-up was negative for SSA, SSB, RNP, Smith, dsDNA; complement C3 and C4 were not low.  Symptoms are most likely related to GPA; no MALLORY-associated rheumatologic disorder identified    5.  Aneurysm of the ascending aorta at 4.5 cm; history of heart failure with preserved ejection fraction : following with Dr. Locke (cardiology)    6. Cytoxan use history: needs periodic UA to eval for hematuria, due to increased risk for  bladder cancer with cytoxan exposure    7.  Osteoarthritis: Affecting the DIPs.  Mild symptoms this time.  Consider hand therapy referral in the future if needed    8.  Hyperglycemia: encouraged weight loss. Refer to dietitian. Advised to follow-up with PCP    9. Vaccinations: Vaccinations reviewed with Ms. Brice.    - Influenza: up to date  - COVID-19: has received the Moderna COVID-19 vaccine on 3/19/2021 and 4/16/2021 and 10/19/2021; COVID-19 vaccination as documented in #1. Evusheld as documented in #1; risks and side effects and EUA status reviewed in detail today.    10. Cost of rituxan from 2021: reportedly being billed for 2021 rituxan that insurance was going to cover but then said that they are not.  Previously advised that she speak with [place] financial assistance and [place] infusion financial assistance, and note that she is now receiving Truxima that is covered by her insurance per patient.  Regarding the 2021 bill, the infusion finance team has been unsuccessful in getting a hold of her and I explained with her today.  She was given the contact information for Vy Black, email and phone number.  The patient states that she will call Vy and notify this clinic if she is having any trouble with reading her.  Verbalized understanding that it may be a limited amount of time that she will be able to have this situation adjusted so needs to speak with Vy MAI.  Vy's phone number and email were also put into the after visit summary     Total minutes spent in evaluation with patient, documentation, , and review of pertinent studies and chart notes: 30     Ms. Brice verbalized agreement with and understanding of the rational for the diagnosis and treatment plan.  All questions were answered to best of my ability and the patient's satisfaction. Ms. Brice was advised to contact the clinic with any questions that may arise after the clinic visit.      Thank you for involving  me in the care of the patient    Return to clinic: 3 months      HPI   Flaviaben Brice is a 57 year old female with a past medical history significant for hypertension, depression, and granulomatosis with polyangiitis who is seen for follow-up of granulomatosis with polyangiitis    10/31/2013 [facility] rheumatology clinic note by Dr. Ann Wesley documents severe KS-3 ANCA positive GPA with pulmonary involvement, pericarditis/effusion, borderline aneurysmal dilation of the ascending aorta, necrotizing crescentic nephritis.  Kidney biopsy has confirmed diagnosis in the past.  Has received Solu-Medrol x3, Cytoxan x1 at 1300 mg, on prednisone 60-80 mg daily.  Deteriorated quickly despite those interventions and was hospitalized again, requiring plasmapheresis and dialysis.  Was seeing Dr. Velazquez from [facility] and was again placed on Cytoxan 15 mg/kg every 3 weeks.  Has seen ENT but there was no clear sinus involvement.  She was then transitioned after 7 infusions of Cytoxan to Imuran on 11/2012 and has been doing well on that.      7/11/2020 [facility] hospitalization note: Acute kidney injury, migratory polyarthritis, chronic diastolic heart failure.  Left knee and left hip pain.  Right shoulder pain that migrated to the left shoulder.  No joint swelling.  No fevers.  In the past she had flare of her joint pain that she was treated with prednisone.  Prior to this ED evaluation she reportedly used prednisone 20 mg without improvement.    7/22/2020: [facility] emergency department visit for shortness of breath.  Notes history of granulomatosis with polyangiitis, diastolic heart failure, a sending thoracic aortic aneurysm from 4.6 cm on chest CT 2/18/2020), migratory polyarthritis, acute kidney injury, and hypertension.  Coronavirus test negative.  Chest CT negative for pneumonia.  O2 saturation normal.  Advised to follow-up with her PCP and cardiology.    8/5/2020:  Ms. Brice reported that she  was dx'd with GPA by Dr. Wesley. She was treated and in remission; was on AZA for a year and no issues for a while. Lost to follow-up with Dr. Wesley.  Then 1 year ago started having more shortness of breath and found to have more lung nodules.  Recalls heart failure and kidney failure when first dx'd.  Told to see cardiology to follow the aneurysm.  Then in Jan 2020 started to have pain in her shoulders, knees ankles. Pain was so bad that she went to [facility]; found to have YOLI.  Until able to have this rheumatology evaluation, she says that she was given prednisone to help the joint pain; currently on 5mg daily of prednisone; felt much better when on prednisone 30mg daily. Has been dealing with right knee swelling; the prednisone helped with this.  Joint pains are worse in the AM; improve with time and activity. Morning stiffness is very mild while on prednisone 5mg daily.  No hematuria. No hemoptysis.  Flavia Brice's mother was present during the clinic visit.     9/9/2020: Significant improvement with prednisone and rituximab.  No longer with swelling around her ankles.  Hand swelling/pain/stiffness has resolved.  She feels a little puffy in her face but no actual swelling.  She has followed with her cardiologist and they are going to monitor the thoracic aortic aneurysm.  Tolerated rituximab infusion well.  Currently on prednisone 10 mg daily.  Not going into work at this time; she is taking a leave of absence.    10/21/2020: Improved.  Now with mild pain at her MCPs, DIPs, and knees.  MCP and knee pain is better with activity, worse in the morning.  DIP pain is worse with activity.  Joint pains started again with dose reduction of prednisone.  Has been on azathioprine and tolerated well in the past.    12/9/2020: Significant improvement with regard to her joint pain.  No joint pain now.  No morning stiffness.  No gelling phenomenon.  Tolerating azathioprine well.  Continues on prednisone 5mg daily.  Mild  shortness of breath when walking up stairs that has been stable; no associated chest pain or pressure, palpitations, lightheadedness, dizziness, or nausea and the mild shortness of breath resolves quickly when she rests.  No shortness of breath at rest or currently.  Following with cardiology and plans to repeat her echocardiogram in March 2021.  Last echocardiogram was in February 2020.    4/8/2021: More joint aches and pains and she attributes this to being off of prednisone and having to delay her rituximab infusion secondary to the COVID-19 vaccine.  She has rescheduled the first of the 2 rituximab infusions to be 2 weeks after the second COVID-19 vaccine.  Knees ache more at the end of the day.  No cough, chest pain, shortness of breath.  Has seen cardiology and she says that her aortic aneurysm is stable.    7/7/2021:  doing well this time.  Mild aches of the PIPs and DIPs with more activity that improves with rest.  Usually has pain that travels around but in general is doing okay.  Only received 1 of 2 rituximab doses because her insurance had denied it; she then got a letter about a month later stating that rituximab had been approved.  Tolerating azathioprine and rituximab well.  Overall happy with how well she is doing.  No blood in urine or stool    10/19/2021: Reports that she is doing well.  She says that she is doing better than she was previously, and way better than she was before treatment.  No joint pain or stiffness.  No rash.  Tolerating azathioprine well.  No difficulty doing her daily activities.     1/25/2022: feels the best she has in a long time.  No joint pain or swelling. Morning stiffness for <20.  No rash.  No black or bloody stools.  No cough, chest pain, or shortness of breath.  No hemoptysis.  No hematemesis.  Using a surgical mask at work.    5/4/2022: had covid infxn, resolved >2wks ago. Fasting for labs recently. Joints more achy; due to rtx.  Tolerating aza well. No joint  swelling. Morning stiffness <30 min. +gelling. No hemoptysis.  No blood in urine.     Today, 8/2/2022: Did not get the rituximab infusion but does have a scheduled for 9/1/2022 and 9/15/2022.  More joint pain at the MCPs, PIPs, knees, and MTPs that is worse in the morning and improves with time and activity.  Morning stiffness for about 1 hour.  No rash.  No black or bloody stools.  No hematuria.  No cough, chest pain, or shortness of breath.    Denies fevers, chills, nausea, vomiting. Occasional constipation or diarrhea, unchanged from previous. No abdominal pain. No chest pain/pressure, palpitations, or shortness of breath at this time.  No LE swelling.  No oral or nasal sores.  No rash. No sicca symptoms.  No eye pain or redness.    Tobacco: quit smoking in Feb 2020  EtOH: no more than 1 drink per month  Drugs: none  Occupation: [employer]    ROS   12 point review of system was completed and negative except as noted in the HPI     Active Problem List     Patient Active Problem List   Diagnosis     Wegener's granulomatosis     CARDIOVASCULAR SCREENING; LDL GOAL LESS THAN 130     Overweight     Advanced directives, counseling/discussion     Hypertension goal BP (blood pressure) < 140/90     Situational depression     Granulomatosis with polyangiitis (H)     Chronic kidney disease, stage 3     Former smoker     Menopausal syndrome (hot flashes)     Past Medical History     Past Medical History:   Diagnosis Date     Tinnitus      Past Surgical History     Past Surgical History:   Procedure Laterality Date     COLONOSCOPY WITH CO2 INSUFFLATION N/A 2/18/2019    Procedure: COLONOSCOPY WITH CO2 INSUFFLATION;  Surgeon: Javier Guillen MD;  Location: [facility] OR     GALLBLADDER SURGERY       Allergy   No Known Allergies  Current Medication List     Current Outpatient Medications   Medication Sig     Ascorbic Acid (VITAMIN C) 100 MG CHEW      azaTHIOprine (IMURAN) 50 MG tablet Take 2 tablets (100 mg) by mouth daily     gabapentin  "(NEURONTIN) 300 MG capsule Take 1 capsule (300 mg) by mouth At Bedtime     losartan-hydrochlorothiazide (HYZAAR) 100-25 MG tablet Take 1 tablet by mouth daily     Multiple Vitamins-Minerals (ZINC PO)      nirmatrelvir and ritonavir (PAXLOVID) therapy pack Take 2 tablets by mouth 2 times daily Take 1 tablet of Nirmatelvir and 1 tablet of Ritonavir twice daily for 5 days     VITAMIN D PO      zinc gluconate 50 MG tablet Take 50 mg by mouth daily     No current facility-administered medications for this visit.         Social History   See HPI    Family History     Family History   Problem Relation Age of Onset     Diabetes Father      Heart Disease Maternal Grandmother      Heart Disease Maternal Grandfather      Arthritis Paternal Grandmother      Heart Disease Paternal Grandfather      Grandmother: rheumatoid arthritis    Physical Exam     Temp Readings from Last 3 Encounters:   11/30/21 98.2  F (36.8  C) (Oral)   11/11/21 98.2  F (36.8  C) (Oral)   10/21/21 98.6  F (37  C) (Tympanic)     BP Readings from Last 5 Encounters:   11/30/21 119/78   11/11/21 128/82   10/21/21 (!) 142/80   07/07/21 (!) 163/97   04/28/21 124/68     Pulse Readings from Last 1 Encounters:   11/30/21 65     Resp Readings from Last 1 Encounters:   11/30/21 18     Estimated body mass index is 32.22 kg/m  as calculated from the following:    Height as of 7/7/21: 1.664 m (5' 5.5\").    Weight as of 11/30/21: 89.2 kg (196 lb 9.6 oz).    GEN: NAD.  HEENT: MMM.  Anicteric, noninjected sclera. No obvious external lesions of the ear and nose. Hearing intact.  PULM: No increased work of breathing  MSK:  Hands and wrists without swelling.   SKIN: No rash or jaundice seen  PSYCH: Alert. Appropriate.      Labs / Imaging (select studies)     RF/CCP  Recent Labs   Lab Test 08/05/20  1453   CCPIGG <1   RHF <7     MALLORY  Recent Labs   Lab Test 08/05/20  1453   CAMMIE Positive*   ANAP1 HOMOGENEOUS   ANAT1 1:160     RNP/Sm/SSA/SSB  Recent Labs   Lab Test " 08/11/20  1402   RNPIGG <0.2   SMIGG <0.2   SSAIGG <0.2   SSBIGG <0.2     dsDNA  Recent Labs   Lab Test 08/11/20  1402   DNA <1     C3/C4  Recent Labs   Lab Test 08/11/20  1402   M9CCKMC 133   I9GJHNY 41*     Antiphospholipid Antibodies  Recent Labs   Lab Test 08/11/20  1402   B2GPG 0.8   B2GPM <2.9   CARDG <1.6   CARDM 0.8   LUPINT Negative     ANCA  Recent Labs   Lab Test 08/05/20  1453   PR3IGG 4.8*   MPOIGG <0.2     IgG  Recent Labs   Lab Test 11/11/21  0804 04/28/21  0848 08/05/20  1453   IGG 1,009 863 1,009    116 149   IGM 13* 18* 32*     CBC  Recent Labs   Lab Test 04/16/22  1236 01/23/22  1024 10/14/21  0720 07/07/21  0818 04/24/21  1130 12/03/20  1128   WBC 5.4 6.3 6.0 5.0 5.1 8.3   RBC 3.83 4.25 3.99 3.98 3.82 4.27   HGB 11.8 13.1 12.4 12.2 11.7 13.1   HCT 36.7 41.6 39.0 38.8 37.6 41.3   MCV 96 98 98 98 98 97   RDW 13.3 13.8 14.4 14.9 13.7 14.2    293 288 304 297 347   MCH 30.8 30.8 31.1 30.7 30.6 30.7   MCHC 32.2 31.5 31.8 31.4* 31.1* 31.7   NEUTROPHIL 58 63 56 60.1 63.5 73.6   LYMPH 26 22 29 22.9 20.4 17.7   MONOCYTE 14 12 11 11.2 11.1 6.9   EOSINOPHIL 3 3 4 5.0 4.4 1.3   BASOPHIL 0 0 1 0.8 0.6 0.5   ANEU  --   --   --  3.0 3.2 6.1   ALYM  --   --   --  1.1 1.0 1.5   MARCOS  --   --   --  0.6 0.6 0.6   AEOS  --   --   --  0.3 0.2 0.1   ABAS  --   --   --  0.0 0.0 0.0   ANEUTAUTO 3.1 4.0 3.3  --   --   --    ALYMPAUTO 1.4 1.4 1.7  --   --   --    AMONOAUTO 0.7 0.8 0.7  --   --   --    AEOSAUTO 0.1 0.2 0.2  --   --   --    ABSBASO 0.0 0.0 0.0  --   --   --      CMP  Recent Labs   Lab Test 04/16/22  1236 01/23/22  1024 10/14/21  0720 07/07/21  0818 04/24/21  1130 12/03/20  1128 11/19/20  1123 11/04/20  1509 09/16/20  1438    141 141 142 141  --   --   --  140   POTASSIUM 4.0 4.2 4.5 4.7 4.2  --   --   --  3.9   CHLORIDE 112* 106 108 112* 108  --   --   --  108   CO2 24 26 27 26 28  --   --   --  27   ANIONGAP 8 9 6 4 5  --   --   --  5   * 172* 116* 103* 114*  --   --   --  120*    BUN 39* 33* 29 34* 26  --   --   --  32*   CR 1.58* 1.32* 1.43* 1.44* 1.50* 1.41* 1.45*   < > 1.33*   GFRESTIMATED 38* 47* 41* 40* 38* 41* 40*   < > 44*   GFRESTBLACK  --   --   --  46* 44* 48* 46*   < > 51*   JESSICA 9.3 9.0 8.9 9.6 9.5  --   --   --  9.0   BILITOTAL 0.5 0.3 0.4 0.2 0.4 0.4 0.3   < > 0.3   ALBUMIN 3.7 3.8 3.5 3.6 3.5 3.9 3.7   < > 3.4   PROTTOTAL 7.0 7.2 7.1 7.2 6.7* 7.5 7.4   < > 6.9   ALKPHOS 85 80 84 75 67 80 78   < > 78   AST 18 14 15 21 22 16 13   < > 8   ALT 51* 37 41 49 62* 37 33   < > 28    < > = values in this interval not displayed.       HgA1c  Recent Labs   Lab Test 04/16/22  1236 10/27/17  1541   A1C 6.1* 5.8     Calcium/VitaminD  Recent Labs   Lab Test 04/16/22  1236 01/23/22  1024 10/14/21  0720   JESSICA 9.3 9.0 8.9     ESR/CRP  Recent Labs   Lab Test 04/16/22  1236 01/23/22  1024 10/14/21  0720   SED 13 11 13   CRP 3.1 5.8 8.6*     CK/Aldolase  Recent Labs   Lab Test 08/11/20  1402   CKT 79     TSH/T4  Recent Labs   Lab Test 10/21/21  0920 03/26/18  1058   TSH 1.10 1.44     Lipid Panel  Recent Labs   Lab Test 01/23/22  1024 03/26/18  1058   CHOL 192 188   TRIG 118 93   HDL 74 87   LDL 94 82   NHDL 118 101     Hepatitis B  Recent Labs   Lab Test 04/16/22  1236 08/05/20  1453   HBCAB Nonreactive Nonreactive   HEPBANG Nonreactive Nonreactive     Hepatitis C  Recent Labs   Lab Test 08/05/20  1453 03/26/18  1058   HCVAB Nonreactive Nonreactive     Lyme ab screening  Recent Labs   Lab Test 08/05/20  1453   LYMEGM 0.10       Tuberculosis Screening  Recent Labs   Lab Test 04/16/22  1236 08/05/20  1453   TBRES Negative  --    TBRST  --  Negative     HIV Screening  Recent Labs   Lab Test 08/05/20  1453   HIAGAB Nonreactive     UA  Recent Labs   Lab Test 04/16/22  1236 10/14/21  0720 07/07/21  0818 04/24/21  1137 09/16/20  1442 08/05/20  1453 07/27/20  0938   COLOR Yellow Yellow Yellow   < > Yellow Yellow Yellow   APPEARANCE Clear Clear Clear   < > Clear Clear Clear   URINEGLC Negative Negative  Negative   < > Negative Negative Negative   URINEBILI Negative Negative Negative   < > Negative Negative Negative   SG 1.025 >=1.030 1.025   < > 1.025 1.025 1.015   URINEPH 5.5 5.5 5.0   < > 5.0 5.0 5.0   PROTEIN Negative Trace* Trace*   < > 30* Negative Trace*   UROBILINOGEN 0.2 0.2 0.2   < > 0.2 0.2 0.2   NITRITE Negative Negative Negative   < > Negative Negative Negative   UBLD Negative Trace* Negative   < > Negative Trace* Small*   LEUKEST Negative Trace* Negative   < > Negative Negative Negative   WBCU  --  0-5 0 - 5  --  0 - 5 0 - 5 0 - 5   RBCU  --  None Seen O - 2  --  O - 2 O - 2 2-5*   SQUAMOUSEPI  --   --   --   --  Few Few Few   BACTERIA  --  Few*  --   --  Few* Few* Few*   MUCOUS  --   --   --   --   --  Present*  --     < > = values in this interval not displayed.     Urine Microscopic  Recent Labs   Lab Test 10/14/21  0720 07/07/21  0818 09/16/20  1442 08/05/20  1453 07/27/20  0938   WBCU 0-5 0 - 5 0 - 5 0 - 5 0 - 5   RBCU None Seen O - 2 O - 2 O - 2 2-5*   SQUAMOUSEPI  --   --  Few Few Few   BACTERIA Few*  --  Few* Few* Few*   MUCOUS  --   --   --  Present*  --      Urine Protein  Recent Labs   Lab Test 04/16/22  1236 01/23/22  1024 10/14/21  0720   UTP 0.16 0.13 0.45   UTPG 0.14 0.13 0.23*   UCRR 114 104 198       Immunization History     Immunization History   Administered Date(s) Administered     COVID-19,PF,Moderna 03/19/2021, 04/16/2021, 10/19/2021     Influenza Quad, Recombinant, pf(RIV4) (Flublok) 11/07/2020, 10/19/2021     Influenza Vaccine IM > 6 months Valent IIV4 (Alfuria,Fluzone) 10/31/2013, 02/19/2020     Pneumococcal 23 valent 04/25/2008, 06/28/2012     Tdap (Adacel,Boostrix) 07/02/2012          Chart documentation done in part with Dragon Voice recognition Software. Although reviewed after completion, some word and grammatical error may remain.      Video-Visit Details    Type of service:  Video Visit    Video Start Time: 9:10 AM    Video End Time: 9:27 AM    Originating Location (pt.  Location):  [place], [place]    Distant Location (provider location):  [place]    Platform used for Video Visit: Iman Moreira MD

## 2022-08-02 NOTE — PATIENT INSTRUCTIONS
RHEUMATOLOGY    Dr. oRnaldo Moreira    [facility]  [address] [place] [address] [place] [address]  Phone number: [phone]  Fax number: [phone]      Thank you for choosing [facility]!    Maura Newell CMA Rheumatology    ------------------------------------    Please call to speak with the infusion finance team regarding the April 2021 infusion:     Vy Black    [email or web address]   [phone]    -----------------------------    Please call to schedule an mRNA COVID-19 vaccination now.      Please call [phone] to schedule Evusheld to be 2 weeks after the COVID-19 vaccination    Labs now.    Labs to be within 5 days of the rituximab infusion  Labs in 3 months

## 2022-08-07 ENCOUNTER — LAB (OUTPATIENT)
Dept: LAB | Facility: CLINIC | Age: 57
End: 2022-08-07
Payer: COMMERCIAL

## 2022-08-07 DIAGNOSIS — M31.31 GRANULOMATOSIS WITH POLYANGIITIS WITH RENAL INVOLVEMENT (H): ICD-10-CM

## 2022-08-07 DIAGNOSIS — M06.4 INFLAMMATORY POLYARTHROPATHY (H): ICD-10-CM

## 2022-08-07 DIAGNOSIS — Z79.899 HIGH RISK MEDICATIONS (NOT ANTICOAGULANTS) LONG-TERM USE: ICD-10-CM

## 2022-08-07 LAB
ALBUMIN UR-MCNC: NEGATIVE MG/DL
APPEARANCE UR: CLEAR
BASOPHILS # BLD AUTO: 0 10E3/UL (ref 0–0.2)
BASOPHILS NFR BLD AUTO: 0 %
BILIRUB UR QL STRIP: NEGATIVE
COLOR UR AUTO: YELLOW
EOSINOPHIL # BLD AUTO: 0.3 10E3/UL (ref 0–0.7)
EOSINOPHIL NFR BLD AUTO: 4 %
ERYTHROCYTE [DISTWIDTH] IN BLOOD BY AUTOMATED COUNT: 13.6 % (ref 10–15)
ERYTHROCYTE [SEDIMENTATION RATE] IN BLOOD BY WESTERGREN METHOD: 10 MM/HR (ref 0–30)
GLUCOSE UR STRIP-MCNC: NEGATIVE MG/DL
HCT VFR BLD AUTO: 36.1 % (ref 35–47)
HGB BLD-MCNC: 11.6 G/DL (ref 11.7–15.7)
HGB UR QL STRIP: NEGATIVE
KETONES UR STRIP-MCNC: NEGATIVE MG/DL
LEUKOCYTE ESTERASE UR QL STRIP: NEGATIVE
LYMPHOCYTES # BLD AUTO: 1.9 10E3/UL (ref 0.8–5.3)
LYMPHOCYTES NFR BLD AUTO: 26 %
MCH RBC QN AUTO: 30.9 PG (ref 26.5–33)
MCHC RBC AUTO-ENTMCNC: 32.1 G/DL (ref 31.5–36.5)
MCV RBC AUTO: 96 FL (ref 78–100)
MONOCYTES # BLD AUTO: 0.7 10E3/UL (ref 0–1.3)
MONOCYTES NFR BLD AUTO: 11 %
NEUTROPHILS # BLD AUTO: 4.1 10E3/UL (ref 1.6–8.3)
NEUTROPHILS NFR BLD AUTO: 59 %
NITRATE UR QL: NEGATIVE
PH UR STRIP: 5.5 [PH] (ref 5–7)
PLATELET # BLD AUTO: 258 10E3/UL (ref 150–450)
RBC # BLD AUTO: 3.75 10E6/UL (ref 3.8–5.2)
SP GR UR STRIP: 1.01 (ref 1–1.03)
UROBILINOGEN UR STRIP-ACNC: 0.2 E.U./DL
WBC # BLD AUTO: 7 10E3/UL (ref 4–11)

## 2022-08-07 PROCEDURE — 81003 URINALYSIS AUTO W/O SCOPE: CPT

## 2022-08-07 PROCEDURE — 36415 COLL VENOUS BLD VENIPUNCTURE: CPT

## 2022-08-07 PROCEDURE — 86140 C-REACTIVE PROTEIN: CPT

## 2022-08-07 PROCEDURE — 80053 COMPREHEN METABOLIC PANEL: CPT

## 2022-08-07 PROCEDURE — 85652 RBC SED RATE AUTOMATED: CPT

## 2022-08-07 PROCEDURE — 85025 COMPLETE CBC W/AUTO DIFF WBC: CPT

## 2022-08-08 LAB
ALBUMIN SERPL-MCNC: 3.6 G/DL (ref 3.4–5)
ALP SERPL-CCNC: 75 U/L (ref 40–150)
ALT SERPL W P-5'-P-CCNC: 30 U/L (ref 0–50)
ANION GAP SERPL CALCULATED.3IONS-SCNC: 7 MMOL/L (ref 3–14)
AST SERPL W P-5'-P-CCNC: 14 U/L (ref 0–45)
BILIRUB SERPL-MCNC: 0.4 MG/DL (ref 0.2–1.3)
BUN SERPL-MCNC: 33 MG/DL (ref 7–30)
CALCIUM SERPL-MCNC: 8.9 MG/DL (ref 8.5–10.1)
CHLORIDE BLD-SCNC: 109 MMOL/L (ref 94–109)
CO2 SERPL-SCNC: 25 MMOL/L (ref 20–32)
CREAT SERPL-MCNC: 1.34 MG/DL (ref 0.52–1.04)
CRP SERPL-MCNC: 4.2 MG/L (ref 0–8)
GFR SERPL CREATININE-BSD FRML MDRD: 46 ML/MIN/1.73M2
GLUCOSE BLD-MCNC: 132 MG/DL (ref 70–99)
POTASSIUM BLD-SCNC: 4 MMOL/L (ref 3.4–5.3)
PROT SERPL-MCNC: 7 G/DL (ref 6.8–8.8)
SODIUM SERPL-SCNC: 141 MMOL/L (ref 133–144)

## 2022-08-20 NOTE — LETTER
[facility]  [address]  [place] [address]  Phone: [phone]  Fax: [phone]         April 27, 2021    Regarding:  Flavia Brice                                                                                                          [address] [address]      To Whom It May Concern:    Flavia Brice has Granulomatosis with Polyangiitis diagnosed with 2012, and then recurrence of symptoms in 2020 with renal, cardiac, pulmonary, and joint manifestations. She has severe disease. Symptoms are significantly improved since receiving rituximab on 8/21/2020 & 9/4/2020, but symptoms are not completely controlled at this point.  As you know, therapy with rituximab should be continued at the dose of 1gram IV every 2 weeks for 2 doses, repeated every 6 months for active disease and dose reduction may be considered when the disease is controlled on a case by case basis.  If symptoms are controlled in the future then reducing the rituximab dose to 500mg IV every 2 weeks for 2 doses would be reasonable to consider, but must be individualized. She has a life threatening disease that is improving but not yet completely controlled.  Please reconsider approval of rituximab 1gram IV every 2 weeks for two doses, to be repeated every 6 months for now.     Ronaldo Moreira MD  [facility] Rheumatology  4/27/2021 2:08 PM     Sent from [facility] for facial injury s/p syncopal episode. Pt is on Eliquis

## 2022-09-01 ENCOUNTER — LAB (OUTPATIENT)
Dept: LAB | Facility: CLINIC | Age: 57
End: 2022-09-01
Payer: COMMERCIAL

## 2022-09-01 ENCOUNTER — INFUSION THERAPY VISIT (OUTPATIENT)
Dept: INFUSION THERAPY | Facility: CLINIC | Age: 57
End: 2022-09-01
Payer: COMMERCIAL

## 2022-09-01 VITALS
DIASTOLIC BLOOD PRESSURE: 87 MMHG | TEMPERATURE: 98.2 F | RESPIRATION RATE: 16 BRPM | SYSTOLIC BLOOD PRESSURE: 127 MMHG | HEART RATE: 60 BPM | BODY MASS INDEX: 30.99 KG/M2 | WEIGHT: 189.1 LBS | OXYGEN SATURATION: 98 %

## 2022-09-01 DIAGNOSIS — M31.31 GRANULOMATOSIS WITH POLYANGIITIS WITH RENAL INVOLVEMENT (H): Primary | ICD-10-CM

## 2022-09-01 LAB
CD19 CELLS # BLD: 66 CELLS/UL (ref 107–698)
CD19 CELLS NFR BLD: 5 % (ref 6–27)
HOLD SPECIMEN: NORMAL
IGA SERPL-MCNC: 139 MG/DL (ref 84–499)
IGG SERPL-MCNC: 961 MG/DL (ref 610–1616)
IGM SERPL-MCNC: 14 MG/DL (ref 35–242)

## 2022-09-01 PROCEDURE — 86355 B CELLS TOTAL COUNT: CPT | Performed by: INTERNAL MEDICINE

## 2022-09-01 PROCEDURE — 99207 PR NO CHARGE LOS: CPT

## 2022-09-01 PROCEDURE — 96413 CHEMO IV INFUSION 1 HR: CPT | Performed by: NURSE PRACTITIONER

## 2022-09-01 PROCEDURE — 36415 COLL VENOUS BLD VENIPUNCTURE: CPT | Performed by: INTERNAL MEDICINE

## 2022-09-01 PROCEDURE — 96375 TX/PRO/DX INJ NEW DRUG ADDON: CPT | Performed by: NURSE PRACTITIONER

## 2022-09-01 PROCEDURE — 96415 CHEMO IV INFUSION ADDL HR: CPT | Performed by: NURSE PRACTITIONER

## 2022-09-01 PROCEDURE — 82784 ASSAY IGA/IGD/IGG/IGM EACH: CPT | Performed by: INTERNAL MEDICINE

## 2022-09-01 RX ORDER — METHYLPREDNISOLONE SODIUM SUCCINATE 125 MG/2ML
125 INJECTION, POWDER, LYOPHILIZED, FOR SOLUTION INTRAMUSCULAR; INTRAVENOUS ONCE
Status: CANCELLED | OUTPATIENT
Start: 2022-09-15

## 2022-09-01 RX ORDER — MEPERIDINE HYDROCHLORIDE 25 MG/ML
25 INJECTION INTRAMUSCULAR; INTRAVENOUS; SUBCUTANEOUS EVERY 30 MIN PRN
Status: CANCELLED | OUTPATIENT
Start: 2022-09-15

## 2022-09-01 RX ORDER — DIPHENHYDRAMINE HCL 25 MG
50 CAPSULE ORAL ONCE
Status: CANCELLED
Start: 2022-09-15

## 2022-09-01 RX ORDER — HEPARIN SODIUM (PORCINE) LOCK FLUSH IV SOLN 100 UNIT/ML 100 UNIT/ML
5 SOLUTION INTRAVENOUS
Status: CANCELLED | OUTPATIENT
Start: 2022-09-15

## 2022-09-01 RX ORDER — ACETAMINOPHEN 325 MG/1
650 TABLET ORAL ONCE
Status: CANCELLED
Start: 2022-09-15

## 2022-09-01 RX ORDER — EPINEPHRINE 1 MG/ML
0.3 INJECTION, SOLUTION, CONCENTRATE INTRAVENOUS EVERY 5 MIN PRN
Status: CANCELLED | OUTPATIENT
Start: 2022-09-15

## 2022-09-01 RX ORDER — HEPARIN SODIUM,PORCINE 10 UNIT/ML
5 VIAL (ML) INTRAVENOUS
Status: CANCELLED | OUTPATIENT
Start: 2022-09-15

## 2022-09-01 RX ORDER — DIPHENHYDRAMINE HYDROCHLORIDE 50 MG/ML
50 INJECTION INTRAMUSCULAR; INTRAVENOUS
Status: CANCELLED
Start: 2022-09-15

## 2022-09-01 RX ORDER — ALBUTEROL SULFATE 0.83 MG/ML
2.5 SOLUTION RESPIRATORY (INHALATION)
Status: CANCELLED | OUTPATIENT
Start: 2022-09-15

## 2022-09-01 RX ORDER — ALBUTEROL SULFATE 90 UG/1
1-2 AEROSOL, METERED RESPIRATORY (INHALATION)
Status: CANCELLED
Start: 2022-09-15

## 2022-09-01 RX ORDER — NALOXONE HYDROCHLORIDE 0.4 MG/ML
0.2 INJECTION, SOLUTION INTRAMUSCULAR; INTRAVENOUS; SUBCUTANEOUS
Status: CANCELLED | OUTPATIENT
Start: 2022-09-15

## 2022-09-01 RX ORDER — DIPHENHYDRAMINE HCL 25 MG
50 CAPSULE ORAL ONCE
Status: COMPLETED | OUTPATIENT
Start: 2022-09-01 | End: 2022-09-01

## 2022-09-01 RX ORDER — METHYLPREDNISOLONE SODIUM SUCCINATE 125 MG/2ML
125 INJECTION, POWDER, LYOPHILIZED, FOR SOLUTION INTRAMUSCULAR; INTRAVENOUS ONCE
Status: COMPLETED | OUTPATIENT
Start: 2022-09-01 | End: 2022-09-01

## 2022-09-01 RX ORDER — ACETAMINOPHEN 325 MG/1
650 TABLET ORAL ONCE
Status: COMPLETED | OUTPATIENT
Start: 2022-09-01 | End: 2022-09-01

## 2022-09-01 RX ORDER — METHYLPREDNISOLONE SODIUM SUCCINATE 125 MG/2ML
125 INJECTION, POWDER, LYOPHILIZED, FOR SOLUTION INTRAMUSCULAR; INTRAVENOUS
Status: CANCELLED
Start: 2022-09-15

## 2022-09-01 RX ORDER — EPINEPHRINE 1 MG/ML
0.3 INJECTION, SOLUTION INTRAMUSCULAR; SUBCUTANEOUS EVERY 5 MIN PRN
Status: CANCELLED | OUTPATIENT
Start: 2022-09-15

## 2022-09-01 RX ADMIN — Medication 50 MG: at 08:56

## 2022-09-01 RX ADMIN — ACETAMINOPHEN 650 MG: 325 TABLET ORAL at 08:56

## 2022-09-01 RX ADMIN — METHYLPREDNISOLONE SODIUM SUCCINATE 125 MG: 125 INJECTION INTRAMUSCULAR; INTRAVENOUS at 09:00

## 2022-09-01 RX ADMIN — Medication 250 ML: at 09:01

## 2022-09-01 ASSESSMENT — PAIN SCALES - GENERAL: PAINLEVEL: NO PAIN (0)

## 2022-09-01 NOTE — PROGRESS NOTES
Infusion Nursing Note:  Flavia Ele Brice presents today for Truxima D1-rapid .    Patient seen by provider today: No   present during visit today: Not Applicable.    Note: Ears started itching during infusion, extended 200 ml/hr infusion for extra 10 minutes before increasing to the remainder 400 ml/hr. Itching did not increase, Alley states its very mild and happens every time.     Intravenous Access:  Peripheral IV placed.    Treatment Conditions:  Biological Infusion Checklist:  ~~~ NOTE: If the patient answers yes to any of the questions below, hold the infusion and contact ordering provider or on-call provider.    1. Have you recently had an elevated temperature, fever, chills, productive cough, coughing for 3 weeks or longer or hemoptysis, abnormal vital signs, night sweats,  chest pain or have you noticed a decrease in your appetite, unexplained weight loss or fatigue? No  2. Do you have any open wounds or new incisions? No  3. Do you have any recent or upcoming hospitalizations, surgeries or dental procedures? No  4. Do you currently have or recently have had any signs of illness or infection or are you on any antibiotics? No  5. Have you had any new, sudden or worsening abdominal pain? No  6. Have you or anyone in your household received a live vaccination in the past 4 weeks? Please note:  No live vaccines while on biologic/chemotherapy until 6 months after the last treatment.  Patient can receive the flu vaccine (shot only) and the pneumovax.  It is optimal for the patient to get these vaccines mid cycle, but they can be given at any time as long as it is not on the day of the infusion. No  7. Have you recently been diagnosed with any new nervous system diseases (ie. Multiple sclerosis, Guillain [place], seizures, neurological changes) or cancer diagnosis? No  8. Are you on any form of radiation or chemotherapy? No  9. Have there been any other new onset medical symptoms? No    Post Infusion  Assessment:  Patient tolerated infusion without incident.  Site patent and intact, free from redness, edema or discomfort.  No evidence of extravasations.  Access discontinued per protocol.  Biologic Infusion Post Education: Call the triage nurse at your clinic or seek medical attention if you have chills and/or temperature greater than or equal to 100.5, uncontrolled nausea/vomiting, diarrhea, constipation, dizziness, shortness of breath, chest pain, heart palpitations, weakness or any other new or concerning symptoms, questions or concerns.  You cannot have any live virus vaccines prior to or during treatment or up to 6 months post infusion.  If you have an upcoming surgery, medical procedure or dental procedure during treatment, this should be discussed with your ordering physician and your surgeon/dentist.  If you are having any concerning symptom, if you are unsure if you should get your next infusion or wish to speak to a provider before your next infusion, please call your care coordinator or triage nurse at your clinic to notify them so we can adequately serve you.     Discharge Plan:   Discharge instructions reviewed with: Patient.  Patient and/or family verbalized understanding of discharge instructions and all questions answered.  Patient discharged in stable condition accompanied by: self.  Departure Mode: Ambulatory.  Next apt 9/15 verified with patient     Josseline Winter RN

## 2022-09-03 ENCOUNTER — HEALTH MAINTENANCE LETTER (OUTPATIENT)
Age: 57
End: 2022-09-03

## 2022-09-15 ENCOUNTER — INFUSION THERAPY VISIT (OUTPATIENT)
Dept: INFUSION THERAPY | Facility: CLINIC | Age: 57
End: 2022-09-15
Payer: COMMERCIAL

## 2022-09-15 VITALS
TEMPERATURE: 97.9 F | DIASTOLIC BLOOD PRESSURE: 77 MMHG | RESPIRATION RATE: 16 BRPM | HEART RATE: 63 BPM | BODY MASS INDEX: 31.14 KG/M2 | OXYGEN SATURATION: 92 % | SYSTOLIC BLOOD PRESSURE: 128 MMHG | WEIGHT: 190 LBS

## 2022-09-15 DIAGNOSIS — M31.31 GRANULOMATOSIS WITH POLYANGIITIS WITH RENAL INVOLVEMENT (H): Primary | ICD-10-CM

## 2022-09-15 PROCEDURE — 96375 TX/PRO/DX INJ NEW DRUG ADDON: CPT | Performed by: NURSE PRACTITIONER

## 2022-09-15 PROCEDURE — 96415 CHEMO IV INFUSION ADDL HR: CPT | Performed by: NURSE PRACTITIONER

## 2022-09-15 PROCEDURE — 99207 PR NO CHARGE LOS: CPT

## 2022-09-15 PROCEDURE — 96413 CHEMO IV INFUSION 1 HR: CPT | Performed by: NURSE PRACTITIONER

## 2022-09-15 RX ORDER — ACETAMINOPHEN 325 MG/1
650 TABLET ORAL ONCE
Status: CANCELLED
Start: 2022-09-15

## 2022-09-15 RX ORDER — METHYLPREDNISOLONE SODIUM SUCCINATE 125 MG/2ML
125 INJECTION, POWDER, LYOPHILIZED, FOR SOLUTION INTRAMUSCULAR; INTRAVENOUS ONCE
Status: COMPLETED | OUTPATIENT
Start: 2022-09-15 | End: 2022-09-15

## 2022-09-15 RX ORDER — EPINEPHRINE 1 MG/ML
0.3 INJECTION, SOLUTION INTRAMUSCULAR; SUBCUTANEOUS EVERY 5 MIN PRN
Status: CANCELLED | OUTPATIENT
Start: 2022-09-15

## 2022-09-15 RX ORDER — ALBUTEROL SULFATE 0.83 MG/ML
2.5 SOLUTION RESPIRATORY (INHALATION)
Status: CANCELLED | OUTPATIENT
Start: 2022-09-15

## 2022-09-15 RX ORDER — METHYLPREDNISOLONE SODIUM SUCCINATE 125 MG/2ML
125 INJECTION, POWDER, LYOPHILIZED, FOR SOLUTION INTRAMUSCULAR; INTRAVENOUS
Status: CANCELLED
Start: 2022-09-15

## 2022-09-15 RX ORDER — HEPARIN SODIUM,PORCINE 10 UNIT/ML
5 VIAL (ML) INTRAVENOUS
Status: CANCELLED | OUTPATIENT
Start: 2022-09-15

## 2022-09-15 RX ORDER — MEPERIDINE HYDROCHLORIDE 25 MG/ML
25 INJECTION INTRAMUSCULAR; INTRAVENOUS; SUBCUTANEOUS EVERY 30 MIN PRN
Status: CANCELLED | OUTPATIENT
Start: 2022-09-15

## 2022-09-15 RX ORDER — DIPHENHYDRAMINE HYDROCHLORIDE 50 MG/ML
50 INJECTION INTRAMUSCULAR; INTRAVENOUS
Status: CANCELLED
Start: 2022-09-15

## 2022-09-15 RX ORDER — HEPARIN SODIUM (PORCINE) LOCK FLUSH IV SOLN 100 UNIT/ML 100 UNIT/ML
5 SOLUTION INTRAVENOUS
Status: CANCELLED | OUTPATIENT
Start: 2022-09-15

## 2022-09-15 RX ORDER — DIPHENHYDRAMINE HCL 25 MG
50 CAPSULE ORAL ONCE
Status: CANCELLED
Start: 2022-09-15

## 2022-09-15 RX ORDER — ALBUTEROL SULFATE 90 UG/1
1-2 AEROSOL, METERED RESPIRATORY (INHALATION)
Status: CANCELLED
Start: 2022-09-15

## 2022-09-15 RX ORDER — NALOXONE HYDROCHLORIDE 0.4 MG/ML
0.2 INJECTION, SOLUTION INTRAMUSCULAR; INTRAVENOUS; SUBCUTANEOUS
Status: CANCELLED | OUTPATIENT
Start: 2022-09-15

## 2022-09-15 RX ORDER — DIPHENHYDRAMINE HCL 25 MG
50 CAPSULE ORAL ONCE
Status: COMPLETED | OUTPATIENT
Start: 2022-09-15 | End: 2022-09-15

## 2022-09-15 RX ORDER — ACETAMINOPHEN 325 MG/1
650 TABLET ORAL ONCE
Status: COMPLETED | OUTPATIENT
Start: 2022-09-15 | End: 2022-09-15

## 2022-09-15 RX ORDER — METHYLPREDNISOLONE SODIUM SUCCINATE 125 MG/2ML
125 INJECTION, POWDER, LYOPHILIZED, FOR SOLUTION INTRAMUSCULAR; INTRAVENOUS ONCE
Status: CANCELLED | OUTPATIENT
Start: 2022-09-15

## 2022-09-15 RX ADMIN — Medication 250 ML: at 08:30

## 2022-09-15 RX ADMIN — METHYLPREDNISOLONE SODIUM SUCCINATE 125 MG: 125 INJECTION INTRAMUSCULAR; INTRAVENOUS at 08:30

## 2022-09-15 RX ADMIN — ACETAMINOPHEN 650 MG: 325 TABLET ORAL at 08:26

## 2022-09-15 RX ADMIN — Medication 50 MG: at 08:26

## 2022-09-15 ASSESSMENT — PAIN SCALES - GENERAL: PAINLEVEL: NO PAIN (0)

## 2022-09-15 NOTE — PROGRESS NOTES
Infusion Nursing Note:  Flaviaben Brice presents today for Rapid Truxima.    Patient seen by provider today: No   present during visit today: Not Applicable.    Note: Pt doing well, tolerated 90 minute infusion last time with only slight itching in her ears noted which resolved on it's own before infusion ended.    Intravenous Access:  Peripheral IV placed.    Treatment Conditions:  Biological Infusion Checklist:  ~~~ NOTE: If the patient answers yes to any of the questions below, hold the infusion and contact ordering provider or on-call provider.    1. Have you recently had an elevated temperature, fever, chills, productive cough, coughing for 3 weeks or longer or hemoptysis, abnormal vital signs, night sweats,  chest pain or have you noticed a decrease in your appetite, unexplained weight loss or fatigue? No  2. Do you have any open wounds or new incisions? No  3. Do you have any recent or upcoming hospitalizations, surgeries or dental procedures? No  4. Do you currently have or recently have had any signs of illness or infection or are you on any antibiotics? No  5. Have you had any new, sudden or worsening abdominal pain? No  6. Have you or anyone in your household received a live vaccination in the past 4 weeks? Please note:  No live vaccines while on biologic/chemotherapy until 6 months after the last treatment.  Patient can receive the flu vaccine (shot only) and the pneumovax.  It is optimal for the patient to get these vaccines mid cycle, but they can be given at any time as long as it is not on the day of the infusion. No  7. Have you recently been diagnosed with any new nervous system diseases (ie. Multiple sclerosis, Guillain [place], seizures, neurological changes) or cancer diagnosis? No  8. Are you on any form of radiation or chemotherapy? No  9. Are you pregnant or breast feeding or do you have plans of pregnancy in the future? No  10. Have there been any other new onset medical  symptoms? No  Post Infusion Assessment:  Patient tolerated infusion without incident.  Blood return noted pre and post infusion.  Site patent and intact, free from redness, edema or discomfort.  No evidence of extravasations.  Access discontinued per protocol.  Biologic Infusion Post Education: Call the triage nurse at your clinic or seek medical attention if you have chills and/or temperature greater than or equal to 100.5, uncontrolled nausea/vomiting, diarrhea, constipation, dizziness, shortness of breath, chest pain, heart palpitations, weakness or any other new or concerning symptoms, questions or concerns.  You cannot have any live virus vaccines prior to or during treatment or up to 6 months post infusion.  If you have an upcoming surgery, medical procedure or dental procedure during treatment, this should be discussed with your ordering physician and your surgeon/dentist.  If you are having any concerning symptom, if you are unsure if you should get your next infusion or wish to speak to a provider before your next infusion, please call your care coordinator or triage nurse at your clinic to notify them so we can adequately serve you.     Discharge Plan:   Discharge instructions reviewed with: Patient.  Patient and/or family verbalized understanding of discharge instructions and all questions answered.  AVS to patient via [employer].  Patient will return in 6 months for next appointment.   Patient discharged in stable condition accompanied by: self.  Departure Mode: Ambulatory.      Alexa Freeman RN

## 2022-11-08 ENCOUNTER — VIRTUAL VISIT (OUTPATIENT)
Dept: RHEUMATOLOGY | Facility: CLINIC | Age: 57
End: 2022-11-08
Payer: COMMERCIAL

## 2022-11-08 DIAGNOSIS — M31.31 GRANULOMATOSIS WITH POLYANGIITIS WITH RENAL INVOLVEMENT (H): Primary | ICD-10-CM

## 2022-11-08 DIAGNOSIS — Z79.899 HIGH RISK MEDICATIONS (NOT ANTICOAGULANTS) LONG-TERM USE: ICD-10-CM

## 2022-11-08 DIAGNOSIS — M06.4 INFLAMMATORY POLYARTHROPATHY (H): ICD-10-CM

## 2022-11-08 PROCEDURE — 99214 OFFICE O/P EST MOD 30 MIN: CPT | Mod: GT | Performed by: INTERNAL MEDICINE

## 2022-11-08 RX ORDER — HEPARIN SODIUM (PORCINE) LOCK FLUSH IV SOLN 100 UNIT/ML 100 UNIT/ML
5 SOLUTION INTRAVENOUS
Status: CANCELLED | OUTPATIENT
Start: 2023-03-15

## 2022-11-08 RX ORDER — EPINEPHRINE 1 MG/ML
0.3 INJECTION, SOLUTION, CONCENTRATE INTRAVENOUS EVERY 5 MIN PRN
Status: CANCELLED | OUTPATIENT
Start: 2023-03-15

## 2022-11-08 RX ORDER — MEPERIDINE HYDROCHLORIDE 25 MG/ML
25 INJECTION INTRAMUSCULAR; INTRAVENOUS; SUBCUTANEOUS EVERY 30 MIN PRN
Status: CANCELLED | OUTPATIENT
Start: 2023-03-15

## 2022-11-08 RX ORDER — ALBUTEROL SULFATE 0.83 MG/ML
2.5 SOLUTION RESPIRATORY (INHALATION)
Status: CANCELLED | OUTPATIENT
Start: 2023-03-15

## 2022-11-08 RX ORDER — METHYLPREDNISOLONE SODIUM SUCCINATE 125 MG/2ML
125 INJECTION, POWDER, LYOPHILIZED, FOR SOLUTION INTRAMUSCULAR; INTRAVENOUS ONCE
Status: CANCELLED | OUTPATIENT
Start: 2023-03-15

## 2022-11-08 RX ORDER — ACETAMINOPHEN 325 MG/1
650 TABLET ORAL ONCE
Status: CANCELLED
Start: 2023-03-15

## 2022-11-08 RX ORDER — DIPHENHYDRAMINE HYDROCHLORIDE 50 MG/ML
50 INJECTION INTRAMUSCULAR; INTRAVENOUS
Status: CANCELLED
Start: 2023-03-15

## 2022-11-08 RX ORDER — DIPHENHYDRAMINE HCL 25 MG
50 CAPSULE ORAL ONCE
Status: CANCELLED
Start: 2023-03-15

## 2022-11-08 RX ORDER — ALBUTEROL SULFATE 90 UG/1
1-2 AEROSOL, METERED RESPIRATORY (INHALATION)
Status: CANCELLED
Start: 2023-03-15

## 2022-11-08 RX ORDER — HEPARIN SODIUM,PORCINE 10 UNIT/ML
5 VIAL (ML) INTRAVENOUS
Status: CANCELLED | OUTPATIENT
Start: 2023-03-15

## 2022-11-08 RX ORDER — METHYLPREDNISOLONE SODIUM SUCCINATE 125 MG/2ML
125 INJECTION, POWDER, LYOPHILIZED, FOR SOLUTION INTRAMUSCULAR; INTRAVENOUS
Status: CANCELLED
Start: 2023-03-15

## 2022-11-08 NOTE — PATIENT INSTRUCTIONS
RHEUMATOLOGY    Dr. Ronaldo Moreira    [facility]  [address] [place] [address]  [place] [address]  Phone number: [phone]  Fax number: [phone]    You may schedule your FLU shot by calling [phone] or if you would like to get your shot at a [facility] pharmacy you may schedule online at [email or web address].    Thank you for choosing [facility]!    Maura Newell CMA Rheumatology

## 2022-11-08 NOTE — PROGRESS NOTES
Flavia Brice  is a 57 year old year old who is being evaluated via a billable video visit.      How would you like to obtain your AVS? MyChart  If the video visit is dropped, the invitation should be resent by: Text to cell phone: [phone]   Will anyone else be joining your video visit? No     Rheumatology Video Visit      Flavia Brice MRN# [id number]   YOB: 1965 Age: 57 year old      Date of visit: 11/08/22   PCP: Monet Ackerman    Chief Complaint   Patient presents with:  Granulomatosis with polyangiitis with renal involvement    Assessment and Plan     1.  Granulomatosis with polyangiitis: Dx'd 2012 by Dr. Wesley at [facility] when she presented with pulmonary involvement, pericarditis/effusion, borderline aneurysmal dilation of the ascending aorta and biopsy proven necrotizing crescentic nephritis.  Went into remission with cytoxan and prednisone, then maintained on AZA for 2 yrs per patient, stopped when lost to follow-up. Recurrence in 2020 with YOLI, pleuritic chest pain, inflammatory arthritis (MCPs, PIPs, shoulders, knees) that has responded well to prednisone.  She was tx'd with prednisone and rituximab 1g IV received on 8/21/2020 & 9/4/2020, 4/28/2021 (delayed for the COVID-19 vaccine) & (no 2nd dose of rituximab), 11/11/2021 & 11/30/2021, 9/1/2022 & 9/15/2022.    Currently on azathioprine 100 mg daily and rituximab.  Continue on combination of azathioprine and rituximab.    Because she has been doing well, we will try reducing rituximab to be 1 g IV every 6 months.  Also advised that she receive Evusheld and she will consider and possibly call to schedule.    Chronic illness, stable.    - Continue azathioprine 100 mg daily  - Re-dose of rituximab (truxima) 1 g IV every 6 months, next due 3/15/2023  - Labs now: CBC, creatinine, hepatic panel, ESR, CRP, UA  - Labs in 3 months: CBC, creatinine, hepatic panel, ESR, CRP, UA, QuantiFERON-TB gold plus  - Lab within 1 week of  rituximab infusion: COVID-19 PCR    High risk medication requiring intensive toxicity monitoring at least quarterly: labs ordered include CBC, Creatinine, Hepatic panel to monitor for cytopenia and hepatotoxicity; checking creatinine as it affects clearance of medication.     # Rituximab (Rituxan) Risks and Benefits: The risks and benefits of rituximab were discussed in detail and the patient verbalized understanding.  The risks discussed include, but are not limited to, the risk for hypersensitivity, anaphylaxis, anaphylactoid reactions, fatal infusion reactions, an increased risk for serious infections leading to hospitalization or death, severe mucocutaneous reactions, reactivation of hepatitis B, and development of progressive multifocal leukoencephalopathy (PML) resulting in death.  The most common adverse reactions are infections, nasopharyngitis, urinary tract infections, nausea, diarrhea, headache, muscle spasms, and anemia.  It was discussed that the medication would need to be discontinued if a serious infection develops.  It was discussed that live vaccinations should not be received while using rituximab or within 30 days prior to starting rituximab.  Higher risk for complications from COVID-19 infection when on rituximab was discussed.  I encouraged reviewing the package insert and asking any questions about the medication.       2. Inflammatory arthritis: Related to GPA.  See #1    3. Elevated serum creatinine: Related to GPA.      4. Positive MALLORY (antinuclear antibody): Additional work-up was negative for SSA, SSB, RNP, Smith, dsDNA; complement C3 and C4 were not low.  Symptoms are most likely related to GPA; no MALLORY-associated rheumatologic disorder identified    5.  Aneurysm of the ascending aorta at 4.5 cm; history of heart failure with preserved ejection fraction : following with Dr. Locke (cardiology)    6. Cytoxan use history: needs periodic UA to eval for hematuria, due to increased risk for  bladder cancer with cytoxan exposure    7.  Osteoarthritis: Affecting the DIPs.  Mild symptoms this time.  Consider hand therapy referral in the future if needed    8.  Hyperglycemia: encouraged weight loss.  Advised to follow-up with PCP    9. Vaccinations: Vaccinations reviewed with Ms. Brice.    - Influenza: Advised receiving in late November or early December  - COVID-19: has received the Moderna COVID-19 vaccine on 3/19/2021 and 4/16/2021 and 10/19/2021; and reportedly end of August 2022.  Advised that she receive the updated COVID-19 vaccination in January 2023.  Evusheld as documented in #1; risks and side effects and EUA status reviewed in detail today.    Total minutes spent in evaluation with patient, documentation, , and review of pertinent studies and chart notes: 20     Ms. Brice verbalized agreement with and understanding of the rational for the diagnosis and treatment plan.  All questions were answered to best of my ability and the patient's satisfaction. Ms. Brice was advised to contact the clinic with any questions that may arise after the clinic visit.      Thank you for involving me in the care of the patient    Return to clinic: 3-4 months months      HPI   Flaivaabigail Brice is a 57 year old female with a past medical history significant for hypertension, depression, and granulomatosis with polyangiitis who is seen for follow-up of granulomatosis with polyangiitis    10/31/2013 [facility] rheumatology clinic note by Dr. Ann Wesley documents severe [place]-3 ANCA positive GPA with pulmonary involvement, pericarditis/effusion, borderline aneurysmal dilation of the ascending aorta, necrotizing crescentic nephritis.  Kidney biopsy has confirmed diagnosis in the past.  Has received Solu-Medrol x3, Cytoxan x1 at 1300 mg, on prednisone 60-80 mg daily.  Deteriorated quickly despite those interventions and was hospitalized again, requiring plasmapheresis and dialysis.  Was seeing Dr. Velazquez  from [facility] and was again placed on Cytoxan 15 mg/kg every 3 weeks.  Has seen ENT but there was no clear sinus involvement.  She was then transitioned after 7 infusions of Cytoxan to Imuran on 11/2012 and has been doing well on that.      7/11/2020 [facility] hospitalization note: Acute kidney injury, migratory polyarthritis, chronic diastolic heart failure.  Left knee and left hip pain.  Right shoulder pain that migrated to the left shoulder.  No joint swelling.  No fevers.  In the past she had flare of her joint pain that she was treated with prednisone.  Prior to this ED evaluation she reportedly used prednisone 20 mg without improvement.    7/22/2020: [facility] emergency department visit for shortness of breath.  Notes history of granulomatosis with polyangiitis, diastolic heart failure, a sending thoracic aortic aneurysm from 4.6 cm on chest CT 2/18/2020), migratory polyarthritis, acute kidney injury, and hypertension.  Coronavirus test negative.  Chest CT negative for pneumonia.  O2 saturation normal.  Advised to follow-up with her PCP and cardiology.    8/5/2020:  Ms. Brice reported that she was dx'd with GPA by Dr. Wesley. She was treated and in remission; was on AZA for a year and no issues for a while. Lost to follow-up with Dr. Wesley.  Then 1 year ago started having more shortness of breath and found to have more lung nodules.  Recalls heart failure and kidney failure when first dx'd.  Told to see cardiology to follow the aneurysm.  Then in Jan 2020 started to have pain in her shoulders, knees ankles. Pain was so bad that she went to [facility]; found to have YOLI.  Until able to have this rheumatology evaluation, she says that she was given prednisone to help the joint pain; currently on 5mg daily of prednisone; felt much better when on prednisone 30mg daily. Has been dealing with right knee swelling; the prednisone helped with this.  Joint pains are worse in the AM; improve with time  and activity. Morning stiffness is very mild while on prednisone 5mg daily.  No hematuria. No hemoptysis.  Flavia Brice's mother was present during the clinic visit.     9/9/2020: Significant improvement with prednisone and rituximab.  No longer with swelling around her ankles.  Hand swelling/pain/stiffness has resolved.  She feels a little puffy in her face but no actual swelling.  She has followed with her cardiologist and they are going to monitor the thoracic aortic aneurysm.  Tolerated rituximab infusion well.  Currently on prednisone 10 mg daily.  Not going into work at this time; she is taking a leave of absence.    10/21/2020: Improved.  Now with mild pain at her MCPs, DIPs, and knees.  MCP and knee pain is better with activity, worse in the morning.  DIP pain is worse with activity.  Joint pains started again with dose reduction of prednisone.  Has been on azathioprine and tolerated well in the past.    12/9/2020: Significant improvement with regard to her joint pain.  No joint pain now.  No morning stiffness.  No gelling phenomenon.  Tolerating azathioprine well.  Continues on prednisone 5mg daily.  Mild shortness of breath when walking up stairs that has been stable; no associated chest pain or pressure, palpitations, lightheadedness, dizziness, or nausea and the mild shortness of breath resolves quickly when she rests.  No shortness of breath at rest or currently.  Following with cardiology and plans to repeat her echocardiogram in March 2021.  Last echocardiogram was in February 2020.    4/8/2021: More joint aches and pains and she attributes this to being off of prednisone and having to delay her rituximab infusion secondary to the COVID-19 vaccine.  She has rescheduled the first of the 2 rituximab infusions to be 2 weeks after the second COVID-19 vaccine.  Knees ache more at the end of the day.  No cough, chest pain, shortness of breath.  Has seen cardiology and she says that her aortic aneurysm  is stable.    7/7/2021:  doing well this time.  Mild aches of the PIPs and DIPs with more activity that improves with rest.  Usually has pain that travels around but in general is doing okay.  Only received 1 of 2 rituximab doses because her insurance had denied it; she then got a letter about a month later stating that rituximab had been approved.  Tolerating azathioprine and rituximab well.  Overall happy with how well she is doing.  No blood in urine or stool    10/19/2021: Reports that she is doing well.  She says that she is doing better than she was previously, and way better than she was before treatment.  No joint pain or stiffness.  No rash.  Tolerating azathioprine well.  No difficulty doing her daily activities.     1/25/2022: feels the best she has in a long time.  No joint pain or swelling. Morning stiffness for <20.  No rash.  No black or bloody stools.  No cough, chest pain, or shortness of breath.  No hemoptysis.  No hematemesis.  Using a surgical mask at work.    5/4/2022: had covid infxn, resolved >2wks ago. Fasting for labs recently. Joints more achy; due to rtx.  Tolerating aza well. No joint swelling. Morning stiffness <30 min. +gelling. No hemoptysis.  No blood in urine.     8/2/2022: Did not get the rituximab infusion but does have a scheduled for 9/1/2022 and 9/15/2022.  More joint pain at the MCPs, PIPs, knees, and MTPs that is worse in the morning and improves with time and activity.  Morning stiffness for about 1 hour.  No rash.  No black or bloody stools.  No hematuria.  No cough, chest pain, or shortness of breath.    Today, 11/8/2022: Stiffness throughout 20 minutes.  Joints are doing well with only occasional MCP and PIP ache for the first 20 minutes of the morning.  No gelling phenomenon.  Tolerating azathioprine well.  No rash.  No hematuria.  No black or bloody stools.  No hemoptysis or hematemesis.    Denies fevers, chills, nausea, vomiting. Occasional constipation or diarrhea,  unchanged from previous. No abdominal pain. No chest pain/pressure, palpitations, or shortness of breath at this time.  No LE swelling.  No oral or nasal sores.  No rash. No sicca symptoms.  No eye pain or redness.    Tobacco: quit smoking in Feb 2020  EtOH: no more than 1 drink per month  Drugs: none  Occupation: [employer]    ROS   12 point review of system was completed and negative except as noted in the HPI     Active Problem List     Patient Active Problem List   Diagnosis     Wegener's granulomatosis     CARDIOVASCULAR SCREENING; LDL GOAL LESS THAN 130     Overweight     Advanced directives, counseling/discussion     Hypertension goal BP (blood pressure) < 140/90     Situational depression     Granulomatosis with polyangiitis (H)     Chronic kidney disease, stage 3     Former smoker     Menopausal syndrome (hot flashes)     Past Medical History     Past Medical History:   Diagnosis Date     Tinnitus      Past Surgical History     Past Surgical History:   Procedure Laterality Date     COLONOSCOPY WITH CO2 INSUFFLATION N/A 2/18/2019    Procedure: COLONOSCOPY WITH CO2 INSUFFLATION;  Surgeon: Javier Guillen MD;  Location: [facility] OR     GALLBLADDER SURGERY       Allergy   No Known Allergies  Current Medication List     Current Outpatient Medications   Medication Sig     Ascorbic Acid (VITAMIN C) 100 MG CHEW      azaTHIOprine (IMURAN) 50 MG tablet Take 2 tablets (100 mg) by mouth daily     losartan-hydrochlorothiazide (HYZAAR) 100-25 MG tablet Take 1 tablet by mouth daily     Multiple Vitamins-Minerals (ZINC PO)      VITAMIN D PO      zinc gluconate 50 MG tablet Take 50 mg by mouth daily     gabapentin (NEURONTIN) 300 MG capsule Take 1 capsule (300 mg) by mouth At Bedtime (Patient not taking: Reported on 9/15/2022)     No current facility-administered medications for this visit.         Social History   See HPI    Family History     Family History   Problem Relation Age of Onset     Diabetes Father      Heart Disease  "Maternal Grandmother      Heart Disease Maternal Grandfather      Arthritis Paternal Grandmother      Heart Disease Paternal Grandfather      Grandmother: rheumatoid arthritis    Physical Exam     Temp Readings from Last 3 Encounters:   09/15/22 97.9  F (36.6  C) (Oral)   09/01/22 98.2  F (36.8  C)   11/30/21 98.2  F (36.8  C) (Oral)     BP Readings from Last 5 Encounters:   09/15/22 128/77   09/01/22 127/87   11/30/21 119/78   11/11/21 128/82   10/21/21 (!) 142/80     Pulse Readings from Last 1 Encounters:   09/15/22 63     Resp Readings from Last 1 Encounters:   09/15/22 16     Estimated body mass index is 31.14 kg/m  as calculated from the following:    Height as of 7/7/21: 1.664 m (5' 5.5\").    Weight as of 9/15/22: 86.2 kg (190 lb).    GEN: NAD.  HEENT: MMM.  Anicteric, noninjected sclera. No obvious external lesions of the ear and nose. Hearing intact.  PULM: No increased work of breathing  MSK:  Hands and wrists without swelling.   SKIN: No rash or jaundice seen  PSYCH: Alert. Appropriate.      Labs / Imaging (select studies)   RF/CCP  Recent Labs   Lab Test 08/05/20  1453   CCPIGG <1   RHF <7     MALLORY  Recent Labs   Lab Test 08/05/20  1453   CAMMIE Positive*   ANAP1 HOMOGENEOUS   ANAT1 1:160     RNP/Sm/SSA/SSB  Recent Labs   Lab Test 08/11/20  1402   RNPIGG <0.2   SMIGG <0.2   SSAIGG <0.2   SSBIGG <0.2     dsDNA  Recent Labs   Lab Test 08/11/20  1402   DNA <1     C3/C4  Recent Labs   Lab Test 08/11/20  1402   G3VAENF 133   Q4PRWNC 41*     Antiphospholipid Antibodies  Recent Labs   Lab Test 08/11/20  1402   B2GPG 0.8   B2GPM <2.9   CARDG <1.6   CARDM 0.8   LUPINT Negative     ANCA  Recent Labs   Lab Test 08/05/20  1453   PR3IGG 4.8*   MPOIGG <0.2     IgG  Recent Labs   Lab Test 09/01/22  0815 11/11/21  0804 04/28/21  0848    1,009 863    138 116   IGM 14* 13* 18*     CBC  Recent Labs   Lab Test 08/07/22  1604 04/16/22  1236 01/23/22  1024 10/14/21  0720 07/07/21  0818 04/24/21  1130 12/03/20  1128 "   WBC 7.0 5.4 6.3   < > 5.0 5.1 8.3   RBC 3.75* 3.83 4.25   < > 3.98 3.82 4.27   HGB 11.6* 11.8 13.1   < > 12.2 11.7 13.1   HCT 36.1 36.7 41.6   < > 38.8 37.6 41.3   MCV 96 96 98   < > 98 98 97   RDW 13.6 13.3 13.8   < > 14.9 13.7 14.2    330 293   < > 304 297 347   MCH 30.9 30.8 30.8   < > 30.7 30.6 30.7   MCHC 32.1 32.2 31.5   < > 31.4* 31.1* 31.7   NEUTROPHIL 59 58 63   < > 60.1 63.5 73.6   LYMPH 26 26 22   < > 22.9 20.4 17.7   MONOCYTE 11 14 12   < > 11.2 11.1 6.9   EOSINOPHIL 4 3 3   < > 5.0 4.4 1.3   BASOPHIL 0 0 0   < > 0.8 0.6 0.5   ANEU  --   --   --   --  3.0 3.2 6.1   ALYM  --   --   --   --  1.1 1.0 1.5   MARCOS  --   --   --   --  0.6 0.6 0.6   AEOS  --   --   --   --  0.3 0.2 0.1   ABAS  --   --   --   --  0.0 0.0 0.0   ANEUTAUTO 4.1 3.1 4.0   < >  --   --   --    ALYMPAUTO 1.9 1.4 1.4   < >  --   --   --    AMONOAUTO 0.7 0.7 0.8   < >  --   --   --    AEOSAUTO 0.3 0.1 0.2   < >  --   --   --    ABSBASO 0.0 0.0 0.0   < >  --   --   --     < > = values in this interval not displayed.     CMP  Recent Labs   Lab Test 08/07/22  1604 04/16/22  1236 01/23/22  1024 10/14/21  0720 07/07/21  0818 04/24/21  1130 12/03/20  1128 11/19/20  1123    144 141 141 142 141  --   --    POTASSIUM 4.0 4.0 4.2 4.5 4.7 4.2  --   --    CHLORIDE 109 112* 106 108 112* 108  --   --    CO2 25 24 26 27 26 28  --   --    ANIONGAP 7 8 9 6 4 5  --   --    * 125* 172* 116* 103* 114*  --   --    BUN 33* 39* 33* 29 34* 26  --   --    CR 1.34* 1.58* 1.32* 1.43* 1.44* 1.50* 1.41* 1.45*   GFRESTIMATED 46* 38* 47* 41* 40* 38* 41* 40*   GFRESTBLACK  --   --   --   --  46* 44* 48* 46*   JESSICA 8.9 9.3 9.0 8.9 9.6 9.5  --   --    BILITOTAL 0.4 0.5 0.3 0.4 0.2 0.4 0.4 0.3   ALBUMIN 3.6 3.7 3.8 3.5 3.6 3.5 3.9 3.7   PROTTOTAL 7.0 7.0 7.2 7.1 7.2 6.7* 7.5 7.4   ALKPHOS 75 85 80 84 75 67 80 78   AST 14 18 14 15 21 22 16 13   ALT 30 51* 37 41 49 62* 37 33     HgA1c  Recent Labs   Lab Test 04/16/22  1236 10/27/17  1541   A1C 6.1* 5.8      Calcium/VitaminD  Recent Labs   Lab Test 08/07/22  1604 04/16/22  1236 01/23/22  1024   JESSICA 8.9 9.3 9.0     ESR/CRP  Recent Labs   Lab Test 08/07/22  1604 04/16/22  1236 01/23/22  1024   SED 10 13 11   CRP 4.2 3.1 5.8     CK/Aldolase  Recent Labs   Lab Test 08/11/20  1402   CKT 79     TSH/T4  Recent Labs   Lab Test 10/21/21  0920 03/26/18  1058   TSH 1.10 1.44     Lipid Panel  Recent Labs   Lab Test 01/23/22  1024 03/26/18  1058   CHOL 192 188   TRIG 118 93   HDL 74 87   LDL 94 82   NHDL 118 101     Hepatitis B  Recent Labs   Lab Test 04/16/22  1236 08/05/20  1453   HBCAB Nonreactive Nonreactive   HEPBANG Nonreactive Nonreactive     Hepatitis C  Recent Labs   Lab Test 08/05/20  1453 03/26/18  1058   HCVAB Nonreactive Nonreactive     Lyme ab screening  Recent Labs   Lab Test 08/05/20  1453   LYMEGM 0.10     Tuberculosis Screening  Recent Labs   Lab Test 04/16/22  1236 08/05/20  1453   TBRES Negative  --    TBRST  --  Negative     HIV Screening  Recent Labs   Lab Test 08/05/20  1453   HIAGAB Nonreactive     UA  Recent Labs   Lab Test 08/07/22  1604 04/16/22  1236 10/14/21  0720 07/07/21  0818 04/24/21  1137 09/16/20  1442 08/05/20  1453 07/27/20  0938   COLOR Yellow Yellow Yellow Yellow   < > Yellow Yellow Yellow   APPEARANCE Clear Clear Clear Clear   < > Clear Clear Clear   URINEGLC Negative Negative Negative Negative   < > Negative Negative Negative   URINEBILI Negative Negative Negative Negative   < > Negative Negative Negative   SG 1.015 1.025 >=1.030 1.025   < > 1.025 1.025 1.015   URINEPH 5.5 5.5 5.5 5.0   < > 5.0 5.0 5.0   PROTEIN Negative Negative Trace* Trace*   < > 30* Negative Trace*   UROBILINOGEN 0.2 0.2 0.2 0.2   < > 0.2 0.2 0.2   NITRITE Negative Negative Negative Negative   < > Negative Negative Negative   UBLD Negative Negative Trace* Negative   < > Negative Trace* Small*   LEUKEST Negative Negative Trace* Negative   < > Negative Negative Negative   WBCU  --   --  0-5 0 - 5  --  0 - 5 0 - 5 0 - 5    RBCU  --   --  None Seen O - 2  --  O - 2 O - 2 2-5*   SQUAMOUSEPI  --   --   --   --   --  Few Few Few   BACTERIA  --   --  Few*  --   --  Few* Few* Few*   MUCOUS  --   --   --   --   --   --  Present*  --     < > = values in this interval not displayed.     Urine Microscopic  Recent Labs   Lab Test 10/14/21  0720 07/07/21  0818 09/16/20  1442 08/05/20  1453 07/27/20  0938   WBCU 0-5 0 - 5 0 - 5 0 - 5 0 - 5   RBCU None Seen O - 2 O - 2 O - 2 2-5*   SQUAMOUSEPI  --   --  Few Few Few   BACTERIA Few*  --  Few* Few* Few*   MUCOUS  --   --   --  Present*  --      Urine Protein  GHUTP and UTP= Urine protein (random), GHUTPG and UTPG = urine protein:creatinine ratio (random), UCRR = urine creatinine (random)  Recent Labs   Lab Test 04/16/22  1236 01/23/22  1024 10/14/21  0720   UTP 0.16 0.13 0.45   UTPG 0.14 0.13 0.23*   UCRR 114 104 198       Immunization History     Immunization History   Administered Date(s) Administered     COVID-19,PF,Moderna 03/19/2021, 04/16/2021, 10/19/2021     Influenza Quad, Recombinant, pf(RIV4) (Flublok) 11/07/2020, 10/19/2021     Influenza Vaccine IM > 6 months Valent IIV4 (Alfuria,Fluzone) 10/31/2013, 02/19/2020     Pneumococcal 23 valent 04/25/2008, 06/28/2012     Tdap (Adacel,Boostrix) 07/02/2012          Chart documentation done in part with Dragon Voice recognition Software. Although reviewed after completion, some word and grammatical error may remain.      Video-Visit Details    Type of service:  Video Visit    Video Start Time: 10:33 AM    Video End Time:10:47 AM    Originating Location (pt. Location): [place], [place]    Distant Location (provider location): off-site [place]    Platform used for Video Visit: Rusty Moreira MD

## 2022-11-09 ENCOUNTER — MYC MEDICAL ADVICE (OUTPATIENT)
Dept: FAMILY MEDICINE | Facility: CLINIC | Age: 57
End: 2022-11-09

## 2022-12-08 ENCOUNTER — TELEPHONE (OUTPATIENT)
Dept: CARDIOLOGY | Facility: CLINIC | Age: 57
End: 2022-12-08

## 2022-12-12 ENCOUNTER — TELEPHONE (OUTPATIENT)
Dept: CARDIOLOGY | Facility: CLINIC | Age: 57
End: 2022-12-12

## 2022-12-13 NOTE — TELEPHONE ENCOUNTER
Left Voicemail (2nd Attempt) and Sent Mychart (2nd Attempt) for the patient to call back and schedule the following:    Appointment type: Cardiology- Return General  Provider: Can  Return date: 03/8/23  Specialty phone number: [phone]  Additional appointment(s) needed: echo prior  Additonal Notes: none    Florencio Hughes, Visit Facilitator/MA.

## 2023-02-17 DIAGNOSIS — I10 HYPERTENSION GOAL BP (BLOOD PRESSURE) < 140/90: ICD-10-CM

## 2023-02-17 RX ORDER — LOSARTAN POTASSIUM AND HYDROCHLOROTHIAZIDE 25; 100 MG/1; MG/1
TABLET ORAL
Qty: 30 TABLET | Refills: 0 | OUTPATIENT
Start: 2023-02-17

## 2023-02-17 NOTE — TELEPHONE ENCOUNTER
Sent in 30-day supply of medication because patient has not been seen in more than a year.  Her insurance denied and said her insurance requires 90-day refill.  She will need to do some type of visit for refills then.  Can do MyChart E-visit if needing medication urgently or virtual visit or in person visit.  Please notify KELLY Sparks, FNP-BC

## 2023-02-28 ENCOUNTER — OFFICE VISIT (OUTPATIENT)
Dept: RHEUMATOLOGY | Facility: CLINIC | Age: 58
End: 2023-02-28
Payer: COMMERCIAL

## 2023-02-28 VITALS
OXYGEN SATURATION: 98 % | SYSTOLIC BLOOD PRESSURE: 138 MMHG | WEIGHT: 187.6 LBS | DIASTOLIC BLOOD PRESSURE: 83 MMHG | HEART RATE: 64 BPM | BODY MASS INDEX: 30.74 KG/M2

## 2023-02-28 DIAGNOSIS — Z78.0 POST-MENOPAUSAL: ICD-10-CM

## 2023-02-28 DIAGNOSIS — Z79.899 HIGH RISK MEDICATIONS (NOT ANTICOAGULANTS) LONG-TERM USE: ICD-10-CM

## 2023-02-28 DIAGNOSIS — M18.0 PRIMARY OSTEOARTHRITIS OF BOTH FIRST CARPOMETACARPAL JOINTS: ICD-10-CM

## 2023-02-28 DIAGNOSIS — M06.4 INFLAMMATORY POLYARTHROPATHY (H): ICD-10-CM

## 2023-02-28 DIAGNOSIS — M31.31 GRANULOMATOSIS WITH POLYANGIITIS WITH RENAL INVOLVEMENT (H): Primary | ICD-10-CM

## 2023-02-28 DIAGNOSIS — Z13.820 OSTEOPOROSIS SCREENING: ICD-10-CM

## 2023-02-28 LAB
ALBUMIN MFR UR ELPH: 20 MG/DL (ref 1–14)
ALBUMIN SERPL-MCNC: 3.9 G/DL (ref 3.4–5)
ALBUMIN UR-MCNC: ABNORMAL MG/DL
ALP SERPL-CCNC: 80 U/L (ref 40–150)
ALT SERPL W P-5'-P-CCNC: 32 U/L (ref 0–50)
APPEARANCE UR: ABNORMAL
AST SERPL W P-5'-P-CCNC: 15 U/L (ref 0–45)
BACTERIA #/AREA URNS HPF: ABNORMAL /HPF
BASOPHILS # BLD AUTO: 0 10E3/UL (ref 0–0.2)
BASOPHILS NFR BLD AUTO: 0 %
BILIRUB DIRECT SERPL-MCNC: <0.1 MG/DL (ref 0–0.2)
BILIRUB SERPL-MCNC: 0.2 MG/DL (ref 0.2–1.3)
BILIRUB UR QL STRIP: NEGATIVE
COLOR UR AUTO: YELLOW
CREAT SERPL-MCNC: 1.25 MG/DL (ref 0.52–1.04)
CREAT UR-MCNC: 130 MG/DL
CRP SERPL-MCNC: 3.3 MG/L (ref 0–8)
EOSINOPHIL # BLD AUTO: 0.2 10E3/UL (ref 0–0.7)
EOSINOPHIL NFR BLD AUTO: 3 %
ERYTHROCYTE [DISTWIDTH] IN BLOOD BY AUTOMATED COUNT: 14.5 % (ref 10–15)
ERYTHROCYTE [SEDIMENTATION RATE] IN BLOOD BY WESTERGREN METHOD: 9 MM/HR (ref 0–30)
GFR SERPL CREATININE-BSD FRML MDRD: 49 ML/MIN/1.73M2
GLUCOSE UR STRIP-MCNC: NEGATIVE MG/DL
GRAN CASTS #/AREA URNS LPF: ABNORMAL /LPF
HCT VFR BLD AUTO: 40.1 % (ref 35–47)
HGB BLD-MCNC: 12.9 G/DL (ref 11.7–15.7)
HGB UR QL STRIP: NEGATIVE
HYALINE CASTS #/AREA URNS LPF: ABNORMAL /LPF
KETONES UR STRIP-MCNC: NEGATIVE MG/DL
LEUKOCYTE ESTERASE UR QL STRIP: ABNORMAL
LYMPHOCYTES # BLD AUTO: 1.5 10E3/UL (ref 0.8–5.3)
LYMPHOCYTES NFR BLD AUTO: 22 %
MCH RBC QN AUTO: 30.8 PG (ref 26.5–33)
MCHC RBC AUTO-ENTMCNC: 32.2 G/DL (ref 31.5–36.5)
MCV RBC AUTO: 96 FL (ref 78–100)
MONOCYTES # BLD AUTO: 0.7 10E3/UL (ref 0–1.3)
MONOCYTES NFR BLD AUTO: 11 %
NEUTROPHILS # BLD AUTO: 4.4 10E3/UL (ref 1.6–8.3)
NEUTROPHILS NFR BLD AUTO: 64 %
NITRATE UR QL: NEGATIVE
PH UR STRIP: 5 [PH] (ref 5–7)
PLATELET # BLD AUTO: 324 10E3/UL (ref 150–450)
PROT SERPL-MCNC: 7.4 G/DL (ref 6.8–8.8)
PROT/CREAT 24H UR: 0.15 MG/MG CR (ref 0–0.2)
RBC # BLD AUTO: 4.19 10E6/UL (ref 3.8–5.2)
RBC #/AREA URNS AUTO: ABNORMAL /HPF
SP GR UR STRIP: 1.02 (ref 1–1.03)
SQUAMOUS #/AREA URNS AUTO: ABNORMAL /LPF
UROBILINOGEN UR STRIP-ACNC: 0.2 E.U./DL
WBC # BLD AUTO: 6.8 10E3/UL (ref 4–11)
WBC #/AREA URNS AUTO: ABNORMAL /HPF

## 2023-02-28 PROCEDURE — 84156 ASSAY OF PROTEIN URINE: CPT | Performed by: INTERNAL MEDICINE

## 2023-02-28 PROCEDURE — 80076 HEPATIC FUNCTION PANEL: CPT | Performed by: INTERNAL MEDICINE

## 2023-02-28 PROCEDURE — 85025 COMPLETE CBC W/AUTO DIFF WBC: CPT | Performed by: INTERNAL MEDICINE

## 2023-02-28 PROCEDURE — 82565 ASSAY OF CREATININE: CPT | Performed by: INTERNAL MEDICINE

## 2023-02-28 PROCEDURE — 85652 RBC SED RATE AUTOMATED: CPT | Performed by: INTERNAL MEDICINE

## 2023-02-28 PROCEDURE — 99214 OFFICE O/P EST MOD 30 MIN: CPT | Performed by: INTERNAL MEDICINE

## 2023-02-28 PROCEDURE — 87086 URINE CULTURE/COLONY COUNT: CPT | Performed by: INTERNAL MEDICINE

## 2023-02-28 PROCEDURE — 81001 URINALYSIS AUTO W/SCOPE: CPT | Performed by: INTERNAL MEDICINE

## 2023-02-28 PROCEDURE — 86481 TB AG RESPONSE T-CELL SUSP: CPT | Performed by: INTERNAL MEDICINE

## 2023-02-28 PROCEDURE — 86140 C-REACTIVE PROTEIN: CPT | Performed by: INTERNAL MEDICINE

## 2023-02-28 PROCEDURE — 36415 COLL VENOUS BLD VENIPUNCTURE: CPT | Performed by: INTERNAL MEDICINE

## 2023-02-28 RX ORDER — AZATHIOPRINE 50 MG/1
100 TABLET ORAL DAILY
Qty: 180 TABLET | Refills: 0 | Status: SHIPPED | OUTPATIENT
Start: 2023-02-28 | End: 2023-05-30

## 2023-02-28 NOTE — PROGRESS NOTES
Rheumatology Clinic Visit      Flavia Brice MRN# [id number]   YOB: 1965 Age: 58 year old      Date of visit: 2/28/23   PCP: Monet Da Silva    Chief Complaint   Patient presents with:  Granulomatosis with polyangiitis     Assessment and Plan     1.  Granulomatosis with polyangiitis: Dx'd 2012 by Dr. Wesley at [facility] when she presented with pulmonary involvement, pericarditis/effusion, borderline aneurysmal dilation of the ascending aorta and biopsy proven necrotizing crescentic nephritis.  Went into remission with cytoxan and prednisone, then maintained on AZA for 2 yrs per patient, stopped when lost to follow-up. Recurrence in 2020 with YOLI, pleuritic chest pain, inflammatory arthritis (MCPs, PIPs, shoulders, knees) that has responded well to prednisone.  She was tx'd with prednisone and rituximab 1g IV received on 8/21/2020 & 9/4/2020, 4/28/2021 (delayed for the COVID-19 vaccine) & (no 2nd dose of rituximab), 11/11/2021 & 11/30/2021, 9/1/2022 & 9/15/2022. Currently on azathioprine 100 mg daily and rituximab.  Continue on combination of azathioprine and rituximab. Because she has been doing well, the plan now is to reduce rituximab to be 1 g IV every 6 months.  Toxicity monitoring labs are overdue; stressed the importance of labs. Chronic illness, stable.    - Continue azathioprine 100 mg daily  - Re-dose of rituximab (truxima) 1 g IV every 6 months, next due 3/15/2023  - Labs now: CBC, creatinine, hepatic panel, ESR, CRP, UA, quantiferon TB gold plus  - Labs in 3 months: CBC, creatinine, hepatic panel, ESR, CRP, UA, QuantiFERON-TB gold plus  - Lab within 1 week of rituximab infusion: COVID-19 PCR    High risk medication requiring intensive toxicity monitoring at least quarterly: labs ordered include CBC, Creatinine, Hepatic panel to monitor for cytopenia and hepatotoxicity; checking creatinine as it affects clearance of medication.     # Rituximab (Rituxan) Risks and Benefits:  The risks and benefits of rituximab were discussed in detail and the patient verbalized understanding.  The risks discussed include, but are not limited to, the risk for hypersensitivity, anaphylaxis, anaphylactoid reactions, fatal infusion reactions, an increased risk for serious infections leading to hospitalization or death, severe mucocutaneous reactions, reactivation of hepatitis B, and development of progressive multifocal leukoencephalopathy (PML) resulting in death.  The most common adverse reactions are infections, nasopharyngitis, urinary tract infections, nausea, diarrhea, headache, muscle spasms, and anemia.  It was discussed that the medication would need to be discontinued if a serious infection develops.  It was discussed that live vaccinations should not be received while using rituximab or within 30 days prior to starting rituximab.  Higher risk for complications from COVID-19 infection when on rituximab was discussed.  I encouraged reviewing the package insert and asking any questions about the medication.       2. Inflammatory arthritis: Related to GPA.  See #1    3. Elevated serum creatinine: Related to GPA.      4. Positive MALLORY (antinuclear antibody): Additional work-up was negative for SSA, SSB, RNP, Smith, dsDNA; complement C3 and C4 were not low.  Symptoms are most likely related to GPA; no MALLORY-associated rheumatologic disorder identified    5.  Aneurysm of the ascending aorta at 4.5 cm; history of heart failure with preserved ejection fraction : following with Dr. Locke (cardiology)    6. Cytoxan use history: needs periodic UA to eval for hematuria, due to increased risk for bladder cancer with cytoxan exposure    7.  Osteoarthritis: Affecting the DIPs and bilateral first CMC joints.  May benefit from intermittent immobilization of the first CMC joints and will refer to hand therapy to learn hand therapy exercises and consider having a splint made for first CMC joint immobilization.  Advised  that she stop using oral NSAIDs for intermittent OA symptoms; okay to use topical Voltaren.  - Hand therapy referral  - Start diclofenac (VOLTAREN) 1 % topical gel; Apply up to 2 grams of 1% gel to hands up to 4 times daily as needed for joint pain (maximum: 8 g per upper extremity per day)  Dispense: 200 g; Refill: 1      8.  Bone Health: last DEXA was in 2012.  Repeat DEXA.  - DEXA ordered; phone number provided so that she may call to schedule    9. Colonoscopy overdue: Patient reports having intermittent blood in her stool.  2019 colonoscopy report documents that she should return for repeat colonoscopy 6 months later but she never went.  Patient states that she will follow-up with her primary care provider to have a colonoscopy arranged.  Advised that she follow-up with her primary care provider for age-appropriate cancer screening.    10. Vaccinations: Vaccinations reviewed with Ms. Brice.    - Influenza: Encouraged yearly vaccination  - COVID-19: Advised updating ASAP, as it needs to be at least 2 weeks before the next rituximab infusion    Total minutes spent in evaluation with patient, documentation, , and review of pertinent studies and chart notes: 22     Ms. Brice verbalized agreement with and understanding of the rational for the diagnosis and treatment plan.  All questions were answered to best of my ability and the patient's satisfaction. Ms. Brice was advised to contact the clinic with any questions that may arise after the clinic visit.      Thank you for involving me in the care of the patient    Return to clinic: 3-4 months months      HPI   Flaviaben Brice is a 58 year old female with a past medical history significant for hypertension, depression, and granulomatosis with polyangiitis who is seen for follow-up of granulomatosis with polyangiitis    10/31/2013 [facility] rheumatology clinic note by Dr. Ann Wesley documents severe HI-3 ANCA positive GPA with pulmonary  involvement, pericarditis/effusion, borderline aneurysmal dilation of the ascending aorta, necrotizing crescentic nephritis.  Kidney biopsy has confirmed diagnosis in the past.  Has received Solu-Medrol x3, Cytoxan x1 at 1300 mg, on prednisone 60-80 mg daily.  Deteriorated quickly despite those interventions and was hospitalized again, requiring plasmapheresis and dialysis.  Was seeing Dr. Velazquez from [facility] and was again placed on Cytoxan 15 mg/kg every 3 weeks.  Has seen ENT but there was no clear sinus involvement.  She was then transitioned after 7 infusions of Cytoxan to Imuran on 11/2012 and has been doing well on that.      7/11/2020 [facility] hospitalization note: Acute kidney injury, migratory polyarthritis, chronic diastolic heart failure.  Left knee and left hip pain.  Right shoulder pain that migrated to the left shoulder.  No joint swelling.  No fevers.  In the past she had flare of her joint pain that she was treated with prednisone.  Prior to this ED evaluation she reportedly used prednisone 20 mg without improvement.    7/22/2020: [facility] emergency department visit for shortness of breath.  Notes history of granulomatosis with polyangiitis, diastolic heart failure, a sending thoracic aortic aneurysm from 4.6 cm on chest CT 2/18/2020), migratory polyarthritis, acute kidney injury, and hypertension.  Coronavirus test negative.  Chest CT negative for pneumonia.  O2 saturation normal.  Advised to follow-up with her PCP and cardiology.    8/5/2020:  Ms. Brice reported that she was dx'd with GPA by Dr. Wesley. She was treated and in remission; was on AZA for a year and no issues for a while. Lost to follow-up with Dr. Wesley.  Then 1 year ago started having more shortness of breath and found to have more lung nodules.  Recalls heart failure and kidney failure when first dx'd.  Told to see cardiology to follow the aneurysm.  Then in Jan 2020 started to have pain in her shoulders, knees  ankles. Pain was so bad that she went to [facility]; found to have YOLI.  Until able to have this rheumatology evaluation, she says that she was given prednisone to help the joint pain; currently on 5mg daily of prednisone; felt much better when on prednisone 30mg daily. Has been dealing with right knee swelling; the prednisone helped with this.  Joint pains are worse in the AM; improve with time and activity. Morning stiffness is very mild while on prednisone 5mg daily.  No hematuria. No hemoptysis.  Flavia Brice's mother was present during the clinic visit.     9/9/2020: Significant improvement with prednisone and rituximab.  No longer with swelling around her ankles.  Hand swelling/pain/stiffness has resolved.  She feels a little puffy in her face but no actual swelling.  She has followed with her cardiologist and they are going to monitor the thoracic aortic aneurysm.  Tolerated rituximab infusion well.  Currently on prednisone 10 mg daily.  Not going into work at this time; she is taking a leave of absence.    10/21/2020: Improved.  Now with mild pain at her MCPs, DIPs, and knees.  MCP and knee pain is better with activity, worse in the morning.  DIP pain is worse with activity.  Joint pains started again with dose reduction of prednisone.  Has been on azathioprine and tolerated well in the past.    12/9/2020: Significant improvement with regard to her joint pain.  No joint pain now.  No morning stiffness.  No gelling phenomenon.  Tolerating azathioprine well.  Continues on prednisone 5mg daily.  Mild shortness of breath when walking up stairs that has been stable; no associated chest pain or pressure, palpitations, lightheadedness, dizziness, or nausea and the mild shortness of breath resolves quickly when she rests.  No shortness of breath at rest or currently.  Following with cardiology and plans to repeat her echocardiogram in March 2021.  Last echocardiogram was in February 2020.    4/8/2021: More  joint aches and pains and she attributes this to being off of prednisone and having to delay her rituximab infusion secondary to the COVID-19 vaccine.  She has rescheduled the first of the 2 rituximab infusions to be 2 weeks after the second COVID-19 vaccine.  Knees ache more at the end of the day.  No cough, chest pain, shortness of breath.  Has seen cardiology and she says that her aortic aneurysm is stable.    7/7/2021:  doing well this time.  Mild aches of the PIPs and DIPs with more activity that improves with rest.  Usually has pain that travels around but in general is doing okay.  Only received 1 of 2 rituximab doses because her insurance had denied it; she then got a letter about a month later stating that rituximab had been approved.  Tolerating azathioprine and rituximab well.  Overall happy with how well she is doing.  No blood in urine or stool    10/19/2021: Reports that she is doing well.  She says that she is doing better than she was previously, and way better than she was before treatment.  No joint pain or stiffness.  No rash.  Tolerating azathioprine well.  No difficulty doing her daily activities.     1/25/2022: feels the best she has in a long time.  No joint pain or swelling. Morning stiffness for <20.  No rash.  No black or bloody stools.  No cough, chest pain, or shortness of breath.  No hemoptysis.  No hematemesis.  Using a surgical mask at work.    5/4/2022: had covid infxn, resolved >2wks ago. Fasting for labs recently. Joints more achy; due to rtx.  Tolerating aza well. No joint swelling. Morning stiffness <30 min. +gelling. No hemoptysis.  No blood in urine.     8/2/2022: Did not get the rituximab infusion but does have a scheduled for 9/1/2022 and 9/15/2022.  More joint pain at the MCPs, PIPs, knees, and MTPs that is worse in the morning and improves with time and activity.  Morning stiffness for about 1 hour.  No rash.  No black or bloody stools.  No hematuria.  No cough, chest pain,  or shortness of breath.    11/8/2022:  Joints are doing well with only occasional MCP and PIP ache for the first 20 minutes of the morning.  No gelling phenomenon.  Tolerating azathioprine well.  No rash.  No hematuria.  No black or bloody stools.  No hemoptysis or hematemesis.    Today, 2/28/2023: Doing well at this time except for pain at the end of the day and with increased activity of the bilateral first CMC joints that improves with rest; no swelling of these joints.  No other joint pain or swelling.  Morning stiffness for no more than 20 minutes.  No hematuria.  No hemoptysis.  Couple times and had blood in her stool and she recalls that she had a colonoscopy in 2019 and was supposed to follow-up for repeat colonoscopy 6 months later but never went.    Denies fevers, chills, nausea, vomiting. Occasional constipation or diarrhea, unchanged from previous. No abdominal pain. No chest pain/pressure, palpitations, or shortness of breath at this time.  No LE swelling.  No oral or nasal sores.  No rash. No sicca symptoms.  No eye pain or redness.    Tobacco: quit smoking in Feb 2020  EtOH: no more than 1 drink per month  Drugs: none  Occupation: [employer]    ROS   12 point review of system was completed and negative except as noted in the HPI     Active Problem List     Patient Active Problem List   Diagnosis     Wegener's granulomatosis     CARDIOVASCULAR SCREENING; LDL GOAL LESS THAN 130     Overweight     Advanced directives, counseling/discussion     Hypertension goal BP (blood pressure) < 140/90     Situational depression     Granulomatosis with polyangiitis (H)     Chronic kidney disease, stage 3     Former smoker     Menopausal syndrome (hot flashes)     Past Medical History     Past Medical History:   Diagnosis Date     Tinnitus      Past Surgical History     Past Surgical History:   Procedure Laterality Date     COLONOSCOPY WITH CO2 INSUFFLATION N/A 2/18/2019    Procedure: COLONOSCOPY WITH CO2 INSUFFLATION;   "Surgeon: Javier Guillen MD;  Location: [facility] OR     GALLBLADDER SURGERY       Allergy   No Known Allergies  Current Medication List     Current Outpatient Medications   Medication Sig     Ascorbic Acid (VITAMIN C) 100 MG CHEW      azaTHIOprine (IMURAN) 50 MG tablet Take 2 tablets (100 mg) by mouth daily     losartan-hydrochlorothiazide (HYZAAR) 100-25 MG tablet Take 1 tablet by mouth daily Due for follow-up for further refills     Multiple Vitamins-Minerals (ZINC PO)      VITAMIN D PO      zinc gluconate 50 MG tablet Take 50 mg by mouth daily     No current facility-administered medications for this visit.     Social History   See HPI    Family History     Family History   Problem Relation Age of Onset     Diabetes Father      Heart Disease Maternal Grandmother      Heart Disease Maternal Grandfather      Arthritis Paternal Grandmother      Heart Disease Paternal Grandfather      Grandmother: rheumatoid arthritis    Physical Exam     Temp Readings from Last 3 Encounters:   09/15/22 97.9  F (36.6  C) (Oral)   09/01/22 98.2  F (36.8  C)   11/30/21 98.2  F (36.8  C) (Oral)     BP Readings from Last 5 Encounters:   02/28/23 138/83   09/15/22 128/77   09/01/22 127/87   11/30/21 119/78   11/11/21 128/82     Pulse Readings from Last 1 Encounters:   02/28/23 64     Resp Readings from Last 1 Encounters:   09/15/22 16     Estimated body mass index is 30.74 kg/m  as calculated from the following:    Height as of 7/7/21: 1.664 m (5' 5.5\").    Weight as of this encounter: 85.1 kg (187 lb 9.6 oz).    GEN: NAD.   HEENT:  Anicteric, noninjected sclera. No obvious external lesions of the ear and nose. Hearing intact.  CV: S1, S2. RRR. No m/r/g  PULM: No increased work of breathing. CTA bilaterally   MSK: MCPs, PIPs, DIPs without swelling or tenderness to palpation.  Squaring and tenderness to palpation without effusion, increased warmth, or overlying erythema at the bilateral first CMC joints.  Wrists without swelling or tenderness to " palpation.  Elbows and shoulders without swelling or tenderness to palpation.   Knees, ankles, and MTPs without swelling or tenderness to palpation.    SKIN: No rash or jaundice seen  PSYCH: Alert. Appropriate.       Labs / Imaging (select studies)     RF/CCP  Recent Labs   Lab Test 08/05/20  1453   CCPIGG <1   RHF <7     MALLORY  Recent Labs   Lab Test 08/05/20  1453   CAMMIE Positive*   ANAP1 HOMOGENEOUS   ANAT1 1:160     RNP/Sm/SSA/SSB  Recent Labs   Lab Test 08/11/20  1402   RNPIGG <0.2   SMIGG <0.2   SSAIGG <0.2   SSBIGG <0.2     dsDNA  Recent Labs   Lab Test 08/11/20  1402   DNA <1     C3/C4  Recent Labs   Lab Test 08/11/20  1402   Y3QOMYI 133   X1HLPKH 41*     Antiphospholipid Antibodies  Recent Labs   Lab Test 08/11/20  1402   B2GPG 0.8   B2GPM <2.9   CARDG <1.6   CARDM 0.8   LUPINT Negative     ANCA  Recent Labs   Lab Test 08/05/20  1453   PR3IGG 4.8*   MPOIGG <0.2     IgG  Recent Labs   Lab Test 09/01/22  0815 11/11/21  0804 04/28/21  0848    1,009 863    138 116   IGM 14* 13* 18*     Cryoglobulins  No results for input(s): CRYOG, CRYALB, CRYGG, CRYGA, CRYGM, CRYKAP, CRYLAM, CRYTOT in the last 19022 hours.  CBC  Recent Labs   Lab Test 08/07/22  1604 04/16/22  1236 01/23/22  1024 10/14/21  0720 07/07/21  0818 04/24/21  1130 12/03/20  1128   WBC 7.0 5.4 6.3   < > 5.0 5.1 8.3   RBC 3.75* 3.83 4.25   < > 3.98 3.82 4.27   HGB 11.6* 11.8 13.1   < > 12.2 11.7 13.1   HCT 36.1 36.7 41.6   < > 38.8 37.6 41.3   MCV 96 96 98   < > 98 98 97   RDW 13.6 13.3 13.8   < > 14.9 13.7 14.2    330 293   < > 304 297 347   MCH 30.9 30.8 30.8   < > 30.7 30.6 30.7   MCHC 32.1 32.2 31.5   < > 31.4* 31.1* 31.7   NEUTROPHIL 59 58 63   < > 60.1 63.5 73.6   LYMPH 26 26 22   < > 22.9 20.4 17.7   MONOCYTE 11 14 12   < > 11.2 11.1 6.9   EOSINOPHIL 4 3 3   < > 5.0 4.4 1.3   BASOPHIL 0 0 0   < > 0.8 0.6 0.5   ANEU  --   --   --   --  3.0 3.2 6.1   ALYM  --   --   --   --  1.1 1.0 1.5   MARCOS  --   --   --   --  0.6 0.6 0.6    AEOS  --   --   --   --  0.3 0.2 0.1   ABAS  --   --   --   --  0.0 0.0 0.0   ANEUTAUTO 4.1 3.1 4.0   < >  --   --   --    ALYMPAUTO 1.9 1.4 1.4   < >  --   --   --    AMONOAUTO 0.7 0.7 0.8   < >  --   --   --    AEOSAUTO 0.3 0.1 0.2   < >  --   --   --    ABSBASO 0.0 0.0 0.0   < >  --   --   --     < > = values in this interval not displayed.     CMP  Recent Labs   Lab Test 08/07/22  1604 04/16/22  1236 01/23/22  1024 10/14/21  0720 07/07/21  0818 04/24/21  1130 12/03/20  1128 11/19/20  1123    144 141 141 142 141  --   --    POTASSIUM 4.0 4.0 4.2 4.5 4.7 4.2  --   --    CHLORIDE 109 112* 106 108 112* 108  --   --    CO2 25 24 26 27 26 28  --   --    ANIONGAP 7 8 9 6 4 5  --   --    * 125* 172* 116* 103* 114*  --   --    BUN 33* 39* 33* 29 34* 26  --   --    CR 1.34* 1.58* 1.32* 1.43* 1.44* 1.50* 1.41* 1.45*   GFRESTIMATED 46* 38* 47* 41* 40* 38* 41* 40*   GFRESTBLACK  --   --   --   --  46* 44* 48* 46*   JESSICA 8.9 9.3 9.0 8.9 9.6 9.5  --   --    BILITOTAL 0.4 0.5 0.3 0.4 0.2 0.4 0.4 0.3   ALBUMIN 3.6 3.7 3.8 3.5 3.6 3.5 3.9 3.7   PROTTOTAL 7.0 7.0 7.2 7.1 7.2 6.7* 7.5 7.4   ALKPHOS 75 85 80 84 75 67 80 78   AST 14 18 14 15 21 22 16 13   ALT 30 51* 37 41 49 62* 37 33     HgA1c  Recent Labs   Lab Test 04/16/22  1236 10/27/17  1541   A1C 6.1* 5.8     Calcium/VitaminD  Recent Labs   Lab Test 08/07/22  1604 04/16/22  1236 01/23/22  1024   JESSICA 8.9 9.3 9.0     ESR/CRP  Recent Labs   Lab Test 08/07/22  1604 04/16/22  1236 01/23/22  1024   SED 10 13 11   CRP 4.2 3.1 5.8     CK/Aldolase  Recent Labs   Lab Test 08/11/20  1402   CKT 79     TSH/T4  Recent Labs   Lab Test 10/21/21  0920 03/26/18  1058   TSH 1.10 1.44     Lipid Panel  Recent Labs   Lab Test 01/23/22  1024 03/26/18  1058   CHOL 192 188   TRIG 118 93   HDL 74 87   LDL 94 82   NHDL 118 101     Hepatitis B  Recent Labs   Lab Test 04/16/22  1236 08/05/20  1453   HBCAB Nonreactive Nonreactive   HEPBANG Nonreactive Nonreactive     Hepatitis C  Recent Labs    Lab Test 08/05/20  1453 03/26/18  1058   HCVAB Nonreactive Nonreactive     Lyme ab screening  Recent Labs   Lab Test 08/05/20  1453   LYMEGM 0.10     Tuberculosis Screening  Recent Labs   Lab Test 04/16/22  1236 08/05/20  1453   TBRES Negative  --    TBRST  --  Negative     HIV Screening  Recent Labs   Lab Test 08/05/20  1453   HIAGAB Nonreactive     UA  Recent Labs   Lab Test 08/07/22  1604 04/16/22  1236 10/14/21  0720 07/07/21  0818 04/24/21  1137 09/16/20  1442 08/05/20  1453 07/27/20  0938   COLOR Yellow Yellow Yellow Yellow   < > Yellow Yellow Yellow   APPEARANCE Clear Clear Clear Clear   < > Clear Clear Clear   URINEGLC Negative Negative Negative Negative   < > Negative Negative Negative   URINEBILI Negative Negative Negative Negative   < > Negative Negative Negative   SG 1.015 1.025 >=1.030 1.025   < > 1.025 1.025 1.015   URINEPH 5.5 5.5 5.5 5.0   < > 5.0 5.0 5.0   PROTEIN Negative Negative Trace* Trace*   < > 30* Negative Trace*   UROBILINOGEN 0.2 0.2 0.2 0.2   < > 0.2 0.2 0.2   NITRITE Negative Negative Negative Negative   < > Negative Negative Negative   UBLD Negative Negative Trace* Negative   < > Negative Trace* Small*   LEUKEST Negative Negative Trace* Negative   < > Negative Negative Negative   WBCU  --   --  0-5 0 - 5  --  0 - 5 0 - 5 0 - 5   RBCU  --   --  None Seen O - 2  --  O - 2 O - 2 2-5*   SQUAMOUSEPI  --   --   --   --   --  Few Few Few   BACTERIA  --   --  Few*  --   --  Few* Few* Few*   MUCOUS  --   --   --   --   --   --  Present*  --     < > = values in this interval not displayed.     Urine Microscopic  Recent Labs   Lab Test 10/14/21  0720 07/07/21  0818 09/16/20  1442 08/05/20  1453 07/27/20  0938   WBCU 0-5 0 - 5 0 - 5 0 - 5 0 - 5   RBCU None Seen O - 2 O - 2 O - 2 2-5*   SQUAMOUSEPI  --   --  Few Few Few   BACTERIA Few*  --  Few* Few* Few*   MUCOUS  --   --   --  Present*  --      Urine Protein  GHUTP and UTP= Urine protein (random), GHUTPG and UTPG = urine protein:creatinine ratio  (random), UCRR = urine creatinine (random)  Recent Labs   Lab Test 04/16/22  1236 01/23/22  1024 10/14/21  0720   UTP 0.16 0.13 0.45   UTPG 0.14 0.13 0.23*   UCRR 114 104 198       Immunization History     Immunization History   Administered Date(s) Administered     COVID-19 Vaccine 18+ (Moderna) 03/19/2021, 04/16/2021, 10/19/2021     Influenza Vaccine 50-64 or 18-64 w/egg allergy (Flublok) 11/07/2020, 10/19/2021     Influenza Vaccine >6 months (Alfuria,Fluzone) 10/31/2013, 02/19/2020     Pneumococcal 23 valent 04/25/2008, 06/28/2012     Tdap (Adacel,Boostrix) 07/02/2012          Chart documentation done in part with Dragon Voice recognition Software. Although reviewed after completion, some word and grammatical error may remain.    Ronaldo Moreira MD

## 2023-02-28 NOTE — Clinical Note
Alley Gustafson says that she will be seeing you soon.  I asked her to discuss with you about having a repeat colonoscopy.  Her last colonoscopy was in 2019 and she was supposed to follow-up for repeat colonoscopy 6 months later.  She reports having blood in her stool intermittently now. Thanks! Ronaldo

## 2023-02-28 NOTE — PATIENT INSTRUCTIONS
RHEUMATOLOGY    Dr. Ronaldo Moreira    [facility] [place]  [address] [place] [address]  [place] [address]  Phone number: [phone]  Fax number: [phone]    You may schedule your FLU shot by calling [phone] or if you would like to get your shot at a [facility] pharmacy you may schedule online at [email or web address].    Thank you for choosing [facility]!

## 2023-03-02 LAB
BACTERIA UR CULT: NORMAL
GAMMA INTERFERON BACKGROUND BLD IA-ACNC: 0.05 IU/ML
M TB IFN-G BLD-IMP: NEGATIVE
M TB IFN-G CD4+ BCKGRND COR BLD-ACNC: 9.95 IU/ML
MITOGEN IGNF BCKGRD COR BLD-ACNC: 0 IU/ML
MITOGEN IGNF BCKGRD COR BLD-ACNC: 0 IU/ML
QUANTIFERON MITOGEN: 10 IU/ML
QUANTIFERON NIL TUBE: 0.05 IU/ML
QUANTIFERON TB1 TUBE: 0.05 IU/ML
QUANTIFERON TB2 TUBE: 0.05

## 2023-03-10 ENCOUNTER — OFFICE VISIT (OUTPATIENT)
Dept: FAMILY MEDICINE | Facility: CLINIC | Age: 58
End: 2023-03-10
Payer: COMMERCIAL

## 2023-03-10 VITALS
RESPIRATION RATE: 22 BRPM | BODY MASS INDEX: 30.41 KG/M2 | HEIGHT: 66 IN | HEART RATE: 69 BPM | TEMPERATURE: 99.5 F | WEIGHT: 189.2 LBS | DIASTOLIC BLOOD PRESSURE: 70 MMHG | OXYGEN SATURATION: 99 % | SYSTOLIC BLOOD PRESSURE: 104 MMHG

## 2023-03-10 DIAGNOSIS — Z12.4 CERVICAL CANCER SCREENING: ICD-10-CM

## 2023-03-10 DIAGNOSIS — I10 HYPERTENSION GOAL BP (BLOOD PRESSURE) < 140/90: ICD-10-CM

## 2023-03-10 DIAGNOSIS — Z13.29 SCREENING FOR THYROID DISORDER: ICD-10-CM

## 2023-03-10 DIAGNOSIS — Z86.0100 HISTORY OF COLONIC POLYPS: ICD-10-CM

## 2023-03-10 DIAGNOSIS — N18.32 STAGE 3B CHRONIC KIDNEY DISEASE (H): ICD-10-CM

## 2023-03-10 DIAGNOSIS — I20.89 ANGINA AT REST (H): ICD-10-CM

## 2023-03-10 DIAGNOSIS — I50.32 CHRONIC DIASTOLIC (CONGESTIVE) HEART FAILURE (H): ICD-10-CM

## 2023-03-10 DIAGNOSIS — Z13.220 SCREENING FOR HYPERLIPIDEMIA: ICD-10-CM

## 2023-03-10 DIAGNOSIS — Z86.79 HX OF AORTIC ANEURYSM: ICD-10-CM

## 2023-03-10 DIAGNOSIS — Z13.1 SCREENING FOR DIABETES MELLITUS: ICD-10-CM

## 2023-03-10 DIAGNOSIS — I71.20 THORACIC AORTIC ANEURYSM WITHOUT RUPTURE, UNSPECIFIED PART (H): ICD-10-CM

## 2023-03-10 DIAGNOSIS — N30.01 ACUTE CYSTITIS WITH HEMATURIA: ICD-10-CM

## 2023-03-10 DIAGNOSIS — J96.20 ACUTE ON CHRONIC RESPIRATORY FAILURE, UNSPECIFIED WHETHER WITH HYPOXIA OR HYPERCAPNIA (H): ICD-10-CM

## 2023-03-10 DIAGNOSIS — K62.5 RECTAL BLEEDING: ICD-10-CM

## 2023-03-10 DIAGNOSIS — Z12.31 VISIT FOR SCREENING MAMMOGRAM: ICD-10-CM

## 2023-03-10 DIAGNOSIS — Z12.11 SPECIAL SCREENING FOR MALIGNANT NEOPLASMS, COLON: ICD-10-CM

## 2023-03-10 DIAGNOSIS — Z00.00 ROUTINE GENERAL MEDICAL EXAMINATION AT A HEALTH CARE FACILITY: Primary | ICD-10-CM

## 2023-03-10 DIAGNOSIS — Z12.31 ENCOUNTER FOR SCREENING MAMMOGRAM FOR BREAST CANCER: ICD-10-CM

## 2023-03-10 LAB
CHOLEST SERPL-MCNC: 201 MG/DL
FASTING STATUS PATIENT QL REPORTED: NO
HBA1C MFR BLD: 6 % (ref 0–5.6)
HDLC SERPL-MCNC: 83 MG/DL
LDLC SERPL CALC-MCNC: 96 MG/DL
NONHDLC SERPL-MCNC: 118 MG/DL
NT-PROBNP SERPL-MCNC: 169 PG/ML (ref 0–900)
TRIGL SERPL-MCNC: 110 MG/DL
TSH SERPL DL<=0.005 MIU/L-ACNC: 1.66 MU/L (ref 0.4–4)

## 2023-03-10 PROCEDURE — 87624 HPV HI-RISK TYP POOLED RSLT: CPT | Performed by: NURSE PRACTITIONER

## 2023-03-10 PROCEDURE — 90715 TDAP VACCINE 7 YRS/> IM: CPT | Performed by: NURSE PRACTITIONER

## 2023-03-10 PROCEDURE — G0145 SCR C/V CYTO,THINLAYER,RESCR: HCPCS | Performed by: NURSE PRACTITIONER

## 2023-03-10 PROCEDURE — 90471 IMMUNIZATION ADMIN: CPT | Performed by: NURSE PRACTITIONER

## 2023-03-10 PROCEDURE — 90682 RIV4 VACC RECOMBINANT DNA IM: CPT | Performed by: NURSE PRACTITIONER

## 2023-03-10 PROCEDURE — 0134A COVID-19 VACCINE BIVALENT BOOSTER 18+ (MODERNA): CPT | Performed by: NURSE PRACTITIONER

## 2023-03-10 PROCEDURE — 90746 HEPB VACCINE 3 DOSE ADULT IM: CPT | Performed by: NURSE PRACTITIONER

## 2023-03-10 PROCEDURE — 80061 LIPID PANEL: CPT | Performed by: NURSE PRACTITIONER

## 2023-03-10 PROCEDURE — 36415 COLL VENOUS BLD VENIPUNCTURE: CPT | Performed by: NURSE PRACTITIONER

## 2023-03-10 PROCEDURE — 90677 PCV20 VACCINE IM: CPT | Performed by: NURSE PRACTITIONER

## 2023-03-10 PROCEDURE — 99214 OFFICE O/P EST MOD 30 MIN: CPT | Mod: 25 | Performed by: NURSE PRACTITIONER

## 2023-03-10 PROCEDURE — 83036 HEMOGLOBIN GLYCOSYLATED A1C: CPT | Performed by: NURSE PRACTITIONER

## 2023-03-10 PROCEDURE — 90472 IMMUNIZATION ADMIN EACH ADD: CPT | Performed by: NURSE PRACTITIONER

## 2023-03-10 PROCEDURE — 91313 COVID-19 VACCINE BIVALENT BOOSTER 18+ (MODERNA): CPT | Performed by: NURSE PRACTITIONER

## 2023-03-10 PROCEDURE — 83880 ASSAY OF NATRIURETIC PEPTIDE: CPT | Performed by: NURSE PRACTITIONER

## 2023-03-10 PROCEDURE — 99396 PREV VISIT EST AGE 40-64: CPT | Mod: 25 | Performed by: NURSE PRACTITIONER

## 2023-03-10 PROCEDURE — 84443 ASSAY THYROID STIM HORMONE: CPT | Performed by: NURSE PRACTITIONER

## 2023-03-10 RX ORDER — LOSARTAN POTASSIUM AND HYDROCHLOROTHIAZIDE 25; 100 MG/1; MG/1
1 TABLET ORAL DAILY
Qty: 90 TABLET | Refills: 3 | Status: SHIPPED | OUTPATIENT
Start: 2023-03-10 | End: 2024-05-14

## 2023-03-10 RX ORDER — SULFAMETHOXAZOLE/TRIMETHOPRIM 800-160 MG
1 TABLET ORAL 2 TIMES DAILY
Qty: 6 TABLET | Refills: 0 | Status: SHIPPED | OUTPATIENT
Start: 2023-03-10 | End: 2023-03-28

## 2023-03-10 ASSESSMENT — ENCOUNTER SYMPTOMS
FEVER: 0
BREAST MASS: 0
HEMATURIA: 0
ARTHRALGIAS: 1
PALPITATIONS: 0
NERVOUS/ANXIOUS: 0
DIZZINESS: 0
MYALGIAS: 1
HEMATOCHEZIA: 1
NAUSEA: 0
DYSURIA: 0
HEARTBURN: 0
EYE PAIN: 0
JOINT SWELLING: 1
CONSTIPATION: 0
FREQUENCY: 1
DIARRHEA: 0
COUGH: 0
ABDOMINAL PAIN: 0
PARESTHESIAS: 0
WEAKNESS: 0
SHORTNESS OF BREATH: 0
CHILLS: 0
HEADACHES: 0

## 2023-03-10 ASSESSMENT — ANXIETY QUESTIONNAIRES
3. WORRYING TOO MUCH ABOUT DIFFERENT THINGS: NOT AT ALL
4. TROUBLE RELAXING: NOT AT ALL
IF YOU CHECKED OFF ANY PROBLEMS ON THIS QUESTIONNAIRE, HOW DIFFICULT HAVE THESE PROBLEMS MADE IT FOR YOU TO DO YOUR WORK, TAKE CARE OF THINGS AT HOME, OR GET ALONG WITH OTHER PEOPLE: NOT DIFFICULT AT ALL
GAD7 TOTAL SCORE: 0
6. BECOMING EASILY ANNOYED OR IRRITABLE: NOT AT ALL
8. IF YOU CHECKED OFF ANY PROBLEMS, HOW DIFFICULT HAVE THESE MADE IT FOR YOU TO DO YOUR WORK, TAKE CARE OF THINGS AT HOME, OR GET ALONG WITH OTHER PEOPLE?: NOT DIFFICULT AT ALL
GAD7 TOTAL SCORE: 0
7. FEELING AFRAID AS IF SOMETHING AWFUL MIGHT HAPPEN: NOT AT ALL
5. BEING SO RESTLESS THAT IT IS HARD TO SIT STILL: NOT AT ALL
2. NOT BEING ABLE TO STOP OR CONTROL WORRYING: NOT AT ALL
1. FEELING NERVOUS, ANXIOUS, OR ON EDGE: NOT AT ALL
7. FEELING AFRAID AS IF SOMETHING AWFUL MIGHT HAPPEN: NOT AT ALL

## 2023-03-10 ASSESSMENT — PAIN SCALES - GENERAL: PAINLEVEL: NO PAIN (0)

## 2023-03-10 NOTE — PROGRESS NOTES
SUBJECTIVE:   CC: Alley is an 58 year old who presents for preventive health visit.   Patient has been advised of split billing requirements and indicates understanding: Yes  Healthy Habits:     Getting at least 3 servings of Calcium per day:  NO    Bi-annual eye exam:  Yes    Dental care twice a year:  Yes    Sleep apnea or symptoms of sleep apnea:  None    Diet:  Regular (no restrictions)    Frequency of exercise:  1 day/week    Taking medications regularly:  Yes    Medication side effects:  Lightheadedness    PHQ-2 Total Score: 0    Additional concerns today:  Yes    Patient would still like to discuss the followin.) labs from a week ago Tuesday showed possible UTI, would like to follow up-Labs from  did show positive leukocytes, she is having urinary symptoms.  We will treat with antibiotics.  Discussed taking the full course of antibiotics with daily yogurt or probiotics.  Follow-up if symptoms persist or worsen.  2.)  History of 8 polyps removed with last colonoscopy, she has been having rectal bleeding -bright red blood while wiping after BM's -would like referral for colonoscopy.  No family history of colon cancer.    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  She gets a mammogram every other year and is due for a mammogram.  She has never had an abnormal Pap she is due for a Pap she will complete that today.  She does follow-up with rheumatology for her autoimmune issue.  She reports when this has flared she has had kidney failure and heart failure; requiring dialysis.  Currently has stage III kidney disease that is being monitored by her rheumatologist and has not worsened.  She has not needed a referral to a nephrologist or cardiologist.  Is not taking aspirin or beta-blocker at this time as she is not in heart failure.  BNP lab done to check.  She does have a aortic aneurysm that supposed to be monitored annually and has not changed in the  last 12 years.  Put in aortic ultrasound and cardiology referral advised patient check with insurance to see which is covered as she normally sees a cardiologist for evaluation and ultrasound.  Discussed labs to be completed today.  She would like her flu, COVID, hep B, tetanus and pneumonia vaccines today.  She does not check her blood pressures, she denies chest pain, dizziness, frequent headaches.              Today's PHQ-2 Score:   PHQ-2 ( 1999 Pfizer) 3/10/2023   Q1: Little interest or pleasure in doing things 0   Q2: Feeling down, depressed or hopeless 0   PHQ-2 Score 0   PHQ-2 Total Score (12-17 Years)- Positive if 3 or more points; Administer PHQ-A if positive -   Q1: Little interest or pleasure in doing things Not at all   Q2: Feeling down, depressed or hopeless Not at all   PHQ-2 Score 0           Social History     Tobacco Use     Smoking status: Former     Packs/day: 0.50     Types: Cigarettes     Smokeless tobacco: Never   Substance Use Topics     Alcohol use: Yes     Comment: monthly-Social         Alcohol Use 3/10/2023   Prescreen: >3 drinks/day or >7 drinks/week? No       Reviewed orders with patient.  Reviewed health maintenance and updated orders accordingly - Yes  Lab work is in process  Labs reviewed in EPIC  BP Readings from Last 3 Encounters:   03/10/23 104/70   02/28/23 138/83   09/15/22 128/77    Wt Readings from Last 3 Encounters:   03/10/23 85.8 kg (189 lb 3.2 oz)   02/28/23 85.1 kg (187 lb 9.6 oz)   09/15/22 86.2 kg (190 lb)                  Patient Active Problem List   Diagnosis     Wegener's granulomatosis     CARDIOVASCULAR SCREENING; LDL GOAL LESS THAN 130     Overweight     Advanced directives, counseling/discussion     Hypertension goal BP (blood pressure) < 140/90     Situational depression     Granulomatosis with polyangiitis (H)     Chronic kidney disease, stage 3     Former smoker     Menopausal syndrome (hot flashes)     Chronic diastolic (congestive) heart failure (H)      Thoracic aortic aneurysm without rupture, unspecified part     Angina at rest (H)     Rectal bleeding     History of colonic polyps     Hx of aortic aneurysm     Acute on chronic respiratory failure, unspecified whether with hypoxia or hypercapnia (H)     Past Surgical History:   Procedure Laterality Date     COLONOSCOPY WITH CO2 INSUFFLATION N/A 2/18/2019    Procedure: COLONOSCOPY WITH CO2 INSUFFLATION;  Surgeon: Javier Guillen MD;  Location: [facility]     GALLBLADDER SURGERY         Social History     Tobacco Use     Smoking status: Former     Packs/day: 0.50     Types: Cigarettes     Smokeless tobacco: Never   Substance Use Topics     Alcohol use: Yes     Comment: monthly-Social     Family History   Problem Relation Age of Onset     Diabetes Father      Heart Disease Maternal Grandmother      Heart Disease Maternal Grandfather      Arthritis Paternal Grandmother      Heart Disease Paternal Grandfather          Current Outpatient Medications   Medication Sig Dispense Refill     Ascorbic Acid (VITAMIN C) 100 MG CHEW        ASPIRIN NOT PRESCRIBED (INTENTIONAL) Please choose reason not prescribed from choices below.       azaTHIOprine (IMURAN) 50 MG tablet Take 2 tablets (100 mg) by mouth daily 180 tablet 0     BETA BLOCKER NOT PRESCRIBED (INTENTIONAL) Please choose reason not prescribed from choices below.  0     losartan-hydrochlorothiazide (HYZAAR) 100-25 MG tablet Take 1 tablet by mouth daily 90 tablet 3     Multiple Vitamins-Minerals (ZINC PO)        sulfamethoxazole-trimethoprim (BACTRIM DS) 800-160 MG tablet Take 1 tablet by mouth 2 times daily With daily yogurt or probiotics 6 tablet 0     VITAMIN D PO        zinc gluconate 50 MG tablet Take 50 mg by mouth daily       No Known Allergies  Recent Labs   Lab Test 02/28/23  1421 08/07/22  1604 04/16/22  1236 01/23/22  1024 10/21/21  0920 10/14/21  0720 07/07/21  0818 04/24/21  1130 02/28/20  1044 03/26/18  1058 10/27/17  1541   A1C  --   --  6.1*  --   --   --   --    --   --   --  5.8   LDL  --   --   --  94  --   --   --   --   --  82  --    HDL  --   --   --  74  --   --   --   --   --  87  --    TRIG  --   --   --  118  --   --   --   --   --  93  --    ALT 32 30 51* 37  --    < > 49 62*   < >  --   --    CR 1.25* 1.34* 1.58* 1.32*  --    < > 1.44* 1.50*   < > 1.08* 1.42*   GFRESTIMATED 49* 46* 38* 47*  --    < > 40* 38*   < > 53* 39*   GFRESTBLACK  --   --   --   --   --   --  46* 44*   < > 64 47*   POTASSIUM  --  4.0 4.0 4.2  --    < > 4.7 4.2   < > 4.1 3.7   TSH  --   --   --   --  1.10  --   --   --   --  1.44  --     < > = values in this interval not displayed.        Breast Cancer Screening:  Any new diagnosis of family breast, ovarian, or bowel cancer? No    FHS-7: No flowsheet data found.    Mammogram Screening: Recommended mammography every 1-2 years with patient discussion and risk factor consideration  Pertinent mammograms are reviewed under the imaging tab.    History of abnormal Pap smear: NO - age 30-65 PAP every 5 years with negative HPV co-testing recommended  PAP / HPV Latest Ref Rng & Units 3/26/2018 2/27/2014   PAP (Historical) - NIL NIL   HPV16 NEG:Negative Negative -   HPV18 NEG:Negative Negative -   HRHPV NEG:Negative Negative -     Reviewed and updated as needed this visit by clinical staff   Tobacco  Allergies  Meds  Problems  Med Hx  Surg Hx  Fam Hx  Soc   Hx        Reviewed and updated as needed this visit by Provider   Tobacco  Allergies  Meds  Problems  Med Hx  Surg Hx  Fam Hx  Soc   Hx       Past Medical History:   Diagnosis Date     Tinnitus       Past Surgical History:   Procedure Laterality Date     COLONOSCOPY WITH CO2 INSUFFLATION N/A 2/18/2019    Procedure: COLONOSCOPY WITH CO2 INSUFFLATION;  Surgeon: Javier Guillen MD;  Location: [facility]     GALLBLADDER SURGERY       OB History   No obstetric history on file.       Review of Systems   Constitutional: Negative for chills and fever.   HENT: Negative for congestion, ear pain and  "hearing loss.    Eyes: Negative for pain and visual disturbance.   Respiratory: Negative for cough and shortness of breath.    Cardiovascular: Positive for peripheral edema. Negative for chest pain and palpitations.   Gastrointestinal: Positive for hematochezia. Negative for abdominal pain, constipation, diarrhea, heartburn and nausea.   Breasts:  Negative for tenderness, breast mass and discharge.   Genitourinary: Positive for frequency and urgency. Negative for dysuria, genital sores, hematuria, pelvic pain, vaginal bleeding and vaginal discharge.   Musculoskeletal: Positive for arthralgias, joint swelling and myalgias.   Skin: Negative for rash.   Neurological: Negative for dizziness, weakness, headaches and paresthesias.   Psychiatric/Behavioral: Positive for mood changes. The patient is not nervous/anxious.           OBJECTIVE:   /70   Pulse 69   Temp 99.5  F (37.5  C) (Temporal)   Resp 22   Ht 1.67 m (5' 5.75\")   Wt 85.8 kg (189 lb 3.2 oz)   SpO2 99%   BMI 30.77 kg/m    Physical Exam  GENERAL APPEARANCE: healthy, alert and no distress  EYES: Eyes grossly normal to inspection, PERRL and conjunctivae and sclerae normal  HENT: ear canals and TM's normal, nose and mouth without ulcers or lesions, oropharynx clear and oral mucous membranes moist  NECK: no adenopathy, no asymmetry, masses, or scars and thyroid normal to palpation  RESP: lungs clear to auscultation - no rales, rhonchi or wheezes  BREAST: Deferred per guidelines-asymptomatic per patient, mammogram ordered  CV: regular rate and rhythm, normal S1 S2, no S3 or S4, no murmur, click or rub, no peripheral edema and peripheral pulses strong  ABDOMEN: soft, nontender, no hepatosplenomegaly, no masses and bowel sounds normal   (female): normal female external genitalia, normal urethral meatus, vaginal mucosal atrophy noted, normal cervix, adnexae, and uterus without masses or abnormal discharge  MS: no musculoskeletal defects are noted and gait " is age appropriate without ataxia, no CVA tenderness  SKIN: no suspicious lesions or rashes  NEURO: Normal strength and tone, cranial nerves intact sensory exam grossly normal, mentation intact and speech normal  PSYCH: mentation appears normal and affect normal/bright    Diagnostic Test Results:  Labs reviewed in Epic  See orders    ASSESSMENT/PLAN:       ICD-10-CM    1. Routine general medical examination at a health care facility  Z00.00       2. Screening for hyperlipidemia  Z13.220 Lipid panel reflex to direct LDL Non-fasting     Lipid panel reflex to direct LDL Non-fasting      3. Visit for screening mammogram  Z12.31 MA SCREENING DIGITAL BILAT - Future  (s+30)      4. Encounter for screening mammogram for breast cancer  Z12.31 MA SCREENING DIGITAL BILAT - Future  (s+30)      5. Cervical cancer screening  Z12.4 Pap Screen with HPV - recommended age 30 - 65 years      6. Stage 3b chronic kidney disease (H)  N18.32       7. Hypertension goal BP (blood pressure) < 140/90  I10 losartan-hydrochlorothiazide (HYZAAR) 100-25 MG tablet      8. Acute cystitis with hematuria  N30.01 sulfamethoxazole-trimethoprim (BACTRIM DS) 800-160 MG tablet      9. Screening for diabetes mellitus  Z13.1 Hemoglobin A1c     Hemoglobin A1c      10. Screening for thyroid disorder  Z13.29 TSH with free T4 reflex     TSH with free T4 reflex      11. Hx of aortic aneurysm  Z86.79 US Abdominal Aorta Imaging     Adult Cardiology Eval  Referral      12. History of colonic polyps  Z86.010 Colonoscopy Screening  Referral      13. Rectal bleeding  K62.5 Colonoscopy Screening  Referral      14. Special screening for malignant neoplasms, colon  Z12.11 Colonoscopy Screening  Referral      15. Chronic diastolic (congestive) heart failure (H)  I50.32 BNP-N terminal pro     BNP-N terminal pro      16. Thoracic aortic aneurysm without rupture, unspecified part  I71.20       17. Angina at rest (H)  I20.8       18. Acute  on chronic respiratory failure, unspecified whether with hypoxia or hypercapnia (H)  J96.20           Patient has been advised of split billing requirements and indicates understanding: Yes      COUNSELING:  Reviewed preventive health counseling, as reflected in patient instructions        She reports that she has quit smoking. Her smoking use included cigarettes. She smoked an average of .5 packs per day. She has never used smokeless tobacco.          ISSAC LION  [facility] RANJIT  Answers for HPI/ROS submitted by the patient on 3/10/2023  DHRUV 7 TOTAL SCORE: 0

## 2023-03-10 NOTE — NURSING NOTE
Prior to immunization administration, verified patients identity using patient s name and date of birth. Please see Immunization Activity for additional information.     Screening Questionnaire for Adult Immunization    Are you sick today?   No   Do you have allergies to medications, food, a vaccine component or latex?   No   Have you ever had a serious reaction after receiving a vaccination?   No   Do you have a long-term health problem with heart, lung, kidney, or metabolic disease (e.g., diabetes), asthma, a blood disorder, no spleen, complement component deficiency, a cochlear implant, or a spinal fluid leak?  Are you on long-term aspirin therapy?   Yes   Do you have cancer, leukemia, HIV/AIDS, or any other immune system problem?   Yes   Do you have a parent, brother, or sister with an immune system problem?   No   In the past 3 months, have you taken medications that affect  your immune system, such as prednisone, other steroids, or anticancer drugs; drugs for the treatment of rheumatoid arthritis, Crohn s disease, or psoriasis; or have you had radiation treatments?   Yes   Have you had a seizure, or a brain or other nervous system problem?   No   During the past year, have you received a transfusion of blood or blood    products, or been given immune (gamma) globulin or antiviral drug?   No   For women: Are you pregnant or is there a chance you could become       pregnant during the next month?   No   Have you received any vaccinations in the past 4 weeks?   No     Immunization questionnaire was positive for at least one answer.  Notified NEWTON Pritchard-BC.        Per orders of NEWTON Pritchard-BC, injection of Tdap, PCV20, Moderna bivalent booster, Influenza and Hepatitis B given by Erin Amador MA. Patient instructed to remain in clinic for 15 minutes afterwards, and to report any adverse reaction to me immediately.       Screening performed by Erin Shea  BETTY Gupta on 3/10/2023 at 3:36 PM.

## 2023-03-14 ENCOUNTER — DOCUMENTATION ONLY (OUTPATIENT)
Dept: LAB | Facility: CLINIC | Age: 58
End: 2023-03-14

## 2023-03-14 ENCOUNTER — ANCILLARY PROCEDURE (OUTPATIENT)
Dept: BONE DENSITY | Facility: CLINIC | Age: 58
End: 2023-03-14
Attending: INTERNAL MEDICINE
Payer: COMMERCIAL

## 2023-03-14 DIAGNOSIS — Z13.820 OSTEOPOROSIS SCREENING: ICD-10-CM

## 2023-03-14 DIAGNOSIS — Z78.0 POST-MENOPAUSAL: ICD-10-CM

## 2023-03-14 DIAGNOSIS — M31.31 GRANULOMATOSIS WITH POLYANGIITIS WITH RENAL INVOLVEMENT (H): ICD-10-CM

## 2023-03-14 DIAGNOSIS — Z79.899 HIGH RISK MEDICATIONS (NOT ANTICOAGULANTS) LONG-TERM USE: ICD-10-CM

## 2023-03-14 PROCEDURE — 77080 DXA BONE DENSITY AXIAL: CPT | Performed by: INTERNAL MEDICINE

## 2023-03-14 NOTE — PROGRESS NOTES
Flavia Brice has an upcoming lab appointment with no lab orders. Please place future lab orders for appointment.  Thank you   Ashley MANZANARES  Future Appointments   Date Time Provider Department Center   3/15/2023  8:30 AM LAB ONC [facility] [place]   3/15/2023  9[facility]00 [facility] [place]

## 2023-03-15 ENCOUNTER — INFUSION THERAPY VISIT (OUTPATIENT)
Dept: INFUSION THERAPY | Facility: CLINIC | Age: 58
End: 2023-03-15
Payer: COMMERCIAL

## 2023-03-15 ENCOUNTER — LAB (OUTPATIENT)
Dept: LAB | Facility: CLINIC | Age: 58
End: 2023-03-15
Payer: COMMERCIAL

## 2023-03-15 VITALS
OXYGEN SATURATION: 95 % | BODY MASS INDEX: 30.95 KG/M2 | SYSTOLIC BLOOD PRESSURE: 120 MMHG | HEART RATE: 56 BPM | TEMPERATURE: 98 F | WEIGHT: 190.3 LBS | DIASTOLIC BLOOD PRESSURE: 61 MMHG | RESPIRATION RATE: 16 BRPM

## 2023-03-15 DIAGNOSIS — M31.31 GRANULOMATOSIS WITH POLYANGIITIS WITH RENAL INVOLVEMENT (H): ICD-10-CM

## 2023-03-15 DIAGNOSIS — N18.30 CHRONIC KIDNEY DISEASE, STAGE 3 (H): Primary | ICD-10-CM

## 2023-03-15 LAB
BKR LAB AP GYN ADEQUACY: NORMAL
BKR LAB AP GYN INTERPRETATION: NORMAL
BKR LAB AP HPV REFLEX: NORMAL
BKR LAB AP PREVIOUS ABNORMAL: NORMAL
CD19 B CELL COMMENT: ABNORMAL
CD19 CELLS # BLD: 2 CELLS/UL (ref 107–698)
CD19 CELLS NFR BLD: <1 % (ref 6–27)
HOLD SPECIMEN: NORMAL
HOLD SPECIMEN: NORMAL
PATH REPORT.COMMENTS IMP SPEC: NORMAL
PATH REPORT.COMMENTS IMP SPEC: NORMAL
PATH REPORT.RELEVANT HX SPEC: NORMAL

## 2023-03-15 PROCEDURE — 86355 B CELLS TOTAL COUNT: CPT | Performed by: INTERNAL MEDICINE

## 2023-03-15 PROCEDURE — 99207 PR NO CHARGE LOS: CPT | Performed by: NURSE PRACTITIONER

## 2023-03-15 PROCEDURE — 82784 ASSAY IGA/IGD/IGG/IGM EACH: CPT | Performed by: INTERNAL MEDICINE

## 2023-03-15 PROCEDURE — 36415 COLL VENOUS BLD VENIPUNCTURE: CPT | Performed by: INTERNAL MEDICINE

## 2023-03-15 RX ORDER — ACETAMINOPHEN 325 MG/1
650 TABLET ORAL ONCE
Status: CANCELLED
Start: 2023-03-15

## 2023-03-15 RX ORDER — HEPARIN SODIUM,PORCINE 10 UNIT/ML
5 VIAL (ML) INTRAVENOUS
Status: CANCELLED | OUTPATIENT
Start: 2023-03-15

## 2023-03-15 RX ORDER — DIPHENHYDRAMINE HYDROCHLORIDE 50 MG/ML
50 INJECTION INTRAMUSCULAR; INTRAVENOUS
Status: CANCELLED
Start: 2023-03-15

## 2023-03-15 RX ORDER — ALBUTEROL SULFATE 90 UG/1
1-2 AEROSOL, METERED RESPIRATORY (INHALATION)
Status: CANCELLED
Start: 2023-03-15

## 2023-03-15 RX ORDER — METHYLPREDNISOLONE SODIUM SUCCINATE 125 MG/2ML
125 INJECTION, POWDER, LYOPHILIZED, FOR SOLUTION INTRAMUSCULAR; INTRAVENOUS ONCE
Status: CANCELLED | OUTPATIENT
Start: 2023-03-15

## 2023-03-15 RX ORDER — DIPHENHYDRAMINE HCL 25 MG
50 CAPSULE ORAL ONCE
Status: CANCELLED
Start: 2023-03-15

## 2023-03-15 RX ORDER — ALBUTEROL SULFATE 0.83 MG/ML
2.5 SOLUTION RESPIRATORY (INHALATION)
Status: CANCELLED | OUTPATIENT
Start: 2023-03-15

## 2023-03-15 RX ORDER — EPINEPHRINE 1 MG/ML
0.3 INJECTION, SOLUTION, CONCENTRATE INTRAVENOUS EVERY 5 MIN PRN
Status: CANCELLED | OUTPATIENT
Start: 2023-03-15

## 2023-03-15 RX ORDER — MEPERIDINE HYDROCHLORIDE 25 MG/ML
25 INJECTION INTRAMUSCULAR; INTRAVENOUS; SUBCUTANEOUS EVERY 30 MIN PRN
Status: CANCELLED | OUTPATIENT
Start: 2023-03-15

## 2023-03-15 RX ORDER — HEPARIN SODIUM (PORCINE) LOCK FLUSH IV SOLN 100 UNIT/ML 100 UNIT/ML
5 SOLUTION INTRAVENOUS
Status: CANCELLED | OUTPATIENT
Start: 2023-03-15

## 2023-03-15 ASSESSMENT — PAIN SCALES - GENERAL: PAINLEVEL: NO PAIN (0)

## 2023-03-15 NOTE — PROGRESS NOTES
Lab order is entered already: COVID19 PCR is needed.     RN: Please arrange for patient to return to have this completed. This is needed before rituximab infusion.        Ronaldo Moreira MD  3/15/2023

## 2023-03-15 NOTE — RESULT ENCOUNTER NOTE
Derick Alejandro,    Thank you for your recent office visit.    Here are your recent results.  Your A1c has slightly improved from 11 months ago, continue to work on improving your diet and increasing your exercise and weight loss.  You are in the prediabetes category.  For a nondiabetic your cholesterol is considered normal, we base this off the bad cholesterol, the LDL, for a nondiabetic we want your LDL less than 190, yours is 96.  If you became diabetic we want your LDL less than 70 as diabetics are at higher risk for heart attack and stroke.  Your thyroid levels are normal.  You are not in heart failure.    Feel free to contact me via [employer] or call the clinic at [phone].    Sincerely,    Monet Ackerman, KELLY, FNP-BC

## 2023-03-15 NOTE — PROGRESS NOTES
"Infusion Nursing Note:  Flavia Brice presents today for Truxima.    Patient seen by provider today: No   present during visit today: Not Applicable.    Note: The patient reports being on Bactrim for a bladder infection. She denies any symptoms and states it was found on a routine UA at her appt with Dr. Moreira on 2/28/23. Patient wad directed to see her PCP and had an appt on 3/10/23. Bactrim was started on Saturday and she will take her last dose today as she reports she missed a dose or two.     The patient also report she received the COVID vaccine, flu vaccine, hep B, tetanus, and the pneumacoccal vaccine. Per Dr. Moreira's note: \"- COVID-19: Advised updating ASAP, as it needs to be at least 2 weeks before the next rituximab infusion.\"    Dr. Moreira paged and stated we need to delay the infusion until the patient has completed her antibiotics and ensure her infection does not reoccur. Dr. Moreira also stated due to the patient receiving the COVID vaccine we need to wait 2 weeks before receiving Truxima. Next scheduled appointment is on 3/28/23 - Dr. Moreira confirmed this was appropriate.     Patient updated on the above and agreeable to plan.    Intravenous Access:  No Intravenous access/labs at this visit.    Treatment Conditions:  Biological Infusion Checklist:  ~~~ NOTE: If the patient answers yes to any of the questions below, hold the infusion and contact ordering provider or on-call provider.    1. Have you recently had an elevated temperature, fever, chills, productive cough, coughing for 3 weeks or longer or hemoptysis, abnormal vital signs, night sweats,  chest pain or have you noticed a decrease in your appetite, unexplained weight loss or fatigue? No  2. Do you have any open wounds or new incisions? No  3. Do you have any recent or upcoming hospitalizations, surgeries or dental procedures? No  4. Do you currently have or recently have had any signs of illness or infection or " are you on any antibiotics? Yes, on antibiotics see above  5. Have you had any new, sudden or worsening abdominal pain? No  6. Have you or anyone in your household received a live vaccination in the past 4 weeks? Please note:  No live vaccines while on biologic/chemotherapy until 6 months after the last treatment.  Patient can receive the flu vaccine (shot only) and the pneumovax.  It is optimal for the patient to get these vaccines mid cycle, but they can be given at any time as long as it is not on the day of the infusion. No   7. Have you recently been diagnosed with any new nervous system diseases (ie. Multiple sclerosis, Guillain [place], seizures, neurological changes) or cancer diagnosis? No  8. Are you on any form of radiation or chemotherapy? No  9. Are you pregnant or breast feeding or do you have plans of pregnancy in the future? No  10. Have you been having any signs of worsening depression or suicidal ideations?  (benlysta only) No  11. Have there been any other new onset medical symptoms? No    Post Infusion Assessment:  N/A.     Discharge Plan:   AVS to patient via [employer].  Patient will return 3/28/23 for next appointment. Future appts have been reviewed and crosschecked with appt note and plan.   Patient discharged in stable condition accompanied by: self.  Departure Mode: Ambulatory.    Amanda Orlando RN

## 2023-03-16 PROBLEM — Z12.4 SCREENING FOR CERVICAL CANCER: Status: ACTIVE | Noted: 2023-03-16

## 2023-03-16 LAB
HUMAN PAPILLOMA VIRUS 16 DNA: NEGATIVE
HUMAN PAPILLOMA VIRUS 18 DNA: NEGATIVE
HUMAN PAPILLOMA VIRUS FINAL DIAGNOSIS: NORMAL
HUMAN PAPILLOMA VIRUS OTHER HR: NEGATIVE
IGA SERPL-MCNC: 120 MG/DL (ref 84–499)
IGG SERPL-MCNC: 859 MG/DL (ref 610–1616)
IGM SERPL-MCNC: 15 MG/DL (ref 35–242)

## 2023-03-16 NOTE — PROGRESS NOTES
Spoke with LS and the lab and was reported to be able to do COVID testing on asymptomatic patients.  Called patient and left voicemail as she needs to be scheduled 2 to 3 days prior to her infusion. left call back number    Trudi FITZGERALD RN Specialty Triage 3/16/2023 11:51 AM

## 2023-03-21 ENCOUNTER — THERAPY VISIT (OUTPATIENT)
Dept: OCCUPATIONAL THERAPY | Facility: CLINIC | Age: 58
End: 2023-03-21
Attending: INTERNAL MEDICINE
Payer: COMMERCIAL

## 2023-03-21 DIAGNOSIS — M79.644 BILATERAL THUMB PAIN: Primary | ICD-10-CM

## 2023-03-21 DIAGNOSIS — M18.0 PRIMARY OSTEOARTHRITIS OF BOTH FIRST CARPOMETACARPAL JOINTS: ICD-10-CM

## 2023-03-21 DIAGNOSIS — M79.645 BILATERAL THUMB PAIN: Primary | ICD-10-CM

## 2023-03-21 PROCEDURE — 97140 MANUAL THERAPY 1/> REGIONS: CPT | Mod: GO | Performed by: OCCUPATIONAL THERAPIST

## 2023-03-21 PROCEDURE — 97760 ORTHOTIC MGMT&TRAING 1ST ENC: CPT | Mod: GO | Performed by: OCCUPATIONAL THERAPIST

## 2023-03-21 PROCEDURE — 97110 THERAPEUTIC EXERCISES: CPT | Mod: GO | Performed by: OCCUPATIONAL THERAPIST

## 2023-03-21 PROCEDURE — 97165 OT EVAL LOW COMPLEX 30 MIN: CPT | Mod: GO | Performed by: OCCUPATIONAL THERAPIST

## 2023-03-21 NOTE — PROGRESS NOTES
"Hand Therapy Initial Evaluation    Current Date:  3/21/2023    Subjective:  Flavia (\"Alley\") Ele Brice is a 58 year old right hand dominant female.    Diagnosis:   Bilateral thumb pain/CMC OA (left > right)  DOI:  2/28/2023 (therapy referral)    Patient reports symptoms of pain, weakness/loss of strength and edema of bilateral thumbs which occurred due to insidious onset over the past 2 years. Since onset symptoms are gradually getting worse 6-9 months.  Special tests:  none.  Previous treatment: self treatment with massage and volterin gel.  General health as reported by patient is fair to good.  Pertinent medical history includes:Heart Problems, High Blood Pressure, Kidney Disease, Migraines/Headaches, Numbness/Tingling, Overweight, Rheumatoid Arthritis, Smoking, Calf Pain, Swelling, Warmth, Chest Pain, Cold/Hot Extremity  Medical allergies:none.  Surgical history: other: gall bladder.  Medication history: High Blood Pressure, see full list in EMR.    Occupational Profile Information:  Current occupation is   Currently working in normal job without restrictions  Job Tasks: Computer Work, Lifting, Carrying, Pushing, Pulling, Repetitive Tasks, Walking  Prior functional level:  independent-shared household chores  Barriers include:none  Mobility: No difficulty  Transportation: drives    Functional Outcome Measure:  Upper Extremity Functional Index  SCORE:   Column Totals: 66/80  (A lower score indicates greater disability.)    Objective:  Pain Level (Scale 0-10)   3/21/2023   At Rest 0   With Use 1-3     Pain Description  Date 3/21/2023   Location Thumb CMC joints   Pain Quality Aching   Frequency intermittent     Pain is worst  daytime   Exacerbated by  gripping, lifting   Relieved by Massage and cream   Progression Gradually worsening      ROM  Thumb 3/21/2023   AROM  (PROM) Right   RABD 40   PABD 40   Retropulsion (cm) 3 cm   [place] Opposition Scale (0-10/10) 10/10     Thumb 3/21/2023   AROM  " (PROM) Left   RABD 40   PABD 40   Retropulsion (cm) 3 cm    K[place]andji Opposition Scale (0-10/10) 10/10     Thumb Observation/Appearance   - none  + mild    ++ moderate    +++ severe     3/21/2023   Shoulder deformity present over CMC R: + slight  L: +   Edema over the CMC joint R: -  L: -   Noted collapse of IP into hyperextension during pinch R: + slight  L: +      Provocative Tests  Pain Report:  - none    + mild    ++ moderate    +++ severe     3/21/2023   CMC Grind test R: -  L: -   Crepitus present R: -  L: -   CMC Adduction Stress Test R: -  L: -   CMC Extension Stress Test R: +  L: ++       Strength   (Measured in pounds)  Pain Report: - none  + mild    ++ moderate    +++ severe    3/21/2023 3/21/2023   Trials Right Left   1  2  3 33-  27-  26+ 27-  22+  19+   Average 29 23     Lat Pinch 3/21/2023 3/21/2023   Trials Right Left   1  2  3 11-  11-  10- 11-  11-  10-   Average 11 11     3 Pt Pinch 3/21/2023 3/21/2023   Trials Right Left   1  2  3 11-  9-  7+ 8-  8-  7+   Average 9 8     Palpation   Pain Report:  - none    + mild    ++ moderate    +++ severe    3/21/2023   CMC Joint Line R: + slight  L: +   Thenar Eminence R: -  L: -   Web Space R: +  L: ++   1st DC R: -  L: + slight   Radial Styloid R: -  L: -   FCR R: -  L: -     Assessment:  Patient presents with symptoms consistent with diagnosis of bilateral thumb CMC arthritis, with conservative intervention.    Patient's limitations or Problem List includes:  Pain, Decreased ROM/motion, Weakness, Decreased stability, Decreased  and Decreased pinch of bilateral thumbs which interferes with the patient's ability to perform Work Tasks, Sleep Patterns, Recreational Activities and Household Chores as compared to previous level of function.    Rehab Potential:  Good - Return to full activity, some limitations    Patient will benefit from skilled Occupational Therapy to increase ROM, flexibility, overall strength,  strength, pinch strength and  stability of thumb and decrease pain to return to previous activity level and resume normal daily tasks and to reach their rehab potential.    Barriers to Learning:  No barrier    Communication Issues:  Patient appears to be able to clearly communicate and understand verbal and written communication and follow directions correctly.    Chart Review: Chart Review and Simple history review with patient    Identified Performance Deficits: home establishment and management, meal preparation and cleanup, sleep, work and leisure activities    Assessment of Occupational Performance:  5 or more Performance Deficits    Clinical Decision Making (Complexity): Low complexity    Treatment Explanation:  The following has been discussed with the patient:    RX ordered/plan of care  Anticipated outcomes  Possible risks and side effects    Plan:  Frequency:  2 X a month, once daily  Duration:  for 3 months    Treatment Plan:    Modalities:    Paraffin   Therapeutic Exercise:    Place and Hold, Isometrics and Stabilization  Manual Techniques:   Joint mobilization and Myofascial release  Orthotic Fabrication:    Static hand based  Self Care:    Self Care Tasks, Ergonomic Considerations and Joint Protection and Adaptive Equipment Education     Discharge Plan:  Achieve all LTG  [place] in home treatment program.  Reach maximal therapeutic benefit.    Home Program:  Warmth for pain and stiffness, consider home paraffin bath  Self myofascial release of 1st web space with clip and small ball  Self CMC mobilization on chest  Place and hold pinch, avoid IP hyperextension  Left custom CMC neoprene cool comfort orthosis during the day with ADL s per symptoms  Incorporate joint protection into daily functional activities    Next Visit:  See in 1-2 weeks  Assess response to HEP and left comfort cool orthosis, consider for right  Consider Paraffin trial  Add Thumb Stabilization Program with hard  C  and index abd  Consider custom orthoplast  Hand based Thumb Spica orthoses to wear night/sleeping  Review and issue adaptive equipment resources

## 2023-03-22 NOTE — PROGRESS NOTES
Chart review completed and noted that patient has scheduled lab appointment appropriately.  Added to the appointment notes that COVID asymptomatic testing needs to be done as well.  Closing encounter.    Trudi FITZGERALD RN Specialty Triage 3/22/2023 12:27 PM

## 2023-03-25 ENCOUNTER — LAB (OUTPATIENT)
Dept: LAB | Facility: CLINIC | Age: 58
End: 2023-03-25
Payer: COMMERCIAL

## 2023-03-25 DIAGNOSIS — Z79.899 HIGH RISK MEDICATIONS (NOT ANTICOAGULANTS) LONG-TERM USE: ICD-10-CM

## 2023-03-25 DIAGNOSIS — M31.31 GRANULOMATOSIS WITH POLYANGIITIS WITH RENAL INVOLVEMENT (H): ICD-10-CM

## 2023-03-25 DIAGNOSIS — M06.4 INFLAMMATORY POLYARTHROPATHY (H): ICD-10-CM

## 2023-03-25 LAB
ALBUMIN UR-MCNC: NEGATIVE MG/DL
APPEARANCE UR: CLEAR
BILIRUB UR QL STRIP: NEGATIVE
COLOR UR AUTO: YELLOW
GLUCOSE UR STRIP-MCNC: NEGATIVE MG/DL
HGB UR QL STRIP: NEGATIVE
KETONES UR STRIP-MCNC: NEGATIVE MG/DL
LEUKOCYTE ESTERASE UR QL STRIP: NEGATIVE
NITRATE UR QL: NEGATIVE
PH UR STRIP: 5.5 [PH] (ref 5–7)
SARS-COV-2 RNA RESP QL NAA+PROBE: NEGATIVE
SP GR UR STRIP: 1.02 (ref 1–1.03)
UROBILINOGEN UR STRIP-ACNC: 0.2 E.U./DL

## 2023-03-25 PROCEDURE — 81003 URINALYSIS AUTO W/O SCOPE: CPT

## 2023-03-25 PROCEDURE — U0003 INFECTIOUS AGENT DETECTION BY NUCLEIC ACID (DNA OR RNA); SEVERE ACUTE RESPIRATORY SYNDROME CORONAVIRUS 2 (SARS-COV-2) (CORONAVIRUS DISEASE [COVID-19]), AMPLIFIED PROBE TECHNIQUE, MAKING USE OF HIGH THROUGHPUT TECHNOLOGIES AS DESCRIBED BY CMS-2020-01-R: HCPCS

## 2023-03-25 PROCEDURE — U0005 INFEC AGEN DETEC AMPLI PROBE: HCPCS

## 2023-03-28 ENCOUNTER — INFUSION THERAPY VISIT (OUTPATIENT)
Dept: INFUSION THERAPY | Facility: CLINIC | Age: 58
End: 2023-03-28

## 2023-03-28 VITALS
WEIGHT: 190.2 LBS | SYSTOLIC BLOOD PRESSURE: 127 MMHG | TEMPERATURE: 98.4 F | HEART RATE: 56 BPM | RESPIRATION RATE: 16 BRPM | DIASTOLIC BLOOD PRESSURE: 63 MMHG | BODY MASS INDEX: 30.93 KG/M2 | OXYGEN SATURATION: 99 %

## 2023-03-28 DIAGNOSIS — M31.31 GRANULOMATOSIS WITH POLYANGIITIS WITH RENAL INVOLVEMENT (H): Primary | ICD-10-CM

## 2023-03-28 PROCEDURE — 96413 CHEMO IV INFUSION 1 HR: CPT | Performed by: NURSE PRACTITIONER

## 2023-03-28 PROCEDURE — 96375 TX/PRO/DX INJ NEW DRUG ADDON: CPT | Performed by: NURSE PRACTITIONER

## 2023-03-28 PROCEDURE — 96415 CHEMO IV INFUSION ADDL HR: CPT | Performed by: NURSE PRACTITIONER

## 2023-03-28 PROCEDURE — 99207 PR NO CHARGE LOS: CPT

## 2023-03-28 RX ORDER — ALBUTEROL SULFATE 90 UG/1
1-2 AEROSOL, METERED RESPIRATORY (INHALATION)
Status: CANCELLED
Start: 2023-03-28

## 2023-03-28 RX ORDER — MEPERIDINE HYDROCHLORIDE 25 MG/ML
25 INJECTION INTRAMUSCULAR; INTRAVENOUS; SUBCUTANEOUS EVERY 30 MIN PRN
Status: CANCELLED | OUTPATIENT
Start: 2023-03-28

## 2023-03-28 RX ORDER — ACETAMINOPHEN 325 MG/1
650 TABLET ORAL ONCE
Status: COMPLETED | OUTPATIENT
Start: 2023-03-28 | End: 2023-03-28

## 2023-03-28 RX ORDER — ALBUTEROL SULFATE 0.83 MG/ML
2.5 SOLUTION RESPIRATORY (INHALATION)
Status: CANCELLED | OUTPATIENT
Start: 2023-03-28

## 2023-03-28 RX ORDER — HEPARIN SODIUM,PORCINE 10 UNIT/ML
5 VIAL (ML) INTRAVENOUS
Status: CANCELLED | OUTPATIENT
Start: 2023-03-28

## 2023-03-28 RX ORDER — DIPHENHYDRAMINE HCL 25 MG
50 CAPSULE ORAL ONCE
Status: COMPLETED | OUTPATIENT
Start: 2023-03-28 | End: 2023-03-28

## 2023-03-28 RX ORDER — METHYLPREDNISOLONE SODIUM SUCCINATE 125 MG/2ML
125 INJECTION, POWDER, LYOPHILIZED, FOR SOLUTION INTRAMUSCULAR; INTRAVENOUS ONCE
Status: CANCELLED | OUTPATIENT
Start: 2023-03-28

## 2023-03-28 RX ORDER — METHYLPREDNISOLONE SODIUM SUCCINATE 125 MG/2ML
125 INJECTION, POWDER, LYOPHILIZED, FOR SOLUTION INTRAMUSCULAR; INTRAVENOUS
Status: CANCELLED
Start: 2023-03-28

## 2023-03-28 RX ORDER — ACETAMINOPHEN 325 MG/1
650 TABLET ORAL ONCE
Status: CANCELLED
Start: 2023-03-28

## 2023-03-28 RX ORDER — EPINEPHRINE 1 MG/ML
0.3 INJECTION, SOLUTION INTRAMUSCULAR; SUBCUTANEOUS EVERY 5 MIN PRN
Status: CANCELLED | OUTPATIENT
Start: 2023-03-28

## 2023-03-28 RX ORDER — METHYLPREDNISOLONE SODIUM SUCCINATE 125 MG/2ML
125 INJECTION, POWDER, LYOPHILIZED, FOR SOLUTION INTRAMUSCULAR; INTRAVENOUS ONCE
Status: COMPLETED | OUTPATIENT
Start: 2023-03-28 | End: 2023-03-28

## 2023-03-28 RX ORDER — HEPARIN SODIUM (PORCINE) LOCK FLUSH IV SOLN 100 UNIT/ML 100 UNIT/ML
5 SOLUTION INTRAVENOUS
Status: CANCELLED | OUTPATIENT
Start: 2023-03-28

## 2023-03-28 RX ORDER — DIPHENHYDRAMINE HCL 25 MG
50 CAPSULE ORAL ONCE
Status: CANCELLED
Start: 2023-03-28

## 2023-03-28 RX ORDER — DIPHENHYDRAMINE HYDROCHLORIDE 50 MG/ML
50 INJECTION INTRAMUSCULAR; INTRAVENOUS
Status: CANCELLED
Start: 2023-03-28

## 2023-03-28 RX ADMIN — METHYLPREDNISOLONE SODIUM SUCCINATE 125 MG: 125 INJECTION INTRAMUSCULAR; INTRAVENOUS at 08:03

## 2023-03-28 RX ADMIN — Medication 250 ML: at 08:00

## 2023-03-28 RX ADMIN — Medication 50 MG: at 07:50

## 2023-03-28 RX ADMIN — ACETAMINOPHEN 650 MG: 325 TABLET ORAL at 07:50

## 2023-03-28 ASSESSMENT — PAIN SCALES - GENERAL: PAINLEVEL: NO PAIN (0)

## 2023-03-28 NOTE — PROGRESS NOTES
Infusion Nursing Note:  Flavia Marlow Yuniel presents today for Rapid Truxima.    Patient seen by provider today: No   present during visit today: Not Applicable.    Note: N/A.    Intravenous Access:  Peripheral IV placed.    Treatment Conditions:  Biological Infusion Checklist:  ~~~ NOTE: If the patient answers yes to any of the questions below, hold the infusion and contact ordering provider or on-call provider.    1. Have you recently had an elevated temperature, fever, chills, productive cough, coughing for 3 weeks or longer or hemoptysis, abnormal vital signs, night sweats,  chest pain or have you noticed a decrease in your appetite, unexplained weight loss or fatigue? No  2. Do you have any open wounds or new incisions? No  3. Do you have any recent or upcoming hospitalizations, surgeries or dental procedures? No  4. Do you currently have or recently have had any signs of illness or infection or are you on any antibiotics? No  5. Have you had any new, sudden or worsening abdominal pain? No  6. Have you or anyone in your household received a live vaccination in the past 4 weeks? Please note:  No live vaccines while on biologic/chemotherapy until 6 months after the last treatment.  Patient can receive the flu vaccine (shot only) and the pneumovax.  It is optimal for the patient to get these vaccines mid cycle, but they can be given at any time as long as it is not on the day of the infusion. No  7. Have you recently been diagnosed with any new nervous system diseases (ie. Multiple sclerosis, Guillain [place], seizures, neurological changes) or cancer diagnosis? No  8. Are you on any form of radiation or chemotherapy? No  9. Are you pregnant or breast feeding or do you have plans of pregnancy in the future? No  10. Have you been having any signs of worsening depression or suicidal ideations?  (benlysta only) No  11. Have there been any other new onset medical symptoms? No      Post Infusion  Assessment:  Patient tolerated infusion without incident.  Blood return noted pre and post infusion.  Site patent and intact, free from redness, edema or discomfort.  No evidence of extravasations.  Access discontinued per protocol.     Discharge Plan:   Patient will return in 6 months for next appointment.   Future appts have been reviewed and crosschecked with appt note and plan.  Patient discharged in stable condition accompanied by: self.  Departure Mode: Ambulatory.    Brie Cabral RN-BSN, PHN, OCN  [facility]

## 2023-04-18 ENCOUNTER — THERAPY VISIT (OUTPATIENT)
Dept: OCCUPATIONAL THERAPY | Facility: CLINIC | Age: 58
End: 2023-04-18
Payer: COMMERCIAL

## 2023-04-18 DIAGNOSIS — M18.0 PRIMARY OSTEOARTHRITIS OF BOTH FIRST CARPOMETACARPAL JOINTS: ICD-10-CM

## 2023-04-18 DIAGNOSIS — M79.644 BILATERAL THUMB PAIN: Primary | ICD-10-CM

## 2023-04-18 DIAGNOSIS — M79.645 BILATERAL THUMB PAIN: Primary | ICD-10-CM

## 2023-04-18 PROCEDURE — 97763 ORTHC/PROSTC MGMT SBSQ ENC: CPT | Mod: GO | Performed by: OCCUPATIONAL THERAPIST

## 2023-04-18 PROCEDURE — 97110 THERAPEUTIC EXERCISES: CPT | Mod: GO | Performed by: OCCUPATIONAL THERAPIST

## 2023-04-18 NOTE — PROGRESS NOTES
SOAP note objective information for 4/18/2023:    Diagnosis:   Bilateral thumb pain/CMC OA (left > right)  DOI:  2/28/2023 (therapy referral)    Objective:  Pain Level (Scale 0-10)   3/21/2023 4/18/2023   At Rest 0    With Use 1-3 0-1     ROM  Thumb 3/21/2023 4/18/2023   AROM  (PROM) Right Right   RABD 40 45   PABD 40 45   Retropulsion (cm) 3 cm    K[place]andji Opposition Scale (0-10/10) 10/10      Thumb 3/21/2023 4/18/2023   AROM  (PROM) Left Left   RABD 40 45   PABD 40 45   Retropulsion (cm) 3 cm     K[place]andji Opposition Scale (0-10/10) 10/10      Palpation   Pain Report:  - none    + mild    ++ moderate    +++ severe    3/21/2023 4/18/2023   CMC Joint Line R: + slight  L: + R:+ slight  L:+ slight   Thenar Eminence R: -  L: - R:-  L:-   Web Space R: +  L: ++ R:+  L:+   1st DC R: -  L: + slight R:-  L:-   Radial Styloid R: -  L: - R:-  L:-   FCR R: -  L: - R:-  L:-     Home Program:  Warmth for pain and stiffness, consider home paraffin bath  Self myofascial release of 1st web space with clip and small ball  Self CMC mobilization on chest  Place and hold pinch, avoid IP hyperextension  Thumb Stabilization Program with hard  C  and index abd  Bilateral custom CMC neoprene cool comfort orthoses during the day with ADL s per symptoms  Incorporate joint protection into daily functional activities    Next Visit:  See in 2-4 weeks  Assess response to right comfort cool orthosis and thumb stabilization   Consider Paraffin trial  Consider custom orthoplast Hand based Thumb Spica orthoses to wear night/sleeping  Review and issue adaptive equipment resources   Wean to HEP    Please refer to the daily flowsheet for treatment today, total treatment time and time spent performing 1:1 timed codes.

## 2023-04-21 ENCOUNTER — OFFICE VISIT (OUTPATIENT)
Dept: URGENT CARE | Facility: URGENT CARE | Age: 58
End: 2023-04-21
Payer: COMMERCIAL

## 2023-04-21 VITALS
DIASTOLIC BLOOD PRESSURE: 86 MMHG | TEMPERATURE: 99.1 F | HEART RATE: 63 BPM | OXYGEN SATURATION: 100 % | BODY MASS INDEX: 31.39 KG/M2 | WEIGHT: 193 LBS | SYSTOLIC BLOOD PRESSURE: 144 MMHG

## 2023-04-21 DIAGNOSIS — J01.90 ACUTE NON-RECURRENT SINUSITIS, UNSPECIFIED LOCATION: Primary | ICD-10-CM

## 2023-04-21 DIAGNOSIS — H10.33 ACUTE CONJUNCTIVITIS OF BOTH EYES, UNSPECIFIED ACUTE CONJUNCTIVITIS TYPE: ICD-10-CM

## 2023-04-21 PROCEDURE — 99213 OFFICE O/P EST LOW 20 MIN: CPT | Performed by: PHYSICIAN ASSISTANT

## 2023-04-21 RX ORDER — POLYMYXIN B SULFATE AND TRIMETHOPRIM 1; 10000 MG/ML; [USP'U]/ML
1-2 SOLUTION OPHTHALMIC EVERY 6 HOURS
Qty: 3 ML | Refills: 0 | Status: SHIPPED | OUTPATIENT
Start: 2023-04-21 | End: 2023-04-28

## 2023-04-21 ASSESSMENT — ENCOUNTER SYMPTOMS
VOMITING: 0
SHORTNESS OF BREATH: 0
MYALGIAS: 0
RHINORRHEA: 1
SORE THROAT: 0
EYE DISCHARGE: 1
FEVER: 0
NAUSEA: 0
DIARRHEA: 0
COUGH: 1
SINUS PRESSURE: 1

## 2023-04-21 NOTE — PROGRESS NOTES
SUBJECTIVE:   Flavia Brice is a 58 year old female presenting with a chief complaint of   Chief Complaint   Patient presents with     Cold Symptoms     Sx 4 weeks     Eye Problem     Sx 2 days Goopin eyes     Sinus Problem     Sx for a couple of weeks.        She is an established patient of [facility].  URI symptoms x 4 weeks. Eye discharge 2 days ago.   Wears contacts.   Patient is immunocompromised.  Occasionally productive cough.  Occasionally SOB, with activity.   Negative covid.  Cough occasionally up at night.      Treatment:  Dayquil, cough drops, sleeping upright.      Review of Systems   Constitutional: Negative for fever.   HENT: Positive for congestion, rhinorrhea and sinus pressure. Negative for ear pain and sore throat.    Eyes: Positive for discharge.   Respiratory: Positive for cough. Negative for shortness of breath.    Cardiovascular: Negative for chest pain.   Gastrointestinal: Negative for diarrhea, nausea and vomiting.   Musculoskeletal: Negative for myalgias.   All other systems reviewed and are negative.      Past Medical History:   Diagnosis Date     Tinnitus      Family History   Problem Relation Age of Onset     Diabetes Father      Heart Disease Maternal Grandmother      Heart Disease Maternal Grandfather      Arthritis Paternal Grandmother      Heart Disease Paternal Grandfather      Current Outpatient Medications   Medication Sig Dispense Refill     amoxicillin-clavulanate (AUGMENTIN) 875-125 MG tablet Take 1 tablet by mouth 2 times daily for 10 days 20 tablet 0     Ascorbic Acid (VITAMIN C) 100 MG CHEW        azaTHIOprine (IMURAN) 50 MG tablet Take 2 tablets (100 mg) by mouth daily 180 tablet 0     losartan-hydrochlorothiazide (HYZAAR) 100-25 MG tablet Take 1 tablet by mouth daily 90 tablet 3     Multiple Vitamins-Minerals (ZINC PO)        trimethoprim-polymyxin b (POLYTRIM) 08246-3.1 UNIT/ML-% ophthalmic solution Place 1-2 drops into both eyes every 6 hours for 7 days 3 mL 0      VITAMIN D PO        ASPIRIN NOT PRESCRIBED (INTENTIONAL) Please choose reason not prescribed from choices below. (Patient not taking: Reported on 3/15/2023)       BETA BLOCKER NOT PRESCRIBED (INTENTIONAL) Please choose reason not prescribed from choices below. (Patient not taking: Reported on 3/15/2023)  0     zinc gluconate 50 MG tablet Take 50 mg by mouth daily (Patient not taking: Reported on 3/15/2023)       Social History     Tobacco Use     Smoking status: Former     Packs/day: 0.50     Types: Cigarettes     Smokeless tobacco: Never   Vaping Use     Vaping status: Never Used     Passive vaping exposure: Yes   Substance Use Topics     Alcohol use: Yes     Comment: monthly-Social       OBJECTIVE  BP (!) 144/86   Pulse 63   Temp 99.1  F (37.3  C) (Tympanic)   Wt 87.5 kg (193 lb)   SpO2 100%   BMI 31.39 kg/m      Physical Exam  Vitals and nursing note reviewed.   Constitutional:       Appearance: Normal appearance. She is obese.   HENT:      Head: Normocephalic and atraumatic.      Right Ear: Tympanic membrane, ear canal and external ear normal.      Left Ear: Tympanic membrane, ear canal and external ear normal.      Nose: Nose normal.      Mouth/Throat:      Mouth: Mucous membranes are moist.      Pharynx: Oropharynx is clear.   Eyes:      General:         Right eye: Discharge present.         Left eye: Discharge present.     Extraocular Movements: Extraocular movements intact.      Conjunctiva/sclera: Conjunctivae normal.   Cardiovascular:      Rate and Rhythm: Normal rate and regular rhythm.      Pulses: Normal pulses.      Heart sounds: Normal heart sounds.   Pulmonary:      Effort: Pulmonary effort is normal.      Breath sounds: Normal breath sounds.   Musculoskeletal:      Cervical back: Normal range of motion.   Skin:     General: Skin is warm and dry.      Findings: No rash.   Neurological:      General: No focal deficit present.      Mental Status: She is alert.   Psychiatric:         Mood and  Affect: Mood normal.         Behavior: Behavior normal.         Labs:  No results found for this or any previous visit (from the past 24 hour(s)).    X-Ray was not done.    ASSESSMENT:      ICD-10-CM    1. Acute non-recurrent sinusitis, unspecified location  J01.90 amoxicillin-clavulanate (AUGMENTIN) 875-125 MG tablet      2. Acute conjunctivitis of both eyes, unspecified acute conjunctivitis type  H10.33 trimethoprim-polymyxin b (POLYTRIM) 31581-0.1 UNIT/ML-% ophthalmic solution           Medical Decision Making:    Differential Diagnosis:  URI Adult/Peds:  Conjunctivitis and Sinusitis    Serious Comorbid Conditions:  Adult:  reviewed    PLAN:    Rx for augmentin and polytrim.   Push fluids.  Patient education.  Discussed reasons to seek immediate medical attention.  Additionally if no improvement or worsening in one week, may follow up with PCP and/or UC.        Followup:    If not improving or if condition worsens, follow up with your Primary Care Provider, If not improving or if conditions worsens over the next 12-24 hours, go to the Emergency Department    There are no Patient Instructions on file for this visit.

## 2023-04-26 ENCOUNTER — TELEPHONE (OUTPATIENT)
Dept: FAMILY MEDICINE | Facility: CLINIC | Age: 58
End: 2023-04-26
Payer: COMMERCIAL

## 2023-04-26 NOTE — TELEPHONE ENCOUNTER
Patient Quality Outreach    Patient is due for the following:   Hypertension -  BP check and Hypertension follow-up visit  Breast Cancer Screening - Mammogram  IVD  -  IVD Follow-up Visit      Topic Date Due     Zoster (Shingles) Vaccine (1 of 2) Never done     Hepatitis B Vaccine (2 of 3 - 19+ 3-dose series) 04/07/2023       Next Steps:   Schedule a office visit for IVD/HTN follow up    Type of outreach:    Sent [employer] message.      Questions for provider review:    None     Erin Amador MA

## 2023-04-29 ENCOUNTER — HEALTH MAINTENANCE LETTER (OUTPATIENT)
Age: 58
End: 2023-04-29

## 2023-05-28 ENCOUNTER — LAB (OUTPATIENT)
Dept: LAB | Facility: CLINIC | Age: 58
End: 2023-05-28
Payer: COMMERCIAL

## 2023-05-28 DIAGNOSIS — M06.4 INFLAMMATORY POLYARTHROPATHY (H): ICD-10-CM

## 2023-05-28 DIAGNOSIS — Z79.899 HIGH RISK MEDICATIONS (NOT ANTICOAGULANTS) LONG-TERM USE: ICD-10-CM

## 2023-05-28 DIAGNOSIS — M31.31 GRANULOMATOSIS WITH POLYANGIITIS WITH RENAL INVOLVEMENT (H): ICD-10-CM

## 2023-05-28 LAB
ALBUMIN MFR UR ELPH: 7.9 MG/DL (ref 1–14)
BASOPHILS # BLD AUTO: 0 10E3/UL (ref 0–0.2)
BASOPHILS NFR BLD AUTO: 1 %
CREAT UR-MCNC: 73 MG/DL
EOSINOPHIL # BLD AUTO: 0.2 10E3/UL (ref 0–0.7)
EOSINOPHIL NFR BLD AUTO: 3 %
ERYTHROCYTE [DISTWIDTH] IN BLOOD BY AUTOMATED COUNT: 13.7 % (ref 10–15)
ERYTHROCYTE [SEDIMENTATION RATE] IN BLOOD BY WESTERGREN METHOD: 9 MM/HR (ref 0–30)
HCT VFR BLD AUTO: 40 % (ref 35–47)
HGB BLD-MCNC: 12.8 G/DL (ref 11.7–15.7)
IMM GRANULOCYTES # BLD: 0 10E3/UL
IMM GRANULOCYTES NFR BLD: 0 %
LYMPHOCYTES # BLD AUTO: 1.5 10E3/UL (ref 0.8–5.3)
LYMPHOCYTES NFR BLD AUTO: 22 %
MCH RBC QN AUTO: 30.9 PG (ref 26.5–33)
MCHC RBC AUTO-ENTMCNC: 32 G/DL (ref 31.5–36.5)
MCV RBC AUTO: 97 FL (ref 78–100)
MONOCYTES # BLD AUTO: 0.7 10E3/UL (ref 0–1.3)
MONOCYTES NFR BLD AUTO: 10 %
NEUTROPHILS # BLD AUTO: 4.5 10E3/UL (ref 1.6–8.3)
NEUTROPHILS NFR BLD AUTO: 64 %
PLATELET # BLD AUTO: 298 10E3/UL (ref 150–450)
PROT/CREAT 24H UR: 0.11 MG/MG CR (ref 0–0.2)
RBC # BLD AUTO: 4.14 10E6/UL (ref 3.8–5.2)
WBC # BLD AUTO: 6.9 10E3/UL (ref 4–11)

## 2023-05-28 PROCEDURE — 36415 COLL VENOUS BLD VENIPUNCTURE: CPT

## 2023-05-28 PROCEDURE — 85025 COMPLETE CBC W/AUTO DIFF WBC: CPT

## 2023-05-28 PROCEDURE — 84156 ASSAY OF PROTEIN URINE: CPT

## 2023-05-28 PROCEDURE — 85652 RBC SED RATE AUTOMATED: CPT

## 2023-05-28 PROCEDURE — 86140 C-REACTIVE PROTEIN: CPT

## 2023-05-28 PROCEDURE — 82565 ASSAY OF CREATININE: CPT

## 2023-05-28 PROCEDURE — 80076 HEPATIC FUNCTION PANEL: CPT

## 2023-05-30 ENCOUNTER — OFFICE VISIT (OUTPATIENT)
Dept: RHEUMATOLOGY | Facility: CLINIC | Age: 58
End: 2023-05-30
Payer: COMMERCIAL

## 2023-05-30 VITALS
HEART RATE: 65 BPM | SYSTOLIC BLOOD PRESSURE: 126 MMHG | OXYGEN SATURATION: 99 % | DIASTOLIC BLOOD PRESSURE: 76 MMHG | WEIGHT: 193.8 LBS | BODY MASS INDEX: 31.52 KG/M2

## 2023-05-30 DIAGNOSIS — M31.31 GRANULOMATOSIS WITH POLYANGIITIS WITH RENAL INVOLVEMENT (H): Primary | ICD-10-CM

## 2023-05-30 DIAGNOSIS — M06.4 INFLAMMATORY POLYARTHROPATHY (H): ICD-10-CM

## 2023-05-30 DIAGNOSIS — M72.2 BILATERAL PLANTAR FASCIITIS: ICD-10-CM

## 2023-05-30 DIAGNOSIS — Z79.899 HIGH RISK MEDICATIONS (NOT ANTICOAGULANTS) LONG-TERM USE: ICD-10-CM

## 2023-05-30 LAB
ALBUMIN SERPL-MCNC: 3.8 G/DL (ref 3.4–5)
ALP SERPL-CCNC: 82 U/L (ref 40–150)
ALT SERPL W P-5'-P-CCNC: 26 U/L (ref 0–50)
AST SERPL W P-5'-P-CCNC: 14 U/L (ref 0–45)
BILIRUB DIRECT SERPL-MCNC: <0.1 MG/DL (ref 0–0.2)
BILIRUB SERPL-MCNC: 0.2 MG/DL (ref 0.2–1.3)
CREAT SERPL-MCNC: 1.29 MG/DL (ref 0.52–1.04)
CRP SERPL-MCNC: <2.9 MG/L (ref 0–8)
GFR SERPL CREATININE-BSD FRML MDRD: 48 ML/MIN/1.73M2
PROT SERPL-MCNC: 7.3 G/DL (ref 6.8–8.8)

## 2023-05-30 PROCEDURE — 99214 OFFICE O/P EST MOD 30 MIN: CPT | Performed by: INTERNAL MEDICINE

## 2023-05-30 RX ORDER — ACETAMINOPHEN 325 MG/1
650 TABLET ORAL ONCE
Status: CANCELLED
Start: 2023-09-15

## 2023-05-30 RX ORDER — ALBUTEROL SULFATE 0.83 MG/ML
2.5 SOLUTION RESPIRATORY (INHALATION)
Status: CANCELLED | OUTPATIENT
Start: 2023-09-15

## 2023-05-30 RX ORDER — HEPARIN SODIUM (PORCINE) LOCK FLUSH IV SOLN 100 UNIT/ML 100 UNIT/ML
5 SOLUTION INTRAVENOUS
Status: CANCELLED | OUTPATIENT
Start: 2023-09-15

## 2023-05-30 RX ORDER — MEPERIDINE HYDROCHLORIDE 25 MG/ML
25 INJECTION INTRAMUSCULAR; INTRAVENOUS; SUBCUTANEOUS EVERY 30 MIN PRN
Status: CANCELLED | OUTPATIENT
Start: 2023-09-15

## 2023-05-30 RX ORDER — HEPARIN SODIUM,PORCINE 10 UNIT/ML
5 VIAL (ML) INTRAVENOUS
Status: CANCELLED | OUTPATIENT
Start: 2023-09-15

## 2023-05-30 RX ORDER — METHYLPREDNISOLONE SODIUM SUCCINATE 125 MG/2ML
125 INJECTION, POWDER, LYOPHILIZED, FOR SOLUTION INTRAMUSCULAR; INTRAVENOUS
Status: CANCELLED
Start: 2023-09-15

## 2023-05-30 RX ORDER — DIPHENHYDRAMINE HYDROCHLORIDE 50 MG/ML
50 INJECTION INTRAMUSCULAR; INTRAVENOUS
Status: CANCELLED
Start: 2023-09-15

## 2023-05-30 RX ORDER — METHYLPREDNISOLONE SODIUM SUCCINATE 125 MG/2ML
125 INJECTION, POWDER, LYOPHILIZED, FOR SOLUTION INTRAMUSCULAR; INTRAVENOUS ONCE
Status: CANCELLED | OUTPATIENT
Start: 2023-09-15

## 2023-05-30 RX ORDER — EPINEPHRINE 1 MG/ML
0.3 INJECTION, SOLUTION, CONCENTRATE INTRAVENOUS EVERY 5 MIN PRN
Status: CANCELLED | OUTPATIENT
Start: 2023-09-15

## 2023-05-30 RX ORDER — ALBUTEROL SULFATE 90 UG/1
1-2 AEROSOL, METERED RESPIRATORY (INHALATION)
Status: CANCELLED
Start: 2023-09-15

## 2023-05-30 RX ORDER — AZATHIOPRINE 50 MG/1
100 TABLET ORAL DAILY
Qty: 180 TABLET | Refills: 2 | Status: SHIPPED | OUTPATIENT
Start: 2023-05-30 | End: 2024-04-02

## 2023-05-30 RX ORDER — DIPHENHYDRAMINE HCL 25 MG
50 CAPSULE ORAL ONCE
Status: CANCELLED
Start: 2023-09-15

## 2023-05-30 NOTE — PROGRESS NOTES
Rheumatology Clinic Visit      Flavia Brice MRN# [id number]   YOB: 1965 Age: 58 year old      Date of visit: 5/30/23   PCP: Monet Da Silva    Chief Complaint   Patient presents with:  Granulomatosis with polyangiitis with renal involvement    Assessment and Plan     1.  Granulomatosis with polyangiitis: Dx'd 2012 by Dr. Wesley at [facility] when she presented with pulmonary involvement, pericarditis/effusion, borderline aneurysmal dilation of the ascending aorta and biopsy proven necrotizing crescentic nephritis.  Went into remission with cytoxan and prednisone, then maintained on AZA for 2 yrs per patient, stopped when lost to follow-up. Recurrence in 2020 with YOLI, pleuritic chest pain, inflammatory arthritis (MCPs, PIPs, shoulders, knees) that has responded well to prednisone.  She was tx'd with prednisone and rituximab 1g IV received on 8/21/2020 & 9/4/2020, 4/28/2021 (delayed for the COVID-19 vaccine) & (no 2nd dose of rituximab), 11/11/2021 & 11/30/2021, 9/1/2022 & 9/15/2022; 3/15/2023. Currently on azathioprine 100 mg daily and rituximab.  Continue on combination of azathioprine and rituximab.  Reduced rituximab to be 1 g IV every 6 months, first received in March 2023 and she continues to do well. Chronic illness, stable.    - Continue azathioprine 100 mg daily  - Re-dose of rituximab (truxima) 1 g IV every 6 months, next due 9/15/2023  - Labs in 3 months: CBC, creatinine, hepatic panel, ESR, CRP, UA    High risk medication requiring intensive toxicity monitoring at least quarterly.     2. Inflammatory arthritis: Related to GPA.  See #1    3. Elevated serum creatinine: Related to GPA.  Has been stable; most recent creatinine pending results    4. Positive MALLORY (antinuclear antibody): Additional work-up was negative for SSA, SSB, RNP, Smith, dsDNA; complement C3 and C4 were not low.  Symptoms are most likely related to GPA; no MALLORY-associated rheumatologic disorder  identified    5.  Aneurysm of the ascending aorta at 4.5 cm; history of heart failure with preserved ejection fraction: following with Dr. Locke (cardiology)    6. Cytoxan use history: needs periodic UA to eval for hematuria, due to increased risk for bladder cancer after cytoxan exposure    7.  Osteoarthritis: Affecting the DIPs and bilateral first CMC joints.  Significant improvement with topical Voltaren gel as needed and exercises taught from hand therapy.  Continue home hand therapy exercises.    8.  Osteopenia: Osteopenia based on 3/14/2023 DEXA.  Continue calcium and vitamin D.  Plan to recheck DEXA in 2633-4252.    9.  Planter fasciitis, bilateral: Reviewed the diagnosis and treatment options.  Advise stretching exercises every 1 hour.  Advised shoes with good arch support whenever ambulatory, indoors and outdoors.  If significant worsening or no improvement then may consider podiatry evaluation    10. Vaccinations: Vaccinations reviewed with Ms. Brice.    - Influenza: Encouraged yearly vaccination  - COVID-19: Advised updating 1 month before the next rituximab infusion    Total minutes spent in evaluation with patient, documentation, , and review of pertinent studies and chart notes: 20     Ms. Brice verbalized agreement with and understanding of the rational for the diagnosis and treatment plan.  All questions were answered to best of my ability and the patient's satisfaction. Ms. Brice was advised to contact the clinic with any questions that may arise after the clinic visit.      Thank you for involving me in the care of the patient    Return to clinic: 3-4 months months      HPI   Flavia Brice is a 58 year old female with a past medical history significant for hypertension, depression, and granulomatosis with polyangiitis who is seen for follow-up of granulomatosis with polyangiitis    10/31/2013 [facility] rheumatology clinic note by Dr. Ann Wesley documents severe AK-3 ANCA  positive GPA with pulmonary involvement, pericarditis/effusion, borderline aneurysmal dilation of the ascending aorta, necrotizing crescentic nephritis.  Kidney biopsy has confirmed diagnosis in the past.  Has received Solu-Medrol x3, Cytoxan x1 at 1300 mg, on prednisone 60-80 mg daily.  Deteriorated quickly despite those interventions and was hospitalized again, requiring plasmapheresis and dialysis.  Was seeing Dr. Velazquez from [facility] and was again placed on Cytoxan 15 mg/kg every 3 weeks.  Has seen ENT but there was no clear sinus involvement.  She was then transitioned after 7 infusions of Cytoxan to Imuran on 11/2012 and has been doing well on that.      7/11/2020 [facility] hospitalization note: Acute kidney injury, migratory polyarthritis, chronic diastolic heart failure.  Left knee and left hip pain.  Right shoulder pain that migrated to the left shoulder.  No joint swelling.  No fevers.  In the past she had flare of her joint pain that she was treated with prednisone.  Prior to this ED evaluation she reportedly used prednisone 20 mg without improvement.    7/22/2020: [facility] emergency department visit for shortness of breath.  Notes history of granulomatosis with polyangiitis, diastolic heart failure, a sending thoracic aortic aneurysm from 4.6 cm on chest CT 2/18/2020), migratory polyarthritis, acute kidney injury, and hypertension.  Coronavirus test negative.  Chest CT negative for pneumonia.  O2 saturation normal.  Advised to follow-up with her PCP and cardiology.    8/5/2020:  Ms. Brice reported that she was dx'd with GPA by Dr. Wesley. She was treated and in remission; was on AZA for a year and no issues for a while. Lost to follow-up with Dr. Wesley.  Then 1 year ago started having more shortness of breath and found to have more lung nodules.  Recalls heart failure and kidney failure when first dx'd.  Told to see cardiology to follow the aneurysm.  Then in Jan 2020 started to have pain in  her shoulders, knees ankles. Pain was so bad that she went to [facility]; found to have YOLI.  Until able to have this rheumatology evaluation, she says that she was given prednisone to help the joint pain; currently on 5mg daily of prednisone; felt much better when on prednisone 30mg daily. Has been dealing with right knee swelling; the prednisone helped with this.  Joint pains are worse in the AM; improve with time and activity. Morning stiffness is very mild while on prednisone 5mg daily.  No hematuria. No hemoptysis.  Flavia Brice's mother was present during the clinic visit.     9/9/2020: Significant improvement with prednisone and rituximab.  No longer with swelling around her ankles.  Hand swelling/pain/stiffness has resolved.  She feels a little puffy in her face but no actual swelling.  She has followed with her cardiologist and they are going to monitor the thoracic aortic aneurysm.  Tolerated rituximab infusion well.  Currently on prednisone 10 mg daily.  Not going into work at this time; she is taking a leave of absence.    10/21/2020: Improved.  Now with mild pain at her MCPs, DIPs, and knees.  MCP and knee pain is better with activity, worse in the morning.  DIP pain is worse with activity.  Joint pains started again with dose reduction of prednisone.  Has been on azathioprine and tolerated well in the past.    12/9/2020: Significant improvement with regard to her joint pain.  No joint pain now.  No morning stiffness.  No gelling phenomenon.  Tolerating azathioprine well.  Continues on prednisone 5mg daily.  Mild shortness of breath when walking up stairs that has been stable; no associated chest pain or pressure, palpitations, lightheadedness, dizziness, or nausea and the mild shortness of breath resolves quickly when she rests.  No shortness of breath at rest or currently.  Following with cardiology and plans to repeat her echocardiogram in March 2021.  Last echocardiogram was in February  2020.    4/8/2021: More joint aches and pains and she attributes this to being off of prednisone and having to delay her rituximab infusion secondary to the COVID-19 vaccine.  She has rescheduled the first of the 2 rituximab infusions to be 2 weeks after the second COVID-19 vaccine.  Knees ache more at the end of the day.  No cough, chest pain, shortness of breath.  Has seen cardiology and she says that her aortic aneurysm is stable.    7/7/2021:  doing well this time.  Mild aches of the PIPs and DIPs with more activity that improves with rest.  Usually has pain that travels around but in general is doing okay.  Only received 1 of 2 rituximab doses because her insurance had denied it; she then got a letter about a month later stating that rituximab had been approved.  Tolerating azathioprine and rituximab well.  Overall happy with how well she is doing.  No blood in urine or stool    10/19/2021: Reports that she is doing well.  She says that she is doing better than she was previously, and way better than she was before treatment.  No joint pain or stiffness.  No rash.  Tolerating azathioprine well.  No difficulty doing her daily activities.     1/25/2022: feels the best she has in a long time.  No joint pain or swelling. Morning stiffness for <20.  No rash.  No black or bloody stools.  No cough, chest pain, or shortness of breath.  No hemoptysis.  No hematemesis.  Using a surgical mask at work.    5/4/2022: had covid infxn, resolved >2wks ago. Fasting for labs recently. Joints more achy; due to rtx.  Tolerating aza well. No joint swelling. Morning stiffness <30 min. +gelling. No hemoptysis.  No blood in urine.     8/2/2022: Did not get the rituximab infusion but does have a scheduled for 9/1/2022 and 9/15/2022.  More joint pain at the MCPs, PIPs, knees, and MTPs that is worse in the morning and improves with time and activity.  Morning stiffness for about 1 hour.  No rash.  No black or bloody stools.  No hematuria.   No cough, chest pain, or shortness of breath.    11/8/2022:  Joints are doing well with only occasional MCP and PIP ache for the first 20 minutes of the morning.  No gelling phenomenon.  Tolerating azathioprine well.  No rash.  No hematuria.  No black or bloody stools.  No hemoptysis or hematemesis.    2/28/2023: Doing well at this time except for pain at the end of the day and with increased activity of the bilateral first CMC joints that improves with rest; no swelling of these joints.  No other joint pain or swelling.  Morning stiffness for no more than 20 minutes.  No hematuria.  No hemoptysis.  Couple times and had blood in her stool and she recalls that she had a colonoscopy in 2019 and was supposed to follow-up for repeat colonoscopy 6 months later but never went.    Today, 5/30/2023: Currently doing well.  Bilateral plantar foot pain with the first few steps of the day and after any period of inactivity where she was not standing on her feet.  After walking more than a couple steps then the bottom of her feet start to feel much better.  Hand pain is nearly resolved with the exercises taught in hand therapy.  She uses topical Voltaren gel for her hands as needed, infrequently.  Other joints are doing well.  No cough, chest pain, or shortness of breath.  No hematuria.  Arthritis is not limiting her daily activities.    Denies fevers, chills, nausea, vomiting. Occasional constipation or diarrhea, unchanged from previous. No abdominal pain. No chest pain/pressure, palpitations, or shortness of breath at this time.  No LE swelling.  No oral or nasal sores.  No rash. No sicca symptoms.  No eye pain or redness.    Tobacco: quit smoking in Feb 2020  EtOH: no more than 1 drink per month  Drugs: none  Occupation: [employer]    ROS   12 point review of system was completed and negative except as noted in the HPI     Active Problem List     Patient Active Problem List   Diagnosis     Wegener's granulomatosis      CARDIOVASCULAR SCREENING; LDL GOAL LESS THAN 130     Overweight     Advanced directives, counseling/discussion     Hypertension goal BP (blood pressure) < 140/90     Situational depression     Granulomatosis with polyangiitis (H)     Chronic kidney disease, stage 3     Former smoker     Menopausal syndrome (hot flashes)     Chronic diastolic (congestive) heart failure (H)     Thoracic aortic aneurysm without rupture, unspecified part (H)     Angina at rest (H)     Rectal bleeding     History of colonic polyps     Hx of aortic aneurysm     Acute on chronic respiratory failure, unspecified whether with hypoxia or hypercapnia (H)     Screening for cervical cancer     Primary osteoarthritis of both first carpometacarpal joints     Bilateral thumb pain     Past Medical History     Past Medical History:   Diagnosis Date     Tinnitus      Past Surgical History     Past Surgical History:   Procedure Laterality Date     COLONOSCOPY WITH CO2 INSUFFLATION N/A 2/18/2019    Procedure: COLONOSCOPY WITH CO2 INSUFFLATION;  Surgeon: Javier Guillen MD;  Location: [facility] OR     GALLBLADDER SURGERY       Allergy   No Known Allergies  Current Medication List     Current Outpatient Medications   Medication Sig     Ascorbic Acid (VITAMIN C) 100 MG CHEW      azaTHIOprine (IMURAN) 50 MG tablet Take 2 tablets (100 mg) by mouth daily     losartan-hydrochlorothiazide (HYZAAR) 100-25 MG tablet Take 1 tablet by mouth daily     Multiple Vitamins-Minerals (ZINC PO)      VITAMIN D PO      ASPIRIN NOT PRESCRIBED (INTENTIONAL) Please choose reason not prescribed from choices below. (Patient not taking: Reported on 3/15/2023)     BETA BLOCKER NOT PRESCRIBED (INTENTIONAL) Please choose reason not prescribed from choices below. (Patient not taking: Reported on 3/15/2023)     zinc gluconate 50 MG tablet Take 50 mg by mouth daily (Patient not taking: Reported on 3/15/2023)     No current facility-administered medications for this visit.     Social History  "  See HPI    Family History     Family History   Problem Relation Age of Onset     Diabetes Father      Heart Disease Maternal Grandmother      Heart Disease Maternal Grandfather      Arthritis Paternal Grandmother      Heart Disease Paternal Grandfather      Grandmother: rheumatoid arthritis    Physical Exam     Temp Readings from Last 3 Encounters:   04/21/23 99.1  F (37.3  C) (Tympanic)   03/28/23 98.4  F (36.9  C) (Oral)   03/15/23 98  F (36.7  C) (Oral)     BP Readings from Last 5 Encounters:   04/21/23 (!) 144/86   03/28/23 127/63   03/15/23 120/61   03/10/23 104/70   02/28/23 138/83     Pulse Readings from Last 1 Encounters:   05/30/23 65     Resp Readings from Last 1 Encounters:   03/28/23 16     Estimated body mass index is 31.52 kg/m  as calculated from the following:    Height as of 3/10/23: 1.67 m (5' 5.75\").    Weight as of this encounter: 87.9 kg (193 lb 12.8 oz).    GEN: NAD.   HEENT:  Anicteric, noninjected sclera. No obvious external lesions of the ear and nose. Hearing intact.  CV: S1, S2. RRR. No m/r/g  PULM: No increased work of breathing. CTA bilaterally   MSK: MCPs, PIPs, DIPs without swelling or tenderness to palpation.  Squaring and tenderness to palpation without effusion, increased warmth, or overlying erythema at the bilateral first CMC joints.  Wrists without swelling or tenderness to palpation.  Elbows and shoulders without swelling or tenderness to palpation.   Knees, ankles, and MTPs without swelling or tenderness to palpation.  Loses the arch in both feet when standing.  SKIN: No rash or jaundice seen  PSYCH: Alert. Appropriate.       Labs / Imaging (select studies)     RF/CCP  Recent Labs   Lab Test 08/05/20  1453   CCPIGG <1   RHF <7     CBC  Recent Labs   Lab Test 05/28/23  0911 02/28/23  1421 08/07/22  1604 10/14/21  0720 07/07/21  0818 04/24/21  1130 12/03/20  1128   WBC 6.9 6.8 7.0   < > 5.0 5.1 8.3   RBC 4.14 4.19 3.75*   < > 3.98 3.82 4.27   HGB 12.8 12.9 11.6*   < > 12.2 11.7 " 13.1   HCT 40.0 40.1 36.1   < > 38.8 37.6 41.3   MCV 97 96 96   < > 98 98 97   RDW 13.7 14.5 13.6   < > 14.9 13.7 14.2    324 258   < > 304 297 347   MCH 30.9 30.8 30.9   < > 30.7 30.6 30.7   MCHC 32.0 32.2 32.1   < > 31.4* 31.1* 31.7   NEUTROPHIL 64 64 59   < > 60.1 63.5 73.6   LYMPH 22 22 26   < > 22.9 20.4 17.7   MONOCYTE 10 11 11   < > 11.2 11.1 6.9   EOSINOPHIL 3 3 4   < > 5.0 4.4 1.3   BASOPHIL 1 0 0   < > 0.8 0.6 0.5   ANEU  --   --   --   --  3.0 3.2 6.1   ALYM  --   --   --   --  1.1 1.0 1.5   MARCOS  --   --   --   --  0.6 0.6 0.6   AEOS  --   --   --   --  0.3 0.2 0.1   ABAS  --   --   --   --  0.0 0.0 0.0   ANEUTAUTO 4.5 4.4 4.1   < >  --   --   --    ALYMPAUTO 1.5 1.5 1.9   < >  --   --   --    AMONOAUTO 0.7 0.7 0.7   < >  --   --   --    AEOSAUTO 0.2 0.2 0.3   < >  --   --   --    ABSBASO 0.0 0.0 0.0   < >  --   --   --     < > = values in this interval not displayed.     CMP  Recent Labs   Lab Test 02/28/23  1421 08/07/22  1604 04/16/22  1236 01/23/22  1024 10/14/21  0720 07/07/21  0818 04/24/21  1130 12/03/20  1128   NA  --  141 144 141   < > 142 141  --    POTASSIUM  --  4.0 4.0 4.2   < > 4.7 4.2  --    CHLORIDE  --  109 112* 106   < > 112* 108  --    CO2  --  25 24 26   < > 26 28  --    ANIONGAP  --  7 8 9   < > 4 5  --    GLC  --  132* 125* 172*   < > 103* 114*  --    BUN  --  33* 39* 33*   < > 34* 26  --    CR 1.25* 1.34* 1.58* 1.32*   < > 1.44* 1.50* 1.41*   GFRESTIMATED 49* 46* 38* 47*   < > 40* 38* 41*   GFRESTBLACK  --   --   --   --   --  46* 44* 48*   JESSICA  --  8.9 9.3 9.0   < > 9.6 9.5  --    BILITOTAL 0.2 0.4 0.5 0.3   < > 0.2 0.4 0.4   ALBUMIN 3.9 3.6 3.7 3.8   < > 3.6 3.5 3.9   PROTTOTAL 7.4 7.0 7.0 7.2   < > 7.2 6.7* 7.5   ALKPHOS 80 75 85 80   < > 75 67 80   AST 15 14 18 14   < > 21 22 16   ALT 32 30 51* 37   < > 49 62* 37    < > = values in this interval not displayed.     Calcium/VitaminD  Recent Labs   Lab Test 08/07/22  1604 04/16/22  1236 01/23/22  1024   JESSICA 8.9 9.3 9.0      ESR/CRP  Recent Labs   Lab Test 05/28/23  0911 02/28/23  1421 08/07/22  1604 04/16/22  1236   SED 9 9 10 13   CRP  --  3.3 4.2 3.1     Hepatitis B  Recent Labs   Lab Test 04/16/22  1236 08/05/20  1453   HBCAB Nonreactive Nonreactive   HEPBANG Nonreactive Nonreactive     Hepatitis C  Recent Labs   Lab Test 08/05/20  1453 03/26/18  1058   HCVAB Nonreactive Nonreactive     Lyme ab screening  Recent Labs   Lab Test 08/05/20  1453   LYMEGM 0.10     Tuberculosis Screening  Recent Labs   Lab Test 02/28/23  1421 04/16/22  1236 08/05/20  1453   TBRES Negative Negative  --    TBRST  --   --  Negative     HIV Screening  Recent Labs   Lab Test 08/05/20  1453   HIAGAB Nonreactive     Immunization History     Immunization History   Administered Date(s) Administered     COVID-19 Bivalent 18+ (Moderna) 03/10/2023     COVID-19 Monovalent 18+ (Moderna) 03/19/2021, 04/16/2021, 10/19/2021     Hepatitis B, Adult 03/10/2023     Influenza Vaccine 50-64 or 18-64 w/egg allergy (Flublok) 11/07/2020, 10/19/2021, 03/10/2023     Influenza Vaccine >6 months (Alfuria,Fluzone) 10/31/2013, 02/19/2020     Pneumococcal 20 valent Conjugate (Prevnar 20) 03/10/2023     Pneumococcal 23 valent 04/25/2008, 06/28/2012     TDAP (Adacel,Boostrix) 07/02/2012, 03/10/2023          Chart documentation done in part with Dragon Voice recognition Software. Although reviewed after completion, some word and grammatical error may remain.    Ronaldo Moreira MD

## 2023-05-30 NOTE — NURSING NOTE
RAPID3 (0-30) Cumulative Score  10.3          RAPID3 Weighted Score (divide #4 by 3 and that is the weighted score)  3.4

## 2023-05-30 NOTE — PATIENT INSTRUCTIONS
RHEUMATOLOGY    [facility]  [address]  [place] [address]    Phone number: [phone]  Fax number: [phone]      Thank you for choosing [facility]!    Maura Newell CMA

## 2023-07-18 PROBLEM — M79.645 BILATERAL THUMB PAIN: Status: RESOLVED | Noted: 2023-03-21 | Resolved: 2023-07-18

## 2023-07-18 PROBLEM — M79.644 BILATERAL THUMB PAIN: Status: RESOLVED | Noted: 2023-03-21 | Resolved: 2023-07-18

## 2023-07-18 PROBLEM — M18.0 PRIMARY OSTEOARTHRITIS OF BOTH FIRST CARPOMETACARPAL JOINTS: Status: RESOLVED | Noted: 2023-03-21 | Resolved: 2023-07-18

## 2023-08-01 ENCOUNTER — TELEPHONE (OUTPATIENT)
Dept: FAMILY MEDICINE | Facility: CLINIC | Age: 58
End: 2023-08-01
Payer: COMMERCIAL

## 2023-08-01 NOTE — TELEPHONE ENCOUNTER
Patient Quality Outreach    Patient is due for the following:   Colon Cancer Screening  Breast Cancer Screening - Mammogram      Topic Date Due    Zoster (Shingles) Vaccine (1 of 2) Never done    Hepatitis B Vaccine (2 of 3 - 19+ 3-dose series) 04/07/2023       Next Steps:   Schedule a office visit for mammogram, colon ca screening, immunizations     Type of outreach:    Sent [employer] message.      Questions for provider review:    None           Erin Amador MA

## 2023-09-09 ENCOUNTER — LAB (OUTPATIENT)
Dept: LAB | Facility: CLINIC | Age: 58
End: 2023-09-09
Payer: COMMERCIAL

## 2023-09-09 DIAGNOSIS — Z79.899 HIGH RISK MEDICATIONS (NOT ANTICOAGULANTS) LONG-TERM USE: ICD-10-CM

## 2023-09-09 DIAGNOSIS — M06.4 INFLAMMATORY POLYARTHROPATHY (H): ICD-10-CM

## 2023-09-09 DIAGNOSIS — M31.31 GRANULOMATOSIS WITH POLYANGIITIS WITH RENAL INVOLVEMENT (H): ICD-10-CM

## 2023-09-09 DIAGNOSIS — N18.30 CHRONIC KIDNEY DISEASE, STAGE 3 (H): ICD-10-CM

## 2023-09-09 LAB
ALBUMIN SERPL BCG-MCNC: 4.3 G/DL (ref 3.5–5.2)
ALBUMIN UR-MCNC: ABNORMAL MG/DL
ALP SERPL-CCNC: 90 U/L (ref 35–104)
ALT SERPL W P-5'-P-CCNC: 25 U/L (ref 0–50)
ANION GAP SERPL CALCULATED.3IONS-SCNC: 12 MMOL/L (ref 7–15)
APPEARANCE UR: CLEAR
AST SERPL W P-5'-P-CCNC: 20 U/L (ref 0–45)
BASOPHILS # BLD AUTO: 0 10E3/UL (ref 0–0.2)
BASOPHILS NFR BLD AUTO: 1 %
BILIRUB DIRECT SERPL-MCNC: <0.2 MG/DL (ref 0–0.3)
BILIRUB SERPL-MCNC: 0.4 MG/DL
BILIRUB UR QL STRIP: NEGATIVE
BUN SERPL-MCNC: 34.7 MG/DL (ref 8–23)
CALCIUM SERPL-MCNC: 9.5 MG/DL (ref 8.6–10)
CHLORIDE SERPL-SCNC: 104 MMOL/L (ref 98–107)
COLOR UR AUTO: YELLOW
CREAT SERPL-MCNC: 1.51 MG/DL (ref 0.51–0.95)
CRP SERPL-MCNC: 5.09 MG/L
DEPRECATED HCO3 PLAS-SCNC: 25 MMOL/L (ref 22–29)
EGFRCR SERPLBLD CKD-EPI 2021: 39 ML/MIN/1.73M2
EOSINOPHIL # BLD AUTO: 0.2 10E3/UL (ref 0–0.7)
EOSINOPHIL NFR BLD AUTO: 3 %
ERYTHROCYTE [DISTWIDTH] IN BLOOD BY AUTOMATED COUNT: 13.4 % (ref 10–15)
ERYTHROCYTE [SEDIMENTATION RATE] IN BLOOD BY WESTERGREN METHOD: 10 MM/HR (ref 0–30)
GLUCOSE SERPL-MCNC: 118 MG/DL (ref 70–99)
GLUCOSE UR STRIP-MCNC: NEGATIVE MG/DL
HCT VFR BLD AUTO: 40.2 % (ref 35–47)
HGB BLD-MCNC: 13 G/DL (ref 11.7–15.7)
HGB UR QL STRIP: NEGATIVE
IMM GRANULOCYTES # BLD: 0 10E3/UL
IMM GRANULOCYTES NFR BLD: 0 %
KETONES UR STRIP-MCNC: NEGATIVE MG/DL
LEUKOCYTE ESTERASE UR QL STRIP: NEGATIVE
LYMPHOCYTES # BLD AUTO: 1.4 10E3/UL (ref 0.8–5.3)
LYMPHOCYTES NFR BLD AUTO: 23 %
MCH RBC QN AUTO: 30.1 PG (ref 26.5–33)
MCHC RBC AUTO-ENTMCNC: 32.3 G/DL (ref 31.5–36.5)
MCV RBC AUTO: 93 FL (ref 78–100)
MONOCYTES # BLD AUTO: 0.7 10E3/UL (ref 0–1.3)
MONOCYTES NFR BLD AUTO: 12 %
MUCOUS THREADS #/AREA URNS LPF: PRESENT /LPF
NEUTROPHILS # BLD AUTO: 3.7 10E3/UL (ref 1.6–8.3)
NEUTROPHILS NFR BLD AUTO: 61 %
NITRATE UR QL: NEGATIVE
PH UR STRIP: 5.5 [PH] (ref 5–7)
PLATELET # BLD AUTO: 315 10E3/UL (ref 150–450)
POTASSIUM SERPL-SCNC: 4.8 MMOL/L (ref 3.4–5.3)
PROT SERPL-MCNC: 7 G/DL (ref 6.4–8.3)
RBC # BLD AUTO: 4.32 10E6/UL (ref 3.8–5.2)
RBC #/AREA URNS AUTO: ABNORMAL /HPF
SODIUM SERPL-SCNC: 141 MMOL/L (ref 136–145)
SP GR UR STRIP: 1.02 (ref 1–1.03)
SQUAMOUS #/AREA URNS AUTO: ABNORMAL /LPF
UROBILINOGEN UR STRIP-ACNC: 0.2 E.U./DL
WBC # BLD AUTO: 6.1 10E3/UL (ref 4–11)
WBC #/AREA URNS AUTO: ABNORMAL /HPF

## 2023-09-09 PROCEDURE — 86140 C-REACTIVE PROTEIN: CPT

## 2023-09-09 PROCEDURE — 36415 COLL VENOUS BLD VENIPUNCTURE: CPT

## 2023-09-09 PROCEDURE — 85025 COMPLETE CBC W/AUTO DIFF WBC: CPT

## 2023-09-09 PROCEDURE — 82248 BILIRUBIN DIRECT: CPT

## 2023-09-09 PROCEDURE — 81001 URINALYSIS AUTO W/SCOPE: CPT

## 2023-09-09 PROCEDURE — 85652 RBC SED RATE AUTOMATED: CPT

## 2023-09-09 PROCEDURE — 80053 COMPREHEN METABOLIC PANEL: CPT

## 2023-09-12 ENCOUNTER — OFFICE VISIT (OUTPATIENT)
Dept: RHEUMATOLOGY | Facility: CLINIC | Age: 58
End: 2023-09-12
Payer: COMMERCIAL

## 2023-09-12 VITALS
TEMPERATURE: 98.1 F | WEIGHT: 196 LBS | DIASTOLIC BLOOD PRESSURE: 80 MMHG | HEART RATE: 57 BPM | BODY MASS INDEX: 31.88 KG/M2 | SYSTOLIC BLOOD PRESSURE: 122 MMHG | OXYGEN SATURATION: 99 % | RESPIRATION RATE: 16 BRPM

## 2023-09-12 DIAGNOSIS — M06.4 INFLAMMATORY POLYARTHROPATHY (H): ICD-10-CM

## 2023-09-12 DIAGNOSIS — M31.31 GRANULOMATOSIS WITH POLYANGIITIS WITH RENAL INVOLVEMENT (H): Primary | ICD-10-CM

## 2023-09-12 DIAGNOSIS — Z79.899 HIGH RISK MEDICATIONS (NOT ANTICOAGULANTS) LONG-TERM USE: ICD-10-CM

## 2023-09-12 PROCEDURE — 99214 OFFICE O/P EST MOD 30 MIN: CPT | Performed by: INTERNAL MEDICINE

## 2023-09-12 NOTE — PATIENT INSTRUCTIONS
RHEUMATOLOGY    [facility]  [address]  [place] [address]    Phone number: [phone]  Fax number: [phone]    If you need a medication refill, please contact us as you may need lab work and/or a follow up visit prior to your refill.      Thank you for choosing [facility]!    Maura Newell CMA Rheumatology    -----------------------    Call to schedule the rituximab infusion to be in 3-4 weeks: [phone]    Please get the influenza and COVID-19 vaccination, to be received 2 weeks before the next rituximab infusion

## 2023-09-12 NOTE — PROGRESS NOTES
Rheumatology Clinic Visit      Flavia Brice MRN# [id number]   YOB: 1965 Age: 58 year old      Date of visit: 9/12/23   PCP: Monet Da Silva    Chief Complaint   Patient presents with:  Granulomatosis with polyangiitis with renal involvement    Assessment and Plan     1.  Granulomatosis with polyangiitis: Dx'd 2012 by Dr. Wesley at [facility] when she presented with pulmonary involvement, pericarditis/effusion, borderline aneurysmal dilation of the ascending aorta and biopsy proven necrotizing crescentic nephritis.  Went into remission with cytoxan and prednisone, then maintained on AZA for 2 yrs per patient, stopped when lost to follow-up. Recurrence in 2020 with YOLI, pleuritic chest pain, inflammatory arthritis (MCPs, PIPs, shoulders, knees) that has responded well to prednisone.  She was tx'd with prednisone and rituximab 1g IV received on 8/21/2020 & 9/4/2020, 4/28/2021 (delayed for the COVID-19 vaccine) & (no 2nd dose of rituximab), 11/11/2021 & 11/30/2021, 9/1/2022 & 9/15/2022; 3/15/2023. Currently on azathioprine 100 mg daily and rituximab.  Reduced rituximab to be 1 g IV every 6 months, first received in March 2023 and she continues to do well.  Continue with plan for rituximab every 6 months, but because she has not yet scheduled a 9/15/2023 rituximab and the COVID-19 vaccination will be released hopefully within the next few days, and she is doing well with regard to GPA/inflammatory arthritis, discussed delaying the next rituximab to be in 3-4 weeks from today, allowing her to get the influenza vaccination, and also hopefully the COVID-19 vaccination, 2 weeks prior to rituximab.  Stressed the importance of having the vaccinations 2 weeks prior to rituximab but if the COVID-19 vaccination ends up not being available within the next couple weeks then she is to reach out to this clinic for direction.  Chronic illness, stable.    - Continue azathioprine 100 mg daily  - Re-dose  of rituximab (truxima) 1 g IV every 6 months, next due in 3-4 weeks  - Labs in 3 months: CBC, creatinine, hepatic panel, ESR, CRP, UA    High risk medication requiring intensive toxicity monitoring at least quarterly.     2. Inflammatory arthritis: Related to GPA.  See #1    3. Elevated serum creatinine: Related to GPA.  Has been stable.  Avoid nephrotoxic agents.    4. Positive MALLORY (antinuclear antibody): Additional work-up was negative for SSA, SSB, RNP, Smith, dsDNA; complement C3 and C4 were not low.  Symptoms are most likely related to GPA; no MALLORY-associated rheumatologic disorder identified    5.  Aneurysm of the ascending aorta at 4.5 cm; history of heart failure with preserved ejection fraction: following with Dr. Locke (cardiology)    6. Cytoxan use history: needs periodic UA to eval for hematuria, due to increased risk for bladder cancer after cytoxan exposure    7.  Osteoarthritis: Affecting the DIPs and bilateral first CMC joints.  Significant improvement with topical Voltaren gel as needed and exercises taught from hand therapy.  Continue home hand therapy exercises.    8.  Osteopenia: Osteopenia based on 3/14/2023 DEXA.  Continue calcium and vitamin D.  Plan to recheck DEXA in [id number].    9.  Planter fasciitis, bilateral: Diagnosed 5/30/2023, and had significant improvement with stretching exercises and shoewear whenever ambulatory.  Minimal symptoms at this time.    10. Vaccinations: Vaccinations reviewed with Ms. Brice.    - Influenza: Encouraged yearly vaccination, to be received at least 2 weeks before the next rituximab infusion.  - COVID-19: Advise getting the updated COVID-19 vaccination when available, and to be received at least 2 weeks before the next rituximab infusion.  If unable to do so that she is to reach out to this clinic.  It is anticipated that the new COVID-19 vaccination will be released within the next 1 week, but it is not certain that it will be (it received FDA approval  yesterday)    Total minutes spent in evaluation with patient, documentation, , and review of pertinent studies and chart notes: 22     Ms. Brice verbalized agreement with and understanding of the rational for the diagnosis and treatment plan.  All questions were answered to best of my ability and the patient's satisfaction. Ms. Brice was advised to contact the clinic with any questions that may arise after the clinic visit.      Thank you for involving me in the care of the patient    Return to clinic: 3-4 months months      HPI   Flavia Ele Brice is a 58 year old female with a past medical history significant for hypertension, depression, and granulomatosis with polyangiitis who is seen for follow-up of granulomatosis with polyangiitis    10/31/2013 [facility] rheumatology clinic note by Dr. Ann eWsley documents severe OR-3 ANCA positive GPA with pulmonary involvement, pericarditis/effusion, borderline aneurysmal dilation of the ascending aorta, necrotizing crescentic nephritis.  Kidney biopsy has confirmed diagnosis in the past.  Has received Solu-Medrol x3, Cytoxan x1 at 1300 mg, on prednisone 60-80 mg daily.  Deteriorated quickly despite those interventions and was hospitalized again, requiring plasmapheresis and dialysis.  Was seeing Dr. Velazquez from [facility] and was again placed on Cytoxan 15 mg/kg every 3 weeks.  Has seen ENT but there was no clear sinus involvement.  She was then transitioned after 7 infusions of Cytoxan to Imuran on 11/2012 and has been doing well on that.      7/11/2020 [facility] hospitalization note: Acute kidney injury, migratory polyarthritis, chronic diastolic heart failure.  Left knee and left hip pain.  Right shoulder pain that migrated to the left shoulder.  No joint swelling.  No fevers.  In the past she had flare of her joint pain that she was treated with prednisone.  Prior to this ED evaluation she reportedly used prednisone 20 mg without  improvement.    7/22/2020: [facility] emergency department visit for shortness of breath.  Notes history of granulomatosis with polyangiitis, diastolic heart failure, a sending thoracic aortic aneurysm from 4.6 cm on chest CT 2/18/2020), migratory polyarthritis, acute kidney injury, and hypertension.  Coronavirus test negative.  Chest CT negative for pneumonia.  O2 saturation normal.  Advised to follow-up with her PCP and cardiology.    8/5/2020:  Ms. Brice reported that she was dx'd with GPA by Dr. Wesley. She was treated and in remission; was on AZA for a year and no issues for a while. Lost to follow-up with Dr. Wesley.  Then 1 year ago started having more shortness of breath and found to have more lung nodules.  Recalls heart failure and kidney failure when first dx'd.  Told to see cardiology to follow the aneurysm.  Then in Jan 2020 started to have pain in her shoulders, knees ankles. Pain was so bad that she went to [facility]; found to have YOLI.  Until able to have this rheumatology evaluation, she says that she was given prednisone to help the joint pain; currently on 5mg daily of prednisone; felt much better when on prednisone 30mg daily. Has been dealing with right knee swelling; the prednisone helped with this.  Joint pains are worse in the AM; improve with time and activity. Morning stiffness is very mild while on prednisone 5mg daily.  No hematuria. No hemoptysis.  Flavia Brice's mother was present during the clinic visit.     9/9/2020: Significant improvement with prednisone and rituximab.  No longer with swelling around her ankles.  Hand swelling/pain/stiffness has resolved.  She feels a little puffy in her face but no actual swelling.  She has followed with her cardiologist and they are going to monitor the thoracic aortic aneurysm.  Tolerated rituximab infusion well.  Currently on prednisone 10 mg daily.  Not going into work at this time; she is taking a leave of  absence.    10/21/2020: Improved.  Now with mild pain at her MCPs, DIPs, and knees.  MCP and knee pain is better with activity, worse in the morning.  DIP pain is worse with activity.  Joint pains started again with dose reduction of prednisone.  Has been on azathioprine and tolerated well in the past.    12/9/2020: Significant improvement with regard to her joint pain.  No joint pain now.  No morning stiffness.  No gelling phenomenon.  Tolerating azathioprine well.  Continues on prednisone 5mg daily.  Mild shortness of breath when walking up stairs that has been stable; no associated chest pain or pressure, palpitations, lightheadedness, dizziness, or nausea and the mild shortness of breath resolves quickly when she rests.  No shortness of breath at rest or currently.  Following with cardiology and plans to repeat her echocardiogram in March 2021.  Last echocardiogram was in February 2020.    4/8/2021: More joint aches and pains and she attributes this to being off of prednisone and having to delay her rituximab infusion secondary to the COVID-19 vaccine.  She has rescheduled the first of the 2 rituximab infusions to be 2 weeks after the second COVID-19 vaccine.  Knees ache more at the end of the day.  No cough, chest pain, shortness of breath.  Has seen cardiology and she says that her aortic aneurysm is stable.    7/7/2021:  doing well this time.  Mild aches of the PIPs and DIPs with more activity that improves with rest.  Usually has pain that travels around but in general is doing okay.  Only received 1 of 2 rituximab doses because her insurance had denied it; she then got a letter about a month later stating that rituximab had been approved.  Tolerating azathioprine and rituximab well.  Overall happy with how well she is doing.  No blood in urine or stool    10/19/2021: Reports that she is doing well.  She says that she is doing better than she was previously, and way better than she was before treatment.  No  joint pain or stiffness.  No rash.  Tolerating azathioprine well.  No difficulty doing her daily activities.     1/25/2022: feels the best she has in a long time.  No joint pain or swelling. Morning stiffness for <20.  No rash.  No black or bloody stools.  No cough, chest pain, or shortness of breath.  No hemoptysis.  No hematemesis.  Using a surgical mask at work.    5/4/2022: had covid infxn, resolved >2wks ago. Fasting for labs recently. Joints more achy; due to rtx.  Tolerating aza well. No joint swelling. Morning stiffness <30 min. +gelling. No hemoptysis.  No blood in urine.     8/2/2022: Did not get the rituximab infusion but does have a scheduled for 9/1/2022 and 9/15/2022.  More joint pain at the MCPs, PIPs, knees, and MTPs that is worse in the morning and improves with time and activity.  Morning stiffness for about 1 hour.  No rash.  No black or bloody stools.  No hematuria.  No cough, chest pain, or shortness of breath.    11/8/2022:  Joints are doing well with only occasional MCP and PIP ache for the first 20 minutes of the morning.  No gelling phenomenon.  Tolerating azathioprine well.  No rash.  No hematuria.  No black or bloody stools.  No hemoptysis or hematemesis.    2/28/2023: Doing well at this time except for pain at the end of the day and with increased activity of the bilateral first CMC joints that improves with rest; no swelling of these joints.  No other joint pain or swelling.  Morning stiffness for no more than 20 minutes.  No hematuria.  No hemoptysis.  Couple times and had blood in her stool and she recalls that she had a colonoscopy in 2019 and was supposed to follow-up for repeat colonoscopy 6 months later but never went.    5/30/2023: Currently doing well.  Bilateral plantar foot pain with the first few steps of the day and after any period of inactivity where she was not standing on her feet.  After walking more than a couple steps then the bottom of her feet start to feel much  better.  Hand pain is nearly resolved with the exercises taught in hand therapy.  She uses topical Voltaren gel for her hands as needed, infrequently.  Other joints are doing well.  No cough, chest pain, or shortness of breath.  No hematuria.  Arthritis is not limiting her daily activities.    Today, 9/12/2023: Near resolution of bilateral planter fasciitis symptoms with stretching exercises and wearing appropriate shoes whenever ambulatory.  Currently without joint pain or swelling.  No morning stiffness or gelling phenomenon.  No cough, chest pain, shortness of breath.  No hematuria.  No fatigue.  Arthritis does not limit her daily activities.    Denies fevers, chills, nausea, vomiting. Occasional constipation or diarrhea, unchanged from previous. No abdominal pain. No chest pain/pressure, palpitations, or shortness of breath at this time.  No LE swelling.  No oral or nasal sores.  No rash. No sicca symptoms.  No eye pain or redness.    Tobacco: quit smoking in Feb 2020  EtOH: no more than 1 drink per month  Drugs: none  Occupation: [employer]    ROS   12 point review of system was completed and negative except as noted in the HPI     Active Problem List     Patient Active Problem List   Diagnosis    Wegener's granulomatosis    CARDIOVASCULAR SCREENING; LDL GOAL LESS THAN 130    Overweight    Advanced directives, counseling/discussion    Hypertension goal BP (blood pressure) < 140/90    Situational depression    Granulomatosis with polyangiitis (H)    Chronic kidney disease, stage 3    Former smoker    Menopausal syndrome (hot flashes)    Chronic diastolic (congestive) heart failure (H)    Thoracic aortic aneurysm without rupture, unspecified part (H)    Angina at rest (H)    Rectal bleeding    History of colonic polyps    Hx of aortic aneurysm    Acute on chronic respiratory failure, unspecified whether with hypoxia or hypercapnia (H)    Screening for cervical cancer     Past Medical History     Past Medical History:    Diagnosis Date    Tinnitus      Past Surgical History     Past Surgical History:   Procedure Laterality Date    COLONOSCOPY WITH CO2 INSUFFLATION N/A 2/18/2019    Procedure: COLONOSCOPY WITH CO2 INSUFFLATION;  Surgeon: Javier Guillen MD;  Location: [facility] OR    GALLBLADDER SURGERY       Allergy   No Known Allergies  Current Medication List     Current Outpatient Medications   Medication Sig    Ascorbic Acid (VITAMIN C) 100 MG CHEW     azaTHIOprine (IMURAN) 50 MG tablet Take 2 tablets (100 mg) by mouth daily    losartan-hydrochlorothiazide (HYZAAR) 100-25 MG tablet Take 1 tablet by mouth daily    Multiple Vitamins-Minerals (ZINC PO)     VITAMIN D PO     ASPIRIN NOT PRESCRIBED (INTENTIONAL) Please choose reason not prescribed from choices below. (Patient not taking: Reported on 3/15/2023)    BETA BLOCKER NOT PRESCRIBED (INTENTIONAL) Please choose reason not prescribed from choices below. (Patient not taking: Reported on 3/15/2023)    zinc gluconate 50 MG tablet Take 50 mg by mouth daily (Patient not taking: Reported on 3/15/2023)     No current facility-administered medications for this visit.     Social History   See HPI    Family History     Family History   Problem Relation Age of Onset    Diabetes Father     Heart Disease Maternal Grandmother     Heart Disease Maternal Grandfather     Arthritis Paternal Grandmother     Heart Disease Paternal Grandfather      Grandmother: rheumatoid arthritis    Physical Exam     Temp Readings from Last 3 Encounters:   09/12/23 98.1  F (36.7  C) (Oral)   04/21/23 99.1  F (37.3  C) (Tympanic)   03/28/23 98.4  F (36.9  C) (Oral)     BP Readings from Last 5 Encounters:   05/30/23 126/76   04/21/23 (!) 144/86   03/28/23 127/63   03/15/23 120/61   03/10/23 104/70     Pulse Readings from Last 1 Encounters:   09/12/23 57     Resp Readings from Last 1 Encounters:   09/12/23 16     Estimated body mass index is 31.88 kg/m  as calculated from the following:    Height as of 3/10/23: 1.67 m (5'  "5.75\").    Weight as of this encounter: 88.9 kg (196 lb).    GEN: NAD.   HEENT:  Anicteric, noninjected sclera. No obvious external lesions of the ear and nose. Hearing intact.  CV: S1, S2. RRR. No m/r/g  PULM: No increased work of breathing. CTA bilaterally   MSK: MCPs, PIPs, DIPs without swelling or tenderness to palpation.  Squaring and tenderness to palpation without effusion, increased warmth, or overlying erythema at the bilateral first CMC joints.  Wrists without swelling or tenderness to palpation.  Elbows and shoulders without swelling or tenderness to palpation.   Knees, ankles, and MTPs without swelling or tenderness to palpation.  Pes planus bilaterally.  Nontender to palpation on the plantar aspect of each foot.  SKIN: No rash or jaundice seen  PSYCH: Alert. Appropriate.       Labs / Imaging (select studies)     RF/CCP  Recent Labs   Lab Test 08/05/20  1453   CCPIGG <1   RHF <7     CBC  Recent Labs   Lab Test 09/09/23  0916 05/28/23  0911 02/28/23  1421 10/14/21  0720 07/07/21  0818 04/24/21  1130 12/03/20  1128   WBC 6.1 6.9 6.8   < > 5.0 5.1 8.3   RBC 4.32 4.14 4.19   < > 3.98 3.82 4.27   HGB 13.0 12.8 12.9   < > 12.2 11.7 13.1   HCT 40.2 40.0 40.1   < > 38.8 37.6 41.3   MCV 93 97 96   < > 98 98 97   RDW 13.4 13.7 14.5   < > 14.9 13.7 14.2    298 324   < > 304 297 347   MCH 30.1 30.9 30.8   < > 30.7 30.6 30.7   MCHC 32.3 32.0 32.2   < > 31.4* 31.1* 31.7   NEUTROPHIL 61 64 64   < > 60.1 63.5 73.6   LYMPH 23 22 22   < > 22.9 20.4 17.7   MONOCYTE 12 10 11   < > 11.2 11.1 6.9   EOSINOPHIL 3 3 3   < > 5.0 4.4 1.3   BASOPHIL 1 1 0   < > 0.8 0.6 0.5   ANEU  --   --   --   --  3.0 3.2 6.1   ALYM  --   --   --   --  1.1 1.0 1.5   MARCOS  --   --   --   --  0.6 0.6 0.6   AEOS  --   --   --   --  0.3 0.2 0.1   ABAS  --   --   --   --  0.0 0.0 0.0   ANEUTAUTO 3.7 4.5 4.4   < >  --   --   --    ALYMPAUTO 1.4 1.5 1.5   < >  --   --   --    AMONOAUTO 0.7 0.7 0.7   < >  --   --   --    AEOSAUTO 0.2 0.2 0.2   < > "  --   --   --    ABSBASO 0.0 0.0 0.0   < >  --   --   --     < > = values in this interval not displayed.     CMP  Recent Labs   Lab Test 09/09/23  0916 05/28/23  0911 02/28/23  1421 08/07/22  1604 04/16/22  1236 10/14/21  0720 07/07/21  0818 04/24/21  1130 12/03/20  1128     --   --  141 144   < > 142 141  --    POTASSIUM 4.8  --   --  4.0 4.0   < > 4.7 4.2  --    CHLORIDE 104  --   --  109 112*   < > 112* 108  --    CO2 25  --   --  25 24   < > 26 28  --    ANIONGAP 12  --   --  7 8   < > 4 5  --    *  --   --  132* 125*   < > 103* 114*  --    BUN 34.7*  --   --  33* 39*   < > 34* 26  --    CR 1.51* 1.29* 1.25* 1.34* 1.58*   < > 1.44* 1.50* 1.41*   GFRESTIMATED 39* 48* 49* 46* 38*   < > 40* 38* 41*   GFRESTBLACK  --   --   --   --   --   --  46* 44* 48*   JESSICA 9.5  --   --  8.9 9.3   < > 9.6 9.5  --    BILITOTAL 0.4 0.2 0.2 0.4 0.5   < > 0.2 0.4 0.4   ALBUMIN 4.3 3.8 3.9 3.6 3.7   < > 3.6 3.5 3.9   PROTTOTAL 7.0 7.3 7.4 7.0 7.0   < > 7.2 6.7* 7.5   ALKPHOS 90 82 80 75 85   < > 75 67 80   AST 20 14 15 14 18   < > 21 22 16   ALT 25 26 32 30 51*   < > 49 62* 37    < > = values in this interval not displayed.     Calcium/VitaminD  Recent Labs   Lab Test 09/09/23  0916 08/07/22  1604 04/16/22  1236   JESSICA 9.5 8.9 9.3     ESR/CRP  Recent Labs   Lab Test 09/09/23  0916 05/28/23  0911 02/28/23  1421 08/07/22  1604   SED 10 9 9 10   CRP  --  <2.9 3.3 4.2   CRPI 5.09*  --   --   --      Hepatitis B  Recent Labs   Lab Test 04/16/22  1236 08/05/20  1453   HBCAB Nonreactive Nonreactive   HEPBANG Nonreactive Nonreactive     Hepatitis C  Recent Labs   Lab Test 08/05/20  1453 03/26/18  1058   HCVAB Nonreactive Nonreactive     Lyme ab screening  Recent Labs   Lab Test 08/05/20  1453   LYMEGM 0.10     Tuberculosis Screening  Recent Labs   Lab Test 02/28/23  1421 04/16/22  1236 08/05/20  1453   TBRES Negative Negative  --    TBRST  --   --  Negative     HIV Screening  Recent Labs   Lab Test 08/05/20  1453   HIAGAB  Nonreactive     Immunization History     Immunization History   Administered Date(s) Administered    COVID-19 Bivalent 18+ (Moderna) 03/10/2023    COVID-19 Monovalent 18+ (Moderna) 03/19/2021, 04/16/2021, 10/19/2021    Hepatitis B, Adult 03/10/2023    Influenza Vaccine 18-64 (Flublok) 11/07/2020, 10/19/2021, 03/10/2023    Influenza Vaccine >6 months (Alfuria,Fluzone) 10/31/2013, 02/19/2020    Pneumococcal 20 valent Conjugate (Prevnar 20) 03/10/2023    Pneumococcal 23 valent 04/25/2008, 06/28/2012    TDAP (Adacel,Boostrix) 07/02/2012, 03/10/2023          Chart documentation done in part with Dragon Voice recognition Software. Although reviewed after completion, some word and grammatical error may remain.    Ronaldo Moreira MD

## 2023-09-12 NOTE — NURSING NOTE
RAPID3 (0-30) Cumulative Score  2.0          RAPID3 Weighted Score (divide #4 by 3 and that is the weighted score)  0.6

## 2023-09-18 ENCOUNTER — TELEPHONE (OUTPATIENT)
Dept: FAMILY MEDICINE | Facility: CLINIC | Age: 58
End: 2023-09-18
Payer: COMMERCIAL

## 2023-09-18 NOTE — TELEPHONE ENCOUNTER
Patient Quality Outreach    Patient is due for the following:   Colon Cancer Screening  Breast Cancer Screening - Mammogram      Topic Date Due    Zoster (Shingles) Vaccine (1 of 2) Never done    Hepatitis B Vaccine (2 of 3 - 19+ 3-dose series) 04/07/2023    COVID-19 Vaccine (5 - Moderna risk series) 05/05/2023    Flu Vaccine (1) 09/01/2023   Labs - microalbumin     Next Steps:   Schedule a lab only visit for microalbumin, schedule appointment for colon ca screening  and mammogram.     Type of outreach:    Sent letter.    Next Steps:  Reach out within 90 days via Phone.    Max number of attempts reached: No. Will try again in 90 days if patient still on fail list.    Questions for provider review:    None           Erin Amador MA

## 2023-09-18 NOTE — LETTER
September 18, 2023    To  Flavia Brice  [address] [id number]    Your team at [facility] cares about your health. We have reviewed your chart and based on our findings; we are making the following recommendations to better manage your health.     You are in particular need of attention regarding the following:     Schedule Annual MAMMOGRAPHY. [facility] scheduling number is [phone] or schedule in MyChart (self referral).  1 in 8 women will develop invasive breast cancer during her lifetime and it is the most common non-skin cancer in [place]. EARLY detection, new treatments, and a better understanding of the disease have increased survival rates- the 5 year survival rate in the 1960's was 63% and today it is close to 90%.  Call or MyChart message your clinic to schedule a colonoscopy, schedule/ a FIT Test, or order a Cologuard test. If you are unsure what type of test you need, please call your clinic and speak to clinic staff.   Colon cancer is now the second leading cause of cancer-related deaths in the [place] for both men and women and there are over 130,000 new cases and 50,000 deaths per year from colon cancer. Colonoscopies can prevent 90-95% of these deaths. Problem lesions can be removed before they ever become cancer. This test is not only looking for cancer, but also getting rid of precancerous lesions.   Please schedule a Nurse Only Appointment with your primary care clinic to update your immunizations that are due.  OTHER FOLLOW UP: labs: microalbumin    If you have already completed these items, please contact the clinic via phone or   [employer] so your care team can review and update your records. Thank you for   choosing [facility] for your healthcare needs. For any questions,   concerns, or to schedule an appointment please contact our clinic.    Healthy Regards,      Your [facility] Care Team

## 2023-10-19 ENCOUNTER — LAB (OUTPATIENT)
Dept: LAB | Facility: CLINIC | Age: 58
End: 2023-10-19
Payer: COMMERCIAL

## 2023-10-19 ENCOUNTER — INFUSION THERAPY VISIT (OUTPATIENT)
Dept: INFUSION THERAPY | Facility: CLINIC | Age: 58
End: 2023-10-19
Payer: COMMERCIAL

## 2023-10-19 VITALS
HEART RATE: 79 BPM | OXYGEN SATURATION: 96 % | BODY MASS INDEX: 32.09 KG/M2 | SYSTOLIC BLOOD PRESSURE: 129 MMHG | TEMPERATURE: 98.4 F | WEIGHT: 197.3 LBS | DIASTOLIC BLOOD PRESSURE: 72 MMHG | RESPIRATION RATE: 16 BRPM

## 2023-10-19 DIAGNOSIS — M31.31 GRANULOMATOSIS WITH POLYANGIITIS WITH RENAL INVOLVEMENT (H): Primary | ICD-10-CM

## 2023-10-19 LAB — HOLD SPECIMEN: NORMAL

## 2023-10-19 PROCEDURE — 36415 COLL VENOUS BLD VENIPUNCTURE: CPT | Performed by: INTERNAL MEDICINE

## 2023-10-19 PROCEDURE — 96375 TX/PRO/DX INJ NEW DRUG ADDON: CPT | Performed by: INTERNAL MEDICINE

## 2023-10-19 PROCEDURE — 96413 CHEMO IV INFUSION 1 HR: CPT | Performed by: INTERNAL MEDICINE

## 2023-10-19 PROCEDURE — 82784 ASSAY IGA/IGD/IGG/IGM EACH: CPT | Performed by: INTERNAL MEDICINE

## 2023-10-19 PROCEDURE — 86355 B CELLS TOTAL COUNT: CPT | Performed by: INTERNAL MEDICINE

## 2023-10-19 PROCEDURE — 96415 CHEMO IV INFUSION ADDL HR: CPT | Performed by: INTERNAL MEDICINE

## 2023-10-19 RX ORDER — HEPARIN SODIUM,PORCINE 10 UNIT/ML
5 VIAL (ML) INTRAVENOUS
Status: CANCELLED | OUTPATIENT
Start: 2023-10-19

## 2023-10-19 RX ORDER — METHYLPREDNISOLONE SODIUM SUCCINATE 125 MG/2ML
125 INJECTION, POWDER, LYOPHILIZED, FOR SOLUTION INTRAMUSCULAR; INTRAVENOUS ONCE
Status: CANCELLED | OUTPATIENT
Start: 2023-10-19

## 2023-10-19 RX ORDER — DIPHENHYDRAMINE HYDROCHLORIDE 50 MG/ML
50 INJECTION INTRAMUSCULAR; INTRAVENOUS
Status: CANCELLED
Start: 2023-10-19

## 2023-10-19 RX ORDER — ACETAMINOPHEN 325 MG/1
650 TABLET ORAL ONCE
Status: CANCELLED
Start: 2023-10-19

## 2023-10-19 RX ORDER — ALBUTEROL SULFATE 90 UG/1
1-2 AEROSOL, METERED RESPIRATORY (INHALATION)
Status: CANCELLED
Start: 2023-10-19

## 2023-10-19 RX ORDER — METHYLPREDNISOLONE SODIUM SUCCINATE 125 MG/2ML
125 INJECTION, POWDER, LYOPHILIZED, FOR SOLUTION INTRAMUSCULAR; INTRAVENOUS
Status: CANCELLED
Start: 2023-10-19

## 2023-10-19 RX ORDER — ALBUTEROL SULFATE 0.83 MG/ML
2.5 SOLUTION RESPIRATORY (INHALATION)
Status: CANCELLED | OUTPATIENT
Start: 2023-10-19

## 2023-10-19 RX ORDER — DIPHENHYDRAMINE HCL 25 MG
50 CAPSULE ORAL ONCE
Status: CANCELLED
Start: 2023-10-19

## 2023-10-19 RX ORDER — MEPERIDINE HYDROCHLORIDE 25 MG/ML
25 INJECTION INTRAMUSCULAR; INTRAVENOUS; SUBCUTANEOUS EVERY 30 MIN PRN
Status: CANCELLED | OUTPATIENT
Start: 2023-10-19

## 2023-10-19 RX ORDER — HEPARIN SODIUM (PORCINE) LOCK FLUSH IV SOLN 100 UNIT/ML 100 UNIT/ML
5 SOLUTION INTRAVENOUS
Status: CANCELLED | OUTPATIENT
Start: 2023-10-19

## 2023-10-19 RX ORDER — DIPHENHYDRAMINE HCL 25 MG
50 CAPSULE ORAL ONCE
Status: COMPLETED | OUTPATIENT
Start: 2023-10-19 | End: 2023-10-19

## 2023-10-19 RX ORDER — EPINEPHRINE 1 MG/ML
0.3 INJECTION, SOLUTION, CONCENTRATE INTRAVENOUS EVERY 5 MIN PRN
Status: CANCELLED | OUTPATIENT
Start: 2023-10-19

## 2023-10-19 RX ORDER — METHYLPREDNISOLONE SODIUM SUCCINATE 125 MG/2ML
125 INJECTION, POWDER, LYOPHILIZED, FOR SOLUTION INTRAMUSCULAR; INTRAVENOUS ONCE
Status: COMPLETED | OUTPATIENT
Start: 2023-10-19 | End: 2023-10-19

## 2023-10-19 RX ORDER — EPINEPHRINE 1 MG/ML
0.3 INJECTION, SOLUTION INTRAMUSCULAR; SUBCUTANEOUS EVERY 5 MIN PRN
Status: CANCELLED | OUTPATIENT
Start: 2023-10-19

## 2023-10-19 RX ORDER — ACETAMINOPHEN 325 MG/1
650 TABLET ORAL ONCE
Status: COMPLETED | OUTPATIENT
Start: 2023-10-19 | End: 2023-10-19

## 2023-10-19 RX ADMIN — METHYLPREDNISOLONE SODIUM SUCCINATE 125 MG: 125 INJECTION INTRAMUSCULAR; INTRAVENOUS at 15:07

## 2023-10-19 RX ADMIN — ACETAMINOPHEN 650 MG: 325 TABLET ORAL at 15:05

## 2023-10-19 RX ADMIN — Medication 250 ML: at 15:07

## 2023-10-19 RX ADMIN — Medication 50 MG: at 15:05

## 2023-10-19 NOTE — PROGRESS NOTES
Infusion Nursing Note:  Flavia Ele Brice presents today for Rapid Rituxan.    Patient seen by provider today: No   present during visit today: Not Applicable.    Note: Patient reports feeling well today. No new concerns and no complications in the passed.    Intravenous Access:  Peripheral IV placed.    Treatment Conditions:  ~~~ NOTE: If the patient answers yes to any of the questions below, hold the infusion and contact ordering provider or on-call provider.    Do you currently have any signs of illness or infection or are you on any antibiotics? No  Have you recently had an elevated temperature, fever, chills, productive cough, coughing for 3 weeks or longer or hemoptysis, abnormal vital signs, night sweats, chest pain or have you noticed a decrease in your appetite, unexplained weight loss or fatigue? No  Have you had any new, sudden, or worsening abdominal pain? No  Do you have any open wounds or new incisions? (exclude for patients with hidradenitis suppurativa) No  Have you recently been diagnosed with any new nervous system diseases (ie. Multiple sclerosis, Guillain [place], seizures, neurological changes) or cancer diagnosis? Are you on any form of radiation or chemotherapy? No  Have there been any other new onset medical symptoms? No  Are you pregnant or breast feeding or do you have plans of pregnancy in the future? No;  Do you have any upcoming hospitalizations or surgeries? Does not include esophagogastroduodenoscopy, colonoscopy, endoscopic retrograde cholangiopancreatography (ERCP), endoscopic ultrasound (EUS), dental procedures (including cleanings, fillings, implants, extractions)  or joint aspiration/steroid injections No  Have you or anyone in your household received a live vaccination in the past 4 weeks? Please note: No live vaccines while on biologic/chemotherapy until 6 months after the last treatment. Patient can receive the flu vaccine (shot only).  It is optimal for the patient  to get it mid cycle, but it can be given at any time as long as it is not on the day of the infusion. No  If applicable to prescribed medication, confirm negative PPD or quantiferon gold MTB. If positive, verify has negative chest x-ray or the patient is at least 4 weeks post initiation of INH/B6 therapy and have clearance from provider before infusion N  If applicable to prescribed medication, confirm negative hepatitis B surface antigen or hepatitis C. If positive, clearance from provider before infusion.Y  Rheumatology patients receiving tocilizumab (Actemra): If labs were drawn within the past week, hold dosing until cleared to infuse If AST/ALT > 2 X upper limit normal; ANC < 1.0.  N/A  Patients receiving belimumab (Benlysta): Have you been having any signs of worsening depression or suicidal ideations?  N/A   Post Infusion Assessment:  Patient tolerated infusion without incident.  Site patent and intact, free from redness, edema or discomfort.  No evidence of extravasations.  Access discontinued per protocol.  Biologic Infusion Post Education: Call the triage nurse at your clinic or seek medical attention if you have chills and/or temperature greater than or equal to 100.5, uncontrolled nausea/vomiting, diarrhea, constipation, dizziness, shortness of breath, chest pain, heart palpitations, weakness or any other new or concerning symptoms, questions or concerns.  You cannot have any live virus vaccines prior to or during treatment or up to 6 months post infusion.  If you have an upcoming surgery, medical procedure or dental procedure during treatment, this should be discussed with your ordering physician and your surgeon/dentist.  If you are having any concerning symptom, if you are unsure if you should get your next infusion or wish to speak to a provider before your next infusion, please call your care coordinator or triage nurse at your clinic to notify them so we can adequately serve you.       Discharge  Plan:   AVS to patient via [employer].  Patient will schedule next appointment after follow up with provider in December.   Patient discharged in stable condition accompanied by: self.  Departure Mode: Ambulatory.      Geent Chavez RN

## 2023-10-20 LAB
CD19 B CELL COMMENT: ABNORMAL
CD19 CELLS # BLD: 9 CELLS/UL (ref 107–698)
CD19 CELLS NFR BLD: 1 % (ref 6–27)
IGA SERPL-MCNC: 130 MG/DL (ref 84–499)
IGG SERPL-MCNC: 882 MG/DL (ref 610–1616)
IGM SERPL-MCNC: 24 MG/DL (ref 35–242)

## 2023-11-03 ENCOUNTER — NURSE TRIAGE (OUTPATIENT)
Dept: FAMILY MEDICINE | Facility: CLINIC | Age: 58
End: 2023-11-03

## 2023-11-03 ENCOUNTER — ANCILLARY PROCEDURE (OUTPATIENT)
Dept: GENERAL RADIOLOGY | Facility: CLINIC | Age: 58
End: 2023-11-03
Attending: FAMILY MEDICINE
Payer: COMMERCIAL

## 2023-11-03 ENCOUNTER — OFFICE VISIT (OUTPATIENT)
Dept: URGENT CARE | Facility: URGENT CARE | Age: 58
End: 2023-11-03
Payer: COMMERCIAL

## 2023-11-03 VITALS
DIASTOLIC BLOOD PRESSURE: 80 MMHG | RESPIRATION RATE: 18 BRPM | WEIGHT: 190 LBS | HEART RATE: 74 BPM | OXYGEN SATURATION: 98 % | TEMPERATURE: 99 F | BODY MASS INDEX: 30.9 KG/M2 | SYSTOLIC BLOOD PRESSURE: 128 MMHG

## 2023-11-03 DIAGNOSIS — U07.1 INFECTION DUE TO 2019 NOVEL CORONAVIRUS: Primary | ICD-10-CM

## 2023-11-03 DIAGNOSIS — R07.89 CHEST TIGHTNESS: ICD-10-CM

## 2023-11-03 DIAGNOSIS — U07.1 INFECTION DUE TO 2019 NOVEL CORONAVIRUS: ICD-10-CM

## 2023-11-03 PROCEDURE — 71046 X-RAY EXAM CHEST 2 VIEWS: CPT | Mod: TC | Performed by: RADIOLOGY

## 2023-11-03 PROCEDURE — 99214 OFFICE O/P EST MOD 30 MIN: CPT | Performed by: FAMILY MEDICINE

## 2023-11-03 RX ORDER — ALBUTEROL SULFATE 90 UG/1
2 AEROSOL, METERED RESPIRATORY (INHALATION) EVERY 4 HOURS PRN
Qty: 18 G | Refills: 0 | Status: SHIPPED | OUTPATIENT
Start: 2023-11-03

## 2023-11-03 NOTE — TELEPHONE ENCOUNTER
"Nurse Triage SBAR    Is this a 2nd Level Triage? NO, PT IN AGREEMENT TO BE SEEN 11/3/23    Situation: Pt calling to state that she tested positive for COVID 11/2/23 and requesting Paxlovid treatment.      Background: Pt symptoms started 10/31/23 with cough, 11/2/23 start of sore throat. Pt stated that she autoimmune disease of Wegener's granulomatosis.     Assessment: Increased chest tightness 11/2-11/3 Chest tightness when taking a deep, sore throat, dizziness, chills, fever 101, tonsils are large, deep breath hurts from lungs. \"Feels like I have pneumonia.' Dry cough. Generalized weakness, dizziness occurs when standing, Drinking lots of water, diarrhea, eating but not normally such as bites of food    Protocol Recommended Disposition:   Go To ED/UCC Now (Or To Office With PCP Approval)RN advised pt to be seen at nearest UC to be evaluated for coughing/deep breaths that leads to intermittent chest tightness. Pt verbalize understanding and will be seen at nearest  to be evaluated 11/3/23.     Recommendation: No further action needed as pt in agreement to be seen in UC for chest tightness       Does the patient meet one of the following criteria for ADS visit consideration? 16+ years old, with an [facility] PCP     TIP  Providers, please consider if this condition is appropriate for management at one of our Acute and Diagnostic Services sites.     If patient is a good candidate, please use dotphrase <dot>triageresponse and select Refer to ADS to document.     Ellie Vigil RN on 11/3/2023 at 10:12 AM     Reason for Disposition   Chest pain or 'angina' comes and goes and is happening more often (increasing in frequency) or getting worse (increasing in severity) (Exception: Chest pains that last only a few seconds.)    Additional Information   Negative: SEVERE difficulty breathing (e.g., struggling for each breath, speaks in single words)   Negative: Difficult to awaken or acting confused (e.g., disoriented, slurred " speech)   Negative: Shock suspected (e.g., cold/pale/clammy skin, too weak to stand, low BP, rapid pulse)   Negative: Passed out (i.e., lost consciousness, collapsed and was not responding)   Negative: Chest pain lasting longer than 5 minutes and ANY of the following:         Pain is crushing, pressure-like, or heavy         Over 44 years old          Over 30 years old and one cardiac risk factor (e.g diabetes, high blood pressure, high cholesterol, smoker, or family history of heart disease)         History of heart disease (e.g. angina, heart attack, heart failure, bypass surgery, takes nitroglycerin)   Negative: Heart beating < 50 beats per minute OR > 140 beats per minute   Negative: Visible sweat on face or sweat dripping down face   Negative: Sounds like a life-threatening emergency to the triager   Negative: Followed an injury to chest   Negative: SEVERE chest pain   Negative: Pain also in shoulder(s) or arm(s) or jaw   Negative: Cocaine use within last 3 days   Negative: Difficulty breathing   Negative: Major surgery in the past month   Negative: Hip or leg fracture (broken bone) in past month (or had cast on leg or ankle in past month)   Negative: Illness requiring prolonged bedrest in past month (e.g., immobilization, long hospital stay)   Negative: Long-distance travel in past month (e.g., car, bus, train, plane; with trip lasting 6 or more hours)   Negative: History of prior 'blood clot' in leg or lungs (i.e., deep vein thrombosis, pulmonary embolism)   Negative: History of inherited increased risk of blood clots (e.g., Factor 5 Leiden, Anti-thrombin 3, Protein C or Protein S deficiency, Prothrombin mutation)   Negative: Cancer treatment in the past two months (or has cancer now)   Negative: Heart beating irregularly or very rapidly   Negative: Chest pain lasting longer than 5 minutes and occurred in last 3 days (72 hours) (Exception: Feels exactly the same as previously diagnosed heartburn and has  "accompanying sour taste in mouth.)    Answer Assessment - Initial Assessment Questions  1. LOCATION: \"Where does it hurt?\"        Mid-line and right side   2. RADIATION: \"Does the pain go anywhere else?\" (e.g., into neck, jaw, arms, back)      no  3. ONSET: \"When did the chest pain begin?\" (Minutes, hours or days)       11/1/23  4. PATTERN: \"Does the pain come and go, or has it been constant since it started?\"  \"Does it get worse with exertion?\"       Comes and goes of sharp pain  5. DURATION: \"How long does it last\" (e.g., seconds, minutes, hours)      A few seconds, but constant pressure  6. SEVERITY: \"How bad is the pain?\"  (e.g., Scale 1-10; mild, moderate, or severe)     - MILD (1-3): doesn't interfere with normal activities      - MODERATE (4-7): interferes with normal activities or awakens from sleep     - SEVERE (8-10): excruciating pain, unable to do any normal activities        Moderate, taken aback when pain occurs  7. CARDIAC RISK FACTORS: \"Do you have any history of heart problems or risk factors for heart disease?\" (e.g., angina, prior heart attack; diabetes, high blood pressure, high cholesterol, smoker, or strong family history of heart disease)      High blood pressure, heart failure  8. PULMONARY RISK FACTORS: \"Do you have any history of lung disease?\"  (e.g., blood clots in lung, asthma, emphysema, birth control pills)      no  9. CAUSE: \"What do you think is causing the chest pain?\"      COVID infection  10. OTHER SYMPTOMS: \"Do you have any other symptoms?\" (e.g., dizziness, nausea, vomiting, sweating, fever, difficulty breathing, cough)        Dizziness, cough, fever    Protocols used: Chest Pain-A-[place]    "

## 2023-11-03 NOTE — PATIENT INSTRUCTIONS
For informational purposes only. Not to replace the advice of your health care provider. Copyright   2022 [facility]. All rights reserved. Clinically reviewed by Mckenna Frias, PharmD, BCACP. [employer] [id number] - REV 06/23.  COVID-19 Outpatient Treatments  Your care team can help you find the best treatments for COVID-19. Talk to a health care provider or refer to the FDA medicine fact sheets below.  Paxlovid (nirmatrelvir and ritonavir): https://www.paxlovid.PlayGiga/resources  Molnupiravir (Lagevrio): https://www.fda.gov/media/633769/download  Important: We can only prescribe Paxlovid or Molnupiravir when it can be started within 5 days of first having symptoms.  Paxlovid (nimatrelvir and ritonavir)  How it works  Two medicines (nirmatrelvir and ritonavir) are taken together. They stop the virus from growing. Less amount of virus is easier for your body to fight.  Benefits  Lowers risk of a hospital stay or death from COVID-19.  How to take  Medicine comes in a daily container with both medicine tablets. Take by mouth twice daily (once in the morning, once at night) for 5 days.  The number of tablets to take varies by patient.  Don't chew or break capsules. Swallow whole.  When to take  Take it as soon as possible and within 5 days of your first symptoms.  Who can take it  Patients must be 12 years or older weigh at least 88 pounds. Paxlovid is the preferred treatment for pregnant patients.  Possible side effects  Can cause altered sense of taste, diarrhea (loose, watery stools), high blood pressure, muscle aches.  Medicine conflicts  Some medicines may conflict with Paxlovid and may cause serious side effects.  Tell your care team about all the medicines you take, including prescription and over-the-counter medicines, vitamins, and herbal supplements.  Your care team will review your medicines to make sure that you can safely take Paxlovid.  Cautions  Paxlovid is not advised for patients with severe  kidney or liver disease. If you have kidney or liver problems, the dose may need to be changed.  If you're pregnant or breastfeeding, talk to your care team about your options.  If you take hormonal birth control (such as the Pill), then you or your partner should also use a non-hormonal form of birth control (such as a condom). Keep doing this for 1 menstrual cycle after your last dose of Paxlovid.  Molnupiravir (lagevrio)  How it works  Stops the virus from growing. Less amount of virus is easier for your body to fight.  Benefits  Lowers risk of a hospital stay or death from COVID-19.  How to take  Take 4 capsules by mouth every 12 hours (4 in the morning and 4 at night) for 5 days. Don't chew or break capsules. Swallow whole.  When to take  Take as soon as possible and within 5 days of your first symptoms.  Who can take it  Patients must be 18 years or older.   Possible side effects  Diarrhea (loose, watery stools), nausea (feeling sick to your stomach), dizziness, headaches.  Medicine conflicts  Right now, there are no known conflicts with other drugs. But tell your care team about all medicines you take.  Cautions  This medicine is not advised for patients who are pregnant.  If you are someone who could become pregnant, use trusted birth control until 4 days after your last dose of molnupiravir.  If your partner could become pregnant, you should use trusted birth control until 3 months after your last dose of molnupiravir.

## 2023-11-03 NOTE — PROGRESS NOTES
"  Assessment & Plan     Infection due to 2019 novel coronavirus  Appears well, reassuring exam. CXR clear, no pneumonia or other acute findings. Renal dosing of paxlovid prescribed. Trial of albuterol. Discussed signs/symptoms for which to be re-evaluated.   - XR Chest 2 Views  - nirmatrelvir and ritonavir (PAXLOVID) 150 mg/100 mg therapy pack  Dispense: 20 tablet; Refill: 0  - albuterol (PROAIR HFA/PROVENTIL HFA/VENTOLIN HFA) 108 (90 Base) MCG/ACT inhaler  Dispense: 18 g; Refill: 0               BMI:   Estimated body mass index is 30.9 kg/m  as calculated from the following:    Height as of 3/10/23: 1.67 m (5' 5.75\").    Weight as of this encounter: 86.2 kg (190 lb).           No follow-ups on file.    Amanda Kang MD  [facility]   Alley is a 58 year old, presenting for the following health issues:  URI (Chest tightness, pain when breathing in, dizzy standing up, fever, chills, loss of appetite, diarrhea, sore throat./Tested positive for covid last night (home test))      HPI   Covid pos 11/2, symptoms started 10/31. Tried to get paxlovid by RN protocol, but sent in due to chest tightness.     Per RN triage note- Increased chest tightness 11/2-11/3(feels like when she had pneumonia in past). Chest tightness when taking a deep breath, sore throat, dizziness, chills, fever 101, tonsils are large, deep breath hurts from lungs. \"Feels like I have pneumonia.' Dry cough. Generalized weakness, dizziness occurs when standing, Drinking lots of water, diarrhea, eating but not normally such as bites of food.     No chest pain. No wheezing. No shortness of breath. Has had albuterol with respiratory illness in past and feels like it helped    Wegener's granulomatosis, on imuran and every 6 month rituximab (last 3 weeks ago), in remission for 3-4 years per patient    Creatinine 1.51 eGFR 39 last checked 9/9/23              Review of Systems         Objective    /80   Pulse 74   " Temp 99  F (37.2  C) (Tympanic)   Resp 18   Wt 86.2 kg (190 lb)   SpO2 98%   BMI 30.90 kg/m    Body mass index is 30.9 kg/m .  Physical Exam   GENERAL: healthy, alert and no distress. Appears well.   EYES: Eyes grossly normal to inspection, PERRL and conjunctivae and sclerae normal  HENT: ear canals and TM's normal, nose and mouth without ulcers or lesions  NECK: no adenopathy  RESP: Breathing comfortably. lungs clear to auscultation - no rales, rhonchi or wheezes  CV: regular rate and rhythm, normal S1 S2, no S3 or S4, no murmur, click or rub, no peripheral edema  MS: no gross musculoskeletal defects noted, no edema

## 2023-12-15 ENCOUNTER — PATIENT OUTREACH (OUTPATIENT)
Dept: GASTROENTEROLOGY | Facility: CLINIC | Age: 58
End: 2023-12-15
Payer: COMMERCIAL

## 2023-12-30 ENCOUNTER — LAB (OUTPATIENT)
Dept: LAB | Facility: CLINIC | Age: 58
End: 2023-12-30
Payer: COMMERCIAL

## 2023-12-30 DIAGNOSIS — Z79.899 HIGH RISK MEDICATIONS (NOT ANTICOAGULANTS) LONG-TERM USE: ICD-10-CM

## 2023-12-30 DIAGNOSIS — M31.31 GRANULOMATOSIS WITH POLYANGIITIS WITH RENAL INVOLVEMENT (H): ICD-10-CM

## 2023-12-30 DIAGNOSIS — M06.4 INFLAMMATORY POLYARTHROPATHY (H): ICD-10-CM

## 2023-12-30 LAB
ALBUMIN MFR UR ELPH: 16.3 MG/DL
ALBUMIN SERPL BCG-MCNC: 4.4 G/DL (ref 3.5–5.2)
ALBUMIN UR-MCNC: NEGATIVE MG/DL
ALP SERPL-CCNC: 86 U/L (ref 40–150)
ALT SERPL W P-5'-P-CCNC: 20 U/L (ref 0–50)
APPEARANCE UR: CLEAR
AST SERPL W P-5'-P-CCNC: 28 U/L (ref 0–45)
BACTERIA #/AREA URNS HPF: ABNORMAL /HPF
BASOPHILS # BLD AUTO: 0 10E3/UL (ref 0–0.2)
BASOPHILS NFR BLD AUTO: 1 %
BILIRUB DIRECT SERPL-MCNC: <0.2 MG/DL (ref 0–0.3)
BILIRUB SERPL-MCNC: 0.3 MG/DL
BILIRUB UR QL STRIP: NEGATIVE
COLOR UR AUTO: YELLOW
CREAT SERPL-MCNC: 1.45 MG/DL (ref 0.51–0.95)
CREAT UR-MCNC: 56.3 MG/DL
CRP SERPL-MCNC: <3 MG/L
EGFRCR SERPLBLD CKD-EPI 2021: 41 ML/MIN/1.73M2
EOSINOPHIL # BLD AUTO: 0.2 10E3/UL (ref 0–0.7)
EOSINOPHIL NFR BLD AUTO: 2 %
ERYTHROCYTE [DISTWIDTH] IN BLOOD BY AUTOMATED COUNT: 13.9 % (ref 10–15)
ERYTHROCYTE [SEDIMENTATION RATE] IN BLOOD BY WESTERGREN METHOD: 10 MM/HR (ref 0–30)
GLUCOSE UR STRIP-MCNC: NEGATIVE MG/DL
HCT VFR BLD AUTO: 38.7 % (ref 35–47)
HGB BLD-MCNC: 12.5 G/DL (ref 11.7–15.7)
HGB UR QL STRIP: NEGATIVE
IMM GRANULOCYTES # BLD: 0 10E3/UL
IMM GRANULOCYTES NFR BLD: 0 %
KETONES UR STRIP-MCNC: NEGATIVE MG/DL
LEUKOCYTE ESTERASE UR QL STRIP: ABNORMAL
LYMPHOCYTES # BLD AUTO: 1.6 10E3/UL (ref 0.8–5.3)
LYMPHOCYTES NFR BLD AUTO: 20 %
MCH RBC QN AUTO: 30.4 PG (ref 26.5–33)
MCHC RBC AUTO-ENTMCNC: 32.3 G/DL (ref 31.5–36.5)
MCV RBC AUTO: 94 FL (ref 78–100)
MONOCYTES # BLD AUTO: 1 10E3/UL (ref 0–1.3)
MONOCYTES NFR BLD AUTO: 12 %
NEUTROPHILS # BLD AUTO: 5.2 10E3/UL (ref 1.6–8.3)
NEUTROPHILS NFR BLD AUTO: 65 %
NITRATE UR QL: NEGATIVE
PH UR STRIP: 5 [PH] (ref 5–7)
PLATELET # BLD AUTO: 282 10E3/UL (ref 150–450)
PROT SERPL-MCNC: 7.1 G/DL (ref 6.4–8.3)
PROT/CREAT 24H UR: 0.29 MG/MG CR (ref 0–0.2)
RBC # BLD AUTO: 4.11 10E6/UL (ref 3.8–5.2)
RBC #/AREA URNS AUTO: ABNORMAL /HPF
SP GR UR STRIP: 1.01 (ref 1–1.03)
SQUAMOUS #/AREA URNS AUTO: ABNORMAL /LPF
UROBILINOGEN UR STRIP-ACNC: 0.2 E.U./DL
WBC # BLD AUTO: 8.1 10E3/UL (ref 4–11)
WBC #/AREA URNS AUTO: ABNORMAL /HPF

## 2023-12-30 PROCEDURE — 80076 HEPATIC FUNCTION PANEL: CPT

## 2023-12-30 PROCEDURE — 86140 C-REACTIVE PROTEIN: CPT

## 2023-12-30 PROCEDURE — 85025 COMPLETE CBC W/AUTO DIFF WBC: CPT

## 2023-12-30 PROCEDURE — 85652 RBC SED RATE AUTOMATED: CPT

## 2023-12-30 PROCEDURE — 84156 ASSAY OF PROTEIN URINE: CPT

## 2023-12-30 PROCEDURE — 36415 COLL VENOUS BLD VENIPUNCTURE: CPT

## 2023-12-30 PROCEDURE — 81001 URINALYSIS AUTO W/SCOPE: CPT

## 2023-12-30 PROCEDURE — 82565 ASSAY OF CREATININE: CPT

## 2024-01-02 ENCOUNTER — OFFICE VISIT (OUTPATIENT)
Dept: RHEUMATOLOGY | Facility: CLINIC | Age: 59
End: 2024-01-02
Payer: COMMERCIAL

## 2024-01-02 VITALS
SYSTOLIC BLOOD PRESSURE: 139 MMHG | DIASTOLIC BLOOD PRESSURE: 83 MMHG | HEIGHT: 66 IN | HEART RATE: 68 BPM | WEIGHT: 190 LBS | OXYGEN SATURATION: 98 % | BODY MASS INDEX: 30.53 KG/M2

## 2024-01-02 DIAGNOSIS — M31.31 GRANULOMATOSIS WITH POLYANGIITIS WITH RENAL INVOLVEMENT (H): Primary | ICD-10-CM

## 2024-01-02 DIAGNOSIS — Z79.899 HIGH RISK MEDICATIONS (NOT ANTICOAGULANTS) LONG-TERM USE: ICD-10-CM

## 2024-01-02 DIAGNOSIS — M06.4 INFLAMMATORY POLYARTHROPATHY (H): ICD-10-CM

## 2024-01-02 DIAGNOSIS — Z23 NEED FOR VACCINATION: ICD-10-CM

## 2024-01-02 PROCEDURE — 90750 HZV VACC RECOMBINANT IM: CPT | Performed by: INTERNAL MEDICINE

## 2024-01-02 PROCEDURE — 90471 IMMUNIZATION ADMIN: CPT | Performed by: INTERNAL MEDICINE

## 2024-01-02 PROCEDURE — 99214 OFFICE O/P EST MOD 30 MIN: CPT | Mod: 25 | Performed by: INTERNAL MEDICINE

## 2024-01-02 RX ORDER — ALBUTEROL SULFATE 0.83 MG/ML
2.5 SOLUTION RESPIRATORY (INHALATION)
OUTPATIENT
Start: 2024-05-14

## 2024-01-02 RX ORDER — ALBUTEROL SULFATE 90 UG/1
1-2 AEROSOL, METERED RESPIRATORY (INHALATION)
Start: 2024-05-14

## 2024-01-02 RX ORDER — DIPHENHYDRAMINE HCL 25 MG
50 CAPSULE ORAL ONCE
Status: CANCELLED
Start: 2024-05-14

## 2024-01-02 RX ORDER — MEPERIDINE HYDROCHLORIDE 25 MG/ML
25 INJECTION INTRAMUSCULAR; INTRAVENOUS; SUBCUTANEOUS EVERY 30 MIN PRN
OUTPATIENT
Start: 2024-05-14

## 2024-01-02 RX ORDER — DIPHENHYDRAMINE HYDROCHLORIDE 50 MG/ML
50 INJECTION INTRAMUSCULAR; INTRAVENOUS
Start: 2024-05-14

## 2024-01-02 RX ORDER — EPINEPHRINE 1 MG/ML
0.3 INJECTION, SOLUTION, CONCENTRATE INTRAVENOUS EVERY 5 MIN PRN
OUTPATIENT
Start: 2024-05-14

## 2024-01-02 RX ORDER — METHYLPREDNISOLONE SODIUM SUCCINATE 125 MG/2ML
125 INJECTION, POWDER, LYOPHILIZED, FOR SOLUTION INTRAMUSCULAR; INTRAVENOUS
Start: 2024-05-14

## 2024-01-02 RX ORDER — HEPARIN SODIUM (PORCINE) LOCK FLUSH IV SOLN 100 UNIT/ML 100 UNIT/ML
5 SOLUTION INTRAVENOUS
OUTPATIENT
Start: 2024-05-14

## 2024-01-02 RX ORDER — HEPARIN SODIUM,PORCINE 10 UNIT/ML
5-20 VIAL (ML) INTRAVENOUS DAILY PRN
OUTPATIENT
Start: 2024-05-14

## 2024-01-02 RX ORDER — METHYLPREDNISOLONE SODIUM SUCCINATE 125 MG/2ML
125 INJECTION, POWDER, LYOPHILIZED, FOR SOLUTION INTRAMUSCULAR; INTRAVENOUS ONCE
Status: CANCELLED | OUTPATIENT
Start: 2024-05-14

## 2024-01-02 RX ORDER — ACETAMINOPHEN 325 MG/1
650 TABLET ORAL ONCE
Status: CANCELLED
Start: 2024-05-14

## 2024-01-02 NOTE — PROGRESS NOTES
Rheumatology Clinic Visit      Flavia Brice MRN# [id number]   YOB: 1965 Age: 58 year old      Date of visit: 1/02/24   PCP: Monet Da Silva    Chief Complaint   Patient presents with:  RECHECK    Assessment and Plan     1.  Granulomatosis with polyangiitis: Dx'd 2012 by Dr. Wesley at [facility] when she presented with pulmonary involvement, pericarditis/effusion, borderline aneurysmal dilation of the ascending aorta and biopsy proven necrotizing crescentic nephritis.  Went into remission with cytoxan and prednisone, then maintained on AZA for 2 yrs per patient, stopped when lost to follow-up. Recurrence in 2020 with YOLI, pleuritic chest pain, inflammatory arthritis (MCPs, PIPs, shoulders, knees) that has responded well to prednisone.  She was tx'd with prednisone and rituximab 1g IV received on 8/21/2020 & 9/4/2020, 4/28/2021 (delayed for the COVID-19 vaccine) & (no 2nd dose of rituximab), 11/11/2021 & 11/30/2021, 9/1/2022 & 9/15/2022; 3/15/2023, 10/19/2023. Currently on azathioprine 100 mg daily and rituximab.  Reduced rituximab to be 1 g IV every 6 months, first received in March 2023 and she continues to do well.  Continue with plan for rituximab every 6 months, and she is doing well with regard to GPA/inflammatory arthritis.  Chronic illness, stable.    - Continue azathioprine 100 mg daily  - Continue rituximab (truxima) 1 g IV every 6 months, next due on 4/19/2024 or shortly thereafter; advised that she call to schedule  - Labs in 3 months: CBC, CMP, ESR, CRP, UA, hepatitis B core antibody and surface antigen, QuantiFERON-TB gold plus    High risk medication requiring intensive toxicity monitoring at least quarterly.     2. Inflammatory arthritis: Related to GPA.  See #1    3. Elevated serum creatinine: Related to GPA.  Has been stable.  Avoid nephrotoxic agents.    4. Positive MALLORY (antinuclear antibody): Additional work-up was negative for SSA, SSB, RNP, Smith, dsDNA; complement  C3 and C4 were not low.  Symptoms are most likely related to GPA; no MALLORY-associated rheumatologic disorder identified    5.  Aneurysm of the ascending aorta at 4.5 cm; history of heart failure with preserved ejection fraction: following with Dr. Lcoke (cardiology)    6. Cytoxan use history: needs periodic UA to eval for hematuria, due to increased risk for bladder cancer after cytoxan exposure    7.  Osteoarthritis: Affecting the DIPs and bilateral first CMC joints.  Significant improvement with topical Voltaren gel as needed and exercises taught from hand therapy.  Continue home hand therapy exercises.    8.  Osteopenia: Osteopenia based on 3/14/2023 DEXA.  Continue calcium and vitamin D.  Plan to recheck DEXA in [id number].    9.  Planter fasciitis, bilateral: Diagnosed 5/30/2023, and had significant improvement with stretching exercises and shoewear whenever ambulatory.  Minimal symptoms at this time.    10. Vaccinations: Vaccinations reviewed with Ms. Brice.    - Influenza: Encouraged yearly vaccination, to be received at least 2 weeks before the next rituximab infusion.  - COVID-19: Advise keeping updated; reach out to this clinic for advice on when to receive based on rituximab dosing  - Shingrix: Advised receiving now and on 3/10/2024 or shortly thereafter; to be at least 2 weeks prior to the next rituximab dose  - Prevnar 20: Advised receiving on 3/10/2024 or shortly thereafter; to be at least 2 weeks prior to the next rituximab dose    Total minutes spent in evaluation with patient, documentation, , and review of pertinent studies and chart notes: 20     Ms. Brice verbalized agreement with and understanding of the rational for the diagnosis and treatment plan.  All questions were answered to best of my ability and the patient's satisfaction. Ms. Brice was advised to contact the clinic with any questions that may arise after the clinic visit.      Thank you for involving me in the care of the  patient    Return to clinic: 3-4 months       HPI   Flaviaabigail Brice is a 58 year old female with a past medical history significant for hypertension, depression, and granulomatosis with polyangiitis who is seen for follow-up of granulomatosis with polyangiitis    10/31/2013 [facility] rheumatology clinic note by Dr. Ann Wesley documents severe LA-3 ANCA positive GPA with pulmonary involvement, pericarditis/effusion, borderline aneurysmal dilation of the ascending aorta, necrotizing crescentic nephritis.  Kidney biopsy has confirmed diagnosis in the past.  Has received Solu-Medrol x3, Cytoxan x1 at 1300 mg, on prednisone 60-80 mg daily.  Deteriorated quickly despite those interventions and was hospitalized again, requiring plasmapheresis and dialysis.  Was seeing Dr. Velazquez from [facility] and was again placed on Cytoxan 15 mg/kg every 3 weeks.  Has seen ENT but there was no clear sinus involvement.  She was then transitioned after 7 infusions of Cytoxan to Imuran on 11/2012 and has been doing well on that.      7/11/2020 [facility] hospitalization note: Acute kidney injury, migratory polyarthritis, chronic diastolic heart failure.  Left knee and left hip pain.  Right shoulder pain that migrated to the left shoulder.  No joint swelling.  No fevers.  In the past she had flare of her joint pain that she was treated with prednisone.  Prior to this ED evaluation she reportedly used prednisone 20 mg without improvement.    7/22/2020: [facility] emergency department visit for shortness of breath.  Notes history of granulomatosis with polyangiitis, diastolic heart failure, a sending thoracic aortic aneurysm from 4.6 cm on chest CT 2/18/2020), migratory polyarthritis, acute kidney injury, and hypertension.  Coronavirus test negative.  Chest CT negative for pneumonia.  O2 saturation normal.  Advised to follow-up with her PCP and cardiology.    8/5/2020:  Ms. Brice reported that she was dx'd with GPA by  Dr. Wesley. She was treated and in remission; was on AZA for a year and no issues for a while. Lost to follow-up with Dr. Wesley.  Then 1 year ago started having more shortness of breath and found to have more lung nodules.  Recalls heart failure and kidney failure when first dx'd.  Told to see cardiology to follow the aneurysm.  Then in Jan 2020 started to have pain in her shoulders, knees ankles. Pain was so bad that she went to [facility]; found to have YOLI.  Until able to have this rheumatology evaluation, she says that she was given prednisone to help the joint pain; currently on 5mg daily of prednisone; felt much better when on prednisone 30mg daily. Has been dealing with right knee swelling; the prednisone helped with this.  Joint pains are worse in the AM; improve with time and activity. Morning stiffness is very mild while on prednisone 5mg daily.  No hematuria. No hemoptysis.  Flavia Brice's mother was present during the clinic visit.     9/9/2020: Significant improvement with prednisone and rituximab.  No longer with swelling around her ankles.  Hand swelling/pain/stiffness has resolved.  She feels a little puffy in her face but no actual swelling.  She has followed with her cardiologist and they are going to monitor the thoracic aortic aneurysm.  Tolerated rituximab infusion well.  Currently on prednisone 10 mg daily.  Not going into work at this time; she is taking a leave of absence.    10/21/2020: Improved.  Now with mild pain at her MCPs, DIPs, and knees.  MCP and knee pain is better with activity, worse in the morning.  DIP pain is worse with activity.  Joint pains started again with dose reduction of prednisone.  Has been on azathioprine and tolerated well in the past.    12/9/2020: Significant improvement with regard to her joint pain.  No joint pain now.  No morning stiffness.  No gelling phenomenon.  Tolerating azathioprine well.  Continues on prednisone 5mg daily.  Mild shortness of breath when  walking up stairs that has been stable; no associated chest pain or pressure, palpitations, lightheadedness, dizziness, or nausea and the mild shortness of breath resolves quickly when she rests.  No shortness of breath at rest or currently.  Following with cardiology and plans to repeat her echocardiogram in March 2021.  Last echocardiogram was in February 2020.    4/8/2021: More joint aches and pains and she attributes this to being off of prednisone and having to delay her rituximab infusion secondary to the COVID-19 vaccine.  She has rescheduled the first of the 2 rituximab infusions to be 2 weeks after the second COVID-19 vaccine.  Knees ache more at the end of the day.  No cough, chest pain, shortness of breath.  Has seen cardiology and she says that her aortic aneurysm is stable.    7/7/2021:  doing well this time.  Mild aches of the PIPs and DIPs with more activity that improves with rest.  Usually has pain that travels around but in general is doing okay.  Only received 1 of 2 rituximab doses because her insurance had denied it; she then got a letter about a month later stating that rituximab had been approved.  Tolerating azathioprine and rituximab well.  Overall happy with how well she is doing.  No blood in urine or stool    10/19/2021: Reports that she is doing well.  She says that she is doing better than she was previously, and way better than she was before treatment.  No joint pain or stiffness.  No rash.  Tolerating azathioprine well.  No difficulty doing her daily activities.     1/25/2022: feels the best she has in a long time.  No joint pain or swelling. Morning stiffness for <20.  No rash.  No black or bloody stools.  No cough, chest pain, or shortness of breath.  No hemoptysis.  No hematemesis.  Using a surgical mask at work.    5/4/2022: had covid infxn, resolved >2wks ago. Fasting for labs recently. Joints more achy; due to rtx.  Tolerating aza well. No joint swelling. Morning stiffness <30  min. +gelling. No hemoptysis.  No blood in urine.     8/2/2022: Did not get the rituximab infusion but does have a scheduled for 9/1/2022 and 9/15/2022.  More joint pain at the MCPs, PIPs, knees, and MTPs that is worse in the morning and improves with time and activity.  Morning stiffness for about 1 hour.  No rash.  No black or bloody stools.  No hematuria.  No cough, chest pain, or shortness of breath.    11/8/2022:  Joints are doing well with only occasional MCP and PIP ache for the first 20 minutes of the morning.  No gelling phenomenon.  Tolerating azathioprine well.  No rash.  No hematuria.  No black or bloody stools.  No hemoptysis or hematemesis.    2/28/2023: Doing well at this time except for pain at the end of the day and with increased activity of the bilateral first CMC joints that improves with rest; no swelling of these joints.  No other joint pain or swelling.  Morning stiffness for no more than 20 minutes.  No hematuria.  No hemoptysis.  Couple times and had blood in her stool and she recalls that she had a colonoscopy in 2019 and was supposed to follow-up for repeat colonoscopy 6 months later but never went.    5/30/2023: Currently doing well.  Bilateral plantar foot pain with the first few steps of the day and after any period of inactivity where she was not standing on her feet.  After walking more than a couple steps then the bottom of her feet start to feel much better.  Hand pain is nearly resolved with the exercises taught in hand therapy.  She uses topical Voltaren gel for her hands as needed, infrequently.  Other joints are doing well.  No cough, chest pain, or shortness of breath.  No hematuria.  Arthritis is not limiting her daily activities.    9/12/2023: Near resolution of bilateral planter fasciitis symptoms with stretching exercises and wearing appropriate shoes whenever ambulatory.  Currently without joint pain or swelling.  No morning stiffness or gelling phenomenon.  No cough,  chest pain, shortness of breath.  No hematuria.  No fatigue.  Arthritis does not limit her daily activities.    Today, 1/2/2024: Currently doing well.  No joint pain or swelling.  No morning stiffness or gelling phenomenon.  No plantar fasciitis symptoms.  No chest pain or pressure or shortness of breath.  No cough.  No hematuria.  No fatigue.  Arthritis does not limit daily activities.    Denies fevers, chills, nausea, vomiting. Occasional constipation or diarrhea, unchanged from previous. No abdominal pain. No chest pain/pressure, palpitations, or shortness of breath at this time.  No LE swelling.  No oral or nasal sores.  No rash. No sicca symptoms.  No eye pain or redness.    Tobacco: quit smoking in Feb 2020  EtOH: no more than 1 drink per month  Drugs: none  Occupation: [employer]    ROS   12 point review of system was completed and negative except as noted in the HPI     Active Problem List     Patient Active Problem List   Diagnosis    Wegener's granulomatosis    CARDIOVASCULAR SCREENING; LDL GOAL LESS THAN 130    Overweight    Advanced directives, counseling/discussion    Hypertension goal BP (blood pressure) < 140/90    Situational depression    Granulomatosis with polyangiitis (H)    Chronic kidney disease, stage 3    Former smoker    Menopausal syndrome (hot flashes)    Chronic diastolic (congestive) heart failure (H)    Thoracic aortic aneurysm without rupture, unspecified part (H24)    Angina at rest    Rectal bleeding    History of colonic polyps    Hx of aortic aneurysm    Acute on chronic respiratory failure, unspecified whether with hypoxia or hypercapnia (H)    Screening for cervical cancer     Past Medical History     Past Medical History:   Diagnosis Date    Tinnitus      Past Surgical History     Past Surgical History:   Procedure Laterality Date    COLONOSCOPY WITH CO2 INSUFFLATION N/A 2/18/2019    Procedure: COLONOSCOPY WITH CO2 INSUFFLATION;  Surgeon: Javier Guillen MD;  Location:  OR     "[facility]       Allergy   No Known Allergies  Current Medication List     Current Outpatient Medications   Medication Sig    albuterol (PROAIR HFA/PROVENTIL HFA/VENTOLIN HFA) 108 (90 Base) MCG/ACT inhaler Inhale 2 puffs into the lungs every 4 hours as needed for shortness of breath, wheezing or cough    Ascorbic Acid (VITAMIN C) 100 MG CHEW     azaTHIOprine (IMURAN) 50 MG tablet Take 2 tablets (100 mg) by mouth daily    losartan-hydrochlorothiazide (HYZAAR) 100-25 MG tablet Take 1 tablet by mouth daily    Multiple Vitamins-Minerals (ZINC PO)     VITAMIN D PO     zinc gluconate 50 MG tablet Take 50 mg by mouth daily    ASPIRIN NOT PRESCRIBED (INTENTIONAL) Please choose reason not prescribed from choices below. (Patient not taking: Reported on 3/15/2023)    BETA BLOCKER NOT PRESCRIBED (INTENTIONAL) Please choose reason not prescribed from choices below. (Patient not taking: Reported on 3/15/2023)     No current facility-administered medications for this visit.     Social History   See HPI    Family History     Family History   Problem Relation Age of Onset    Diabetes Father     Heart Disease Maternal Grandmother     Heart Disease Maternal Grandfather     Arthritis Paternal Grandmother     Heart Disease Paternal Grandfather      Grandmother: rheumatoid arthritis    Physical Exam     Temp Readings from Last 3 Encounters:   11/03/23 99  F (37.2  C) (Tympanic)   10/19/23 98.4  F (36.9  C) (Oral)   09/12/23 98.1  F (36.7  C) (Oral)     BP Readings from Last 5 Encounters:   01/02/24 139/83   11/03/23 128/80   10/19/23 129/72   09/12/23 122/80   05/30/23 126/76     Pulse Readings from Last 1 Encounters:   01/02/24 68     Resp Readings from Last 1 Encounters:   11/03/23 18     Estimated body mass index is 30.67 kg/m  as calculated from the following:    Height as of this encounter: 1.676 m (5' 6\").    Weight as of this encounter: 86.2 kg (190 lb).    GEN: NAD.   HEENT:  Anicteric, noninjected sclera. No obvious " external lesions of the ear and nose. Hearing intact.  CV: S1, S2. RRR. No m/r/g  PULM: No increased work of breathing. CTA bilaterally   MSK: MCPs, PIPs, DIPs without swelling or tenderness to palpation.  Squaring and tenderness to palpation without effusion, increased warmth, or overlying erythema at the bilateral first CMC joints.  Wrists without swelling or tenderness to palpation.  Elbows and shoulders without swelling or tenderness to palpation.   Knees, ankles, and MTPs without swelling or tenderness to palpation.  Pes planus bilaterally.  Nontender to palpation on the plantar aspect of each foot.  SKIN: No rash or jaundice seen  PSYCH: Alert. Appropriate.       Labs / Imaging (select studies)     RF/CCP  Recent Labs   Lab Test 08/05/20  1453   CCPIGG <1   RHF <7     MALLORY  Recent Labs   Lab Test 08/05/20  1453   CAMMIE Positive*   ANAP1 HOMOGENEOUS   ANAT1 1:160     RNP/Sm/SSA/SSB  Recent Labs   Lab Test 08/11/20  1402   RNPIGG <0.2   SMIGG <0.2   SSAIGG <0.2   SSBIGG <0.2     dsDNA  Recent Labs   Lab Test 08/11/20  1402   DNA <1     C3/C4  Recent Labs   Lab Test 08/11/20  1402   Q3XPNXY 133   [id number] 41*     Antiphospholipid Antibodies  Recent Labs   Lab Test 08/11/20  1402   B2GPG 0.8   B2GPM <2.9   CARDG <1.6   CARDM 0.8   LUPINT Negative     ANCA  Recent Labs   Lab Test 08/05/20  1453   PR3IGG 4.8*   MPOIGG <0.2     IgG  Recent Labs   Lab Test 10/19/23  1429 03/15/23  0842 09/01/22  0815    859 961    120 139   IGM 24* 15* 14*     CBC  Recent Labs   Lab Test 12/30/23  1719 09/09/23  0916 05/28/23  0911 10/14/21  0720 07/07/21  0818 04/24/21  1130 12/03/20  1128   WBC 8.1 6.1 6.9   < > 5.0 5.1 8.3   RBC 4.11 4.32 4.14   < > 3.98 3.82 4.27   HGB 12.5 13.0 12.8   < > 12.2 11.7 13.1   HCT 38.7 40.2 40.0   < > 38.8 37.6 41.3   MCV 94 93 97   < > 98 98 97   RDW 13.9 13.4 13.7   < > 14.9 13.7 14.2    315 298   < > 304 297 347   MCH 30.4 30.1 30.9   < > 30.7 30.6 30.7   MCHC 32.3 32.3 32.0   < >  31.4* 31.1* 31.7   NEUTROPHIL 65 61 64   < > 60.1 63.5 73.6   LYMPH 20 23 22   < > 22.9 20.4 17.7   MONOCYTE 12 12 10   < > 11.2 11.1 6.9   EOSINOPHIL 2 3 3   < > 5.0 4.4 1.3   BASOPHIL 1 1 1   < > 0.8 0.6 0.5   ANEU  --   --   --   --  3.0 3.2 6.1   ALYM  --   --   --   --  1.1 1.0 1.5   MARCOS  --   --   --   --  0.6 0.6 0.6   AEOS  --   --   --   --  0.3 0.2 0.1   ABAS  --   --   --   --  0.0 0.0 0.0   ANEUTAUTO 5.2 3.7 4.5   < >  --   --   --    ALYMPAUTO 1.6 1.4 1.5   < >  --   --   --    AMONOAUTO 1.0 0.7 0.7   < >  --   --   --    AEOSAUTO 0.2 0.2 0.2   < >  --   --   --    ABSBASO 0.0 0.0 0.0   < >  --   --   --     < > = values in this interval not displayed.     CMP  Recent Labs   Lab Test 12/30/23  1719 09/09/23  0916 05/28/23  0911 02/28/23  1421 08/07/22  1604 04/16/22  1236 01/23/22  1024 10/14/21  0720 10/14/21  0720 07/07/21  0818 04/24/21  1130 12/03/20  1128 11/19/20  1123   NA  --  141  --   --  141 144 141   < > 141 142 141  --   --    POTASSIUM  --  4.8  --   --  4.0 4.0 4.2   < > 4.5 4.7 4.2  --   --    CHLORIDE  --  104  --   --  109 112* 106   < > 108 112* 108  --   --    CO2  --  25  --   --  25 24 26   < > 27 26 28  --   --    ANIONGAP  --  12  --   --  7 8 9   < > 6 4 5  --   --    GLC  --  118*  --   --  132* 125* 172*  --  116* 103* 114*  --   --    BUN  --  34.7*  --   --  33* 39* 33*   < > 29 34* 26  --   --    CR 1.45* 1.51* 1.29* 1.25* 1.34* 1.58* 1.32*   < > 1.43* 1.44* 1.50* 1.41* 1.45*   GFRESTIMATED 41* 39* 48* 49* 46* 38* 47*   < > 41* 40* 38* 41* 40*   GFRESTBLACK  --   --   --   --   --   --   --   --   --  46* 44* 48* 46*   JESSICA  --  9.5  --   --  8.9 9.3 9.0   < > 8.9 9.6 9.5  --   --    BILITOTAL 0.3 0.4 0.2 0.2 0.4 0.5 0.3   < > 0.4 0.2 0.4 0.4 0.3   ALBUMIN 4.4 4.3 3.8 3.9 3.6 3.7 3.8   < > 3.5 3.6 3.5 3.9 3.7   PROTTOTAL 7.1 7.0 7.3 7.4 7.0 7.0 7.2   < > 7.1 7.2 6.7* 7.5 7.4   ALKPHOS 86 90 82 80 75 85 80   < > 84 75 67 80 78   AST 28 20 14 15 14 18 14   < > 15 21 22 16 13    ALT 20 25 26 32 30 51* 37   < > 41 49 62* 37 33    < > = values in this interval not displayed.     HgA1c  Recent Labs   Lab Test 03/10/23  1515 04/16/22  1236 10/27/17  1541   A1C 6.0* 6.1* 5.8     Calcium/VitaminD  Recent Labs   Lab Test 09/09/23  0916 08/07/22  1604 04/16/22  1236   JESSICA 9.5 8.9 9.3     ESR/CRP  Recent Labs   Lab Test 12/30/23  1719 09/09/23  0916 05/28/23  0911 02/28/23  1421 08/07/22  1604   SED 10 10 9 9 10   CRP  --   --  <2.9 3.3 4.2   CRPI <3.00 5.09*  --   --   --      CK/Aldolase  Recent Labs   Lab Test 08/11/20  1402   CKT 79     TSH/T4  Recent Labs   Lab Test 03/10/23  1515 10/21/21  0920 03/26/18  1058   TSH 1.66 1.10 1.44     Lipid Panel  Recent Labs   Lab Test 03/10/23  1515 01/23/22  1024 03/26/18  1058   CHOL 201* 192 188   TRIG 110 118 93   HDL 83 74 87   LDL 96 94 82   NHDL 118 118 101     Hepatitis B  Recent Labs   Lab Test 04/16/22  1236 08/05/20  1453   HBCAB Nonreactive Nonreactive   HEPBANG Nonreactive Nonreactive     Hepatitis C  Recent Labs   Lab Test 08/05/20  1453 03/26/18  1058   HCVAB Nonreactive Nonreactive     Lyme ab screening  Recent Labs   Lab Test 08/05/20  1453   LYMEGM 0.10     Tuberculosis Screening  Recent Labs   Lab Test 02/28/23  1421 04/16/22  1236 08/05/20  1453   TBRES Negative Negative  --    TBRST  --   --  Negative     HIV Screening  Recent Labs   Lab Test 08/05/20  1453   HIAGAB Nonreactive     UA  Recent Labs   Lab Test 12/30/23  1719 09/09/23  0916 03/25/23  1216 02/28/23  1421 04/16/22  1236 10/14/21  0720 04/24/21  1137 09/16/20  1442 08/05/20  1453 07/27/20  0938   COLOR Yellow Yellow Yellow Yellow   < > Yellow   < > Yellow Yellow Yellow   APPEARANCE Clear Clear Clear Slightly Cloudy*   < > Clear   < > Clear Clear Clear   URINEGLC Negative Negative Negative Negative   < > Negative   < > Negative Negative Negative   URINEBILI Negative Negative Negative Negative   < > Negative   < > Negative Negative Negative   SG 1.010 1.020 1.020 1.020   < >  >=1.030   < > 1.025 1.025 1.015   URINEPH 5.0 5.5 5.5 5.0   < > 5.5   < > 5.0 5.0 5.0   PROTEIN Negative Trace* Negative Trace*   < > Trace*   < > 30* Negative Trace*   UROBILINOGEN 0.2 0.2 0.2 0.2   < > 0.2   < > 0.2 0.2 0.2   NITRITE Negative Negative Negative Negative   < > Negative   < > Negative Negative Negative   UBLD Negative Negative Negative Negative   < > Trace*   < > Negative Trace* Small*   LEUKEST Trace* Negative Negative Moderate*   < > Trace*   < > Negative Negative Negative   WBCU 0-5 0-5  --  10-25*  --  0-5   < > 0 - 5 0 - 5 0 - 5   RBCU None Seen 0-2  --  2-5*  --  None Seen   < > O - 2 O - 2 2-5*   SQUAMOUSEPI  --   --   --   --   --   --   --  Few Few Few   BACTERIA None Seen  --   --  Many*  --  Few*  --  Few* Few* Few*   MUCOUS  --   --   --   --   --   --   --   --  Present*  --     < > = values in this interval not displayed.     Urine Microscopic  Recent Labs   Lab Test 12/30/23 1719 09/09/23  0916 02/28/23  1421 10/14/21  0720 07/07/21  0818 09/16/20  1442 08/05/20  1453 07/27/20  0938   WBCU 0-5 0-5 10-25* 0-5   < > 0 - 5 0 - 5 0 - 5   RBCU None Seen 0-2 2-5* None Seen   < > O - 2 O - 2 2-5*   SQUAMOUSEPI  --   --   --   --   --  Few Few Few   BACTERIA None Seen  --  Many* Few*  --  Few* Few* Few*   MUCOUS  --   --   --   --   --   --  Present*  --     < > = values in this interval not displayed.     Urine Protein  GHUTP and UTP= Urine protein (random), GHUTPG and UTPG = urine protein:creatinine ratio (random), UCRR = urine creatinine (random)  Recent Labs   Lab Test 12/30/23 1719 05/28/23  0911 02/28/23  1421 04/16/22  1236 01/23/22  1024 10/14/21  0720   GHUTP 16.3 7.9 20.0*  --   --   --    UTP  --   --   --  0.16 0.13 0.45   GHUTPG 0.29* 0.11 0.15  --   --   --    UTPG  --   --   --  0.14 0.13 0.23*   UCRR 56.3 73.0 130.0 114 104 198       Immunization History     Immunization History   Administered Date(s) Administered    COVID-19 12+ (2023-24) (Pfizer) 09/23/2023    COVID-19  Bivalent 18+ (Moderna) 03/10/2023    COVID-19 Monovalent 18+ (Moderna) 03/19/2021, 04/16/2021, 10/19/2021    Hepatitis B, Adult 03/10/2023    Influenza Vaccine 18-64 (Flublok) 11/07/2020, 10/19/2021, 03/10/2023    Influenza Vaccine >6 months,quad, PF 10/31/2013, 02/19/2020    Influenza,INJ,MDCK,PF,Quad >6mo(Flucelvax) 09/23/2023    Pneumococcal 20 valent Conjugate (Prevnar 20) 03/10/2023    Pneumococcal 23 valent 04/25/2008, 06/28/2012    TDAP (Adacel,Boostrix) 07/02/2012, 03/10/2023          Chart documentation done in part with Dragon Voice recognition Software. Although reviewed after completion, some word and grammatical error may remain.    Ronaldo Moreira MD

## 2024-02-17 ENCOUNTER — OFFICE VISIT (OUTPATIENT)
Dept: URGENT CARE | Facility: URGENT CARE | Age: 59
End: 2024-02-17
Payer: COMMERCIAL

## 2024-02-17 VITALS
DIASTOLIC BLOOD PRESSURE: 79 MMHG | BODY MASS INDEX: 29.54 KG/M2 | TEMPERATURE: 98 F | HEART RATE: 67 BPM | OXYGEN SATURATION: 97 % | WEIGHT: 183 LBS | RESPIRATION RATE: 18 BRPM | SYSTOLIC BLOOD PRESSURE: 112 MMHG

## 2024-02-17 DIAGNOSIS — J01.90 ACUTE SINUSITIS WITH SYMPTOMS > 10 DAYS: Primary | ICD-10-CM

## 2024-02-17 DIAGNOSIS — R09.81 NASAL CONGESTION: ICD-10-CM

## 2024-02-17 PROCEDURE — 99213 OFFICE O/P EST LOW 20 MIN: CPT | Performed by: NURSE PRACTITIONER

## 2024-02-17 PROCEDURE — 87635 SARS-COV-2 COVID-19 AMP PRB: CPT | Performed by: NURSE PRACTITIONER

## 2024-02-17 NOTE — PROGRESS NOTES
Assessment & Plan     Acute sinusitis with symptoms > 10 days    - amoxicillin-clavulanate (AUGMENTIN) 875-125 MG tablet  Dispense: 14 tablet; Refill: 0    Nasal congestion    - Symptomatic COVID-19 Virus (Coronavirus) by PCR Nose     Discussed sinus infections are usually caused by viruses and usually resolve within 7-10 days. Due to duration of symptoms, prescription sent to pharmacy for Augmentin twice daily for 7 days which can make diarrhea worse. Recommended rest, fluids especially warm clear liquids such as tea with honey and lemon, decreasing dairy which can increase congestion, humidifier, steam, nasal irrigation with saline, flonase nasal spray. COVID testing in process. Self-quarantine recommended.     Follow-up with PCP if symptoms persist for 7 days, and sooner if symptoms worsen or new symptoms develop.     Discussed red flag symptoms which warrant immediate visit in emergency room    All questions were answered and patient verbalized understanding. AVS reviewed with patient.     Cynthia Goode, DNP, APRN, CNP 2/17/2024 10:06 AM  [facility] [place]            Noreen Hager is a 59 year old female who presents to clinic today for the following health issues:  Chief Complaint   Patient presents with    Urgent Care    URI     Per patient symptoms have been ongoing for two weeks symptoms fever, diarrhea, head pressure, sinus pressure/pain, ear pressure, cough productive, post nasal drip, chills, sweats, and decreased appetite      Patient presents for evaluation of nasal congestion. Associated symptoms: sinus pain/pressure, headache, ear pain, cough, post-nasal drip, fever, chills, sweats, fatigue, decreased appetite. Symptoms have been present for 2 weeks and have been worsening. Diarrhea has been present for about a week. Took anti-diarrhea medication a few days which helped temporarily. Has not taken temperature. Has been taking Dayquil which helps temporarily, last had at 2am.  Has not tested for COVID recently. Does not feel symptoms in her lungs. Has still been working. Diarrhea has been watery as well as formed mixed.     Denies recent travel, hospitalization, antibiotic use. Denies bloody stool. Cough has been dry.     She had COVID 11/3/23 and was treated with Paxlovid.      Problem list, Medication list, Allergies, and Medical history reviewed in EPIC.    ROS:  Review of systems negative except for noted above        Objective    /79   Pulse 67   Temp 98  F (36.7  C) (Tympanic)   Resp 18   Wt 83 kg (183 lb)   SpO2 97%   BMI 29.54 kg/m    Physical Exam  Constitutional:       General: She is not in acute distress.     Appearance: She is not toxic-appearing or diaphoretic.   HENT:      Head: Normocephalic and atraumatic.      Right Ear: Tympanic membrane, ear canal and external ear normal.      Left Ear: Tympanic membrane, ear canal and external ear normal.      Nose:      Right Sinus: Maxillary sinus tenderness and frontal sinus tenderness present.      Left Sinus: Maxillary sinus tenderness and frontal sinus tenderness present.      Comments: Moderate nasal congestion     Mouth/Throat:      Mouth: Mucous membranes are moist.      Pharynx: Oropharynx is clear. No oropharyngeal exudate or posterior oropharyngeal erythema.   Cardiovascular:      Rate and Rhythm: Normal rate and regular rhythm.      Heart sounds: Normal heart sounds.   Pulmonary:      Effort: Pulmonary effort is normal. No respiratory distress.      Breath sounds: Normal breath sounds. No wheezing, rhonchi or rales.   Lymphadenopathy:      Cervical: No cervical adenopathy.   Skin:     General: Skin is warm and dry.   Neurological:      Mental Status: She is alert.

## 2024-02-17 NOTE — PATIENT INSTRUCTIONS
Rest, fluids especially warm clear liquids such as tea with honey and lemon, decreasing dairy which can increase congestion, humidifier, steam, nasal irrigation with saline, flonase nasal spray.

## 2024-02-17 NOTE — LETTER
February 17, 2024      Flaviaabigail Brice  [address] [id number]        To Whom It May Concern:    Flavia Brice  was seen on 2/17/24.  Please excuse her  until symptoms improving for 24-hours due to illness.        Sincerely,        МАРИЯ Marie

## 2024-02-18 ENCOUNTER — TELEPHONE (OUTPATIENT)
Dept: NURSING | Facility: CLINIC | Age: 59
End: 2024-02-18
Payer: COMMERCIAL

## 2024-02-18 LAB — SARS-COV-2 RNA RESP QL NAA+PROBE: POSITIVE

## 2024-02-18 NOTE — TELEPHONE ENCOUNTER
Patient classified as COVID treatment eligible by Epic high risk algorithm:  No    Coronavirus (COVID-19) Notification    Reason for call  Notify of POSITIVE COVID-19 lab result, assess symptoms,  review [facility] recommendations    Lab Result   Lab test for 2019-nCoV rRt-PCR or SARS-COV-2 PCR  Oropharyngeal AND/OR nasopharyngeal swabs were POSITIVE for 2019-nCoV RNA [OR] SARS-COV-2 RNA (COVID-19) RNA     We have been unable to reach patient by phone at this time to notify of their Positive COVID-19 result.    Left voicemail message requesting a call back to [phone] [facility] for results.        A Positive COVID-19 letter will be sent via [employer] or the mail.    Travis Ingram

## 2024-02-19 ENCOUNTER — VIRTUAL VISIT (OUTPATIENT)
Dept: FAMILY MEDICINE | Facility: CLINIC | Age: 59
End: 2024-02-19
Payer: COMMERCIAL

## 2024-02-19 DIAGNOSIS — Z87.891 FORMER SMOKER: ICD-10-CM

## 2024-02-19 DIAGNOSIS — U07.1 INFECTION DUE TO 2019 NOVEL CORONAVIRUS: Primary | ICD-10-CM

## 2024-02-19 DIAGNOSIS — J20.8 ACUTE BRONCHITIS DUE TO OTHER SPECIFIED ORGANISMS: ICD-10-CM

## 2024-02-19 PROCEDURE — 99442 PR PHYSICIAN TELEPHONE EVALUATION 11-20 MIN: CPT | Mod: 93 | Performed by: STUDENT IN AN ORGANIZED HEALTH CARE EDUCATION/TRAINING PROGRAM

## 2024-02-19 RX ORDER — PREDNISONE 20 MG/1
20 TABLET ORAL DAILY
Qty: 5 TABLET | Refills: 0 | Status: SHIPPED | OUTPATIENT
Start: 2024-02-19 | End: 2024-02-24

## 2024-02-19 NOTE — PROGRESS NOTES
"Alley is a 59 year old who is being evaluated via a billable telephone visit.      What phone number would you like to be contacted at? [phone]   How would you like to obtain your AVS? [place]    Distant Location (provider location):  On-site    Assessment & Plan   1. Infection due to 2019 novel coronavirus  She is out of the window for Paxlovid  2. Acute bronchitis due to other specified organisms  Recommend CXR, patient declined at this time ,stating the provider at the urgent care listened to her lungs and felt they were clear. I advised her to continue to Augmentin that was prescribed for sinusitis. She has noticed an Improvement since she started the antibiotic. She is advised to let us know if she is not improving.  - predniSONE (DELTASONE) 20 MG tablet; Take 1 tablet (20 mg) by mouth daily for 5 days With food  Dispense: 5 tablet; Refill: 0  She has inhaler to use PRN.  3. Former smoker        BMI  Estimated body mass index is 29.54 kg/m  as calculated from the following:    Height as of 1/2/24: 1.676 m (5' 6\").    Weight as of 2/17/24: 83 kg (183 lb).   Weight management plan: Discussed healthy diet and exercise guidelines          Subjective   Alley is a 59 year old, presenting for the following health issues:  Covid Concern      2/19/2024     9:22 AM   Additional Questions   Roomed by Ivon HERNÁNDEZ         2/19/2024     9:22 AM   Patient Reported Additional Medications   Patient reports taking the following new medications No new medications to add     HPI       COVID-19 Symptom Review  How many days ago did these symptoms start? 2/5/24    Are any of the following symptoms significant for you?  New or worsening difficulty breathing? No  Worsening cough? No  Fever or chills? Yes, I felt feverish or had chills.  Headache: YES  Sore throat: No  Chest pain: No  Diarrhea: YES  Body aches? YES    What treatments has patient tried? Amoxicillin , She was started on 2/17/24  Does patient live in a nursing home, " group home, or shelter? No  Does patient have a way to get food/medications during quarantined? Yes, I have a friend or family member who can help me.                Review of Systems  Constitutional, HEENT, cardiovascular, pulmonary, gi and gu systems are negative, except as otherwise noted.      Objective           Vitals:  No vitals were obtained today due to virtual visit.    Physical Exam   General: Alert and no distress //Respiratory: No audible wheeze, cough, or shortness of breath // Psychiatric:  Appropriate affect, tone, and pace of words            Phone call duration: 15 minutes  Signed Electronically by: Idalia Flanagan MD

## 2024-02-19 NOTE — PATIENT INSTRUCTIONS
Derick Hager,    Thank you for allowing [facility] to manage your care.      I sent your prescriptions to your pharmacy.     For your convenience, test results are released as soon as they are available  Please allow 1-2 business days for me to send you a comment about your results.  If not done so, I encourage you to login into [employer] ([email or web address] to review your results in real time.     If you have any questions or concerns, please feel free to call us at [phone].    Sincerely,    Dr. Flanagan    Did you know?      You can schedule a video visit for follow-up appointments as well as future appointments for certain conditions.  Please see the below link.     [email or web address]    If you have not already done so,  I encourage you to sign up for [employer] ([email or web address]).  This will allow you to review your results, securely communicate with a provider, and schedule virtual visits as well.

## 2024-03-12 ENCOUNTER — PATIENT OUTREACH (OUTPATIENT)
Dept: GASTROENTEROLOGY | Facility: CLINIC | Age: 59
End: 2024-03-12
Payer: COMMERCIAL

## 2024-03-12 DIAGNOSIS — Z12.11 SPECIAL SCREENING FOR MALIGNANT NEOPLASMS, COLON: Primary | ICD-10-CM

## 2024-03-12 NOTE — PROGRESS NOTES
"CRC Screening Colonoscopy Referral Review    Patient meets the inclusion criteria for screening colonoscopy standing order.    Ordering/Referring Provider:  Monet Da Silva    BMI: Estimated body mass index is 29.54 kg/m  as calculated from the following:    Height as of 1/2/24: 1.676 m (5' 6\").    Weight as of 2/17/24: 83 kg (183 lb).     Sedation:  Does patient have any of the following conditions affecting sedation?  No medical conditions affecting sedation.    Previous Scopes:  Any previous recommendations or follow up needs based on previous scope?  na / No recommendations.    Medical Concerns to Postpone Order:  Does patient have any of the following medical concerns that should postpone/delay colonoscopy referral?  No medical conditions affecting colonoscopy referral.    Final Referral Details:  Based on patient's medical history patient is appropriate for referral order with moderate sedation. If patient's BMI > 50 do not schedule in ASC.  "

## 2024-03-26 ENCOUNTER — LAB (OUTPATIENT)
Dept: LAB | Facility: CLINIC | Age: 59
End: 2024-03-26
Payer: COMMERCIAL

## 2024-03-26 DIAGNOSIS — Z79.899 HIGH RISK MEDICATIONS (NOT ANTICOAGULANTS) LONG-TERM USE: ICD-10-CM

## 2024-03-26 DIAGNOSIS — M06.4 INFLAMMATORY POLYARTHROPATHY (H): ICD-10-CM

## 2024-03-26 DIAGNOSIS — M31.31 GRANULOMATOSIS WITH POLYANGIITIS WITH RENAL INVOLVEMENT (H): ICD-10-CM

## 2024-03-26 LAB
ALBUMIN SERPL BCG-MCNC: 4.3 G/DL (ref 3.5–5.2)
ALBUMIN UR-MCNC: NEGATIVE MG/DL
ALP SERPL-CCNC: 76 U/L (ref 40–150)
ALT SERPL W P-5'-P-CCNC: 24 U/L (ref 0–50)
ANION GAP SERPL CALCULATED.3IONS-SCNC: 15 MMOL/L (ref 7–15)
APPEARANCE UR: CLEAR
AST SERPL W P-5'-P-CCNC: 20 U/L (ref 0–45)
BASOPHILS # BLD AUTO: 0 10E3/UL (ref 0–0.2)
BASOPHILS NFR BLD AUTO: 1 %
BILIRUB SERPL-MCNC: 0.3 MG/DL
BILIRUB UR QL STRIP: NEGATIVE
BUN SERPL-MCNC: 39.6 MG/DL (ref 8–23)
CALCIUM SERPL-MCNC: 9.5 MG/DL (ref 8.8–10.2)
CHLORIDE SERPL-SCNC: 107 MMOL/L (ref 98–107)
COLOR UR AUTO: YELLOW
CREAT SERPL-MCNC: 1.67 MG/DL (ref 0.51–0.95)
CRP SERPL-MCNC: 4.38 MG/L
DEPRECATED HCO3 PLAS-SCNC: 21 MMOL/L (ref 22–29)
EGFRCR SERPLBLD CKD-EPI 2021: 35 ML/MIN/1.73M2
EOSINOPHIL # BLD AUTO: 0.1 10E3/UL (ref 0–0.7)
EOSINOPHIL NFR BLD AUTO: 3 %
ERYTHROCYTE [DISTWIDTH] IN BLOOD BY AUTOMATED COUNT: 15.8 % (ref 10–15)
ERYTHROCYTE [SEDIMENTATION RATE] IN BLOOD BY WESTERGREN METHOD: 28 MM/HR (ref 0–30)
GLUCOSE SERPL-MCNC: 145 MG/DL (ref 70–99)
GLUCOSE UR STRIP-MCNC: NEGATIVE MG/DL
HBV SURFACE AG SERPL QL IA: NONREACTIVE
HCT VFR BLD AUTO: 34.4 % (ref 35–47)
HGB BLD-MCNC: 10.9 G/DL (ref 11.7–15.7)
HGB UR QL STRIP: ABNORMAL
IMM GRANULOCYTES # BLD: 0 10E3/UL
IMM GRANULOCYTES NFR BLD: 0 %
KETONES UR STRIP-MCNC: NEGATIVE MG/DL
LEUKOCYTE ESTERASE UR QL STRIP: NEGATIVE
LYMPHOCYTES # BLD AUTO: 1.5 10E3/UL (ref 0.8–5.3)
LYMPHOCYTES NFR BLD AUTO: 29 %
MCH RBC QN AUTO: 30.4 PG (ref 26.5–33)
MCHC RBC AUTO-ENTMCNC: 31.7 G/DL (ref 31.5–36.5)
MCV RBC AUTO: 96 FL (ref 78–100)
MONOCYTES # BLD AUTO: 0.6 10E3/UL (ref 0–1.3)
MONOCYTES NFR BLD AUTO: 12 %
NEUTROPHILS # BLD AUTO: 2.8 10E3/UL (ref 1.6–8.3)
NEUTROPHILS NFR BLD AUTO: 55 %
NITRATE UR QL: NEGATIVE
PH UR STRIP: 5.5 [PH] (ref 5–7)
PLATELET # BLD AUTO: 290 10E3/UL (ref 150–450)
POTASSIUM SERPL-SCNC: 4.5 MMOL/L (ref 3.4–5.3)
PROT SERPL-MCNC: 6.9 G/DL (ref 6.4–8.3)
RBC # BLD AUTO: 3.58 10E6/UL (ref 3.8–5.2)
RBC #/AREA URNS AUTO: ABNORMAL /HPF
SODIUM SERPL-SCNC: 143 MMOL/L (ref 135–145)
SP GR UR STRIP: 1.02 (ref 1–1.03)
SQUAMOUS #/AREA URNS AUTO: ABNORMAL /LPF
UROBILINOGEN UR STRIP-ACNC: 0.2 E.U./DL
WBC # BLD AUTO: 5.1 10E3/UL (ref 4–11)
WBC #/AREA URNS AUTO: ABNORMAL /HPF

## 2024-03-26 PROCEDURE — 81001 URINALYSIS AUTO W/SCOPE: CPT

## 2024-03-26 PROCEDURE — 80053 COMPREHEN METABOLIC PANEL: CPT

## 2024-03-26 PROCEDURE — 86481 TB AG RESPONSE T-CELL SUSP: CPT

## 2024-03-26 PROCEDURE — 36415 COLL VENOUS BLD VENIPUNCTURE: CPT

## 2024-03-26 PROCEDURE — 86140 C-REACTIVE PROTEIN: CPT

## 2024-03-26 PROCEDURE — 85025 COMPLETE CBC W/AUTO DIFF WBC: CPT

## 2024-03-26 PROCEDURE — 86704 HEP B CORE ANTIBODY TOTAL: CPT

## 2024-03-26 PROCEDURE — 87340 HEPATITIS B SURFACE AG IA: CPT

## 2024-03-26 PROCEDURE — 85652 RBC SED RATE AUTOMATED: CPT

## 2024-03-27 LAB
GAMMA INTERFERON BACKGROUND BLD IA-ACNC: 0.05 IU/ML
HBV CORE AB SERPL QL IA: NONREACTIVE
M TB IFN-G BLD-IMP: NEGATIVE
M TB IFN-G CD4+ BCKGRND COR BLD-ACNC: 9.95 IU/ML
MITOGEN IGNF BCKGRD COR BLD-ACNC: -0.01 IU/ML
MITOGEN IGNF BCKGRD COR BLD-ACNC: -0.01 IU/ML
QUANTIFERON MITOGEN: 10 IU/ML
QUANTIFERON NIL TUBE: 0.05 IU/ML
QUANTIFERON TB1 TUBE: 0.04 IU/ML
QUANTIFERON TB2 TUBE: 0.04

## 2024-04-02 ENCOUNTER — OFFICE VISIT (OUTPATIENT)
Dept: RHEUMATOLOGY | Facility: CLINIC | Age: 59
End: 2024-04-02
Payer: COMMERCIAL

## 2024-04-02 VITALS
DIASTOLIC BLOOD PRESSURE: 80 MMHG | WEIGHT: 188 LBS | OXYGEN SATURATION: 97 % | SYSTOLIC BLOOD PRESSURE: 118 MMHG | BODY MASS INDEX: 30.34 KG/M2 | HEART RATE: 84 BPM

## 2024-04-02 DIAGNOSIS — M31.31 GRANULOMATOSIS WITH POLYANGIITIS WITH RENAL INVOLVEMENT (H): Primary | ICD-10-CM

## 2024-04-02 DIAGNOSIS — M06.4 INFLAMMATORY POLYARTHROPATHY (H): ICD-10-CM

## 2024-04-02 DIAGNOSIS — Z79.899 HIGH RISK MEDICATIONS (NOT ANTICOAGULANTS) LONG-TERM USE: ICD-10-CM

## 2024-04-02 DIAGNOSIS — R73.9 HYPERGLYCEMIA: ICD-10-CM

## 2024-04-02 PROCEDURE — G2211 COMPLEX E/M VISIT ADD ON: HCPCS | Performed by: INTERNAL MEDICINE

## 2024-04-02 PROCEDURE — 99214 OFFICE O/P EST MOD 30 MIN: CPT | Performed by: INTERNAL MEDICINE

## 2024-04-02 RX ORDER — AZATHIOPRINE 50 MG/1
100 TABLET ORAL DAILY
Qty: 180 TABLET | Refills: 2 | Status: SHIPPED | OUTPATIENT
Start: 2024-04-02 | End: 2024-08-13

## 2024-04-02 NOTE — NURSING NOTE
RAPID3 (0-30) Cumulative Score  5.0          RAPID3 Weighted Score (divide #4 by 3 and that is the weighted score)  1.6

## 2024-04-02 NOTE — PROGRESS NOTES
Rheumatology Clinic Visit      Flavia Brice MRN# [id number]   YOB: 1965 Age: 59 year old      Date of visit: 4/02/24   PCP: Monet Da Silva    Chief Complaint   Patient presents with:  RECHECK    Assessment and Plan     1.  Granulomatosis with polyangiitis: Dx'd 2012 by Dr. Wesley at [facility] when she presented with pulmonary involvement, pericarditis/effusion, borderline aneurysmal dilation of the ascending aorta and biopsy proven necrotizing crescentic nephritis.  Went into remission with cytoxan and prednisone, then maintained on AZA for 2 yrs per patient, stopped when lost to follow-up. Recurrence in 2020 with YOLI, pleuritic chest pain, inflammatory arthritis (MCPs, PIPs, shoulders, knees) that has responded well to prednisone.  She was tx'd with prednisone and rituximab 1g IV received on 8/21/2020 & 9/4/2020, 4/28/2021 (delayed for the COVID-19 vaccine) & (no 2nd dose of rituximab), 11/11/2021 & 11/30/2021, 9/1/2022 & 9/15/2022; 3/15/2023, 10/19/2023. Currently on azathioprine 100 mg daily and rituximab.  Reduced rituximab to be 1 g IV every 6 months, first received in March 2023 and she continues to do well.  Continue with plan for rituximab every 6 months, with the exception of delaying rituximab to be now on 5/14/2024, separate more time between now and when she had pneumonia.  Labs and chest x-ray prior to rituximab.  Chronic illness, stable.    - Continue azathioprine 100 mg daily  - Continue rituximab (truxima) 1 g IV every 6 months, next due on 4/19/2024 or shortly thereafter; advised that she call to schedule  - X-ray in May: Chest  - Labs early May: CBC, CMP, ESR, CRP, COVID-19 PCR, UA, Uprotein:creatinine  - Labs in August: CBC, CMP, ESR, CRP, UA, Uprotein:creatinine, PR3    High risk medication requiring intensive toxicity monitoring at least quarterly.     2. Inflammatory arthritis: Related to GPA.  See #1    3. Elevated serum creatinine: Related to GPA but higher  than baseline possibly due to YOLI noted at recent hospitalization where she was treated for pneumonia and compared to the hospitalization creatinine it has improved.  Avoid nephrotoxic agents.    4. Positive MALLORY (antinuclear antibody): Additional work-up was negative for SSA, SSB, RNP, Smith, dsDNA; complement C3 and C4 were not low.  Symptoms are most likely related to GPA; no MALLORY-associated rheumatologic disorder identified    5.  Aneurysm of the ascending aorta at 4.5 cm; history of heart failure with preserved ejection fraction: following with Dr. Locke (cardiology)    6. Cytoxan use history: needs periodic UA to eval for hematuria, due to increased risk for bladder cancer after cytoxan exposure    7.  Osteoarthritis: Affecting the DIPs and bilateral first CMC joints.  Significant improvement with topical Voltaren gel as needed and exercises taught from hand therapy.  Continue home hand therapy exercises.    8.  Osteopenia: Osteopenia based on 3/14/2023 DEXA.  Continue calcium and vitamin D.  Plan to recheck DEXA in [id number].    9.  Planter fasciitis, bilateral: Diagnosed 5/30/2023, and had significant improvement with stretching exercises and shoewear whenever ambulatory.  Minimal symptoms at this time.    10. Vaccinations: Vaccinations reviewed with Ms. Yuniel.    - Influenza: Encouraged yearly vaccination, to be received at least 2 weeks before the next rituximab infusion.  - COVID-19: Advised updating now, to be received at least 2 weeks prior to the next rituximab dose  - Shingrix: 2nd dose advised, to receive at least 2 weeks prior to the next rituximab dose  - Prevnar 20: up to date    Total minutes spent in evaluation with patient, documentation, , and review of pertinent studies and chart notes: 22  The longitudinal plan of care for the rheumatology problem(s) were addressed during this visit.  Due to added complexity of care, we will continue to support the patient and the subsequent  management of this condition with ongoing continuity of care.      Ms. Brice verbalized agreement with and understanding of the rational for the diagnosis and treatment plan.  All questions were answered to best of my ability and the patient's satisfaction. Ms. Brice was advised to contact the clinic with any questions that may arise after the clinic visit.      Thank you for involving me in the care of the patient    Return to clinic: August HPI   Flavia Brice is a 59 year old female with a past medical history significant for hypertension, depression, and granulomatosis with polyangiitis who is seen for follow-up of granulomatosis with polyangiitis    10/31/2013 [facility] rheumatology clinic note by Dr. Ann Wesley documents severe MT-3 ANCA positive GPA with pulmonary involvement, pericarditis/effusion, borderline aneurysmal dilation of the ascending aorta, necrotizing crescentic nephritis.  Kidney biopsy has confirmed diagnosis in the past.  Has received Solu-Medrol x3, Cytoxan x1 at 1300 mg, on prednisone 60-80 mg daily.  Deteriorated quickly despite those interventions and was hospitalized again, requiring plasmapheresis and dialysis.  Was seeing Dr. Velazquez from [facility] and was again placed on Cytoxan 15 mg/kg every 3 weeks.  Has seen ENT but there was no clear sinus involvement.  She was then transitioned after 7 infusions of Cytoxan to Imuran on 11/2012 and has been doing well on that.      7/11/2020 [facility] hospitalization note: Acute kidney injury, migratory polyarthritis, chronic diastolic heart failure.  Left knee and left hip pain.  Right shoulder pain that migrated to the left shoulder.  No joint swelling.  No fevers.  In the past she had flare of her joint pain that she was treated with prednisone.  Prior to this ED evaluation she reportedly used prednisone 20 mg without improvement.    7/22/2020: [facility] emergency department visit for shortness of breath.   Notes history of granulomatosis with polyangiitis, diastolic heart failure, a sending thoracic aortic aneurysm from 4.6 cm on chest CT 2/18/2020), migratory polyarthritis, acute kidney injury, and hypertension.  Coronavirus test negative.  Chest CT negative for pneumonia.  O2 saturation normal.  Advised to follow-up with her PCP and cardiology.    8/5/2020:  Ms. Brice reported that she was dx'd with GPA by Dr. Wesley. She was treated and in remission; was on AZA for a year and no issues for a while. Lost to follow-up with Dr. Wesley.  Then 1 year ago started having more shortness of breath and found to have more lung nodules.  Recalls heart failure and kidney failure when first dx'd.  Told to see cardiology to follow the aneurysm.  Then in Jan 2020 started to have pain in her shoulders, knees ankles. Pain was so bad that she went to [facility]; found to have YOLI.  Until able to have this rheumatology evaluation, she says that she was given prednisone to help the joint pain; currently on 5mg daily of prednisone; felt much better when on prednisone 30mg daily. Has been dealing with right knee swelling; the prednisone helped with this.  Joint pains are worse in the AM; improve with time and activity. Morning stiffness is very mild while on prednisone 5mg daily.  No hematuria. No hemoptysis.  Flavia Brice's mother was present during the clinic visit.     9/9/2020: Significant improvement with prednisone and rituximab.  No longer with swelling around her ankles.  Hand swelling/pain/stiffness has resolved.  She feels a little puffy in her face but no actual swelling.  She has followed with her cardiologist and they are going to monitor the thoracic aortic aneurysm.  Tolerated rituximab infusion well.  Currently on prednisone 10 mg daily.  Not going into work at this time; she is taking a leave of absence.    10/21/2020: Improved.  Now with mild pain at her MCPs, DIPs, and knees.  MCP and knee pain is better with  activity, worse in the morning.  DIP pain is worse with activity.  Joint pains started again with dose reduction of prednisone.  Has been on azathioprine and tolerated well in the past.    12/9/2020: Significant improvement with regard to her joint pain.  No joint pain now.  No morning stiffness.  No gelling phenomenon.  Tolerating azathioprine well.  Continues on prednisone 5mg daily.  Mild shortness of breath when walking up stairs that has been stable; no associated chest pain or pressure, palpitations, lightheadedness, dizziness, or nausea and the mild shortness of breath resolves quickly when she rests.  No shortness of breath at rest or currently.  Following with cardiology and plans to repeat her echocardiogram in March 2021.  Last echocardiogram was in February 2020.    4/8/2021: More joint aches and pains and she attributes this to being off of prednisone and having to delay her rituximab infusion secondary to the COVID-19 vaccine.  She has rescheduled the first of the 2 rituximab infusions to be 2 weeks after the second COVID-19 vaccine.  Knees ache more at the end of the day.  No cough, chest pain, shortness of breath.  Has seen cardiology and she says that her aortic aneurysm is stable.    7/7/2021:  doing well this time.  Mild aches of the PIPs and DIPs with more activity that improves with rest.  Usually has pain that travels around but in general is doing okay.  Only received 1 of 2 rituximab doses because her insurance had denied it; she then got a letter about a month later stating that rituximab had been approved.  Tolerating azathioprine and rituximab well.  Overall happy with how well she is doing.  No blood in urine or stool    10/19/2021: Reports that she is doing well.  She says that she is doing better than she was previously, and way better than she was before treatment.  No joint pain or stiffness.  No rash.  Tolerating azathioprine well.  No difficulty doing her daily activities.      1/25/2022: feels the best she has in a long time.  No joint pain or swelling. Morning stiffness for <20.  No rash.  No black or bloody stools.  No cough, chest pain, or shortness of breath.  No hemoptysis.  No hematemesis.  Using a surgical mask at work.    5/4/2022: had covid infxn, resolved >2wks ago. Fasting for labs recently. Joints more achy; due to rtx.  Tolerating aza well. No joint swelling. Morning stiffness <30 min. +gelling. No hemoptysis.  No blood in urine.     8/2/2022: Did not get the rituximab infusion but does have a scheduled for 9/1/2022 and 9/15/2022.  More joint pain at the MCPs, PIPs, knees, and MTPs that is worse in the morning and improves with time and activity.  Morning stiffness for about 1 hour.  No rash.  No black or bloody stools.  No hematuria.  No cough, chest pain, or shortness of breath.    11/8/2022:  Joints are doing well with only occasional MCP and PIP ache for the first 20 minutes of the morning.  No gelling phenomenon.  Tolerating azathioprine well.  No rash.  No hematuria.  No black or bloody stools.  No hemoptysis or hematemesis.    2/28/2023: Doing well at this time except for pain at the end of the day and with increased activity of the bilateral first CMC joints that improves with rest; no swelling of these joints.  No other joint pain or swelling.  Morning stiffness for no more than 20 minutes.  No hematuria.  No hemoptysis.  Couple times and had blood in her stool and she recalls that she had a colonoscopy in 2019 and was supposed to follow-up for repeat colonoscopy 6 months later but never went.    5/30/2023: Currently doing well.  Bilateral plantar foot pain with the first few steps of the day and after any period of inactivity where she was not standing on her feet.  After walking more than a couple steps then the bottom of her feet start to feel much better.  Hand pain is nearly resolved with the exercises taught in hand therapy.  She uses topical Voltaren gel for  her hands as needed, infrequently.  Other joints are doing well.  No cough, chest pain, or shortness of breath.  No hematuria.  Arthritis is not limiting her daily activities.    9/12/2023: Near resolution of bilateral planter fasciitis symptoms with stretching exercises and wearing appropriate shoes whenever ambulatory.  Currently without joint pain or swelling.  No morning stiffness or gelling phenomenon.  No cough, chest pain, shortness of breath.  No hematuria.  No fatigue.  Arthritis does not limit her daily activities.    1/2/2024: Currently doing well.  No joint pain or swelling.  No morning stiffness or gelling phenomenon.  No plantar fasciitis symptoms.  No chest pain or pressure or shortness of breath.  No cough.  No hematuria.  No fatigue.  Arthritis does not limit daily activities.    Today, 4/2/2024: Recent pneumonia; symptoms have resolved.  Notes that during the hospital evaluation for pneumonia she was found to have YOLI.  No joint pain or swelling.  No morning stiffness or gelling phenomenon.  No chest pain or pressure or shortness of breath.  No hematuria.  No hemoptysis.  No fatigue.    Denies fevers, chills, nausea, vomiting. Occasional constipation or diarrhea, unchanged from previous. No abdominal pain. No chest pain/pressure, palpitations, or shortness of breath at this time.  No LE swelling.  No oral or nasal sores.  No rash. No sicca symptoms.  No eye pain or redness.    Tobacco: quit smoking in Feb 2020  EtOH: no more than 1 drink per month  Drugs: none  Occupation: [employer]    ROS   12 point review of system was completed and negative except as noted in the HPI     Active Problem List     Patient Active Problem List   Diagnosis    Wegener's granulomatosis    CARDIOVASCULAR SCREENING; LDL GOAL LESS THAN 130    Overweight    Advanced directives, counseling/discussion    Hypertension goal BP (blood pressure) < 140/90    Situational depression    Granulomatosis with polyangiitis (H)    Chronic  kidney disease, stage 3    Former smoker    Menopausal syndrome (hot flashes)    Chronic diastolic (congestive) heart failure (H)    Thoracic aortic aneurysm without rupture, unspecified part (H24)    Angina at rest (H24)    Rectal bleeding    History of colonic polyps    Hx of aortic aneurysm    Acute on chronic respiratory failure, unspecified whether with hypoxia or hypercapnia (H)    Screening for cervical cancer     Past Medical History     Past Medical History:   Diagnosis Date    Tinnitus      Past Surgical History     Past Surgical History:   Procedure Laterality Date    COLONOSCOPY WITH CO2 INSUFFLATION N/A 2/18/2019    Procedure: COLONOSCOPY WITH CO2 INSUFFLATION;  Surgeon: Javier Guillen MD;  Location: [facility] OR    GALLBLADDER SURGERY       Allergy   No Known Allergies  Current Medication List     Current Outpatient Medications   Medication Sig    albuterol (PROAIR HFA/PROVENTIL HFA/VENTOLIN HFA) 108 (90 Base) MCG/ACT inhaler Inhale 2 puffs into the lungs every 4 hours as needed for shortness of breath, wheezing or cough    Ascorbic Acid (VITAMIN C) 100 MG CHEW     azaTHIOprine (IMURAN) 50 MG tablet Take 2 tablets (100 mg) by mouth daily    BETA BLOCKER NOT PRESCRIBED (INTENTIONAL) Please choose reason not prescribed from choices below.    losartan-hydrochlorothiazide (HYZAAR) 100-25 MG tablet Take 1 tablet by mouth daily    Multiple Vitamins-Minerals (ZINC PO)     VITAMIN D PO     zinc gluconate 50 MG tablet Take 50 mg by mouth daily     No current facility-administered medications for this visit.     Social History   See HPI    Family History     Family History   Problem Relation Age of Onset    Diabetes Father     Heart Disease Maternal Grandmother     Heart Disease Maternal Grandfather     Arthritis Paternal Grandmother     Heart Disease Paternal Grandfather      Grandmother: rheumatoid arthritis    Physical Exam     Temp Readings from Last 3 Encounters:   02/17/24 98  F (36.7  C) (Tympanic)   11/03/23  "99  F (37.2  C) (Tympanic)   10/19/23 98.4  F (36.9  C) (Oral)     BP Readings from Last 5 Encounters:   04/02/24 118/80   02/17/24 112/79   01/02/24 139/83   11/03/23 128/80   10/19/23 129/72     Pulse Readings from Last 1 Encounters:   04/02/24 84     Resp Readings from Last 1 Encounters:   02/17/24 18     Estimated body mass index is 30.34 kg/m  as calculated from the following:    Height as of 1/2/24: 1.676 m (5' 6\").    Weight as of this encounter: 85.3 kg (188 lb).    GEN: NAD.   HEENT:  Anicteric, noninjected sclera. No obvious external lesions of the ear and nose. Hearing intact.  CV: S1, S2. RRR. No m/r/g  PULM: No increased work of breathing. CTA bilaterally   MSK: MCPs, PIPs, DIPs without swelling or tenderness to palpation.  Squaring and tenderness to palpation without effusion, increased warmth, or overlying erythema at the bilateral first CMC joints.  Wrists without swelling or tenderness to palpation.  Elbows and shoulders without swelling or tenderness to palpation.   Knees, ankles, and MTPs without swelling or tenderness to palpation.  Pes planus bilaterally.  Nontender to palpation on the plantar aspect of each foot.  SKIN: No rash or jaundice seen  PSYCH: Alert. Appropriate.       Labs / Imaging (select studies)     RF/CCP  Recent Labs   Lab Test 08/05/20  1453   CCPIGG <1   RHF <7     MALLORY  Recent Labs   Lab Test 08/05/20  1453   CAMMIE Positive*   ANAP1 HOMOGENEOUS   ANAT1 1:160     RNP/Sm/SSA/SSB  Recent Labs   Lab Test 08/11/20  1402   RNPIGG <0.2   SMIGG <0.2   SSAIGG <0.2   SSBIGG <0.2     dsDNA  Recent Labs   Lab Test 08/11/20  1402   DNA <1     C3/C4  Recent Labs   Lab Test 08/11/20  1402   S0YEWII 133   H7NZLNC 41*     Antiphospholipid Antibodies  Recent Labs   Lab Test 08/11/20  1402   B2GPG 0.8   B2GPM <2.9   CARDG <1.6   CARDM 0.8   LUPINT Negative     ANCA  Recent Labs   Lab Test 08/05/20  1453   PR3IGG 4.8*   MPOIGG <0.2     IgG  Recent Labs   Lab Test 10/19/23  1429 03/15/23  0842 " 09/01/22  0815    859 961    120 139   IGM 24* 15* 14*     CBC  Recent Labs   Lab Test 03/26/24  0820 12/30/23  1719 09/09/23  0916 10/14/21  0720 07/07/21  0818 04/24/21  1130 12/03/20  1128   WBC 5.1 8.1 6.1   < > 5.0 5.1 8.3   RBC 3.58* 4.11 4.32   < > 3.98 3.82 4.27   HGB 10.9* 12.5 13.0   < > 12.2 11.7 13.1   HCT 34.4* 38.7 40.2   < > 38.8 37.6 41.3   MCV 96 94 93   < > 98 98 97   RDW 15.8* 13.9 13.4   < > 14.9 13.7 14.2    282 315   < > 304 297 347   MCH 30.4 30.4 30.1   < > 30.7 30.6 30.7   MCHC 31.7 32.3 32.3   < > 31.4* 31.1* 31.7   NEUTROPHIL 55 65 61   < > 60.1 63.5 73.6   LYMPH 29 20 23   < > 22.9 20.4 17.7   MONOCYTE 12 12 12   < > 11.2 11.1 6.9   EOSINOPHIL 3 2 3   < > 5.0 4.4 1.3   BASOPHIL 1 1 1   < > 0.8 0.6 0.5   ANEU  --   --   --   --  3.0 3.2 6.1   ALYM  --   --   --   --  1.1 1.0 1.5   MARCOS  --   --   --   --  0.6 0.6 0.6   AEOS  --   --   --   --  0.3 0.2 0.1   ABAS  --   --   --   --  0.0 0.0 0.0   ANEUTAUTO 2.8 5.2 3.7   < >  --   --   --    ALYMPAUTO 1.5 1.6 1.4   < >  --   --   --    AMONOAUTO 0.6 1.0 0.7   < >  --   --   --    AEOSAUTO 0.1 0.2 0.2   < >  --   --   --    ABSBASO 0.0 0.0 0.0   < >  --   --   --     < > = values in this interval not displayed.     CMP  Recent Labs   Lab Test 03/26/24  0820 12/30/23  1719 09/09/23  0916 05/28/23  0911 02/28/23  1421 08/07/22  1604 04/16/22  1236 01/23/22  1024 10/14/21  0720 10/14/21  0720 07/07/21  0818 04/24/21  1130 12/03/20  1128 11/19/20  1123     --  141  --   --  141 144 141   < > 141 142 141  --   --    POTASSIUM 4.5  --  4.8  --   --  4.0 4.0 4.2   < > 4.5 4.7 4.2  --   --    CHLORIDE 107  --  104  --   --  109 112* 106   < > 108 112* 108  --   --    CO2 21*  --  25  --   --  25 24 26   < > 27 26 28  --   --    ANIONGAP 15  --  12  --   --  7 8 9   < > 6 4 5  --   --    *  --  118*  --   --  132* 125* 172*  --  116* 103* 114*  --   --    BUN 39.6*  --  34.7*  --   --  33* 39* 33*   < > 29 34* 26  --    --    CR 1.67* 1.45* 1.51* 1.29*   < > 1.34* 1.58* 1.32*   < > 1.43* 1.44* 1.50* 1.41* 1.45*   GFRESTIMATED 35* 41* 39* 48*   < > 46* 38* 47*   < > 41* 40* 38* 41* 40*   GFRESTBLACK  --   --   --   --   --   --   --   --   --   --  46* 44* 48* 46*   JESSICA 9.5  --  9.5  --   --  8.9 9.3 9.0   < > 8.9 9.6 9.5  --   --    BILITOTAL 0.3 0.3 0.4 0.2   < > 0.4 0.5 0.3   < > 0.4 0.2 0.4 0.4 0.3   ALBUMIN 4.3 4.4 4.3 3.8   < > 3.6 3.7 3.8   < > 3.5 3.6 3.5 3.9 3.7   PROTTOTAL 6.9 7.1 7.0 7.3   < > 7.0 7.0 7.2   < > 7.1 7.2 6.7* 7.5 7.4   ALKPHOS 76 86 90 82   < > 75 85 80   < > 84 75 67 80 78   AST 20 28 20 14   < > 14 18 14   < > 15 21 22 16 13   ALT 24 20 25 26   < > 30 51* 37   < > 41 49 62* 37 33    < > = values in this interval not displayed.     HgA1c  Recent Labs   Lab Test 03/10/23  1515 04/16/22  1236 10/27/17  1541   A1C 6.0* 6.1* 5.8     Calcium/VitaminD  Recent Labs   Lab Test 03/26/24  0820 09/09/23  0916 08/07/22  1604   JESSICA 9.5 9.5 8.9     ESR/CRP  Recent Labs   Lab Test 03/26/24  0820 12/30/23  1719 09/09/23  0916 05/28/23  0911 02/28/23  1421 08/07/22  1604   SED 28 10 10 9 9 10   CRP  --   --   --  <2.9 3.3 4.2   CRPI 4.38 <3.00 5.09*  --   --   --      CK/Aldolase  Recent Labs   Lab Test 08/11/20  1402   CKT 79     TSH/T4  Recent Labs   Lab Test 03/10/23  1515 10/21/21  0920 03/26/18  1058   TSH 1.66 1.10 1.44     Lipid Panel  Recent Labs   Lab Test 03/10/23  1515 01/23/22  1024 03/26/18  1058   CHOL 201* 192 188   TRIG 110 118 93   HDL 83 74 87   LDL 96 94 82   NHDL 118 118 101     Hepatitis B  Recent Labs   Lab Test 03/26/24  0820 04/16/22  1236 08/05/20  1453   HBCAB Nonreactive Nonreactive Nonreactive   HEPBANG Nonreactive Nonreactive Nonreactive     Hepatitis C  Recent Labs   Lab Test 08/05/20  1453 03/26/18  1058   HCVAB Nonreactive Nonreactive     Lyme ab screening  Recent Labs   Lab Test 08/05/20  1453   LYMEGM 0.10     Tuberculosis Screening  Recent Labs   Lab Test 03/26/24  0820 02/28/23  1421  04/16/22  1236 08/05/20  1453   TBRES Negative Negative Negative  --    TBRST  --   --   --  Negative     HIV Screening  Recent Labs   Lab Test 08/05/20  1453   HIAGAB Nonreactive     UA  Recent Labs   Lab Test 03/26/24  0820 12/30/23  1719 09/09/23  0916 03/25/23  1216 02/28/23  1421 04/16/22  1236 10/14/21  0720 04/24/21  1137 09/16/20  1442 08/05/20  1453 07/27/20  0938   COLOR Yellow Yellow Yellow   < > Yellow   < > Yellow   < > Yellow Yellow Yellow   APPEARANCE Clear Clear Clear   < > Slightly Cloudy*   < > Clear   < > Clear Clear Clear   URINEGLC Negative Negative Negative   < > Negative   < > Negative   < > Negative Negative Negative   URINEBILI Negative Negative Negative   < > Negative   < > Negative   < > Negative Negative Negative   SG 1.020 1.010 1.020   < > 1.020   < > >=1.030   < > 1.025 1.025 1.015   URINEPH 5.5 5.0 5.5   < > 5.0   < > 5.5   < > 5.0 5.0 5.0   PROTEIN Negative Negative Trace*   < > Trace*   < > Trace*   < > 30* Negative Trace*   UROBILINOGEN 0.2 0.2 0.2   < > 0.2   < > 0.2   < > 0.2 0.2 0.2   NITRITE Negative Negative Negative   < > Negative   < > Negative   < > Negative Negative Negative   UBLD Trace* Negative Negative   < > Negative   < > Trace*   < > Negative Trace* Small*   LEUKEST Negative Trace* Negative   < > Moderate*   < > Trace*   < > Negative Negative Negative   WBCU None Seen 0-5 0-5  --  10-25*  --  0-5   < > 0 - 5 0 - 5 0 - 5   RBCU None Seen None Seen 0-2  --  2-5*  --  None Seen   < > O - 2 O - 2 2-5*   SQUAMOUSEPI  --   --   --   --   --   --   --   --  Few Few Few   BACTERIA  --  None Seen  --   --  Many*  --  Few*  --  Few* Few* Few*   MUCOUS  --   --   --   --   --   --   --   --   --  Present*  --     < > = values in this interval not displayed.     Urine Microscopic  Recent Labs   Lab Test 03/26/24  0820 12/30/23  1719 09/09/23  0916 02/28/23  1421 10/14/21  0720 07/07/21  0818 09/16/20  1442 08/05/20  1453 07/27/20  0938   WBCU None Seen 0-5 0-5 10-25* 0-5   < > 0  - 5 0 - 5 0 - 5   RBCU None Seen None Seen 0-2 2-5* None Seen   < > O - 2 O - 2 2-5*   SQUAMOUSEPI  --   --   --   --   --   --  Few Few Few   BACTERIA  --  None Seen  --  Many* Few*  --  Few* Few* Few*   MUCOUS  --   --   --   --   --   --   --  Present*  --     < > = values in this interval not displayed.     Urine Protein  GHUTP and UTP= Urine protein (random), GHUTPG and UTPG = urine protein:creatinine ratio (random), UCRR = urine creatinine (random)  Recent Labs   Lab Test 12/30/23  1719 05/28/23  0911 02/28/23  1421 04/16/22  1236 01/23/22  1024 10/14/21  0720   GHUTP 16.3 7.9 20.0*  --   --   --    UTP  --   --   --  0.16 0.13 0.45   GHUTPG 0.29* 0.11 0.15  --   --   --    UTPG  --   --   --  0.14 0.13 0.23*   UCRR 56.3 73.0 130.0 114 104 198     Immunization History     Immunization History   Administered Date(s) Administered    COVID-19 12+ (2023-24) (Pfizer) 09/23/2023    COVID-19 Bivalent 18+ (Moderna) 03/10/2023    COVID-19 Monovalent 18+ (Moderna) 03/19/2021, 04/16/2021, 10/19/2021    Hepatitis B, Adult 03/10/2023    Influenza Vaccine 18-64 (Flublok) 11/07/2020, 10/19/2021, 03/10/2023    Influenza Vaccine >6 months,quad, PF 10/31/2013, 02/19/2020    Influenza,INJ,MDCK,PF,Quad >6mo(Flucelvax) 09/23/2023    Pneumococcal 20 valent Conjugate (Prevnar 20) 03/10/2023    Pneumococcal 23 valent 04/25/2008, 06/28/2012    TDAP (Adacel,Boostrix) 07/02/2012, 03/10/2023    Zoster recombinant adjuvanted (SHINGRIX) 01/02/2024          Chart documentation done in part with Dragon Voice recognition Software. Although reviewed after completion, some word and grammatical error may remain.    Ronaldo Moreira MD

## 2024-04-27 ENCOUNTER — HEALTH MAINTENANCE LETTER (OUTPATIENT)
Age: 59
End: 2024-04-27

## 2024-05-14 DIAGNOSIS — I10 HYPERTENSION GOAL BP (BLOOD PRESSURE) < 140/90: ICD-10-CM

## 2024-05-14 RX ORDER — LOSARTAN POTASSIUM AND HYDROCHLOROTHIAZIDE 25; 100 MG/1; MG/1
1 TABLET ORAL DAILY
Qty: 90 TABLET | Refills: 0 | Status: SHIPPED | OUTPATIENT
Start: 2024-05-14 | End: 2024-08-16

## 2024-05-16 ENCOUNTER — ANCILLARY PROCEDURE (OUTPATIENT)
Dept: GENERAL RADIOLOGY | Facility: CLINIC | Age: 59
End: 2024-05-16
Attending: INTERNAL MEDICINE
Payer: COMMERCIAL

## 2024-05-16 DIAGNOSIS — Z79.899 HIGH RISK MEDICATIONS (NOT ANTICOAGULANTS) LONG-TERM USE: ICD-10-CM

## 2024-05-16 DIAGNOSIS — M31.31 GRANULOMATOSIS WITH POLYANGIITIS WITH RENAL INVOLVEMENT (H): ICD-10-CM

## 2024-05-16 DIAGNOSIS — M06.4 INFLAMMATORY POLYARTHROPATHY (H): ICD-10-CM

## 2024-05-16 PROCEDURE — 71046 X-RAY EXAM CHEST 2 VIEWS: CPT | Mod: GC | Performed by: RADIOLOGY

## 2024-05-17 DIAGNOSIS — M06.4 INFLAMMATORY POLYARTHROPATHY (H): ICD-10-CM

## 2024-05-17 DIAGNOSIS — M31.31 GRANULOMATOSIS WITH POLYANGIITIS WITH RENAL INVOLVEMENT (H): Primary | ICD-10-CM

## 2024-05-19 ENCOUNTER — LAB (OUTPATIENT)
Dept: LAB | Facility: CLINIC | Age: 59
End: 2024-05-19
Payer: COMMERCIAL

## 2024-05-19 DIAGNOSIS — M06.4 INFLAMMATORY POLYARTHROPATHY (H): ICD-10-CM

## 2024-05-19 DIAGNOSIS — Z79.899 HIGH RISK MEDICATIONS (NOT ANTICOAGULANTS) LONG-TERM USE: ICD-10-CM

## 2024-05-19 DIAGNOSIS — M31.31 GRANULOMATOSIS WITH POLYANGIITIS WITH RENAL INVOLVEMENT (H): ICD-10-CM

## 2024-05-19 DIAGNOSIS — R73.9 HYPERGLYCEMIA: ICD-10-CM

## 2024-05-19 LAB
ALBUMIN MFR UR ELPH: 11.1 MG/DL
ALBUMIN SERPL BCG-MCNC: 4.3 G/DL (ref 3.5–5.2)
ALBUMIN UR-MCNC: NEGATIVE MG/DL
ALP SERPL-CCNC: 84 U/L (ref 40–150)
ALT SERPL W P-5'-P-CCNC: 23 U/L (ref 0–50)
ANION GAP SERPL CALCULATED.3IONS-SCNC: 12 MMOL/L (ref 7–15)
APPEARANCE UR: CLEAR
AST SERPL W P-5'-P-CCNC: 21 U/L (ref 0–45)
BASOPHILS # BLD AUTO: 0.1 10E3/UL (ref 0–0.2)
BASOPHILS NFR BLD AUTO: 1 %
BILIRUB SERPL-MCNC: 0.2 MG/DL
BILIRUB UR QL STRIP: NEGATIVE
BUN SERPL-MCNC: 43.2 MG/DL (ref 8–23)
CALCIUM SERPL-MCNC: 9.8 MG/DL (ref 8.8–10.2)
CHLORIDE SERPL-SCNC: 106 MMOL/L (ref 98–107)
COLOR UR AUTO: YELLOW
CREAT SERPL-MCNC: 1.59 MG/DL (ref 0.51–0.95)
CREAT UR-MCNC: 81.9 MG/DL
DEPRECATED HCO3 PLAS-SCNC: 24 MMOL/L (ref 22–29)
EGFRCR SERPLBLD CKD-EPI 2021: 37 ML/MIN/1.73M2
EOSINOPHIL # BLD AUTO: 0.2 10E3/UL (ref 0–0.7)
EOSINOPHIL NFR BLD AUTO: 5 %
ERYTHROCYTE [DISTWIDTH] IN BLOOD BY AUTOMATED COUNT: 14.2 % (ref 10–15)
GLUCOSE SERPL-MCNC: 95 MG/DL (ref 70–99)
GLUCOSE UR STRIP-MCNC: NEGATIVE MG/DL
HBA1C MFR BLD: 5.7 % (ref 0–5.6)
HCT VFR BLD AUTO: 39.1 % (ref 35–47)
HGB BLD-MCNC: 12.5 G/DL (ref 11.7–15.7)
HGB UR QL STRIP: NEGATIVE
IMM GRANULOCYTES # BLD: 0 10E3/UL
IMM GRANULOCYTES NFR BLD: 0 %
KETONES UR STRIP-MCNC: NEGATIVE MG/DL
LEUKOCYTE ESTERASE UR QL STRIP: NEGATIVE
LYMPHOCYTES # BLD AUTO: 1.4 10E3/UL (ref 0.8–5.3)
LYMPHOCYTES NFR BLD AUTO: 35 %
MCH RBC QN AUTO: 30.9 PG (ref 26.5–33)
MCHC RBC AUTO-ENTMCNC: 32 G/DL (ref 31.5–36.5)
MCV RBC AUTO: 97 FL (ref 78–100)
MONOCYTES # BLD AUTO: 0.6 10E3/UL (ref 0–1.3)
MONOCYTES NFR BLD AUTO: 15 %
NEUTROPHILS # BLD AUTO: 1.8 10E3/UL (ref 1.6–8.3)
NEUTROPHILS NFR BLD AUTO: 44 %
NITRATE UR QL: NEGATIVE
PH UR STRIP: 5.5 [PH] (ref 5–7)
PLATELET # BLD AUTO: 283 10E3/UL (ref 150–450)
POTASSIUM SERPL-SCNC: 4.9 MMOL/L (ref 3.4–5.3)
PROT SERPL-MCNC: 6.9 G/DL (ref 6.4–8.3)
PROT/CREAT 24H UR: 0.14 MG/MG CR (ref 0–0.2)
RBC # BLD AUTO: 4.05 10E6/UL (ref 3.8–5.2)
SODIUM SERPL-SCNC: 142 MMOL/L (ref 135–145)
SP GR UR STRIP: 1.02 (ref 1–1.03)
UROBILINOGEN UR STRIP-ACNC: 0.2 E.U./DL
WBC # BLD AUTO: 4 10E3/UL (ref 4–11)

## 2024-05-19 PROCEDURE — 86355 B CELLS TOTAL COUNT: CPT

## 2024-05-19 PROCEDURE — 36415 COLL VENOUS BLD VENIPUNCTURE: CPT

## 2024-05-19 PROCEDURE — 80053 COMPREHEN METABOLIC PANEL: CPT

## 2024-05-19 PROCEDURE — 82784 ASSAY IGA/IGD/IGG/IGM EACH: CPT

## 2024-05-19 PROCEDURE — 85025 COMPLETE CBC W/AUTO DIFF WBC: CPT

## 2024-05-19 PROCEDURE — 81003 URINALYSIS AUTO W/O SCOPE: CPT

## 2024-05-19 PROCEDURE — 84156 ASSAY OF PROTEIN URINE: CPT

## 2024-05-19 PROCEDURE — 87635 SARS-COV-2 COVID-19 AMP PRB: CPT

## 2024-05-19 PROCEDURE — 83036 HEMOGLOBIN GLYCOSYLATED A1C: CPT

## 2024-05-20 LAB
CD19 B CELL COMMENT: ABNORMAL
CD19 CELLS # BLD: 3 CELLS/UL (ref 107–698)
CD19 CELLS NFR BLD: <1 % (ref 6–27)
IGA SERPL-MCNC: 109 MG/DL (ref 84–499)
IGG SERPL-MCNC: 796 MG/DL (ref 610–1616)
IGM SERPL-MCNC: 14 MG/DL (ref 35–242)
SARS-COV-2 RNA RESP QL NAA+PROBE: NEGATIVE

## 2024-05-21 ENCOUNTER — INFUSION THERAPY VISIT (OUTPATIENT)
Dept: INFUSION THERAPY | Facility: CLINIC | Age: 59
End: 2024-05-21
Attending: INTERNAL MEDICINE
Payer: COMMERCIAL

## 2024-05-21 VITALS
WEIGHT: 192.8 LBS | TEMPERATURE: 98.2 F | BODY MASS INDEX: 31.12 KG/M2 | SYSTOLIC BLOOD PRESSURE: 149 MMHG | OXYGEN SATURATION: 95 % | RESPIRATION RATE: 16 BRPM | DIASTOLIC BLOOD PRESSURE: 51 MMHG | HEART RATE: 56 BPM

## 2024-05-21 DIAGNOSIS — M31.31 GRANULOMATOSIS WITH POLYANGIITIS WITH RENAL INVOLVEMENT (H): Primary | ICD-10-CM

## 2024-05-21 PROCEDURE — 250N000011 HC RX IP 250 OP 636: Performed by: INTERNAL MEDICINE

## 2024-05-21 PROCEDURE — 96413 CHEMO IV INFUSION 1 HR: CPT

## 2024-05-21 PROCEDURE — 99207 PR NO CHARGE LOS: CPT

## 2024-05-21 PROCEDURE — 250N000013 HC RX MED GY IP 250 OP 250 PS 637: Performed by: INTERNAL MEDICINE

## 2024-05-21 PROCEDURE — 96415 CHEMO IV INFUSION ADDL HR: CPT

## 2024-05-21 PROCEDURE — 96375 TX/PRO/DX INJ NEW DRUG ADDON: CPT

## 2024-05-21 PROCEDURE — 258N000003 HC RX IP 258 OP 636: Performed by: INTERNAL MEDICINE

## 2024-05-21 RX ORDER — DIPHENHYDRAMINE HCL 25 MG
50 CAPSULE ORAL ONCE
Status: CANCELLED
Start: 2024-05-21

## 2024-05-21 RX ORDER — DIPHENHYDRAMINE HCL 25 MG
50 CAPSULE ORAL ONCE
Status: COMPLETED | OUTPATIENT
Start: 2024-05-21 | End: 2024-05-21

## 2024-05-21 RX ORDER — METHYLPREDNISOLONE SODIUM SUCCINATE 125 MG/2ML
125 INJECTION, POWDER, LYOPHILIZED, FOR SOLUTION INTRAMUSCULAR; INTRAVENOUS
Start: 2024-05-21

## 2024-05-21 RX ORDER — METHYLPREDNISOLONE SODIUM SUCCINATE 125 MG/2ML
125 INJECTION, POWDER, LYOPHILIZED, FOR SOLUTION INTRAMUSCULAR; INTRAVENOUS ONCE
Status: CANCELLED | OUTPATIENT
Start: 2024-05-21

## 2024-05-21 RX ORDER — HEPARIN SODIUM,PORCINE 10 UNIT/ML
5-20 VIAL (ML) INTRAVENOUS DAILY PRN
OUTPATIENT
Start: 2024-05-21

## 2024-05-21 RX ORDER — ALBUTEROL SULFATE 0.83 MG/ML
2.5 SOLUTION RESPIRATORY (INHALATION)
OUTPATIENT
Start: 2024-05-21

## 2024-05-21 RX ORDER — METHYLPREDNISOLONE SODIUM SUCCINATE 125 MG/2ML
125 INJECTION, POWDER, LYOPHILIZED, FOR SOLUTION INTRAMUSCULAR; INTRAVENOUS ONCE
Status: COMPLETED | OUTPATIENT
Start: 2024-05-21 | End: 2024-05-21

## 2024-05-21 RX ORDER — DIPHENHYDRAMINE HYDROCHLORIDE 50 MG/ML
50 INJECTION INTRAMUSCULAR; INTRAVENOUS
Start: 2024-05-21

## 2024-05-21 RX ORDER — ALBUTEROL SULFATE 90 UG/1
1-2 AEROSOL, METERED RESPIRATORY (INHALATION)
Start: 2024-05-21

## 2024-05-21 RX ORDER — ACETAMINOPHEN 325 MG/1
650 TABLET ORAL ONCE
Status: CANCELLED
Start: 2024-05-21

## 2024-05-21 RX ORDER — MEPERIDINE HYDROCHLORIDE 25 MG/ML
25 INJECTION INTRAMUSCULAR; INTRAVENOUS; SUBCUTANEOUS EVERY 30 MIN PRN
OUTPATIENT
Start: 2024-05-21

## 2024-05-21 RX ORDER — HEPARIN SODIUM (PORCINE) LOCK FLUSH IV SOLN 100 UNIT/ML 100 UNIT/ML
5 SOLUTION INTRAVENOUS
OUTPATIENT
Start: 2024-05-21

## 2024-05-21 RX ORDER — ACETAMINOPHEN 325 MG/1
650 TABLET ORAL ONCE
Status: COMPLETED | OUTPATIENT
Start: 2024-05-21 | End: 2024-05-21

## 2024-05-21 RX ORDER — EPINEPHRINE 1 MG/ML
0.3 INJECTION, SOLUTION INTRAMUSCULAR; SUBCUTANEOUS EVERY 5 MIN PRN
OUTPATIENT
Start: 2024-05-21

## 2024-05-21 RX ADMIN — RITUXIMAB-ABBS 1000 MG: 10 INJECTION, SOLUTION INTRAVENOUS at 08:19

## 2024-05-21 RX ADMIN — METHYLPREDNISOLONE SODIUM SUCCINATE 125 MG: 125 INJECTION, POWDER, FOR SOLUTION INTRAMUSCULAR; INTRAVENOUS at 07:56

## 2024-05-21 RX ADMIN — ACETAMINOPHEN 650 MG: 325 TABLET ORAL at 07:46

## 2024-05-21 RX ADMIN — SODIUM CHLORIDE 250 ML: 9 INJECTION, SOLUTION INTRAVENOUS at 07:57

## 2024-05-21 RX ADMIN — DIPHENHYDRAMINE HYDROCHLORIDE 50 MG: 25 CAPSULE ORAL at 07:46

## 2024-05-21 NOTE — PROGRESS NOTES
Infusion Nursing Note:  Flavia Brice presents today for Rapid Rituximab.    Patient seen by provider today: No   present during visit today: Not Applicable.    Note: Patient feeling stiff and achy in her joints today, she states her body feels ready for an infusion. Was seen by Dr. Moreira recently after her hospital visit for pneumonia back in February. She states all her symptoms are resolved, she has an inhaler as needed now. Recently had a chest xray requested by Dr. Moreira prior to rituximab, which she states was normal.     Blood pressure 149/51 today, pt states she has not taken her BP meds yet. Advised her to take these when she gets home.     Rituxan Infusion Rates:   200 ml/hr x 30 min  400 ml/hr x 60 min (remainder)    Intravenous Access:  Peripheral IV placed.    Treatment Conditions:  Biological Infusion Checklist:  ~~~ NOTE: If the patient answers yes to any of the questions below, hold the infusion and contact ordering provider or on-call provider.    Have you recently had an elevated temperature, fever, chills, productive cough, coughing for 3 weeks or longer or hemoptysis,  abnormal vital signs, night sweats,  chest pain or have you noticed a decrease in your appetite, unexplained weight loss or fatigue? No  Do you have any open wounds or new incisions? No  Do you have any upcoming hospitalizations or surgeries? Does not include esophagogastroduodenoscopy, colonoscopy, endoscopic retrograde cholangiopancreatography (ERCP), endoscopic ultrasound (EUS), dental procedures or joint aspiration/steroid injections No  Do you currently have any signs of illness or infection or are you on any antibiotics? No  Have you had any new, sudden or worsening abdominal pain? No  Have you or anyone in your household received a live vaccination in the past 4 weeks? Please note: No live vaccines while on biologic/chemotherapy until 6 months after the last treatment. Patient can receive the flu  vaccine (shot only), pneumovax and the Covid vaccine. It is optimal for the patient to get these vaccines mid cycle, but they can be given at any time as long as it is not on the day of the infusion. No  Have you recently been diagnosed with any new nervous system diseases (ie. Multiple sclerosis, Guillain [place], seizures, neurological changes) or cancer diagnosis? Are you on any form of radiation or chemotherapy? No  Are you pregnant or breast feeding or do you have plans of pregnancy in the future? No  Have there been any other new onset medical symptoms? No    Post Infusion Assessment:  Patient tolerated infusion without incident.  Blood return noted pre and post infusion.  Site patent and intact, free from redness, edema or discomfort.  No evidence of extravasations.  Access discontinued per protocol.  Biologic Infusion Post Education: Call the triage nurse at your clinic or seek medical attention if you have chills and/or temperature greater than or equal to 100.5, uncontrolled nausea/vomiting, diarrhea, constipation, dizziness, shortness of breath, chest pain, heart palpitations, weakness or any other new or concerning symptoms, questions or concerns.  You cannot have any live virus vaccines prior to or during treatment or up to 6 months post infusion.  If you have an upcoming surgery, medical procedure or dental procedure during treatment, this should be discussed with your ordering physician and your surgeon/dentist.  If you are having any concerning symptom, if you are unsure if you should get your next infusion or wish to speak to a provider before your next infusion, please call your care coordinator or triage nurse at your clinic to notify them so we can adequately serve you.       Discharge Plan:   Patient and/or family verbalized understanding of discharge instructions and all questions answered.  AVS to patient via [employer].  Patient will return in 6 months for next appointment.   Patient discharged in  stable condition accompanied by: self.  Departure Mode: Ambulatory.      Nitza Tobias RN

## 2024-08-06 ENCOUNTER — LAB (OUTPATIENT)
Dept: LAB | Facility: CLINIC | Age: 59
End: 2024-08-06
Payer: COMMERCIAL

## 2024-08-06 DIAGNOSIS — M31.31 GRANULOMATOSIS WITH POLYANGIITIS WITH RENAL INVOLVEMENT (H): ICD-10-CM

## 2024-08-06 DIAGNOSIS — Z79.899 HIGH RISK MEDICATIONS (NOT ANTICOAGULANTS) LONG-TERM USE: ICD-10-CM

## 2024-08-06 LAB
ALBUMIN MFR UR ELPH: 17.5 MG/DL
ALBUMIN SERPL BCG-MCNC: 4.1 G/DL (ref 3.5–5.2)
ALBUMIN UR-MCNC: NEGATIVE MG/DL
ALP SERPL-CCNC: 82 U/L (ref 40–150)
ALT SERPL W P-5'-P-CCNC: 22 U/L (ref 0–50)
ANION GAP SERPL CALCULATED.3IONS-SCNC: 11 MMOL/L (ref 7–15)
APPEARANCE UR: CLEAR
AST SERPL W P-5'-P-CCNC: 18 U/L (ref 0–45)
BASOPHILS # BLD AUTO: 0 10E3/UL (ref 0–0.2)
BASOPHILS NFR BLD AUTO: 1 %
BILIRUB SERPL-MCNC: 0.3 MG/DL
BILIRUB UR QL STRIP: NEGATIVE
BUN SERPL-MCNC: 35.4 MG/DL (ref 8–23)
CALCIUM SERPL-MCNC: 9.3 MG/DL (ref 8.8–10.4)
CHLORIDE SERPL-SCNC: 106 MMOL/L (ref 98–107)
COLOR UR AUTO: YELLOW
CREAT SERPL-MCNC: 1.46 MG/DL (ref 0.51–0.95)
CREAT UR-MCNC: 91.6 MG/DL
EGFRCR SERPLBLD CKD-EPI 2021: 41 ML/MIN/1.73M2
EOSINOPHIL # BLD AUTO: 0.2 10E3/UL (ref 0–0.7)
EOSINOPHIL NFR BLD AUTO: 4 %
ERYTHROCYTE [DISTWIDTH] IN BLOOD BY AUTOMATED COUNT: 13.3 % (ref 10–15)
GLUCOSE SERPL-MCNC: 99 MG/DL (ref 70–99)
GLUCOSE UR STRIP-MCNC: NEGATIVE MG/DL
HCO3 SERPL-SCNC: 25 MMOL/L (ref 22–29)
HCT VFR BLD AUTO: 39.6 % (ref 35–47)
HGB BLD-MCNC: 12.8 G/DL (ref 11.7–15.7)
HGB UR QL STRIP: NEGATIVE
IMM GRANULOCYTES # BLD: 0 10E3/UL
IMM GRANULOCYTES NFR BLD: 0 %
KETONES UR STRIP-MCNC: NEGATIVE MG/DL
LEUKOCYTE ESTERASE UR QL STRIP: ABNORMAL
LYMPHOCYTES # BLD AUTO: 1.4 10E3/UL (ref 0.8–5.3)
LYMPHOCYTES NFR BLD AUTO: 28 %
MCH RBC QN AUTO: 30.3 PG (ref 26.5–33)
MCHC RBC AUTO-ENTMCNC: 32.3 G/DL (ref 31.5–36.5)
MCV RBC AUTO: 94 FL (ref 78–100)
MONOCYTES # BLD AUTO: 0.6 10E3/UL (ref 0–1.3)
MONOCYTES NFR BLD AUTO: 12 %
NEUTROPHILS # BLD AUTO: 2.7 10E3/UL (ref 1.6–8.3)
NEUTROPHILS NFR BLD AUTO: 55 %
NITRATE UR QL: NEGATIVE
PH UR STRIP: 5 [PH] (ref 5–7)
PLATELET # BLD AUTO: 276 10E3/UL (ref 150–450)
POTASSIUM SERPL-SCNC: 5 MMOL/L (ref 3.4–5.3)
PROT SERPL-MCNC: 6.6 G/DL (ref 6.4–8.3)
PROT/CREAT 24H UR: 0.19 MG/MG CR (ref 0–0.2)
RBC # BLD AUTO: 4.23 10E6/UL (ref 3.8–5.2)
RBC #/AREA URNS AUTO: ABNORMAL /HPF
SODIUM SERPL-SCNC: 142 MMOL/L (ref 135–145)
SP GR UR STRIP: 1.02 (ref 1–1.03)
SQUAMOUS #/AREA URNS AUTO: ABNORMAL /LPF
UROBILINOGEN UR STRIP-ACNC: 0.2 E.U./DL
WBC # BLD AUTO: 5 10E3/UL (ref 4–11)
WBC #/AREA URNS AUTO: ABNORMAL /HPF

## 2024-08-06 PROCEDURE — 36415 COLL VENOUS BLD VENIPUNCTURE: CPT

## 2024-08-06 PROCEDURE — 84156 ASSAY OF PROTEIN URINE: CPT

## 2024-08-06 PROCEDURE — 81001 URINALYSIS AUTO W/SCOPE: CPT

## 2024-08-06 PROCEDURE — 80053 COMPREHEN METABOLIC PANEL: CPT

## 2024-08-06 PROCEDURE — 85025 COMPLETE CBC W/AUTO DIFF WBC: CPT

## 2024-08-06 PROCEDURE — 83516 IMMUNOASSAY NONANTIBODY: CPT

## 2024-08-09 LAB
PROTEINASE3 AB SER IA-ACNC: 6.8 U/ML
PROTEINASE3 AB SER IA-ACNC: POSITIVE

## 2024-08-13 ENCOUNTER — OFFICE VISIT (OUTPATIENT)
Dept: RHEUMATOLOGY | Facility: CLINIC | Age: 59
End: 2024-08-13
Payer: COMMERCIAL

## 2024-08-13 VITALS
HEART RATE: 62 BPM | RESPIRATION RATE: 16 BRPM | BODY MASS INDEX: 32.6 KG/M2 | SYSTOLIC BLOOD PRESSURE: 122 MMHG | WEIGHT: 202 LBS | DIASTOLIC BLOOD PRESSURE: 81 MMHG | OXYGEN SATURATION: 93 %

## 2024-08-13 DIAGNOSIS — Z79.899 HIGH RISK MEDICATIONS (NOT ANTICOAGULANTS) LONG-TERM USE: ICD-10-CM

## 2024-08-13 DIAGNOSIS — M31.31 GRANULOMATOSIS WITH POLYANGIITIS WITH RENAL INVOLVEMENT (H): Primary | ICD-10-CM

## 2024-08-13 DIAGNOSIS — M06.4 INFLAMMATORY POLYARTHROPATHY (H): ICD-10-CM

## 2024-08-13 PROCEDURE — G2211 COMPLEX E/M VISIT ADD ON: HCPCS | Performed by: INTERNAL MEDICINE

## 2024-08-13 PROCEDURE — 99214 OFFICE O/P EST MOD 30 MIN: CPT | Performed by: INTERNAL MEDICINE

## 2024-08-13 RX ORDER — MEPERIDINE HYDROCHLORIDE 25 MG/ML
25 INJECTION INTRAMUSCULAR; INTRAVENOUS; SUBCUTANEOUS EVERY 30 MIN PRN
OUTPATIENT
Start: 2024-10-21

## 2024-08-13 RX ORDER — AZATHIOPRINE 50 MG/1
100 TABLET ORAL DAILY
Qty: 180 TABLET | Refills: 2 | Status: SHIPPED | OUTPATIENT
Start: 2024-08-13

## 2024-08-13 RX ORDER — ALBUTEROL SULFATE 0.83 MG/ML
2.5 SOLUTION RESPIRATORY (INHALATION)
OUTPATIENT
Start: 2024-10-21

## 2024-08-13 RX ORDER — HEPARIN SODIUM (PORCINE) LOCK FLUSH IV SOLN 100 UNIT/ML 100 UNIT/ML
5 SOLUTION INTRAVENOUS
OUTPATIENT
Start: 2024-10-21

## 2024-08-13 RX ORDER — EPINEPHRINE 1 MG/ML
0.3 INJECTION, SOLUTION, CONCENTRATE INTRAVENOUS EVERY 5 MIN PRN
OUTPATIENT
Start: 2024-10-21

## 2024-08-13 RX ORDER — HEPARIN SODIUM,PORCINE 10 UNIT/ML
5-20 VIAL (ML) INTRAVENOUS DAILY PRN
OUTPATIENT
Start: 2024-10-21

## 2024-08-13 RX ORDER — ACETAMINOPHEN 325 MG/1
650 TABLET ORAL ONCE
Start: 2024-10-21

## 2024-08-13 RX ORDER — DIPHENHYDRAMINE HYDROCHLORIDE 50 MG/ML
50 INJECTION INTRAMUSCULAR; INTRAVENOUS
Start: 2024-10-21

## 2024-08-13 RX ORDER — METHYLPREDNISOLONE SODIUM SUCCINATE 125 MG/2ML
125 INJECTION, POWDER, LYOPHILIZED, FOR SOLUTION INTRAMUSCULAR; INTRAVENOUS
Start: 2024-10-21

## 2024-08-13 RX ORDER — METHYLPREDNISOLONE SODIUM SUCCINATE 125 MG/2ML
125 INJECTION, POWDER, LYOPHILIZED, FOR SOLUTION INTRAMUSCULAR; INTRAVENOUS ONCE
OUTPATIENT
Start: 2024-10-21

## 2024-08-13 RX ORDER — ALBUTEROL SULFATE 90 UG/1
1-2 AEROSOL, METERED RESPIRATORY (INHALATION)
Start: 2024-10-21

## 2024-08-13 NOTE — PROGRESS NOTES
Rheumatology Clinic Visit      Flavia Brice MRN# [id number]   YOB: 1965 Age: 59 year old      Date of visit: 8/13/24   PCP: Monet Da Silva    Chief Complaint   Patient presents with:  GPA    Assessment and Plan     1.  Granulomatosis with polyangiitis: Dx'd 2012 by Dr. Wesley at [facility] when she presented with pulmonary involvement, pericarditis/effusion, borderline aneurysmal dilation of the ascending aorta and biopsy proven necrotizing crescentic nephritis.  Went into remission with cytoxan and prednisone, then maintained on AZA for 2 yrs per patient, stopped when lost to follow-up. Recurrence in 2020 with YOLI, pleuritic chest pain, inflammatory arthritis (MCPs, PIPs, shoulders, knees) that has responded well to prednisone.  She was tx'd with prednisone and rituximab 1g IV received on 8/21/2020 & 9/4/2020, 4/28/2021 (delayed for the COVID-19 vaccine) & (no 2nd dose of rituximab), 11/11/2021 & 11/30/2021, 9/1/2022 & 9/15/2022; 3/15/2023, 10/19/2023, 5/21/2024. Currently on azathioprine 100 mg daily and rituximab.  Reduced rituximab to be 1 g IV every 6 months, first received in March 2023 and she continues to do well.  Mild return of symptoms prior to the next rituximab dose, typically starting about 1-2 weeks prior and is not severe enough, per patient, to warrant changing rituximab to be every 5 months at this time.  Chronic illness  - Continue azathioprine 100 mg daily  - Continue rituximab (truxima) 1 g IV every 6 months   - Labs in 3 months: CBC, CMP, ESR, CRP, UA, Uprotein:creatinine  - Labs in 6 months: CBC, CMP, ESR, CRP, UA, Uprotein:creatinine, QuantiFERON-TB gold plus    High risk medication requiring intensive toxicity monitoring at least quarterly.    # Rituximab (Rituxan,Truxima) Risks and Benefits: The risks and benefits of rituximab were discussed in detail and the patient verbalized understanding.  The risks discussed include, but are not limited to, the risk for  hypersensitivity, anaphylaxis, anaphylactoid reactions, fatal infusion reactions, an increased risk for serious infections leading to hospitalization or death, severe mucocutaneous reactions, reactivation of hepatitis B, and development of progressive multifocal leukoencephalopathy (PML) resulting in death.  The most common adverse reactions are infections, nasopharyngitis, urinary tract infections, nausea, diarrhea, headache, muscle spasms, and anemia.  It was discussed that the medication would need to be discontinued if a serious infection develops.  It was discussed that live vaccinations should not be received while using rituximab or within 30 days prior to starting rituximab.  I encouraged reviewing the package insert and asking any questions about the medication.       2. Inflammatory arthritis: Related to GPA.  See #1    3. Elevated serum creatinine: Related to GPA.  Stable.  Avoid nephrotoxic agents.    4. Positive MALLORY (antinuclear antibody): Additional work-up was negative for SSA, SSB, RNP, Smith, dsDNA; complement C3 and C4 were not low.  Symptoms are most likely related to GPA; no MALLORY-associated rheumatologic disorder identified    5.  Aneurysm of the ascending aorta at 4.5 cm; history of heart failure with preserved ejection fraction: following with Dr. Locke (cardiology)    6. Cytoxan use history: needs periodic UA to eval for hematuria, due to increased risk for bladder cancer after cytoxan exposure    7.  Osteoarthritis: Affecting the DIPs and bilateral first CMC joints.  Significant improvement with topical Voltaren gel as needed and exercises taught from hand therapy.  Continue home hand therapy exercises.    8.  Osteopenia: Osteopenia based on 3/14/2023 DEXA.  Continue calcium and vitamin D.  Plan to recheck DEXA in [id number].    9.  Planter fasciitis, bilateral: Diagnosed 5/30/2023, and had significant improvement with stretching exercises and shoewear whenever ambulatory.  Minimal symptoms at  this time.    10. Vaccinations: Vaccinations reviewed with Ms. Brice.    - Influenza: Encouraged yearly vaccination, to be received at least 2 weeks before the next rituximab infusion.  - COVID-19: Advised updating now, to be received at least 2 weeks prior to the next rituximab dose  - Shingrix: 2nd dose advised, to receive at least 2 weeks prior to the next rituximab dose  - Prevnar 20: up to date    Total minutes spent in evaluation with patient, documentation, , and review of pertinent studies and chart notes: 24  The longitudinal plan of care for the rheumatology problem(s) were addressed during this visit.  Due to added complexity of care, we will continue to support the patient and the subsequent management of this condition with ongoing continuity of care.      Ms. Brice verbalized agreement with and understanding of the rational for the diagnosis and treatment plan.  All questions were answered to best of my ability and the patient's satisfaction. Ms. Brice was advised to contact the clinic with any questions that may arise after the clinic visit.      Thank you for involving me in the care of the patient    Return to clinic: August HPI   Flaviaben Brice is a 59 year old female with a past medical history significant for hypertension, depression, and granulomatosis with polyangiitis who is seen for follow-up of granulomatosis with polyangiitis    10/31/2013 [facility] rheumatology clinic note by Dr. Ann Wesley documents severe NC-3 ANCA positive GPA with pulmonary involvement, pericarditis/effusion, borderline aneurysmal dilation of the ascending aorta, necrotizing crescentic nephritis.  Kidney biopsy has confirmed diagnosis in the past.  Has received Solu-Medrol x3, Cytoxan x1 at 1300 mg, on prednisone 60-80 mg daily.  Deteriorated quickly despite those interventions and was hospitalized again, requiring plasmapheresis and dialysis.  Was seeing Dr. Velazquez from [facility]  and was again placed on Cytoxan 15 mg/kg every 3 weeks.  Has seen ENT but there was no clear sinus involvement.  She was then transitioned after 7 infusions of Cytoxan to Imuran on 11/2012 and has been doing well on that.      7/11/2020 [facility] hospitalization note: Acute kidney injury, migratory polyarthritis, chronic diastolic heart failure.  Left knee and left hip pain.  Right shoulder pain that migrated to the left shoulder.  No joint swelling.  No fevers.  In the past she had flare of her joint pain that she was treated with prednisone.  Prior to this ED evaluation she reportedly used prednisone 20 mg without improvement.    7/22/2020: [facility] emergency department visit for shortness of breath.  Notes history of granulomatosis with polyangiitis, diastolic heart failure, a sending thoracic aortic aneurysm from 4.6 cm on chest CT 2/18/2020), migratory polyarthritis, acute kidney injury, and hypertension.  Coronavirus test negative.  Chest CT negative for pneumonia.  O2 saturation normal.  Advised to follow-up with her PCP and cardiology.    8/5/2020:  Ms. Brice reported that she was dx'd with GPA by Dr. Wesley. She was treated and in remission; was on AZA for a year and no issues for a while. Lost to follow-up with Dr. Wesley.  Then 1 year ago started having more shortness of breath and found to have more lung nodules.  Recalls heart failure and kidney failure when first dx'd.  Told to see cardiology to follow the aneurysm.  Then in Jan 2020 started to have pain in her shoulders, knees ankles. Pain was so bad that she went to [facility]; found to have YOLI.  Until able to have this rheumatology evaluation, she says that she was given prednisone to help the joint pain; currently on 5mg daily of prednisone; felt much better when on prednisone 30mg daily. Has been dealing with right knee swelling; the prednisone helped with this.  Joint pains are worse in the AM; improve with time and activity. Morning  stiffness is very mild while on prednisone 5mg daily.  No hematuria. No hemoptysis.  Flavia Brice's mother was present during the clinic visit.     9/9/2020: Significant improvement with prednisone and rituximab.  No longer with swelling around her ankles.  Hand swelling/pain/stiffness has resolved.  She feels a little puffy in her face but no actual swelling.  She has followed with her cardiologist and they are going to monitor the thoracic aortic aneurysm.  Tolerated rituximab infusion well.  Currently on prednisone 10 mg daily.  Not going into work at this time; she is taking a leave of absence.    10/21/2020: Improved.  Now with mild pain at her MCPs, DIPs, and knees.  MCP and knee pain is better with activity, worse in the morning.  DIP pain is worse with activity.  Joint pains started again with dose reduction of prednisone.  Has been on azathioprine and tolerated well in the past.    12/9/2020: Significant improvement with regard to her joint pain.  No joint pain now.  No morning stiffness.  No gelling phenomenon.  Tolerating azathioprine well.  Continues on prednisone 5mg daily.  Mild shortness of breath when walking up stairs that has been stable; no associated chest pain or pressure, palpitations, lightheadedness, dizziness, or nausea and the mild shortness of breath resolves quickly when she rests.  No shortness of breath at rest or currently.  Following with cardiology and plans to repeat her echocardiogram in March 2021.  Last echocardiogram was in February 2020.    4/8/2021: More joint aches and pains and she attributes this to being off of prednisone and having to delay her rituximab infusion secondary to the COVID-19 vaccine.  She has rescheduled the first of the 2 rituximab infusions to be 2 weeks after the second COVID-19 vaccine.  Knees ache more at the end of the day.  No cough, chest pain, shortness of breath.  Has seen cardiology and she says that her aortic aneurysm is stable.    7/7/2021:   doing well this time.  Mild aches of the PIPs and DIPs with more activity that improves with rest.  Usually has pain that travels around but in general is doing okay.  Only received 1 of 2 rituximab doses because her insurance had denied it; she then got a letter about a month later stating that rituximab had been approved.  Tolerating azathioprine and rituximab well.  Overall happy with how well she is doing.  No blood in urine or stool    10/19/2021: Reports that she is doing well.  She says that she is doing better than she was previously, and way better than she was before treatment.  No joint pain or stiffness.  No rash.  Tolerating azathioprine well.  No difficulty doing her daily activities.     1/25/2022: feels the best she has in a long time.  No joint pain or swelling. Morning stiffness for <20.  No rash.  No black or bloody stools.  No cough, chest pain, or shortness of breath.  No hemoptysis.  No hematemesis.  Using a surgical mask at work.    5/4/2022: had covid infxn, resolved >2wks ago. Fasting for labs recently. Joints more achy; due to rtx.  Tolerating aza well. No joint swelling. Morning stiffness <30 min. +gelling. No hemoptysis.  No blood in urine.     8/2/2022: Did not get the rituximab infusion but does have a scheduled for 9/1/2022 and 9/15/2022.  More joint pain at the MCPs, PIPs, knees, and MTPs that is worse in the morning and improves with time and activity.  Morning stiffness for about 1 hour.  No rash.  No black or bloody stools.  No hematuria.  No cough, chest pain, or shortness of breath.    11/8/2022:  Joints are doing well with only occasional MCP and PIP ache for the first 20 minutes of the morning.  No gelling phenomenon.  Tolerating azathioprine well.  No rash.  No hematuria.  No black or bloody stools.  No hemoptysis or hematemesis.    2/28/2023: Doing well at this time except for pain at the end of the day and with increased activity of the bilateral first CMC joints that  improves with rest; no swelling of these joints.  No other joint pain or swelling.  Morning stiffness for no more than 20 minutes.  No hematuria.  No hemoptysis.  Couple times and had blood in her stool and she recalls that she had a colonoscopy in 2019 and was supposed to follow-up for repeat colonoscopy 6 months later but never went.    5/30/2023: Currently doing well.  Bilateral plantar foot pain with the first few steps of the day and after any period of inactivity where she was not standing on her feet.  After walking more than a couple steps then the bottom of her feet start to feel much better.  Hand pain is nearly resolved with the exercises taught in hand therapy.  She uses topical Voltaren gel for her hands as needed, infrequently.  Other joints are doing well.  No cough, chest pain, or shortness of breath.  No hematuria.  Arthritis is not limiting her daily activities.    9/12/2023: Near resolution of bilateral planter fasciitis symptoms with stretching exercises and wearing appropriate shoes whenever ambulatory.  Currently without joint pain or swelling.  No morning stiffness or gelling phenomenon.  No cough, chest pain, shortness of breath.  No hematuria.  No fatigue.  Arthritis does not limit her daily activities.    1/2/2024: Currently doing well.  No joint pain or swelling.  No morning stiffness or gelling phenomenon.  No plantar fasciitis symptoms.  No chest pain or pressure or shortness of breath.  No cough.  No hematuria.  No fatigue.  Arthritis does not limit daily activities.    4/2/2024: Recent pneumonia; symptoms have resolved.  Notes that during the hospital evaluation for pneumonia she was found to have YOLI.  No joint pain or swelling.  No morning stiffness or gelling phenomenon.  No chest pain or pressure or shortness of breath.  No hematuria.  No hemoptysis.  No fatigue.    Today, 8/13/2024: Mild ache of the hands and feet 1-2 weeks before the next rituximab dose, but no swelling, and  arthritis does not worsen to the point that it affects daily activities; symptoms are mild enough that she does not want to change the frequency of rituximab or adjust other medication.  Otherwise feeling well.      Denies fevers, chills, nausea, vomiting. Occasional constipation or diarrhea, unchanged from previous. No abdominal pain. No chest pain/pressure, palpitations, or shortness of breath at this time.  No LE swelling.  No oral or nasal sores.  No rash. No sicca symptoms.  No eye pain or redness.    Tobacco: quit smoking in Feb 2020  EtOH: no more than 1 drink per month  Drugs: none  Occupation: [employer]    ROS   12 point review of system was completed and negative except as noted in the HPI     Active Problem List     Patient Active Problem List   Diagnosis    Wegener's granulomatosis    CARDIOVASCULAR SCREENING; LDL GOAL LESS THAN 130    Overweight    Hypertension goal BP (blood pressure) < 140/90    Situational depression    Granulomatosis with polyangiitis (H)    Chronic kidney disease, stage 3    Former smoker    Menopausal syndrome (hot flashes)    Chronic diastolic (congestive) heart failure (H)    Thoracic aortic aneurysm without rupture, unspecified part (H24)    Angina at rest (H24)    Rectal bleeding    History of colonic polyps    Hx of aortic aneurysm    Acute on chronic respiratory failure, unspecified whether with hypoxia or hypercapnia (H)    Screening for cervical cancer     Past Medical History     Past Medical History:   Diagnosis Date    Tinnitus      Past Surgical History     Past Surgical History:   Procedure Laterality Date    COLONOSCOPY WITH CO2 INSUFFLATION N/A 2/18/2019    Procedure: COLONOSCOPY WITH CO2 INSUFFLATION;  Surgeon: Javier Guillen MD;  Location: [facility] OR    GALLBLADDER SURGERY       Allergy   No Known Allergies  Current Medication List     Current Outpatient Medications   Medication Sig Dispense Refill    albuterol (PROAIR HFA/PROVENTIL HFA/VENTOLIN HFA) 108 (90 Base)  "MCG/ACT inhaler Inhale 2 puffs into the lungs every 4 hours as needed for shortness of breath, wheezing or cough 18 g 0    Ascorbic Acid (VITAMIN C) 100 MG CHEW       azaTHIOprine (IMURAN) 50 MG tablet Take 2 tablets (100 mg) by mouth daily 180 tablet 2    losartan-hydrochlorothiazide (HYZAAR) 100-25 MG tablet Take 1 tablet by mouth daily Need follow-up visit with labs for further refills 90 tablet 0    Multiple Vitamins-Minerals (ZINC PO)       VITAMIN D PO       zinc gluconate 50 MG tablet Take 50 mg by mouth daily      BETA BLOCKER NOT PRESCRIBED (INTENTIONAL) Please choose reason not prescribed from choices below. (Patient not taking: Reported on 5/21/2024)  0     No current facility-administered medications for this visit.     Social History   See HPI    Family History     Family History   Problem Relation Age of Onset    Diabetes Father     Heart Disease Maternal Grandmother     Heart Disease Maternal Grandfather     Arthritis Paternal Grandmother     Heart Disease Paternal Grandfather      Grandmother: rheumatoid arthritis    Physical Exam     Temp Readings from Last 3 Encounters:   05/21/24 98.2  F (36.8  C) (Oral)   02/17/24 98  F (36.7  C) (Tympanic)   11/03/23 99  F (37.2  C) (Tympanic)     BP Readings from Last 5 Encounters:   08/13/24 122/81   05/21/24 (!) 149/51   04/02/24 118/80   02/17/24 112/79   01/02/24 139/83     Pulse Readings from Last 1 Encounters:   08/13/24 62     Resp Readings from Last 1 Encounters:   08/13/24 16     Estimated body mass index is 32.6 kg/m  as calculated from the following:    Height as of 1/2/24: 1.676 m (5' 6\").    Weight as of this encounter: 91.6 kg (202 lb).    GEN: NAD.   HEENT:  Anicteric, noninjected sclera. No obvious external lesions of the ear and nose. Hearing intact.  CV: S1, S2. RRR. No m/r/g  PULM: No increased work of breathing. CTA bilaterally   MSK: MCPs, PIPs, DIPs without swelling or tenderness to palpation.  Squaring and mild tenderness to palpation " without effusion, increased warmth, or overlying erythema at the bilateral first CMC joints.  Wrists without swelling or tenderness to palpation.  Elbows and shoulders without swelling or tenderness to palpation.   Knees, ankles, and MTPs without swelling or tenderness to palpation.  Pes planus bilaterally.  Nontender to palpation on the plantar aspect of each foot.  SKIN: No rash or jaundice seen  PSYCH: Alert. Appropriate.       Labs / Imaging (select studies)     RF/CCP  Recent Labs   Lab Test 08/05/20  1453   CCPIGG <1   RHF <7     MALLORY  Recent Labs   Lab Test 08/05/20  1453   CAMMIE Positive*   ANAP1 HOMOGENEOUS   ANAT1 1:160     RNP/Sm/SSA/SSB  Recent Labs   Lab Test 08/11/20  1402   RNPIGG <0.2   SMIGG <0.2   SSAIGG <0.2   SSBIGG <0.2     dsDNA  Recent Labs   Lab Test 08/11/20  1402   DNA <1     C3/C4  Recent Labs   Lab Test 08/11/20  1402   K7NZAMY 133   N1BSOXU 41*     Antiphospholipid Antibodies  Recent Labs   Lab Test 08/11/20  1402   B2GPG 0.8   B2GPM <2.9   CARDG <1.6   CARDM 0.8   LUPINT Negative     ANCA  Recent Labs   Lab Test 08/06/24  0720 08/05/20  1453   PR3IGG Positive* 4.8*   YW2ZUMPM 6.8*  --    MPOIGG  --  <0.2     IgG  Recent Labs   Lab Test 05/19/24  0912 10/19/23  1429 03/15/23  0842    882 859    130 120   IGM 14* 24* 15*     CBC  Recent Labs   Lab Test 08/06/24  0720 05/19/24  0912 03/26/24  0820 10/14/21  0720 07/07/21  0818 04/24/21  1130 12/03/20  1128   WBC 5.0 4.0 5.1   < > 5.0 5.1 8.3   RBC 4.23 4.05 3.58*   < > 3.98 3.82 4.27   HGB 12.8 12.5 10.9*   < > 12.2 11.7 13.1   HCT 39.6 39.1 34.4*   < > 38.8 37.6 41.3   MCV 94 97 96   < > 98 98 97   RDW 13.3 14.2 15.8*   < > 14.9 13.7 14.2    283 290   < > 304 297 347   MCH 30.3 30.9 30.4   < > 30.7 30.6 30.7   MCHC 32.3 32.0 31.7   < > 31.4* 31.1* 31.7   NEUTROPHIL 55 44 55   < > 60.1 63.5 73.6   LYMPH 28 35 29   < > 22.9 20.4 17.7   MONOCYTE 12 15 12   < > 11.2 11.1 6.9   EOSINOPHIL 4 5 3   < > 5.0 4.4 1.3   BASOPHIL 1  1 1   < > 0.8 0.6 0.5   ANEU  --   --   --   --  3.0 3.2 6.1   ALYM  --   --   --   --  1.1 1.0 1.5   MARCOS  --   --   --   --  0.6 0.6 0.6   AEOS  --   --   --   --  0.3 0.2 0.1   ABAS  --   --   --   --  0.0 0.0 0.0   ANEUTAUTO 2.7 1.8 2.8   < >  --   --   --    ALYMPAUTO 1.4 1.4 1.5   < >  --   --   --    AMONOAUTO 0.6 0.6 0.6   < >  --   --   --    AEOSAUTO 0.2 0.2 0.1   < >  --   --   --    ABSBASO 0.0 0.1 0.0   < >  --   --   --     < > = values in this interval not displayed.     CMP  Recent Labs   Lab Test 08/06/24  0720 05/19/24  0912 03/26/24  0820 12/30/23  1719 09/09/23  0916 02/28/23  1421 08/07/22  1604 04/16/22  1236 10/14/21  0720 07/07/21  0818 04/24/21  1130 12/03/20  1128 11/19/20  1123    142 143  --  141  --  141 144   < > 142 141  --   --    POTASSIUM 5.0 4.9 4.5  --  4.8  --  4.0 4.0   < > 4.7 4.2  --   --    CHLORIDE 106 106 107  --  104  --  109 112*   < > 112* 108  --   --    CO2 25 24 21*  --  25  --  25 24   < > 26 28  --   --    ANIONGAP 11 12 15  --  12  --  7 8   < > 4 5  --   --    GLC 99 95 145*  --  118*  --  132* 125*   < > 103* 114*  --   --    BUN 35.4* 43.2* 39.6*  --  34.7*  --  33* 39*   < > 34* 26  --   --    CR 1.46* 1.59* 1.67* 1.45* 1.51*   < > 1.34* 1.58*   < > 1.44* 1.50* 1.41* 1.45*   GFRESTIMATED 41* 37* 35* 41* 39*   < > 46* 38*   < > 40* 38* 41* 40*   GFRESTBLACK  --   --   --   --   --   --   --   --   --  46* 44* 48* 46*   JESSICA 9.3 9.8 9.5  --  9.5  --  8.9 9.3   < > 9.6 9.5  --   --    BILITOTAL 0.3 0.2 0.3 0.3 0.4   < > 0.4 0.5   < > 0.2 0.4 0.4 0.3   ALBUMIN 4.1 4.3 4.3 4.4 4.3   < > 3.6 3.7   < > 3.6 3.5 3.9 3.7   PROTTOTAL 6.6 6.9 6.9 7.1 7.0   < > 7.0 7.0   < > 7.2 6.7* 7.5 7.4   ALKPHOS 82 84 76 86 90   < > 75 85   < > 75 67 80 78   AST 18 21 20 28 20   < > 14 18   < > 21 22 16 13   ALT 22 23 24 20 25   < > 30 51*   < > 49 62* 37 33    < > = values in this interval not displayed.     HgA1c  Recent Labs   Lab Test 05/19/24  0912 03/10/23  6401  04/16/22  1236   A1C 5.7* 6.0* 6.1*     Calcium/VitaminD  Recent Labs   Lab Test 08/06/24  0720 05/19/24  0912 03/26/24  0820   JESSICA 9.3 9.8 9.5     ESR/CRP  Recent Labs   Lab Test 03/26/24  0820 12/30/23  1719 09/09/23  0916 05/28/23  0911 02/28/23  1421 08/07/22  1604   SED 28 10 10 9 9 10   CRP  --   --   --  <2.9 3.3 4.2   CRPI 4.38 <3.00 5.09*  --   --   --      CK/Aldolase  Recent Labs   Lab Test 08/11/20  1402   CKT 79     TSH/T4  Recent Labs   Lab Test 03/10/23  1515 10/21/21  0920 03/26/18  1058   TSH 1.66 1.10 1.44     Lipid Panel  Recent Labs   Lab Test 03/10/23  1515 01/23/22  1024 03/26/18  1058   CHOL 201* 192 188   TRIG 110 118 93   HDL 83 74 87   LDL 96 94 82   NHDL 118 118 101     Hepatitis B  Recent Labs   Lab Test 03/26/24  0820 04/16/22  1236 08/05/20  1453   HBCAB Nonreactive Nonreactive Nonreactive   HEPBANG Nonreactive Nonreactive Nonreactive     Hepatitis C  Recent Labs   Lab Test 08/05/20  1453 03/26/18  1058   HCVAB Nonreactive Nonreactive     Lyme ab screening  Recent Labs   Lab Test 08/05/20  1453   LYMEGM 0.10     Tuberculosis Screening  Recent Labs   Lab Test 03/26/24  0820 02/28/23  1421 04/16/22  1236 08/05/20  1453   TBRES Negative Negative Negative  --    TBRST  --   --   --  Negative     HIV Screening  Recent Labs   Lab Test 08/05/20  1453   HIAGAB Nonreactive     UA  Recent Labs   Lab Test 08/06/24  0740 05/19/24  0917 03/26/24  0820 12/30/23  1719 03/25/23  1216 02/28/23  1421 04/16/22  1236 10/14/21  0720 04/24/21  1137 09/16/20  1442 08/05/20  1453 07/27/20  0938   COLOR Yellow Yellow Yellow Yellow   < > Yellow   < > Yellow   < > Yellow Yellow Yellow   APPEARANCE Clear Clear Clear Clear   < > Slightly Cloudy*   < > Clear   < > Clear Clear Clear   URINEGLC Negative Negative Negative Negative   < > Negative   < > Negative   < > Negative Negative Negative   URINEBILI Negative Negative Negative Negative   < > Negative   < > Negative   < > Negative Negative Negative   SG 1.020 1.020  1.020 1.010   < > 1.020   < > >=1.030   < > 1.025 1.025 1.015   URINEPH 5.0 5.5 5.5 5.0   < > 5.0   < > 5.5   < > 5.0 5.0 5.0   PROTEIN Negative Negative Negative Negative   < > Trace*   < > Trace*   < > 30* Negative Trace*   UROBILINOGEN 0.2 0.2 0.2 0.2   < > 0.2   < > 0.2   < > 0.2 0.2 0.2   NITRITE Negative Negative Negative Negative   < > Negative   < > Negative   < > Negative Negative Negative   UBLD Negative Negative Trace* Negative   < > Negative   < > Trace*   < > Negative Trace* Small*   LEUKEST Small* Negative Negative Trace*   < > Moderate*   < > Trace*   < > Negative Negative Negative   WBCU 0-5  --  None Seen 0-5   < > 10-25*  --  0-5   < > 0 - 5 0 - 5 0 - 5   RBCU None Seen  --  None Seen None Seen   < > 2-5*  --  None Seen   < > O - 2 O - 2 2-5*   SQUAMOUSEPI  --   --   --   --   --   --   --   --   --  Few Few Few   BACTERIA  --   --   --  None Seen  --  Many*  --  Few*  --  Few* Few* Few*   MUCOUS  --   --   --   --   --   --   --   --   --   --  Present*  --     < > = values in this interval not displayed.     Urine Microscopic  Recent Labs   Lab Test 08/06/24  0740 03/26/24  0820 12/30/23  1719 09/09/23  0916 02/28/23  1421 10/14/21  0720 07/07/21  0818 09/16/20  1442 08/05/20  1453 07/27/20  0938   WBCU 0-5 None Seen 0-5   < > 10-25* 0-5   < > 0 - 5 0 - 5 0 - 5   RBCU None Seen None Seen None Seen   < > 2-5* None Seen   < > O - 2 O - 2 2-5*   SQUAMOUSEPI  --   --   --   --   --   --   --  Few Few Few   BACTERIA  --   --  None Seen  --  Many* Few*  --  Few* Few* Few*   MUCOUS  --   --   --   --   --   --   --   --  Present*  --     < > = values in this interval not displayed.     Urine Protein  GHUTP and UTP= Urine protein (random), GHUTPG and UTPG = urine protein:creatinine ratio (random), UCRR = urine creatinine (random)  Recent Labs   Lab Test 08/06/24  0740 05/19/24  0917 12/30/23  1719 02/28/23  1421 04/16/22  1236 01/23/22  1024 10/14/21  0720   GHUTP 17.5 11.1 16.3   < >  --   --   --     UTP  --   --   --   --  0.16 0.13 0.45   GHUTPG 0.19 0.14 0.29*   < >  --   --   --    UTPG  --   --   --   --  0.14 0.13 0.23*   UCRR 91.6 81.9 56.3   < > 114 104 198    < > = values in this interval not displayed.     Immunization History     Immunization History   Administered Date(s) Administered    COVID-19 12+ (2023-24) (Pfizer) 09/23/2023    COVID-19 Bivalent 18+ (Moderna) 03/10/2023    COVID-19 Monovalent 18+ (Moderna) 03/19/2021, 04/16/2021, 10/19/2021    Hepatitis B, Adult 03/10/2023    Influenza Vaccine 18-64 (Flublok) 11/07/2020, 10/19/2021, 03/10/2023    Influenza Vaccine >6 months,quad, PF 10/31/2013, 02/19/2020    Influenza,INJ,MDCK,PF,Quad >6mo(Flucelvax) 09/23/2023    Pneumococcal 20 valent Conjugate (Prevnar 20) 03/10/2023    Pneumococcal 23 valent 04/25/2008, 06/28/2012    TDAP (Adacel,Boostrix) 07/02/2012, 03/10/2023    Zoster recombinant adjuvanted (SHINGRIX) 01/02/2024          Chart documentation done in part with Dragon Voice recognition Software. Although reviewed after completion, some word and grammatical error may remain.    Ronaldo Moreira MD

## 2024-08-13 NOTE — NURSING NOTE
RAPID3 (0-30) Cumulative Score  4.7          RAPID3 Weighted Score (divide #4 by 3 and that is the weighted score)  1.5

## 2024-08-16 DIAGNOSIS — I10 HYPERTENSION GOAL BP (BLOOD PRESSURE) < 140/90: ICD-10-CM

## 2024-08-16 RX ORDER — LOSARTAN POTASSIUM AND HYDROCHLOROTHIAZIDE 25; 100 MG/1; MG/1
1 TABLET ORAL DAILY
Qty: 90 TABLET | Refills: 0 | Status: SHIPPED | OUTPATIENT
Start: 2024-08-16

## 2024-08-16 NOTE — TELEPHONE ENCOUNTER
Covering provider.  Rx for 90 days.  Patient has not been seen over 1 year by PCP.  Please schedule an in person preventative visit with PCP for future refills.  If possible, please go over any overdue screening questionnaires (PHQ-2, PHQ-9, ACT, etc.).

## 2024-10-21 ENCOUNTER — TELEPHONE (OUTPATIENT)
Dept: FAMILY MEDICINE | Facility: CLINIC | Age: 59
End: 2024-10-21

## 2024-10-21 NOTE — TELEPHONE ENCOUNTER
Patient Quality Outreach    Patient is due for the following:   Breast Cancer Screening - Mammogram  Physical Preventive Adult Physical    Next Steps:   Schedule a Adult Preventative    Type of outreach:    Sent [employer] message.      Questions for provider review:    None           Ivon Norris, CMA

## 2024-11-12 ENCOUNTER — LAB (OUTPATIENT)
Dept: LAB | Facility: CLINIC | Age: 59
End: 2024-11-12
Payer: COMMERCIAL

## 2024-11-12 DIAGNOSIS — M31.31 GRANULOMATOSIS WITH POLYANGIITIS WITH RENAL INVOLVEMENT (H): ICD-10-CM

## 2024-11-12 DIAGNOSIS — M06.4 INFLAMMATORY POLYARTHROPATHY (H): ICD-10-CM

## 2024-11-12 DIAGNOSIS — Z79.899 HIGH RISK MEDICATIONS (NOT ANTICOAGULANTS) LONG-TERM USE: ICD-10-CM

## 2024-11-12 LAB
ALBUMIN MFR UR ELPH: 16.9 MG/DL
ALBUMIN SERPL BCG-MCNC: 4.2 G/DL (ref 3.5–5.2)
ALBUMIN UR-MCNC: ABNORMAL MG/DL
ALP SERPL-CCNC: 90 U/L (ref 40–150)
ALT SERPL W P-5'-P-CCNC: 29 U/L (ref 0–50)
ANION GAP SERPL CALCULATED.3IONS-SCNC: 10 MMOL/L (ref 7–15)
APPEARANCE UR: CLEAR
AST SERPL W P-5'-P-CCNC: 22 U/L (ref 0–45)
BASOPHILS # BLD AUTO: 0 10E3/UL (ref 0–0.2)
BASOPHILS NFR BLD AUTO: 1 %
BILIRUB SERPL-MCNC: 0.2 MG/DL
BILIRUB UR QL STRIP: NEGATIVE
BUN SERPL-MCNC: 31.6 MG/DL (ref 8–23)
CALCIUM SERPL-MCNC: 9.9 MG/DL (ref 8.8–10.4)
CHLORIDE SERPL-SCNC: 107 MMOL/L (ref 98–107)
COLOR UR AUTO: YELLOW
CREAT SERPL-MCNC: 1.62 MG/DL (ref 0.51–0.95)
CREAT UR-MCNC: 139 MG/DL
EGFRCR SERPLBLD CKD-EPI 2021: 36 ML/MIN/1.73M2
EOSINOPHIL # BLD AUTO: 0.3 10E3/UL (ref 0–0.7)
EOSINOPHIL NFR BLD AUTO: 4 %
ERYTHROCYTE [DISTWIDTH] IN BLOOD BY AUTOMATED COUNT: 13.6 % (ref 10–15)
GLUCOSE SERPL-MCNC: 110 MG/DL (ref 70–99)
GLUCOSE UR STRIP-MCNC: NEGATIVE MG/DL
HCO3 SERPL-SCNC: 24 MMOL/L (ref 22–29)
HCT VFR BLD AUTO: 39.2 % (ref 35–47)
HGB BLD-MCNC: 12.7 G/DL (ref 11.7–15.7)
HGB UR QL STRIP: NEGATIVE
HYALINE CASTS #/AREA URNS LPF: ABNORMAL /LPF
IMM GRANULOCYTES # BLD: 0 10E3/UL
IMM GRANULOCYTES NFR BLD: 0 %
KETONES UR STRIP-MCNC: NEGATIVE MG/DL
LEUKOCYTE ESTERASE UR QL STRIP: NEGATIVE
LYMPHOCYTES # BLD AUTO: 1.6 10E3/UL (ref 0.8–5.3)
LYMPHOCYTES NFR BLD AUTO: 27 %
MCH RBC QN AUTO: 30.5 PG (ref 26.5–33)
MCHC RBC AUTO-ENTMCNC: 32.4 G/DL (ref 31.5–36.5)
MCV RBC AUTO: 94 FL (ref 78–100)
MONOCYTES # BLD AUTO: 0.6 10E3/UL (ref 0–1.3)
MONOCYTES NFR BLD AUTO: 11 %
MUCOUS THREADS #/AREA URNS LPF: PRESENT /LPF
NEUTROPHILS # BLD AUTO: 3.3 10E3/UL (ref 1.6–8.3)
NEUTROPHILS NFR BLD AUTO: 58 %
NITRATE UR QL: NEGATIVE
PH UR STRIP: 5.5 [PH] (ref 5–7)
PLATELET # BLD AUTO: 291 10E3/UL (ref 150–450)
POTASSIUM SERPL-SCNC: 4.8 MMOL/L (ref 3.4–5.3)
PROT SERPL-MCNC: 6.8 G/DL (ref 6.4–8.3)
PROT/CREAT 24H UR: 0.12 MG/MG CR (ref 0–0.2)
RBC # BLD AUTO: 4.16 10E6/UL (ref 3.8–5.2)
RBC #/AREA URNS AUTO: ABNORMAL /HPF
SODIUM SERPL-SCNC: 141 MMOL/L (ref 135–145)
SP GR UR STRIP: 1.02 (ref 1–1.03)
SQUAMOUS #/AREA URNS AUTO: ABNORMAL /LPF
UROBILINOGEN UR STRIP-ACNC: 0.2 E.U./DL
WBC # BLD AUTO: 5.8 10E3/UL (ref 4–11)
WBC #/AREA URNS AUTO: ABNORMAL /HPF

## 2024-11-12 PROCEDURE — 81001 URINALYSIS AUTO W/SCOPE: CPT

## 2024-11-12 PROCEDURE — 36415 COLL VENOUS BLD VENIPUNCTURE: CPT

## 2024-11-12 PROCEDURE — 84156 ASSAY OF PROTEIN URINE: CPT

## 2024-11-12 PROCEDURE — 80053 COMPREHEN METABOLIC PANEL: CPT

## 2024-11-12 PROCEDURE — 85025 COMPLETE CBC W/AUTO DIFF WBC: CPT

## 2024-11-30 DIAGNOSIS — I10 HYPERTENSION GOAL BP (BLOOD PRESSURE) < 140/90: ICD-10-CM

## 2024-12-02 RX ORDER — LOSARTAN POTASSIUM AND HYDROCHLOROTHIAZIDE 25; 100 MG/1; MG/1
1 TABLET ORAL DAILY
Qty: 90 TABLET | Refills: 0 | Status: SHIPPED | OUTPATIENT
Start: 2024-12-02

## 2024-12-17 NOTE — TELEPHONE ENCOUNTER
Patient Quality Outreach     Patient is due for the following:   Breast Cancer Screening - Mammogram  Physical Preventive Adult Physical     Next Steps:   Schedule a Adult Preventative     Type of outreach:    Sent [employer] message.        Questions for provider review:    None               Ivon Norris, CMA

## 2025-01-15 NOTE — RESULT ENCOUNTER NOTE
"RN: Please CALL to notify Flavia Brice of the following:    [employer] message sent:  \"Ms. Brice,    Labs showed an elevated creatinine (measure of kidney function) that is on the higher end of the previous values seen in the last couple years.  Mildly elevated protein in the urine.  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  The antinuclear antibody was also positive and will need additional labs to assess if significant with regard to your symptoms and lab findings.  So please complete the followin. More labs are needed. Preferably you have these labs soon, and before receiving rituximab.  Do not delay rituximab for these labs though, as they can be done on the day of your rituximab infusion if they cannot be done sooner.    2. Call to schedule the rituximab infusion. There will be two infusions,  by 2 weeks.  Please let me know if unable to schedule within 10 days.  Also when you call, ask for help with the financial assistance because rituximab is expensive. The financial assistance program will help significantly with the cost (please see [email or web address]://[email or web address]/support-resources for details). Please call one of the following locations to schedule:    [facility]  [address]  [place], [place] [address] [phone]    [facility] [place]  [address]  [place], [place] [id number] [phone]    [facility] [place]   [address]  [place], [place] [address] [phone]    3. Prednisone: 20mg daily x2weeks, then 15mg daily x2weeks, then 10mg daily thereafter     Sincerely,  Ronaldo Moreira MD  8/10/2020\"" 134

## 2025-02-04 ENCOUNTER — LAB (OUTPATIENT)
Dept: LAB | Facility: CLINIC | Age: 60
End: 2025-02-04
Payer: COMMERCIAL

## 2025-02-04 DIAGNOSIS — M31.31 GRANULOMATOSIS WITH POLYANGIITIS WITH RENAL INVOLVEMENT (H): ICD-10-CM

## 2025-02-04 DIAGNOSIS — Z79.899 HIGH RISK MEDICATIONS (NOT ANTICOAGULANTS) LONG-TERM USE: ICD-10-CM

## 2025-02-04 DIAGNOSIS — M06.4 INFLAMMATORY POLYARTHROPATHY (H): ICD-10-CM

## 2025-02-04 LAB
ALBUMIN MFR UR ELPH: 30.9 MG/DL
ALBUMIN SERPL BCG-MCNC: 4.3 G/DL (ref 3.5–5.2)
ALBUMIN UR-MCNC: 30 MG/DL
ALP SERPL-CCNC: 86 U/L (ref 40–150)
ALT SERPL W P-5'-P-CCNC: 23 U/L (ref 0–50)
ANION GAP SERPL CALCULATED.3IONS-SCNC: 13 MMOL/L (ref 7–15)
APPEARANCE UR: CLEAR
AST SERPL W P-5'-P-CCNC: 22 U/L (ref 0–45)
BASOPHILS # BLD AUTO: 0 10E3/UL (ref 0–0.2)
BASOPHILS NFR BLD AUTO: 1 %
BILIRUB SERPL-MCNC: 0.3 MG/DL
BILIRUB UR QL STRIP: NEGATIVE
BUN SERPL-MCNC: 24.5 MG/DL (ref 8–23)
CALCIUM SERPL-MCNC: 9.7 MG/DL (ref 8.8–10.4)
CHLORIDE SERPL-SCNC: 102 MMOL/L (ref 98–107)
COLOR UR AUTO: YELLOW
CREAT SERPL-MCNC: 1.54 MG/DL (ref 0.51–0.95)
CREAT UR-MCNC: 96.7 MG/DL
CRP SERPL-MCNC: 4.54 MG/L
EGFRCR SERPLBLD CKD-EPI 2021: 38 ML/MIN/1.73M2
EOSINOPHIL # BLD AUTO: 0.2 10E3/UL (ref 0–0.7)
EOSINOPHIL NFR BLD AUTO: 4 %
ERYTHROCYTE [DISTWIDTH] IN BLOOD BY AUTOMATED COUNT: 14 % (ref 10–15)
ERYTHROCYTE [SEDIMENTATION RATE] IN BLOOD BY WESTERGREN METHOD: 12 MM/HR (ref 0–30)
GLUCOSE SERPL-MCNC: 124 MG/DL (ref 70–99)
GLUCOSE UR STRIP-MCNC: NEGATIVE MG/DL
HCO3 SERPL-SCNC: 24 MMOL/L (ref 22–29)
HCT VFR BLD AUTO: 38.3 % (ref 35–47)
HGB BLD-MCNC: 12.2 G/DL (ref 11.7–15.7)
HGB UR QL STRIP: NEGATIVE
HYALINE CASTS #/AREA URNS LPF: ABNORMAL /LPF
IMM GRANULOCYTES # BLD: 0 10E3/UL
IMM GRANULOCYTES NFR BLD: 0 %
KETONES UR STRIP-MCNC: NEGATIVE MG/DL
LEUKOCYTE ESTERASE UR QL STRIP: NEGATIVE
LYMPHOCYTES # BLD AUTO: 1.5 10E3/UL (ref 0.8–5.3)
LYMPHOCYTES NFR BLD AUTO: 26 %
MCH RBC QN AUTO: 29.8 PG (ref 26.5–33)
MCHC RBC AUTO-ENTMCNC: 31.9 G/DL (ref 31.5–36.5)
MCV RBC AUTO: 94 FL (ref 78–100)
MONOCYTES # BLD AUTO: 0.6 10E3/UL (ref 0–1.3)
MONOCYTES NFR BLD AUTO: 10 %
MUCOUS THREADS #/AREA URNS LPF: PRESENT /LPF
NEUTROPHILS # BLD AUTO: 3.6 10E3/UL (ref 1.6–8.3)
NEUTROPHILS NFR BLD AUTO: 59 %
NITRATE UR QL: NEGATIVE
PH UR STRIP: 5.5 [PH] (ref 5–7)
PLATELET # BLD AUTO: 291 10E3/UL (ref 150–450)
POTASSIUM SERPL-SCNC: 4.2 MMOL/L (ref 3.4–5.3)
PROT SERPL-MCNC: 7.2 G/DL (ref 6.4–8.3)
PROT/CREAT 24H UR: 0.32 MG/MG CR (ref 0–0.2)
RBC # BLD AUTO: 4.09 10E6/UL (ref 3.8–5.2)
RBC #/AREA URNS AUTO: ABNORMAL /HPF
SODIUM SERPL-SCNC: 139 MMOL/L (ref 135–145)
SP GR UR STRIP: 1.02 (ref 1–1.03)
SQUAMOUS #/AREA URNS AUTO: ABNORMAL /LPF
UROBILINOGEN UR STRIP-ACNC: 0.2 E.U./DL
WBC # BLD AUTO: 6 10E3/UL (ref 4–11)
WBC #/AREA URNS AUTO: ABNORMAL /HPF

## 2025-02-04 PROCEDURE — 80053 COMPREHEN METABOLIC PANEL: CPT

## 2025-02-04 PROCEDURE — 85652 RBC SED RATE AUTOMATED: CPT

## 2025-02-04 PROCEDURE — 86140 C-REACTIVE PROTEIN: CPT

## 2025-02-04 PROCEDURE — 81001 URINALYSIS AUTO W/SCOPE: CPT

## 2025-02-04 PROCEDURE — 86481 TB AG RESPONSE T-CELL SUSP: CPT

## 2025-02-04 PROCEDURE — 36415 COLL VENOUS BLD VENIPUNCTURE: CPT

## 2025-02-04 PROCEDURE — 84156 ASSAY OF PROTEIN URINE: CPT

## 2025-02-04 PROCEDURE — 85025 COMPLETE CBC W/AUTO DIFF WBC: CPT

## 2025-02-05 LAB
GAMMA INTERFERON BACKGROUND BLD IA-ACNC: 0.07 IU/ML
M TB IFN-G BLD-IMP: NEGATIVE
M TB IFN-G CD4+ BCKGRND COR BLD-ACNC: 9.93 IU/ML
MITOGEN IGNF BCKGRD COR BLD-ACNC: -0.01 IU/ML
MITOGEN IGNF BCKGRD COR BLD-ACNC: 0.01 IU/ML
QUANTIFERON MITOGEN: 10 IU/ML
QUANTIFERON NIL TUBE: 0.07 IU/ML
QUANTIFERON TB1 TUBE: 0.06 IU/ML
QUANTIFERON TB2 TUBE: 0.08

## 2025-02-11 ENCOUNTER — OFFICE VISIT (OUTPATIENT)
Dept: RHEUMATOLOGY | Facility: CLINIC | Age: 60
End: 2025-02-11
Payer: COMMERCIAL

## 2025-02-11 VITALS
BODY MASS INDEX: 31.38 KG/M2 | RESPIRATION RATE: 16 BRPM | WEIGHT: 194.4 LBS | DIASTOLIC BLOOD PRESSURE: 88 MMHG | OXYGEN SATURATION: 100 % | HEART RATE: 65 BPM | SYSTOLIC BLOOD PRESSURE: 132 MMHG

## 2025-02-11 DIAGNOSIS — Z23 NEED FOR VACCINATION: ICD-10-CM

## 2025-02-11 DIAGNOSIS — M06.4 INFLAMMATORY POLYARTHROPATHY (H): ICD-10-CM

## 2025-02-11 DIAGNOSIS — M31.31 GRANULOMATOSIS WITH POLYANGIITIS WITH RENAL INVOLVEMENT (H): Primary | ICD-10-CM

## 2025-02-11 DIAGNOSIS — Z23 NEED FOR PROPHYLACTIC VACCINATION AND INOCULATION AGAINST INFLUENZA: ICD-10-CM

## 2025-02-11 DIAGNOSIS — Z79.899 HIGH RISK MEDICATIONS (NOT ANTICOAGULANTS) LONG-TERM USE: ICD-10-CM

## 2025-02-11 PROCEDURE — G2211 COMPLEX E/M VISIT ADD ON: HCPCS | Performed by: INTERNAL MEDICINE

## 2025-02-11 PROCEDURE — 90472 IMMUNIZATION ADMIN EACH ADD: CPT | Performed by: INTERNAL MEDICINE

## 2025-02-11 PROCEDURE — 90471 IMMUNIZATION ADMIN: CPT | Performed by: INTERNAL MEDICINE

## 2025-02-11 PROCEDURE — 90750 HZV VACC RECOMBINANT IM: CPT | Performed by: INTERNAL MEDICINE

## 2025-02-11 PROCEDURE — 90673 RIV3 VACCINE NO PRESERV IM: CPT | Performed by: INTERNAL MEDICINE

## 2025-02-11 PROCEDURE — 99214 OFFICE O/P EST MOD 30 MIN: CPT | Mod: 25 | Performed by: INTERNAL MEDICINE

## 2025-02-11 NOTE — PATIENT INSTRUCTIONS
RHEUMATOLOGY    [facility]  [address] [place] [address]  [place] [address]    Phone number: [phone]  Fax number: [phone]    If you need a medication refill, please contact us as you may need lab work and/or a follow up visit prior to your refill.      Thank you for choosing [facility]!    Maura Newell CMA Rheumatology

## 2025-02-11 NOTE — NURSING NOTE
Blood pressure rechecked after visit    132/88  Maura Newell CMA Rheumatology  2/11/2025                                 RAPID3 (0-30) Cumulative Score  1.5          RAPID3 Weighted Score (divide #4 by 3 and that is the weighted score)  0.5

## 2025-02-11 NOTE — PROGRESS NOTES
Rheumatology Clinic Visit      Flavia Brice MRN# [id number]   YOB: 1965 Age: 60 year old      Date of visit: 2/11/25   PCP: Monet Da Silva    Chief Complaint   Patient presents with:  Granulomatosis with polyangiitis with renal involvement    Assessment and Plan     1.  Granulomatosis with polyangiitis: Dx'd 2012 by Dr. Wesley at [facility] when she presented with pulmonary involvement, pericarditis/effusion, borderline aneurysmal dilation of the ascending aorta and biopsy proven necrotizing crescentic nephritis.  Went into remission with cytoxan and prednisone, then maintained on AZA for 2 yrs per patient, stopped when lost to follow-up. Recurrence in 2020 with YOLI, pleuritic chest pain, inflammatory arthritis (MCPs, PIPs, shoulders, knees) that has responded well to prednisone.  She was tx'd with prednisone and rituximab 1g IV received on 8/21/2020 & 9/4/2020, 4/28/2021 (delayed for the COVID-19 vaccine) & (no 2nd dose of rituximab), 11/11/2021 & 11/30/2021, 9/1/2022 & 9/15/2022; 3/15/2023, 10/19/2023, 5/21/2024. Currently on azathioprine 100 mg daily and rituximab 1 g IV every 6 months but the last dose of rituximab was missed because of caring for an ill family member.  Feeling well today and labs are stable and she does not want to get rituximab until mid-March.  Discussed vaccinations now; will receive Shingrix and influenza today, then COVID-19 in 2 weeks, then at least 2 weeks later she can get rituximab.  She will call today to schedule her next rituximab infusion.  If she continues to do well on rituximab 1 g IV every 6 months then consider reducing the dose to rituximab 500 mg IV every 6 months chronic illness  - Continue azathioprine 100 mg daily  - Continue rituximab (truxima) 1 g IV every 6 months; anticipate the next dose to be in mid-March 2025  - Labs in 3 months: CBC, CMP, ESR, CRP, UA, Uprotein:creatinine  - Labs in 6 months: CBC, CMP, ESR, CRP, PR3, UA,  Uprotein:creatinine    High risk medication requiring intensive toxicity monitoring at least quarterly.    # Rituximab (Rituxan,Truxima) Risks and Benefits: The risks and benefits of rituximab were discussed in detail and the patient verbalized understanding.  The risks discussed include, but are not limited to, the risk for hypersensitivity, anaphylaxis, anaphylactoid reactions, fatal infusion reactions, an increased risk for serious infections leading to hospitalization or death, severe mucocutaneous reactions, reactivation of hepatitis B, and development of progressive multifocal leukoencephalopathy (PML) resulting in death.  The most common adverse reactions are infections, nasopharyngitis, urinary tract infections, nausea, diarrhea, headache, muscle spasms, and anemia.  It was discussed that the medication would need to be discontinued if a serious infection develops.  It was discussed that live vaccinations should not be received while using rituximab or within 30 days prior to starting rituximab.  I encouraged reviewing the package insert and asking any questions about the medication.       2. Inflammatory arthritis: Related to GPA.  See #1    3. Elevated serum creatinine: Related to GPA.  Stable.  Avoid nephrotoxic agents.    4. Positive MALLORY (antinuclear antibody): Additional work-up was negative for SSA, SSB, RNP, Smith, dsDNA; complement C3 and C4 were not low.  Symptoms are most likely related to GPA; no MALLORY-associated rheumatologic disorder identified    5.  Aneurysm of the ascending aorta at 4.5 cm; history of heart failure with preserved ejection fraction: following with Dr. Locke (cardiology)    6. Cytoxan use history: needs periodic UA to eval for hematuria, due to increased risk for bladder cancer after cytoxan exposure    7.  Osteoarthritis: Affecting the DIPs and bilateral first CMC joints.  Significant improvement with topical Voltaren gel as needed and exercises taught from hand therapy.  Continue  home hand therapy exercises.    8.  Osteopenia: Osteopenia based on 3/14/2023 DEXA.  Continue calcium and vitamin D.  Plan to recheck DEXA in [id number].    9.  History of Planter fasciitis, bilateral: Diagnosed 5/30/2023, and had significant improvement with stretching exercises and shoewear whenever ambulatory.  An issue currently    10. Vaccinations: Vaccinations reviewed with Ms. Brice.    - Influenza: Encouraged yearly vaccination, to be received at least 2 weeks before the next rituximab infusion.  - COVID-19: She plans to receive in 2 weeks, to be received at least 2 weeks prior to the next rituximab dose  - Shingrix: Shingrix vaccine to be received today  - Prevnar 20: up to date    Total minutes spent in evaluation with patient, documentation, , and review of pertinent studies and chart notes: 24  The longitudinal plan of care for the rheumatology problem(s) were addressed during this visit.  Due to added complexity of care, we will continue to support the patient and the subsequent management of this condition with ongoing continuity of care.      Ms. Brice verbalized agreement with and understanding of the rational for the diagnosis and treatment plan.  All questions were answered to best of my ability and the patient's satisfaction. Ms. Brice was advised to contact the clinic with any questions that may arise after the clinic visit.      Thank you for involving me in the care of the patient    Return to clinic: 6 months      HPI   Flaviaben Brice is a 60 year old female with a past medical history significant for hypertension, depression, and granulomatosis with polyangiitis who is seen for follow-up of granulomatosis with polyangiitis    10/31/2013 [facility] rheumatology clinic note by Dr. Ann Wesley documents severe NM-3 ANCA positive GPA with pulmonary involvement, pericarditis/effusion, borderline aneurysmal dilation of the ascending aorta, necrotizing crescentic nephritis.   Kidney biopsy has confirmed diagnosis in the past.  Has received Solu-Medrol x3, Cytoxan x1 at 1300 mg, on prednisone 60-80 mg daily.  Deteriorated quickly despite those interventions and was hospitalized again, requiring plasmapheresis and dialysis.  Was seeing Dr. Velazquez from [facility] and was again placed on Cytoxan 15 mg/kg every 3 weeks.  Has seen ENT but there was no clear sinus involvement.  She was then transitioned after 7 infusions of Cytoxan to Imuran on 11/2012 and has been doing well on that.      7/11/2020 [facility] hospitalization note: Acute kidney injury, migratory polyarthritis, chronic diastolic heart failure.  Left knee and left hip pain.  Right shoulder pain that migrated to the left shoulder.  No joint swelling.  No fevers.  In the past she had flare of her joint pain that she was treated with prednisone.  Prior to this ED evaluation she reportedly used prednisone 20 mg without improvement.    7/22/2020: [facility] emergency department visit for shortness of breath.  Notes history of granulomatosis with polyangiitis, diastolic heart failure, a sending thoracic aortic aneurysm from 4.6 cm on chest CT 2/18/2020), migratory polyarthritis, acute kidney injury, and hypertension.  Coronavirus test negative.  Chest CT negative for pneumonia.  O2 saturation normal.  Advised to follow-up with her PCP and cardiology.    8/5/2020:  Ms. Brice reported that she was dx'd with GPA by Dr. Wesley. She was treated and in remission; was on AZA for a year and no issues for a while. Lost to follow-up with Dr. Wesley.  Then 1 year ago started having more shortness of breath and found to have more lung nodules.  Recalls heart failure and kidney failure when first dx'd.  Told to see cardiology to follow the aneurysm.  Then in Jan 2020 started to have pain in her shoulders, knees ankles. Pain was so bad that she went to [facility]; found to have YOLI.  Until able to have this rheumatology evaluation, she  says that she was given prednisone to help the joint pain; currently on 5mg daily of prednisone; felt much better when on prednisone 30mg daily. Has been dealing with right knee swelling; the prednisone helped with this.  Joint pains are worse in the AM; improve with time and activity. Morning stiffness is very mild while on prednisone 5mg daily.  No hematuria. No hemoptysis.  Flavia Brice's mother was present during the clinic visit.     9/9/2020: Significant improvement with prednisone and rituximab.  No longer with swelling around her ankles.  Hand swelling/pain/stiffness has resolved.  She feels a little puffy in her face but no actual swelling.  She has followed with her cardiologist and they are going to monitor the thoracic aortic aneurysm.  Tolerated rituximab infusion well.  Currently on prednisone 10 mg daily.  Not going into work at this time; she is taking a leave of absence.    10/21/2020: Improved.  Now with mild pain at her MCPs, DIPs, and knees.  MCP and knee pain is better with activity, worse in the morning.  DIP pain is worse with activity.  Joint pains started again with dose reduction of prednisone.  Has been on azathioprine and tolerated well in the past.    12/9/2020: Significant improvement with regard to her joint pain.  No joint pain now.  No morning stiffness.  No gelling phenomenon.  Tolerating azathioprine well.  Continues on prednisone 5mg daily.  Mild shortness of breath when walking up stairs that has been stable; no associated chest pain or pressure, palpitations, lightheadedness, dizziness, or nausea and the mild shortness of breath resolves quickly when she rests.  No shortness of breath at rest or currently.  Following with cardiology and plans to repeat her echocardiogram in March 2021.  Last echocardiogram was in February 2020.    4/8/2021: More joint aches and pains and she attributes this to being off of prednisone and having to delay her rituximab infusion secondary to  the COVID-19 vaccine.  She has rescheduled the first of the 2 rituximab infusions to be 2 weeks after the second COVID-19 vaccine.  Knees ache more at the end of the day.  No cough, chest pain, shortness of breath.  Has seen cardiology and she says that her aortic aneurysm is stable.    7/7/2021:  doing well this time.  Mild aches of the PIPs and DIPs with more activity that improves with rest.  Usually has pain that travels around but in general is doing okay.  Only received 1 of 2 rituximab doses because her insurance had denied it; she then got a letter about a month later stating that rituximab had been approved.  Tolerating azathioprine and rituximab well.  Overall happy with how well she is doing.  No blood in urine or stool    10/19/2021: Reports that she is doing well.  She says that she is doing better than she was previously, and way better than she was before treatment.  No joint pain or stiffness.  No rash.  Tolerating azathioprine well.  No difficulty doing her daily activities.     1/25/2022: feels the best she has in a long time.  No joint pain or swelling. Morning stiffness for <20.  No rash.  No black or bloody stools.  No cough, chest pain, or shortness of breath.  No hemoptysis.  No hematemesis.  Using a surgical mask at work.    5/4/2022: had covid infxn, resolved >2wks ago. Fasting for labs recently. Joints more achy; due to rtx.  Tolerating aza well. No joint swelling. Morning stiffness <30 min. +gelling. No hemoptysis.  No blood in urine.     8/2/2022: Did not get the rituximab infusion but does have a scheduled for 9/1/2022 and 9/15/2022.  More joint pain at the MCPs, PIPs, knees, and MTPs that is worse in the morning and improves with time and activity.  Morning stiffness for about 1 hour.  No rash.  No black or bloody stools.  No hematuria.  No cough, chest pain, or shortness of breath.    11/8/2022:  Joints are doing well with only occasional MCP and PIP ache for the first 20 minutes of  the morning.  No gelling phenomenon.  Tolerating azathioprine well.  No rash.  No hematuria.  No black or bloody stools.  No hemoptysis or hematemesis.    2/28/2023: Doing well at this time except for pain at the end of the day and with increased activity of the bilateral first CMC joints that improves with rest; no swelling of these joints.  No other joint pain or swelling.  Morning stiffness for no more than 20 minutes.  No hematuria.  No hemoptysis.  Couple times and had blood in her stool and she recalls that she had a colonoscopy in 2019 and was supposed to follow-up for repeat colonoscopy 6 months later but never went.    5/30/2023: Currently doing well.  Bilateral plantar foot pain with the first few steps of the day and after any period of inactivity where she was not standing on her feet.  After walking more than a couple steps then the bottom of her feet start to feel much better.  Hand pain is nearly resolved with the exercises taught in hand therapy.  She uses topical Voltaren gel for her hands as needed, infrequently.  Other joints are doing well.  No cough, chest pain, or shortness of breath.  No hematuria.  Arthritis is not limiting her daily activities.    9/12/2023: Near resolution of bilateral planter fasciitis symptoms with stretching exercises and wearing appropriate shoes whenever ambulatory.  Currently without joint pain or swelling.  No morning stiffness or gelling phenomenon.  No cough, chest pain, shortness of breath.  No hematuria.  No fatigue.  Arthritis does not limit her daily activities.    1/2/2024: Currently doing well.  No joint pain or swelling.  No morning stiffness or gelling phenomenon.  No plantar fasciitis symptoms.  No chest pain or pressure or shortness of breath.  No cough.  No hematuria.  No fatigue.  Arthritis does not limit daily activities.    4/2/2024: Recent pneumonia; symptoms have resolved.  Notes that during the hospital evaluation for pneumonia she was found to  have YOLI.  No joint pain or swelling.  No morning stiffness or gelling phenomenon.  No chest pain or pressure or shortness of breath.  No hematuria.  No hemoptysis.  No fatigue.    8/13/2024: Mild ache of the hands and feet 1-2 weeks before the next rituximab dose, but no swelling, and arthritis does not worsen to the point that it affects daily activities; symptoms are mild enough that she does not want to change the frequency of rituximab or adjust other medication.  Otherwise feeling well.      Today, 2/11/2025: Missed the full dose of rituximab but continues to do well.  She missed the fall dose of rituximab because of caring for ill family member.  Currently without joint pain or swelling.  No morning stiffness or gelling phenomenon.  No hemoptysis.  No hematuria.  Overall feels stable.  No missed dose of azathioprine.  Plans to get rituximab in March.    Denies fevers, chills, nausea, vomiting. Occasional constipation or diarrhea, unchanged from previous. No abdominal pain. No chest pain/pressure, palpitations, or shortness of breath at this time.  No LE swelling.  No oral or nasal sores.  No rash. No sicca symptoms.  No eye pain or redness.    Tobacco: quit smoking in Feb 2020  EtOH: no more than 1 drink per month  Drugs: none  Occupation: [employer]    ROS   12 point review of system was completed and negative except as noted in the HPI     Active Problem List     Patient Active Problem List   Diagnosis    Wegener's granulomatosis    CARDIOVASCULAR SCREENING; LDL GOAL LESS THAN 130    Overweight    Hypertension goal BP (blood pressure) < 140/90    Situational depression    Granulomatosis with polyangiitis (H)    Chronic kidney disease, stage 3    Former smoker    Menopausal syndrome (hot flashes)    Chronic diastolic (congestive) heart failure (H)    Thoracic aortic aneurysm without rupture, unspecified part    Angina at rest    Rectal bleeding    History of colonic polyps    Hx of aortic aneurysm    Acute on  chronic respiratory failure, unspecified whether with hypoxia or hypercapnia (H)    Screening for cervical cancer     Past Medical History     Past Medical History:   Diagnosis Date    Tinnitus      Past Surgical History     Past Surgical History:   Procedure Laterality Date    COLONOSCOPY WITH CO2 INSUFFLATION N/A 2/18/2019    Procedure: COLONOSCOPY WITH CO2 INSUFFLATION;  Surgeon: Javier Guillen MD;  Location: [facility] OR    GALLBLADDER SURGERY       Allergy   No Known Allergies  Current Medication List     Current Outpatient Medications   Medication Sig Dispense Refill    albuterol (PROAIR HFA/PROVENTIL HFA/VENTOLIN HFA) 108 (90 Base) MCG/ACT inhaler Inhale 2 puffs into the lungs every 4 hours as needed for shortness of breath, wheezing or cough 18 g 0    Ascorbic Acid (VITAMIN C) 100 MG CHEW       azaTHIOprine (IMURAN) 50 MG tablet Take 2 tablets (100 mg) by mouth daily 180 tablet 2    losartan-hydrochlorothiazide (HYZAAR) 100-25 MG tablet TAKE 1 TABLET BY MOUTH DAILY NEED FOLLOW-UP VISIT WITH LABS FOR FURTHER REFILLS 90 tablet 0    Multiple Vitamins-Minerals (ZINC PO)       VITAMIN D PO       zinc gluconate 50 MG tablet Take 50 mg by mouth daily      BETA BLOCKER NOT PRESCRIBED (INTENTIONAL) Please choose reason not prescribed from choices below. (Patient not taking: Reported on 5/21/2024)  0     No current facility-administered medications for this visit.     Social History   See HPI    Family History     Family History   Problem Relation Age of Onset    Diabetes Father     Heart Disease Maternal Grandmother     Heart Disease Maternal Grandfather     Arthritis Paternal Grandmother     Heart Disease Paternal Grandfather      Grandmother: rheumatoid arthritis    Physical Exam     Temp Readings from Last 3 Encounters:   05/21/24 98.2  F (36.8  C) (Oral)   02/17/24 98  F (36.7  C) (Tympanic)   11/03/23 99  F (37.2  C) (Tympanic)     BP Readings from Last 5 Encounters:   02/11/25 (!) 149/92   08/13/24 122/81   05/21/24  "(!) 149/51   04/02/24 118/80   02/17/24 112/79     Pulse Readings from Last 1 Encounters:   02/11/25 65     Resp Readings from Last 1 Encounters:   02/11/25 16     Estimated body mass index is 31.38 kg/m  as calculated from the following:    Height as of 1/2/24: 1.676 m (5' 6\").    Weight as of this encounter: 88.2 kg (194 lb 6.4 oz).    GEN: NAD.   HEENT:  Anicteric, noninjected sclera. No obvious external lesions of the ear and nose. Hearing intact.  CV: S1, S2. RRR. No m/r/g  PULM: No increased work of breathing. CTA bilaterally   MSK: MCPs, PIPs, DIPs without swelling or tenderness to palpation.  Squaring and mild tenderness to palpation without effusion, increased warmth, or overlying erythema at the bilateral first CMC joints.  Wrists without swelling or tenderness to palpation.  Elbows and shoulders without swelling or tenderness to palpation.   Knees, ankles, and MTPs without swelling or tenderness to palpation.  Pes planus bilaterally.  Nontender to palpation on the plantar aspect of each foot.  SKIN: No rash or jaundice seen  PSYCH: Alert. Appropriate.       Labs / Imaging (select studies)     RF/CCP  Recent Labs   Lab Test 08/05/20  1453   CCPIGG <1   RHF <7     MALLORY  Recent Labs   Lab Test 08/05/20  1453   CAMMIE Positive*   ANAP1 HOMOGENEOUS   ANAT1 1:160     RNP/Sm/SSA/SSB  Recent Labs   Lab Test 08/11/20  1402   RNPIGG <0.2   SMIGG <0.2   SSAIGG <0.2   SSBIGG <0.2     dsDNA  Recent Labs   Lab Test 08/11/20  1402   DNA <1     C3/C4  Recent Labs   Lab Test 08/11/20  1402   K9DTIMO 133   A6RIGOI 41*     Antiphospholipid Antibodies  Recent Labs   Lab Test 08/11/20  1402   B2GPG 0.8   B2GPM <2.9   CARDG <1.6   CARDM 0.8   LUPINT Negative     ANCA  Recent Labs   Lab Test 08/06/24  0720 08/05/20  1453   PR3IGG Positive* 4.8*   ON6QGRIF 6.8*  --    MPOIGG  --  <0.2     IgG  Recent Labs   Lab Test 05/19/24  0912 10/19/23  1429 03/15/23  0842    882 859    130 120   IGM 14* 24* 15*     CBC  Recent " Labs   Lab Test 02/04/25  0738 11/12/24  0720 08/06/24  0720 10/14/21  0720 07/07/21  0818 04/24/21  1130 12/03/20  1128   WBC 6.0 5.8 5.0   < > 5.0 5.1 8.3   RBC 4.09 4.16 4.23   < > 3.98 3.82 4.27   HGB 12.2 12.7 12.8   < > 12.2 11.7 13.1   HCT 38.3 39.2 39.6   < > 38.8 37.6 41.3   MCV 94 94 94   < > 98 98 97   RDW 14.0 13.6 13.3   < > 14.9 13.7 14.2    291 276   < > 304 297 347   MCH 29.8 30.5 30.3   < > 30.7 30.6 30.7   MCHC 31.9 32.4 32.3   < > 31.4* 31.1* 31.7   NEUTROPHIL 59 58 55   < > 60.1 63.5 73.6   LYMPH 26 27 28   < > 22.9 20.4 17.7   MONOCYTE 10 11 12   < > 11.2 11.1 6.9   EOSINOPHIL 4 4 4   < > 5.0 4.4 1.3   BASOPHIL 1 1 1   < > 0.8 0.6 0.5   ANEU  --   --   --   --  3.0 3.2 6.1   ALYM  --   --   --   --  1.1 1.0 1.5   MARCOS  --   --   --   --  0.6 0.6 0.6   AEOS  --   --   --   --  0.3 0.2 0.1   ABAS  --   --   --   --  0.0 0.0 0.0   ANEUTAUTO 3.6 3.3 2.7   < >  --   --   --    ALYMPAUTO 1.5 1.6 1.4   < >  --   --   --    AMONOAUTO 0.6 0.6 0.6   < >  --   --   --    AEOSAUTO 0.2 0.3 0.2   < >  --   --   --    ABSBASO 0.0 0.0 0.0   < >  --   --   --     < > = values in this interval not displayed.     CMP  Recent Labs   Lab Test 02/04/25  0738 11/12/24  0720 08/06/24  0720 05/19/24  0912 03/26/24  0820 12/30/23  1719 09/09/23  0916 10/14/21  0720 07/07/21  0818 04/24/21  1130 12/03/20  1128 11/19/20  1123    141 142 142 143  --  141   < > 142 141  --   --    POTASSIUM 4.2 4.8 5.0 4.9 4.5  --  4.8   < > 4.7 4.2  --   --    CHLORIDE 102 107 106 106 107  --  104   < > 112* 108  --   --    CO2 24 24 25 24 21*  --  25   < > 26 28  --   --    ANIONGAP 13 10 11 12 15  --  12   < > 4 5  --   --    * 110* 99 95 145*  --  118*   < > 103* 114*  --   --    BUN 24.5* 31.6* 35.4* 43.2* 39.6*  --  34.7*   < > 34* 26  --   --    CR 1.54* 1.62* 1.46* 1.59* 1.67*   < > 1.51*   < > 1.44* 1.50* 1.41* 1.45*   GFRESTIMATED 38* 36* 41* 37* 35*   < > 39*   < > 40* 38* 41* 40*   GFRESTBLACK  --   --   --    --   --   --   --   --  46* 44* 48* 46*   JESSICA 9.7 9.9 9.3 9.8 9.5  --  9.5   < > 9.6 9.5  --   --    BILITOTAL 0.3 0.2 0.3 0.2 0.3   < > 0.4   < > 0.2 0.4 0.4 0.3   ALBUMIN 4.3 4.2 4.1 4.3 4.3   < > 4.3   < > 3.6 3.5 3.9 3.7   PROTTOTAL 7.2 6.8 6.6 6.9 6.9   < > 7.0   < > 7.2 6.7* 7.5 7.4   ALKPHOS 86 90 82 84 76   < > 90   < > 75 67 80 78   AST 22 22 18 21 20   < > 20   < > 21 22 16 13   ALT 23 29 22 23 24   < > 25   < > 49 62* 37 33    < > = values in this interval not displayed.       HgA1c  Recent Labs   Lab Test 05/19/24  0912 03/10/23  1515 04/16/22  1236   A1C 5.7* 6.0* 6.1*     Calcium/VitaminD  Recent Labs   Lab Test 02/04/25  0738 11/12/24  0720 08/06/24  0720   JESSICA 9.7 9.9 9.3     ESR/CRP  Recent Labs   Lab Test 02/04/25  0738 03/26/24  0820 12/30/23  1719 09/09/23  0916 05/28/23  0911 02/28/23  1421 08/07/22  1604   SED 12 28 10   < > 9 9 10   CRP  --   --   --   --  <2.9 3.3 4.2   CRPI 4.54 4.38 <3.00   < >  --   --   --     < > = values in this interval not displayed.     CK/Aldolase  Recent Labs   Lab Test 08/11/20  1402   CKT 79     TSH/T4  Recent Labs   Lab Test 03/10/23  1515 10/21/21  0920 03/26/18  1058   TSH 1.66 1.10 1.44     Lipid Panel  Recent Labs   Lab Test 03/10/23  1515 01/23/22  1024 03/26/18  1058   CHOL 201* 192 188   TRIG 110 118 93   HDL 83 74 87   LDL 96 94 82   NHDL 118 118 101     Hepatitis B  Recent Labs   Lab Test 03/26/24  0820 04/16/22  1236 08/05/20  1453   HBCAB Nonreactive Nonreactive Nonreactive   HEPBANG Nonreactive Nonreactive Nonreactive     Hepatitis C  Recent Labs   Lab Test 08/05/20  1453 03/26/18  1058   HCVAB Nonreactive Nonreactive     Lyme ab screening  Recent Labs   Lab Test 08/05/20  1453   LYMEGM 0.10       Tuberculosis Screening  Recent Labs   Lab Test 02/04/25  0738 03/26/24  0820 02/28/23  1421 04/16/22  1236 08/05/20  1453   TBRES Negative Negative Negative   < >  --    TBRST  --   --   --   --  Negative    < > = values in this interval not displayed.      HIV Screening  Recent Labs   Lab Test 08/05/20  1453   HIAGAB Nonreactive     UA  Recent Labs   Lab Test 02/04/25  0738 11/12/24  0720 08/06/24  0740 03/26/24  0820 12/30/23  1719 03/25/23  1216 02/28/23  1421 04/16/22  1236 10/14/21  0720 04/24/21  1137 09/16/20  1442 08/05/20  1453 07/27/20  0938   COLOR Yellow Yellow Yellow   < > Yellow   < > Yellow   < > Yellow   < > Yellow Yellow Yellow   APPEARANCE Clear Clear Clear   < > Clear   < > Slightly Cloudy*   < > Clear   < > Clear Clear Clear   URINEGLC Negative Negative Negative   < > Negative   < > Negative   < > Negative   < > Negative Negative Negative   URINEBILI Negative Negative Negative   < > Negative   < > Negative   < > Negative   < > Negative Negative Negative   SG 1.025 1.020 1.020   < > 1.010   < > 1.020   < > >=1.030   < > 1.025 1.025 1.015   URINEPH 5.5 5.5 5.0   < > 5.0   < > 5.0   < > 5.5   < > 5.0 5.0 5.0   PROTEIN 30* Trace* Negative   < > Negative   < > Trace*   < > Trace*   < > 30* Negative Trace*   UROBILINOGEN 0.2 0.2 0.2   < > 0.2   < > 0.2   < > 0.2   < > 0.2 0.2 0.2   NITRITE Negative Negative Negative   < > Negative   < > Negative   < > Negative   < > Negative Negative Negative   UBLD Negative Negative Negative   < > Negative   < > Negative   < > Trace*   < > Negative Trace* Small*   LEUKEST Negative Negative Small*   < > Trace*   < > Moderate*   < > Trace*   < > Negative Negative Negative   WBCU None Seen 0-5 0-5   < > 0-5   < > 10-25*  --  0-5   < > 0 - 5 0 - 5 0 - 5   RBCU None Seen None Seen None Seen   < > None Seen   < > 2-5*  --  None Seen   < > O - 2 O - 2 2-5*   SQUAMOUSEPI  --   --   --   --   --   --   --   --   --   --  Few Few Few   BACTERIA  --   --   --   --  None Seen  --  Many*  --  Few*  --  Few* Few* Few*   MUCOUS  --   --   --   --   --   --   --   --   --   --   --  Present*  --     < > = values in this interval not displayed.     Urine Microscopic  Recent Labs   Lab Test 02/04/25  0738 11/12/24  0720 08/06/24  0740  03/26/24  0820 12/30/23  1719 09/09/23  0916 02/28/23  1421 10/14/21  0720 07/07/21  0818 09/16/20  1442 08/05/20  1453 07/27/20  0938   WBCU None Seen 0-5 0-5   < > 0-5   < > 10-25* 0-5   < > 0 - 5 0 - 5 0 - 5   RBCU None Seen None Seen None Seen   < > None Seen   < > 2-5* None Seen   < > O - 2 O - 2 2-5*   SQUAMOUSEPI  --   --   --   --   --   --   --   --   --  Few Few Few   BACTERIA  --   --   --   --  None Seen  --  Many* Few*  --  Few* Few* Few*   MUCOUS  --   --   --   --   --   --   --   --   --   --  Present*  --     < > = values in this interval not displayed.     Urine Protein  GHUTP and UTP= Urine protein (random), GHUTPG and UTPG = urine protein:creatinine ratio (random), UCRR = urine creatinine (random)  Recent Labs   Lab Test 02/04/25  0738 11/12/24  0720 08/06/24  0740 02/28/23  1421 04/16/22  1236 01/23/22  1024 10/14/21  0720   GHUTP 30.9 16.9 17.5   < >  --   --   --    UTP  --   --   --   --  0.16 0.13 0.45   GHUTPG 0.32* 0.12 0.19   < >  --   --   --    UTPG  --   --   --   --  0.14 0.13 0.23*   UCRR 96.7 139.0 91.6   < > 114 104 198    < > = values in this interval not displayed.         Immunization History     Immunization History   Administered Date(s) Administered    COVID-19 12+ (Pfizer) 09/23/2023    COVID-19 Bivalent 18+ (Moderna) 03/10/2023    COVID-19 Monovalent 18+ (Moderna) 03/19/2021, 04/16/2021, 10/19/2021    Hepatitis B, Adult 03/10/2023    Influenza Vaccine 18-64 (Flublok) 11/07/2020, 10/19/2021, 03/10/2023    Influenza Vaccine >6 months,quad, PF 10/31/2013, 02/19/2020    Influenza,INJ,MDCK,PF,Quad >6mo(Flucelvax) 09/23/2023    Pneumococcal 20 valent Conjugate (Prevnar 20) 03/10/2023    Pneumococcal 23 valent 04/25/2008, 06/28/2012    TDAP (Adacel,Boostrix) 07/02/2012, 03/10/2023    Zoster recombinant adjuvanted (SHINGRIX) 01/02/2024          Chart documentation done in part with Dragon Voice recognition Software. Although reviewed after completion, some word and grammatical  error may remain.    Ronaldo Moreira MD

## 2025-03-12 ENCOUNTER — PATIENT OUTREACH (OUTPATIENT)
Dept: GASTROENTEROLOGY | Facility: CLINIC | Age: 60
End: 2025-03-12
Payer: COMMERCIAL

## 2025-03-12 DIAGNOSIS — Z12.11 SPECIAL SCREENING FOR MALIGNANT NEOPLASMS, COLON: Primary | ICD-10-CM

## 2025-03-12 NOTE — PROGRESS NOTES
"Patients last colonoscopy on file was on 2019 and was recommended to repeat in 6 months. Order placed by [facility] RN last year now .  [facility] Screening Colonoscopy Referral Review    Patient meets the inclusion criteria for screening colonoscopy standing order.    Ordering/Referring Provider:  Monet Da Silva      BMI: Estimated body mass index is 31.38 kg/m  as calculated from the following:    Height as of 24: 1.676 m (5' 6\").    Weight as of 25: 88.2 kg (194 lb 6.4 oz).     Sedation:  Does patient have any of the following conditions affecting sedation?  No medical conditions affecting sedation.    Previous Scopes:  Any previous recommendations or follow up needs based on previous scope?  na / No recommendations.    Medical Concerns to Postpone Order:  Does patient have any of the following medical concerns that should postpone/delay colonoscopy referral?  No medical conditions affecting colonoscopy referral.    Final Referral Details:  Based on patient's medical history patient is appropriate for referral order with moderate sedation. If patient's BMI > 50 do not schedule in [facility].  "

## 2025-04-23 ENCOUNTER — INFUSION THERAPY VISIT (OUTPATIENT)
Dept: INFUSION THERAPY | Facility: CLINIC | Age: 60
End: 2025-04-23
Attending: INTERNAL MEDICINE
Payer: COMMERCIAL

## 2025-04-23 VITALS
RESPIRATION RATE: 18 BRPM | TEMPERATURE: 97.7 F | SYSTOLIC BLOOD PRESSURE: 144 MMHG | BODY MASS INDEX: 32.64 KG/M2 | DIASTOLIC BLOOD PRESSURE: 78 MMHG | WEIGHT: 202.2 LBS | OXYGEN SATURATION: 98 % | HEART RATE: 65 BPM

## 2025-04-23 DIAGNOSIS — M31.31 GRANULOMATOSIS WITH POLYANGIITIS WITH RENAL INVOLVEMENT (H): Primary | ICD-10-CM

## 2025-04-23 LAB
CD19 B CELL COMMENT: NORMAL
CD19 CELLS # BLD: 241 CELLS/UL (ref 107–698)
CD19 CELLS NFR BLD: 16 % (ref 6–27)
HOLD SPECIMEN: NORMAL

## 2025-04-23 PROCEDURE — 250N000011 HC RX IP 250 OP 636: Mod: JZ | Performed by: INTERNAL MEDICINE

## 2025-04-23 PROCEDURE — 96415 CHEMO IV INFUSION ADDL HR: CPT

## 2025-04-23 PROCEDURE — 82784 ASSAY IGA/IGD/IGG/IGM EACH: CPT | Performed by: INTERNAL MEDICINE

## 2025-04-23 PROCEDURE — 96375 TX/PRO/DX INJ NEW DRUG ADDON: CPT

## 2025-04-23 PROCEDURE — 36415 COLL VENOUS BLD VENIPUNCTURE: CPT | Performed by: INTERNAL MEDICINE

## 2025-04-23 PROCEDURE — 258N000003 HC RX IP 258 OP 636: Performed by: INTERNAL MEDICINE

## 2025-04-23 PROCEDURE — 99207 PR NO CHARGE LOS: CPT

## 2025-04-23 PROCEDURE — 250N000013 HC RX MED GY IP 250 OP 250 PS 637: Performed by: INTERNAL MEDICINE

## 2025-04-23 PROCEDURE — 86355 B CELLS TOTAL COUNT: CPT | Performed by: INTERNAL MEDICINE

## 2025-04-23 PROCEDURE — 96413 CHEMO IV INFUSION 1 HR: CPT

## 2025-04-23 RX ORDER — METHYLPREDNISOLONE SODIUM SUCCINATE 125 MG/2ML
125 INJECTION INTRAMUSCULAR; INTRAVENOUS ONCE
Status: CANCELLED | OUTPATIENT
Start: 2025-09-13

## 2025-04-23 RX ORDER — ACETAMINOPHEN 325 MG/1
650 TABLET ORAL ONCE
Status: COMPLETED | OUTPATIENT
Start: 2025-04-23 | End: 2025-04-23

## 2025-04-23 RX ORDER — HEPARIN SODIUM (PORCINE) LOCK FLUSH IV SOLN 100 UNIT/ML 100 UNIT/ML
5 SOLUTION INTRAVENOUS
Status: CANCELLED | OUTPATIENT
Start: 2025-09-13

## 2025-04-23 RX ORDER — HEPARIN SODIUM,PORCINE 10 UNIT/ML
5-20 VIAL (ML) INTRAVENOUS DAILY PRN
Status: CANCELLED | OUTPATIENT
Start: 2025-09-13

## 2025-04-23 RX ORDER — EPINEPHRINE 1 MG/ML
0.3 INJECTION, SOLUTION INTRAMUSCULAR; SUBCUTANEOUS EVERY 5 MIN PRN
Status: CANCELLED | OUTPATIENT
Start: 2025-09-13

## 2025-04-23 RX ORDER — DIPHENHYDRAMINE HCL 25 MG
50 CAPSULE ORAL ONCE
Status: CANCELLED
Start: 2025-10-20 | End: 2025-10-20

## 2025-04-23 RX ORDER — METHYLPREDNISOLONE SODIUM SUCCINATE 125 MG/2ML
125 INJECTION INTRAMUSCULAR; INTRAVENOUS ONCE
Status: COMPLETED | OUTPATIENT
Start: 2025-04-23 | End: 2025-04-23

## 2025-04-23 RX ORDER — ALBUTEROL SULFATE 90 UG/1
1-2 INHALANT RESPIRATORY (INHALATION)
Status: CANCELLED
Start: 2025-09-13

## 2025-04-23 RX ORDER — DIPHENHYDRAMINE HYDROCHLORIDE 50 MG/ML
50 INJECTION, SOLUTION INTRAMUSCULAR; INTRAVENOUS
Start: 2025-09-13

## 2025-04-23 RX ORDER — ACETAMINOPHEN 325 MG/1
650 TABLET ORAL ONCE
Status: CANCELLED
Start: 2025-09-13

## 2025-04-23 RX ORDER — HEPARIN SODIUM,PORCINE 10 UNIT/ML
5-20 VIAL (ML) INTRAVENOUS DAILY PRN
OUTPATIENT
Start: 2025-09-13

## 2025-04-23 RX ORDER — DIPHENHYDRAMINE HCL 25 MG
50 CAPSULE ORAL ONCE
Status: COMPLETED | OUTPATIENT
Start: 2025-04-23 | End: 2025-04-23

## 2025-04-23 RX ORDER — DIPHENHYDRAMINE HYDROCHLORIDE 50 MG/ML
50 INJECTION, SOLUTION INTRAMUSCULAR; INTRAVENOUS
Status: CANCELLED
Start: 2025-09-13

## 2025-04-23 RX ORDER — ALBUTEROL SULFATE 90 UG/1
1-2 INHALANT RESPIRATORY (INHALATION)
Start: 2025-09-13

## 2025-04-23 RX ORDER — EPINEPHRINE 1 MG/ML
0.3 INJECTION, SOLUTION INTRAMUSCULAR; SUBCUTANEOUS EVERY 5 MIN PRN
OUTPATIENT
Start: 2025-09-13

## 2025-04-23 RX ORDER — ALBUTEROL SULFATE 0.83 MG/ML
2.5 SOLUTION RESPIRATORY (INHALATION)
OUTPATIENT
Start: 2025-09-13

## 2025-04-23 RX ORDER — MEPERIDINE HYDROCHLORIDE 25 MG/ML
25 INJECTION INTRAMUSCULAR; INTRAVENOUS; SUBCUTANEOUS EVERY 30 MIN PRN
OUTPATIENT
Start: 2025-09-13

## 2025-04-23 RX ORDER — HEPARIN SODIUM (PORCINE) LOCK FLUSH IV SOLN 100 UNIT/ML 100 UNIT/ML
5 SOLUTION INTRAVENOUS
OUTPATIENT
Start: 2025-09-13

## 2025-04-23 RX ORDER — METHYLPREDNISOLONE SODIUM SUCCINATE 125 MG/2ML
125 INJECTION INTRAMUSCULAR; INTRAVENOUS
Start: 2025-09-13

## 2025-04-23 RX ORDER — ALBUTEROL SULFATE 0.83 MG/ML
2.5 SOLUTION RESPIRATORY (INHALATION)
Status: CANCELLED | OUTPATIENT
Start: 2025-09-13

## 2025-04-23 RX ORDER — METHYLPREDNISOLONE SODIUM SUCCINATE 125 MG/2ML
125 INJECTION INTRAMUSCULAR; INTRAVENOUS
Status: CANCELLED
Start: 2025-09-13

## 2025-04-23 RX ORDER — MEPERIDINE HYDROCHLORIDE 25 MG/ML
25 INJECTION INTRAMUSCULAR; INTRAVENOUS; SUBCUTANEOUS EVERY 30 MIN PRN
Status: CANCELLED | OUTPATIENT
Start: 2025-09-13

## 2025-04-23 RX ADMIN — RITUXIMAB-ABBS 1000 MG: 10 INJECTION, SOLUTION INTRAVENOUS at 09:13

## 2025-04-23 RX ADMIN — ACETAMINOPHEN 650 MG: 325 TABLET ORAL at 08:51

## 2025-04-23 RX ADMIN — SODIUM CHLORIDE 250 ML: 0.9 INJECTION, SOLUTION INTRAVENOUS at 08:51

## 2025-04-23 RX ADMIN — DIPHENHYDRAMINE HYDROCHLORIDE 50 MG: 25 CAPSULE ORAL at 08:52

## 2025-04-23 RX ADMIN — METHYLPREDNISOLONE SODIUM SUCCINATE 125 MG: 125 INJECTION, POWDER, FOR SOLUTION INTRAMUSCULAR; INTRAVENOUS at 08:54

## 2025-04-23 ASSESSMENT — PAIN SCALES - GENERAL: PAINLEVEL_OUTOF10: NO PAIN (0)

## 2025-04-23 NOTE — PROGRESS NOTES
Infusion Nursing Note:  Flavia Ele Brice presents today for Rapid Rituxan.    Patient seen by provider today: No   present during visit today: Not Applicable.    Note: Alley has been doing red light therapy, she feel this is helping her.      Intravenous Access:  Peripheral IV placed.    Treatment Conditions:  Biological Infusion Checklist:  ~~~ NOTE: If the patient answers yes to any of the questions below, hold the infusion and contact ordering provider or on-call provider.    Have you recently had an elevated temperature, fever, chills, productive cough, coughing for 3 weeks or longer or hemoptysis,  abnormal vital signs, night sweats,  chest pain or have you noticed a decrease in your appetite, unexplained weight loss or fatigue? No  Do you have any open wounds or new incisions? No  Do you have any upcoming hospitalizations or surgeries? Does not include esophagogastroduodenoscopy, colonoscopy, endoscopic retrograde cholangiopancreatography (ERCP), endoscopic ultrasound (EUS), dental procedures or joint aspiration/steroid injections No  Do you currently have any signs of illness or infection or are you on any antibiotics? No  Have you had any new, sudden or worsening abdominal pain? No  Have you or anyone in your household received a live vaccination in the past 4 weeks? Please note: No live vaccines while on biologic/chemotherapy until 6 months after the last treatment. Patient can receive the flu vaccine (shot only), pneumovax and the Covid vaccine. It is optimal for the patient to get these vaccines mid cycle, but they can be given at any time as long as it is not on the day of the infusion. No  Have you recently been diagnosed with any new nervous system diseases (ie. Multiple sclerosis, Guillain [place], seizures, neurological changes) or cancer diagnosis? Are you on any form of radiation or chemotherapy? No  Are you pregnant or breast feeding or do you have plans of pregnancy in the future?  No  Have you been having any signs of worsening depression or suicidal ideations?  (benlysta only) No  Have there been any other new onset medical symptoms? No  Have you had any new blood clots? (IVIG only) No      Post Infusion Assessment:  Patient tolerated infusion without incident.       Discharge Plan:   AVS to patient via MYCHART.  Patient will follow up with ordering provider   Patient discharged in stable condition accompanied by: self.  Departure Mode: Ambulatory.      Dina Martins RN

## 2025-04-24 LAB
IGA SERPL-MCNC: 163 MG/DL (ref 84–499)
IGG SERPL-MCNC: 888 MG/DL (ref 610–1616)
IGM SERPL-MCNC: 22 MG/DL (ref 35–242)

## 2025-05-11 ENCOUNTER — HEALTH MAINTENANCE LETTER (OUTPATIENT)
Age: 60
End: 2025-05-11

## 2025-05-13 ENCOUNTER — DOCUMENTATION ONLY (OUTPATIENT)
Dept: LAB | Facility: CLINIC | Age: 60
End: 2025-05-13

## 2025-05-13 ENCOUNTER — LAB (OUTPATIENT)
Dept: LAB | Facility: CLINIC | Age: 60
End: 2025-05-13
Payer: COMMERCIAL

## 2025-05-13 DIAGNOSIS — Z79.899 HIGH RISK MEDICATIONS (NOT ANTICOAGULANTS) LONG-TERM USE: ICD-10-CM

## 2025-05-13 DIAGNOSIS — M31.31 GRANULOMATOSIS WITH POLYANGIITIS WITH RENAL INVOLVEMENT (H): ICD-10-CM

## 2025-05-13 LAB
ALBUMIN UR-MCNC: 30 MG/DL
AMORPH CRY #/AREA URNS HPF: ABNORMAL /HPF
APPEARANCE UR: CLEAR
BASOPHILS # BLD AUTO: 0 10E3/UL (ref 0–0.2)
BASOPHILS NFR BLD AUTO: 1 %
BILIRUB UR QL STRIP: NEGATIVE
COLOR UR AUTO: YELLOW
EOSINOPHIL # BLD AUTO: 0.3 10E3/UL (ref 0–0.7)
EOSINOPHIL NFR BLD AUTO: 5 %
ERYTHROCYTE [DISTWIDTH] IN BLOOD BY AUTOMATED COUNT: 14.2 % (ref 10–15)
ERYTHROCYTE [SEDIMENTATION RATE] IN BLOOD BY WESTERGREN METHOD: 12 MM/HR (ref 0–30)
GLUCOSE UR STRIP-MCNC: NEGATIVE MG/DL
HCT VFR BLD AUTO: 40 % (ref 35–47)
HGB BLD-MCNC: 13 G/DL (ref 11.7–15.7)
HGB UR QL STRIP: ABNORMAL
IMM GRANULOCYTES # BLD: 0 10E3/UL
IMM GRANULOCYTES NFR BLD: 0 %
KETONES UR STRIP-MCNC: NEGATIVE MG/DL
LEUKOCYTE ESTERASE UR QL STRIP: ABNORMAL
LYMPHOCYTES # BLD AUTO: 1.4 10E3/UL (ref 0.8–5.3)
LYMPHOCYTES NFR BLD AUTO: 29 %
MCH RBC QN AUTO: 30.4 PG (ref 26.5–33)
MCHC RBC AUTO-ENTMCNC: 32.5 G/DL (ref 31.5–36.5)
MCV RBC AUTO: 94 FL (ref 78–100)
MONOCYTES # BLD AUTO: 0.8 10E3/UL (ref 0–1.3)
MONOCYTES NFR BLD AUTO: 17 %
NEUTROPHILS # BLD AUTO: 2.4 10E3/UL (ref 1.6–8.3)
NEUTROPHILS NFR BLD AUTO: 49 %
NITRATE UR QL: NEGATIVE
PH UR STRIP: 6 [PH] (ref 5–7)
PLATELET # BLD AUTO: 292 10E3/UL (ref 150–450)
RBC # BLD AUTO: 4.28 10E6/UL (ref 3.8–5.2)
RBC #/AREA URNS AUTO: ABNORMAL /HPF
SP GR UR STRIP: 1.02 (ref 1–1.03)
SQUAMOUS #/AREA URNS AUTO: ABNORMAL /LPF
UROBILINOGEN UR STRIP-ACNC: 0.2 E.U./DL
WBC # BLD AUTO: 4.9 10E3/UL (ref 4–11)
WBC #/AREA URNS AUTO: ABNORMAL /HPF

## 2025-05-13 PROCEDURE — 80053 COMPREHEN METABOLIC PANEL: CPT

## 2025-05-13 PROCEDURE — 85652 RBC SED RATE AUTOMATED: CPT

## 2025-05-13 PROCEDURE — 36415 COLL VENOUS BLD VENIPUNCTURE: CPT

## 2025-05-13 PROCEDURE — 86140 C-REACTIVE PROTEIN: CPT

## 2025-05-13 PROCEDURE — 85025 COMPLETE CBC W/AUTO DIFF WBC: CPT

## 2025-05-13 PROCEDURE — 81001 URINALYSIS AUTO W/SCOPE: CPT

## 2025-05-13 NOTE — PROGRESS NOTES
Alley did not leave enough urine to complete a total protein. If you would like her to return for a recollect, please have your team reach out to her to make a lab appt.    Thank you, Nilda Abrams MLT

## 2025-05-14 ENCOUNTER — RESULTS FOLLOW-UP (OUTPATIENT)
Dept: RHEUMATOLOGY | Facility: CLINIC | Age: 60
End: 2025-05-14

## 2025-05-14 LAB
ALBUMIN SERPL BCG-MCNC: 4.2 G/DL (ref 3.5–5.2)
ALP SERPL-CCNC: 82 U/L (ref 40–150)
ALT SERPL W P-5'-P-CCNC: 22 U/L (ref 0–50)
ANION GAP SERPL CALCULATED.3IONS-SCNC: 16 MMOL/L (ref 7–15)
AST SERPL W P-5'-P-CCNC: 20 U/L (ref 0–45)
BILIRUB SERPL-MCNC: 0.2 MG/DL
BUN SERPL-MCNC: 26.4 MG/DL (ref 8–23)
CALCIUM SERPL-MCNC: 9.5 MG/DL (ref 8.8–10.4)
CHLORIDE SERPL-SCNC: 105 MMOL/L (ref 98–107)
CREAT SERPL-MCNC: 1.44 MG/DL (ref 0.51–0.95)
CRP SERPL-MCNC: 5.42 MG/L
EGFRCR SERPLBLD CKD-EPI 2021: 41 ML/MIN/1.73M2
GLUCOSE SERPL-MCNC: 96 MG/DL (ref 70–99)
HCO3 SERPL-SCNC: 21 MMOL/L (ref 22–29)
POTASSIUM SERPL-SCNC: 4.7 MMOL/L (ref 3.4–5.3)
PROT SERPL-MCNC: 7 G/DL (ref 6.4–8.3)
SODIUM SERPL-SCNC: 142 MMOL/L (ref 135–145)

## 2025-06-03 ENCOUNTER — ORDERS ONLY (AUTO-RELEASED) (OUTPATIENT)
Dept: FAMILY MEDICINE | Facility: CLINIC | Age: 60
End: 2025-06-03

## 2025-06-03 ENCOUNTER — OFFICE VISIT (OUTPATIENT)
Dept: FAMILY MEDICINE | Facility: CLINIC | Age: 60
End: 2025-06-03
Payer: COMMERCIAL

## 2025-06-03 VITALS
BODY MASS INDEX: 33.82 KG/M2 | WEIGHT: 203 LBS | DIASTOLIC BLOOD PRESSURE: 80 MMHG | HEIGHT: 65 IN | RESPIRATION RATE: 16 BRPM | SYSTOLIC BLOOD PRESSURE: 144 MMHG | HEART RATE: 77 BPM | TEMPERATURE: 97.6 F | OXYGEN SATURATION: 98 %

## 2025-06-03 DIAGNOSIS — Z00.00 ROUTINE GENERAL MEDICAL EXAMINATION AT A HEALTH CARE FACILITY: Primary | ICD-10-CM

## 2025-06-03 DIAGNOSIS — N18.32 STAGE 3B CHRONIC KIDNEY DISEASE (H): ICD-10-CM

## 2025-06-03 DIAGNOSIS — E66.09 CLASS 1 OBESITY DUE TO EXCESS CALORIES WITH SERIOUS COMORBIDITY AND BODY MASS INDEX (BMI) OF 33.0 TO 33.9 IN ADULT: ICD-10-CM

## 2025-06-03 DIAGNOSIS — I50.32 CHRONIC DIASTOLIC (CONGESTIVE) HEART FAILURE (H): ICD-10-CM

## 2025-06-03 DIAGNOSIS — Z12.11 SPECIAL SCREENING FOR MALIGNANT NEOPLASMS, COLON: ICD-10-CM

## 2025-06-03 DIAGNOSIS — Z87.891 FORMER SMOKER: ICD-10-CM

## 2025-06-03 DIAGNOSIS — Z12.31 ENCOUNTER FOR SCREENING MAMMOGRAM FOR BREAST CANCER: ICD-10-CM

## 2025-06-03 DIAGNOSIS — Z13.1 SCREENING FOR DIABETES MELLITUS: ICD-10-CM

## 2025-06-03 DIAGNOSIS — N18.30 STAGE 3 CHRONIC KIDNEY DISEASE, UNSPECIFIED WHETHER STAGE 3A OR 3B CKD (H): ICD-10-CM

## 2025-06-03 DIAGNOSIS — I10 HYPERTENSION GOAL BP (BLOOD PRESSURE) < 140/90: ICD-10-CM

## 2025-06-03 DIAGNOSIS — E66.811 CLASS 1 OBESITY DUE TO EXCESS CALORIES WITH SERIOUS COMORBIDITY AND BODY MASS INDEX (BMI) OF 33.0 TO 33.9 IN ADULT: ICD-10-CM

## 2025-06-03 DIAGNOSIS — Z12.31 VISIT FOR SCREENING MAMMOGRAM: ICD-10-CM

## 2025-06-03 DIAGNOSIS — Z86.79 HX OF AORTIC ANEURYSM: ICD-10-CM

## 2025-06-03 DIAGNOSIS — Z87.891 PERSONAL HISTORY OF TOBACCO USE: ICD-10-CM

## 2025-06-03 DIAGNOSIS — R63.5 ABNORMAL WEIGHT GAIN: ICD-10-CM

## 2025-06-03 DIAGNOSIS — M31.31 GRANULOMATOSIS WITH POLYANGIITIS WITH RENAL INVOLVEMENT (H): ICD-10-CM

## 2025-06-03 PROCEDURE — 3077F SYST BP >= 140 MM HG: CPT | Performed by: NURSE PRACTITIONER

## 2025-06-03 PROCEDURE — 99396 PREV VISIT EST AGE 40-64: CPT | Performed by: NURSE PRACTITIONER

## 2025-06-03 PROCEDURE — G0296 VISIT TO DETERM LDCT ELIG: HCPCS | Performed by: NURSE PRACTITIONER

## 2025-06-03 PROCEDURE — 82570 ASSAY OF URINE CREATININE: CPT | Performed by: NURSE PRACTITIONER

## 2025-06-03 PROCEDURE — 99214 OFFICE O/P EST MOD 30 MIN: CPT | Mod: 25 | Performed by: NURSE PRACTITIONER

## 2025-06-03 PROCEDURE — 3079F DIAST BP 80-89 MM HG: CPT | Performed by: NURSE PRACTITIONER

## 2025-06-03 PROCEDURE — 82043 UR ALBUMIN QUANTITATIVE: CPT | Performed by: NURSE PRACTITIONER

## 2025-06-03 RX ORDER — LOSARTAN POTASSIUM AND HYDROCHLOROTHIAZIDE 25; 100 MG/1; MG/1
1 TABLET ORAL DAILY
Qty: 90 TABLET | Refills: 3 | Status: SHIPPED | OUTPATIENT
Start: 2025-06-03

## 2025-06-03 SDOH — HEALTH STABILITY: PHYSICAL HEALTH: ON AVERAGE, HOW MANY MINUTES DO YOU ENGAGE IN EXERCISE AT THIS LEVEL?: 150+ MIN

## 2025-06-03 SDOH — HEALTH STABILITY: PHYSICAL HEALTH: ON AVERAGE, HOW MANY DAYS PER WEEK DO YOU ENGAGE IN MODERATE TO STRENUOUS EXERCISE (LIKE A BRISK WALK)?: 5 DAYS

## 2025-06-03 ASSESSMENT — LIFESTYLE VARIABLES
HOW OFTEN DO YOU HAVE A DRINK CONTAINING ALCOHOL: 2-4 TIMES A MONTH
HOW OFTEN DO YOU HAVE SIX OR MORE DRINKS ON ONE OCCASION: LESS THAN MONTHLY
HOW MANY STANDARD DRINKS CONTAINING ALCOHOL DO YOU HAVE ON A TYPICAL DAY: 1 OR 2
AUDIT-C TOTAL SCORE: 3
SKIP TO QUESTIONS 9-10: 0

## 2025-06-03 ASSESSMENT — SOCIAL DETERMINANTS OF HEALTH (SDOH)
HOW OFTEN DO YOU GET TOGETHER WITH FRIENDS OR RELATIVES?: ONCE A WEEK
DO YOU BELONG TO ANY CLUBS OR ORGANIZATIONS SUCH AS CHURCH GROUPS UNIONS, FRATERNAL OR ATHLETIC GROUPS, OR SCHOOL GROUPS?: YES
HOW OFTEN DO YOU ATTEND CHURCH OR RELIGIOUS SERVICES?: MORE THAN 4 TIMES PER YEAR
HOW OFTEN DO YOU GET TOGETHER WITH FRIENDS OR RELATIVES?: TWICE A WEEK
HOW OFTEN DO YOU ATTENT MEETINGS OF THE CLUB OR ORGANIZATION YOU BELONG TO?: MORE THAN 4 TIMES PER YEAR
IN A TYPICAL WEEK, HOW MANY TIMES DO YOU TALK ON THE PHONE WITH FAMILY, FRIENDS, OR NEIGHBORS?: TWICE A WEEK

## 2025-06-03 ASSESSMENT — ANXIETY QUESTIONNAIRES
6. BECOMING EASILY ANNOYED OR IRRITABLE: SEVERAL DAYS
GAD7 TOTAL SCORE: 3
7. FEELING AFRAID AS IF SOMETHING AWFUL MIGHT HAPPEN: NOT AT ALL
IF YOU CHECKED OFF ANY PROBLEMS ON THIS QUESTIONNAIRE, HOW DIFFICULT HAVE THESE PROBLEMS MADE IT FOR YOU TO DO YOUR WORK, TAKE CARE OF THINGS AT HOME, OR GET ALONG WITH OTHER PEOPLE: NOT DIFFICULT AT ALL
2. NOT BEING ABLE TO STOP OR CONTROL WORRYING: NOT AT ALL
1. FEELING NERVOUS, ANXIOUS, OR ON EDGE: NOT AT ALL
GAD7 TOTAL SCORE: 3
8. IF YOU CHECKED OFF ANY PROBLEMS, HOW DIFFICULT HAVE THESE MADE IT FOR YOU TO DO YOUR WORK, TAKE CARE OF THINGS AT HOME, OR GET ALONG WITH OTHER PEOPLE?: NOT DIFFICULT AT ALL
3. WORRYING TOO MUCH ABOUT DIFFERENT THINGS: NOT AT ALL
7. FEELING AFRAID AS IF SOMETHING AWFUL MIGHT HAPPEN: NOT AT ALL
5. BEING SO RESTLESS THAT IT IS HARD TO SIT STILL: SEVERAL DAYS
4. TROUBLE RELAXING: SEVERAL DAYS
GAD7 TOTAL SCORE: 3

## 2025-06-03 NOTE — PROGRESS NOTES
Preventive Care Visit  [facility] ISSAC BUTLER, Family Medicine  Robert 3, 2025      Assessment & Plan     Routine general medical examination at a health care facility      Stage 3b chronic kidney disease (H)  Discussed stage III kidney disease she is being monitored by rheumatology for her GPA.  He states her kidney function is stable.  Advised to ask him if he thinks you should start 1 of these medications to protect kidney function at her next visit:   dapagliflozin (FARXIGA) 10 mg tablet  OrderDo Not Order  empagliflozin (JARDIANCE) 10 mg tablet  OrderDo Not Order  sotagliflozin (INPEFA) 200 mg tablet  She will do labs with her specialty appointment.  - Albumin Random Urine Quantitative with Creat Ratio; Future  - Albumin Random Urine Quantitative with Creat Ratio  - TSH with free T4 reflex; Future  - semaglutide-weight management (WEGOVY) 0.25 MG/0.5ML pen; Inject 0.5 mLs (0.25 mg) subcutaneously once a week.  - Semaglutide-Weight Management (WEGOVY) 0.5 MG/0.5ML pen; Inject 0.5 mg subcutaneously once a week.  - Semaglutide-Weight Management (WEGOVY) 1 MG/0.5ML pen; Inject 1 mg subcutaneously once a week.    Visit for screening mammogram  Due for mammogram  - MA Screening Bilateral w/ Mike; Future    Chronic diastolic (congestive) heart failure (H)  She would like to start GLP-1 for her abnormal weight gain, history of high blood pressure, GPA , stage III kidney disease, history of heart failure with aortic aneurysm.  She feels weight loss will help improve all of these conditions. risks of obesity as well as risks and benefits of medication discussed.  - semaglutide-weight management (WEGOVY) 0.25 MG/0.5ML pen; Inject 0.5 mLs (0.25 mg) subcutaneously once a week.  - Semaglutide-Weight Management (WEGOVY) 0.5 MG/0.5ML pen; Inject 0.5 mg subcutaneously once a week.  - Semaglutide-Weight Management (WEGOVY) 1 MG/0.5ML pen; Inject 1 mg subcutaneously once a week.  - Lipid panel  reflex to direct LDL Non-fasting; Future    Screening for diabetes mellitus  Due for screening labs  - Hemoglobin A1c; Future    Encounter for screening mammogram for breast cancer  Due for mammogram  - MA Screening Bilateral w/ Mike; Future    Special screening for malignant neoplasms, colon  In agreement to Coluasid colon cancer screening test  - COLOGUASID(EXACT SCIENCES); Future    Class 1 obesity due to excess calories with serious comorbidity and body mass index (BMI) of 33.0 to 33.9 in adult  Discussed diet and exercise.She would like to start GLP-1 for her abnormal weight gain, history of high blood pressure, GPA , stage III kidney disease, history of heart failure with aortic aneurysm.  She feels weight loss will help improve all of these conditions. risks of obesity as well as risks and benefits of medication discussed.  - semaglutide-weight management (WEGOVY) 0.25 MG/0.5ML pen; Inject 0.5 mLs (0.25 mg) subcutaneously once a week.  - Semaglutide-Weight Management (WEGOVY) 0.5 MG/0.5ML pen; Inject 0.5 mg subcutaneously once a week.  - Semaglutide-Weight Management (WEGOVY) 1 MG/0.5ML pen; Inject 1 mg subcutaneously once a week.    Abnormal weight gain  Discussed diet and exercise.She would like to start GLP-1 for her abnormal weight gain, history of high blood pressure, GPA , stage III kidney disease, history of heart failure with aortic aneurysm.  She feels weight loss will help improve all of these conditions. risks of obesity as well as risks and benefits of medication discussed.  - semaglutide-weight management (WEGOVY) 0.25 MG/0.5ML pen; Inject 0.5 mLs (0.25 mg) subcutaneously once a week.  - Semaglutide-Weight Management (WEGOVY) 0.5 MG/0.5ML pen; Inject 0.5 mg subcutaneously once a week.  - Semaglutide-Weight Management (WEGOVY) 1 MG/0.5ML pen; Inject 1 mg subcutaneously once a week.    Hypertension goal BP (blood pressure) < 140/90  Blood pressure elevated she does not check at home.  She believes  she missed her blood pressure medication this morning.  Denies chest pain, dizziness, frequent headaches, abnormal swelling.  Needing medication refills.Discussed diet and exercise.She would like to start GLP-1 for her abnormal weight gain, history of high blood pressure, GPA , stage III kidney disease, history of heart failure with aortic aneurysm.  She feels weight loss will help improve all of these conditions. risks of obesity as well as risks and benefits of medication discussed.  - semaglutide-weight management (WEGOVY) 0.25 MG/0.5ML pen; Inject 0.5 mLs (0.25 mg) subcutaneously once a week.  - Semaglutide-Weight Management (WEGOVY) 0.5 MG/0.5ML pen; Inject 0.5 mg subcutaneously once a week.  - Semaglutide-Weight Management (WEGOVY) 1 MG/0.5ML pen; Inject 1 mg subcutaneously once a week.  - losartan-hydrochlorothiazide (HYZAAR) 100-25 MG tablet; Take 1 tablet by mouth daily.    Granulomatosis with polyangiitis with renal involvement (H)  Sees rheumatology for management.  Will discuss kidney disease and complete labs at her next visit    Former smoker  Lung cancer screening, verify coverage prior to scheduling.     Hx of aortic aneurysm  Discussed diet and exercise.She would like to start GLP-1 for her abnormal weight gain, history of high blood pressure, GPA , stage III kidney disease, history of heart failure with aortic aneurysm.  She feels weight loss will help improve all of these conditions. risks of obesity as well as risks and benefits of medication discussed.  - semaglutide-weight management (WEGOVY) 0.25 MG/0.5ML pen; Inject 0.5 mLs (0.25 mg) subcutaneously once a week.  - Semaglutide-Weight Management (WEGOVY) 0.5 MG/0.5ML pen; Inject 0.5 mg subcutaneously once a week.  - Semaglutide-Weight Management (WEGOVY) 1 MG/0.5ML pen; Inject 1 mg subcutaneously once a week.    Patient has been advised of split billing requirements and indicates understanding: Yes        BMI  Estimated body mass index is 33.62  "kg/m  as calculated from the following:    Height as of this encounter: 1.655 m (5' 5.16\").    Weight as of this encounter: 92.1 kg (203 lb).   Weight management plan: Discussed healthy diet and exercise guidelines wegovy, tips given on avs    Counseling  Appropriate preventive services were addressed with this patient via screening, questionnaire, or discussion as appropriate for fall prevention, nutrition, physical activity, Tobacco-use cessation, social engagement, weight loss and cognition.  Checklist reviewing preventive services available has been given to the patient.  Reviewed patient's diet, addressing concerns and/or questions.       Follow-up  Return in about 1 year (around 6/3/2026), or if symptoms worsen or fail to improve, for labs with specialty.    Noreen Hager is a 60 year old, presenting for the following:  Physical  BP today: 144/80, 142/80, GLP-1, med refill      6/3/2025     1:46 PM   Additional Questions   Roomed by Vicki Colindres CMA          2/28/2020    10:44 AM 7/27/2020    10:57 AM 10/21/2021     8:42 AM   PHQ   PHQ-9 Total Score 3 6 5   Q9: Thoughts of better off dead/self-harm past 2 weeks Not at all Not at all Not at all        Proxy-reported          10/21/2021     8:43 AM 3/10/2023     9:20 AM 6/3/2025     1:37 PM   DHRUV-7 SCORE   Total Score 4 (minimal anxiety) 0 (minimal anxiety) 3 (minimal anxiety)   Total Score 4 0 3        Patient-reported        Via the Health Maintenance questionnaire, the patient has reported the following services have been completed -Mammogram:  health 2023-01-20, this information has not been sent to the abstraction team.    HPI          6/3/2025   General Health   How would you rate your overall physical health? Good   Feel stress (tense, anxious, or unable to sleep) Only a little   (!) STRESS CONCERN      6/3/2025   Nutrition   Three or more servings of calcium each day? (!) I DON'T KNOW   Diet: Regular (no restrictions)   How many servings of fruit " and vegetables per day? (!) 0-1   How many sweetened beverages each day? 0-1         6/3/2025   Exercise   Days per week of moderate/strenous exercise 5 days         6/3/2025   Social Factors   Frequency of gathering with friends or relatives Once a week   Worry food won't last until get money to buy more No   Food not last or not have enough money for food? No   Do you have housing? (Housing is defined as stable permanent housing and does not include staying outside in a car, in a tent, in an abandoned building, in an overnight shelter, or couch-surfing.) Yes   Are you worried about losing your housing? No   Lack of transportation? No   Unable to get utilities (heat,electricity)? No         6/3/2025   Fall Risk   Fallen 2 or more times in the past year? No   Trouble with walking or balance? No          6/3/2025   Dental   Dentist two times every year? Yes         Today's PHQ-2 Score:       6/3/2025     1:40 PM   PHQ-2 (  [employer])   Q1: Little interest or pleasure in doing things 0   Q2: Feeling down, depressed or hopeless 0   PHQ-2 Score 0    Q1: Little interest or pleasure in doing things Not at all   Q2: Feeling down, depressed or hopeless Not at all   PHQ-2 Score 0       Patient-reported           6/3/2025   Substance Use   Alcohol more than 3/day or more than 7/wk No   Do you use any other substances recreationally? (!) CANNABIS PRODUCTS    (!) OTHER       Multiple values from one day are sorted in reverse-chronological order     Social History     Tobacco Use    Smoking status: Former     Current packs/day: 0.00     Average packs/day: 0.5 packs/day for 26.4 years (13.2 ttl pk-yrs)     Types: Cigarettes     Start date: 10/1/1993     Quit date: 2020     Years since quittin.2    Smokeless tobacco: Never   Vaping Use    Vaping status: Never Used   Substance Use Topics    Alcohol use: Yes     Comment: monthly-Social    Drug use: Yes     Types: Marijuana     Comment: cannabis gummies          Mammogram  Screening - Mammogram every 1-2 years updated in Health Maintenance based on mutual decision making        6/3/2025   STI Screening   New sexual partner(s) since last STI/HIV test? No     History of abnormal Pap smear: YES - reflected in Problem List and Health Maintenance accordingly        Latest Ref Rng & Units 3/10/2023     2:43 PM 3/26/2018    10:52 AM 3/26/2018    10:50 AM   PAP / HPV   PAP  Negative for Intraepithelial Lesion or Malignancy (NILM)      PAP (Historical)   NIL     HPV 16 DNA Negative Negative   Negative    HPV 18 DNA Negative Negative   Negative    Other HR HPV Negative Negative   Negative      ASCVD Risk   The 10-year ASCVD risk score (Radu PALAFOX, et al., 2019) is: 4.8%    Values used to calculate the score:      Age: 60 years      Sex: Female      Is Non- : No      Diabetic: No      Tobacco smoker: No      Systolic Blood Pressure: 144 mmHg      Is BP treated: Yes      HDL Cholesterol: 83 mg/dL      Total Cholesterol: 201 mg/dL           Reviewed and updated as needed this visit by Provider                    Past Medical History:   Diagnosis Date    Depressive disorder     Hypertension     Tinnitus      Past Surgical History:   Procedure Laterality Date    CHOLECYSTECTOMY  1997    COLONOSCOPY  02/18/2019    COLONOSCOPY WITH CO2 INSUFFLATION N/A 02/18/2019    Procedure: COLONOSCOPY WITH CO2 INSUFFLATION;  Surgeon: Javier Guillen MD;  Location: [facility] OR    GALLBLADDER SURGERY       OB History   No obstetric history on file.     Lab work is in process  Labs reviewed in EPIC  BP Readings from Last 3 Encounters:   06/03/25 (!) 144/80   04/23/25 (!) 144/78   02/11/25 132/88    Wt Readings from Last 3 Encounters:   06/03/25 92.1 kg (203 lb)   04/23/25 91.7 kg (202 lb 3.2 oz)   02/11/25 88.2 kg (194 lb 6.4 oz)                  Patient Active Problem List   Diagnosis    Wegener's granulomatosis    CARDIOVASCULAR SCREENING; LDL GOAL LESS THAN 130    Overweight     Hypertension goal BP (blood pressure) < 140/90    Situational depression    Granulomatosis with polyangiitis (H)    Chronic kidney disease, stage 3    Former smoker    Menopausal syndrome (hot flashes)    Chronic diastolic (congestive) heart failure (H)    Thoracic aortic aneurysm without rupture, unspecified part    Angina at rest    Rectal bleeding    History of colonic polyps    Hx of aortic aneurysm    Acute on chronic respiratory failure, unspecified whether with hypoxia or hypercapnia (H)    Screening for cervical cancer    Class 1 obesity due to excess calories with serious comorbidity and body mass index (BMI) of 33.0 to 33.9 in adult    Abnormal weight gain     Past Surgical History:   Procedure Laterality Date    CHOLECYSTECTOMY      COLONOSCOPY  2019    COLONOSCOPY WITH CO2 INSUFFLATION N/A 2019    Procedure: COLONOSCOPY WITH CO2 INSUFFLATION;  Surgeon: Javier Guillen MD;  Location: [facility] OR    GALLBLADDER SURGERY         Social History     Tobacco Use    Smoking status: Former     Current packs/day: 0.00     Average packs/day: 0.5 packs/day for 26.4 years (13.2 ttl pk-yrs)     Types: Cigarettes     Start date: 10/1/1993     Quit date: 2020     Years since quittin.2    Smokeless tobacco: Never   Substance Use Topics    Alcohol use: Yes     Comment: monthly-Social     Family History   Problem Relation Age of Onset    Hypertension Mother     Diabetes Father     Heart Disease Maternal Grandmother     Heart Disease Maternal Grandfather     Arthritis Paternal Grandmother     Heart Disease Paternal Grandfather          Current Outpatient Medications   Medication Sig Dispense Refill    albuterol (PROAIR HFA/PROVENTIL HFA/VENTOLIN HFA) 108 (90 Base) MCG/ACT inhaler Inhale 2 puffs into the lungs every 4 hours as needed for shortness of breath, wheezing or cough 18 g 0    Ascorbic Acid (VITAMIN C) 100 MG CHEW       azaTHIOprine (IMURAN) 50 MG tablet Take 2 tablets (100 mg) by mouth daily 180  tablet 2    BETA BLOCKER NOT PRESCRIBED (INTENTIONAL) Please choose reason not prescribed from choices below.  0    losartan-hydrochlorothiazide (HYZAAR) 100-25 MG tablet Take 1 tablet by mouth daily. 90 tablet 3    Multiple Vitamins-Minerals (ZINC PO)       semaglutide-weight management (WEGOVY) 0.25 MG/0.5ML pen Inject 0.5 mLs (0.25 mg) subcutaneously once a week. 2 mL 0    [START ON 7/3/2025] Semaglutide-Weight Management (WEGOVY) 0.5 MG/0.5ML pen Inject 0.5 mg subcutaneously once a week. 2 mL 0    [START ON 8/1/2025] Semaglutide-Weight Management (WEGOVY) 1 MG/0.5ML pen Inject 1 mg subcutaneously once a week. 2 mL 0    VITAMIN D PO       zinc gluconate 50 MG tablet Take 50 mg by mouth daily       No Known Allergies  Recent Labs   Lab Test 05/13/25  0719 02/04/25  0738 11/12/24  0720 08/06/24  0720 05/19/24  0912 05/28/23  0911 03/10/23  1515 08/07/22  1604 04/16/22  1236 01/23/22  1024 10/21/21  0920 10/14/21  0720 07/07/21  0818 04/24/21  1130 02/28/20  1044 03/26/18  1058   A1C  --   --   --   --  5.7*  --  6.0*  --  6.1*  --   --   --   --   --   --   --    LDL  --   --   --   --   --   --  96  --   --  94  --   --   --   --   --  82   HDL  --   --   --   --   --   --  83  --   --  74  --   --   --   --   --  87   TRIG  --   --   --   --   --   --  110  --   --  118  --   --   --   --   --  93   ALT 22 23 29   < > 23   < >  --    < > 51* 37  --    < > 49 62*   < >  --    CR 1.44* 1.54* 1.62*   < > 1.59*   < >  --    < > 1.58* 1.32*  --    < > 1.44* 1.50*   < > 1.08*   GFRESTIMATED 41* 38* 36*   < > 37*   < >  --    < > 38* 47*  --    < > 40* 38*   < > 53*   GFRESTBLACK  --   --   --   --   --   --   --   --   --   --   --   --  46* 44*   < > 64   POTASSIUM 4.7 4.2 4.8   < > 4.9   < >  --    < > 4.0 4.2  --    < > 4.7 4.2   < > 4.1   TSH  --   --   --   --   --   --  1.66  --   --   --  1.10  --   --   --   --  1.44    < > = values in this interval not displayed.               Review of  "Systems  CONSTITUTIONAL: NEGATIVE for fever, chills, change in weight  INTEGUMENTARY/SKIN: NEGATIVE for worrisome rashes, moles or lesions  EYES: NEGATIVE for vision changes or irritation  ENT/MOUTH: NEGATIVE for ear, mouth and throat problems  RESP: NEGATIVE for significant cough or SOB  BREAST: NEGATIVE for masses, tenderness or discharge  CV: NEGATIVE for chest pain, palpitations or peripheral edema  GI: NEGATIVE for nausea, abdominal pain, heartburn, or change in bowel habits  : NEGATIVE for frequency, dysuria, or hematuria  MUSCULOSKELETAL: NEGATIVE for significant arthralgias or myalgia  NEURO: NEGATIVE for weakness, dizziness or paresthesias  ENDOCRINE: NEGATIVE for temperature intolerance, skin/hair changes  HEME: NEGATIVE for bleeding problems  PSYCHIATRIC: NEGATIVE for changes in mood or affect     Objective    Exam  BP (!) 144/80 (BP Location: Right arm, Patient Position: Sitting, Cuff Size: Adult Regular)   Pulse 77   Temp 97.6  F (36.4  C) (Temporal)   Resp 16   Ht 1.655 m (5' 5.16\")   Wt 92.1 kg (203 lb)   SpO2 98%   BMI 33.62 kg/m     Estimated body mass index is 33.62 kg/m  as calculated from the following:    Height as of this encounter: 1.655 m (5' 5.16\").    Weight as of this encounter: 92.1 kg (203 lb).    Physical Exam  GENERAL: alert and no distress  EYES: Eyes grossly normal to inspection, PERRL and conjunctivae and sclerae normal  HENT: ear canals and TM's normal, nose and mouth without ulcers or lesions  NECK: no adenopathy, no asymmetry, masses, or scars  RESP: lungs clear to auscultation - no rales, rhonchi or wheezes  BREAST: deferred per guidelines, asymptomatic per pt, discussed SBE, symptoms to watch out for and when to follow up, discussed mammogram  CV: regular rate and rhythm, normal S1 S2, no S3 or S4, no murmur, click or rub, no peripheral edema  ABDOMEN: soft, nontender, no hepatosplenomegaly, no masses and bowel sounds normal   (female): deferred per pt, no " concerns  MS: no gross musculoskeletal defects noted, no edema, no CVA tenderness   SKIN: no suspicious lesions or rashes  NEURO: Normal strength and tone, cranial nerves intact, mentation intact and speech normal  PSYCH: mentation appears normal, affect normal/bright  LYMPH: no cervical, supraclavicular adenopathy    See orders    Signed Electronically by: ISSAC LION    Answers submitted by the patient for this visit:  Patient Health Questionnaire (G7) (Submitted on 6/3/2025)  DHRUV 7 TOTAL SCORE: 3    Lung Cancer Screening Shared Decision Making Visit     Flavia Brice, a 60 year old female, is eligible for lung cancer screening    History   Smoking Status    Former    Types: Cigarettes   Smokeless Tobacco    Never       I have discussed with patient the risks and benefits of screening for lung cancer with low-dose CT.     The risks include:    radiation exposure: one low dose chest CT has as much ionizing radiation as about 15 chest x-rays, or 6 months of background radiation living in [place]      false positives: most findings/nodules are NOT cancer, but some might still require additional diagnostic evaluation, including biopsy    over-diagnosis: some slow growing cancers that might never have been clinically significant will be detected and treated unnecessarily     The benefit of early detection of lung cancer is contingent upon adherence to annual screening or more frequent follow up if indicated.     Furthermore, to benefit from screening, Flavia must be willing and able to undergo diagnostic procedures, if indicated. Although no specific guide is available for determining severity of comorbidities, it is reasonable to withhold screening in patients who have greater mortality risk from other diseases.     We did discuss that the best way to prevent lung cancer is to not smoke.    Some patients may value a numeric estimation of lung cancer risk when evaluating if lung cancer  screening is right for them, here is one calculator:    ShouldIScreen

## 2025-06-03 NOTE — PATIENT INSTRUCTIONS
Ask your rheumatologist about starting one of these medications to protect your kidney function    dapagliflozin (FARXIGA) 10 mg tablet  OrderDo Not Order  empagliflozin (JARDIANCE) 10 mg tablet  OrderDo Not Order  sotagliflozin (INPEFA) 200 mg tablet    Patient Education   Preventive Care Advice   This is general advice given by our system to help you stay healthy. However, your care team may have specific advice just for you. Please talk to your care team about your preventive care needs.  Nutrition  Eat 5 or more servings of fruits and vegetables each day.  Try wheat bread, brown rice and whole grain pasta (instead of white bread, rice, and pasta).  Get enough calcium and vitamin D. Check the label on foods and aim for 100% of the RDA (recommended daily allowance).  Lifestyle  Exercise at least 150 minutes each week  (30 minutes a day, 5 days a week).  Do muscle strengthening activities 2 days a week. These help control your weight and prevent disease.  No smoking.  Wear sunscreen to prevent skin cancer.  Have a dental exam and cleaning every 6 months.  Yearly exams  See your health care team every year to talk about:  Any changes in your health.  Any medicines your care team has prescribed.  Preventive care, family planning, and ways to prevent chronic diseases.  Shots (vaccines)   HPV shots (up to age 26), if you've never had them before.  Hepatitis B shots (up to age 59), if you've never had them before.  COVID-19 shot: Get this shot when it's due.  Flu shot: Get a flu shot every year.  Tetanus shot: Get a tetanus shot every 10 years.  Pneumococcal, hepatitis A, and RSV shots: Ask your care team if you need these based on your risk.  Shingles shot (for age 50 and up)  General health tests  Diabetes screening:  Starting at age 35, Get screened for diabetes at least every 3 years.  If you are younger than age 35, ask your care team if you should be screened for diabetes.  Cholesterol test: At age 39, start  having a cholesterol test every 5 years, or more often if advised.  Bone density scan (DEXA): At age 50, ask your care team if you should have this scan for osteoporosis (brittle bones).  Hepatitis C: Get tested at least once in your life.  STIs (sexually transmitted infections)  Before age 24: Ask your care team if you should be screened for STIs.  After age 24: Get screened for STIs if you're at risk. You are at risk for STIs (including HIV) if:  You are sexually active with more than one person.  You don't use condoms every time.  You or a partner was diagnosed with a sexually transmitted infection.  If you are at risk for HIV, ask about PrEP medicine to prevent HIV.  Get tested for HIV at least once in your life, whether you are at risk for HIV or not.  Cancer screening tests  Cervical cancer screening: If you have a cervix, begin getting regular cervical cancer screening tests starting at age 21.  Breast cancer scan (mammogram): If you've ever had breasts, begin having regular mammograms starting at age 40. This is a scan to check for breast cancer.  Colon cancer screening: It is important to start screening for colon cancer at age 45.  Have a colonoscopy test every 10 years (or more often if you're at risk) Or, ask your provider about stool tests like a FIT test every year or Cologuard test every 3 years.  To learn more about your testing options, visit:   .  For help making a decision, visit:   https://bit.ly/dr72002.  Prostate cancer screening test: If you have a prostate, ask your care team if a prostate cancer screening test (PSA) at age 55 is right for you.  Lung cancer screening: If you are a current or former smoker ages 50 to 80, ask your care team if ongoing lung cancer screenings are right for you.  For informational purposes only. Not to replace the advice of your health care provider. Copyright   2023 [employer]. All rights reserved. Clinically reviewed by the [facility]  Transitions Program. [place] 966244 - REV 01/24.  Substance Use Disorder: Care Instructions  Overview     You can improve your life and health by stopping your use of alcohol or drugs. When you don't drink or use drugs, you may feel and sleep better. You may get along better with your family, friends, and coworkers. There are medicines and programs that can help with substance use disorder.  How can you care for yourself at home?  Here are some ways to help you stay sober and prevent relapse.  If you have been given medicine to help keep you sober or reduce your cravings, be sure to take it exactly as prescribed.  Talk to your doctor about programs that can help you stop using drugs or drinking alcohol.  Do not keep alcohol or drugs in your home.  Plan ahead. Think about what you'll say if other people ask you to drink or use drugs. Try not to spend time with people who drink or use drugs.  Use the time and money spent on drinking or drugs to do something that's important to you.  Preventing a relapse  Have a plan to deal with relapse. Learn to recognize changes in your thinking that lead you to drink or use drugs. Get help before you start to drink or use drugs again.  Try to stay away from situations, friends, or places that may lead you to drink or use drugs.  If you feel the need to drink alcohol or use drugs again, seek help right away. Call a trusted friend or family member. Some people get support from organizations such as Narcotics Anonymous or [employer] or from treatment facilities.  If you relapse, get help as soon as you can. Some people make a plan with another person that outlines what they want that person to do for them if they relapse. The plan usually includes how to handle the relapse and who to notify in case of relapse.  Don't give up. Remember that a relapse doesn't mean that you have failed. Use the experience to learn the triggers that lead you to drink or use drugs. Then quit again.  "Recovery is a lifelong process. Many people have several relapses before they are able to quit for good.  Follow-up care is a key part of your treatment and safety. Be sure to make and go to all appointments, and call your doctor if you are having problems. It's also a good idea to know your test results and keep a list of the medicines you take.  When should you call for help?   Call 911  anytime you think you may need emergency care. For example, call if you or someone else:    Has overdosed or has withdrawal signs. Be sure to tell the emergency workers that you are or someone else is using or trying to quit using drugs. Overdose or withdrawal signs may include:  Losing consciousness.  Seizure.  Seeing or hearing things that aren't there (hallucinations).     Is thinking or talking about suicide or harming others.   Where to get help 24 hours a day, 7 days a week   If you or someone you know talks about suicide, self-harm, a mental health crisis, a substance use crisis, or any other kind of emotional distress, get help right away. You can:    Call the Suicide and Crisis Lifeline at 988.     Call 0-055-458-TALK (1-967.666.3122).     Text HOME to 731182 to access the Crisis Text Line.   Consider saving these numbers in your phone.  Go to [employer]org for more information or to chat online.  Call your doctor now or seek immediate medical care if:    You are having withdrawal symptoms. These may include nausea or vomiting, sweating, shakiness, and anxiety.   Watch closely for changes in your health, and be sure to contact your doctor if:    You have a relapse.     You need more help or support to stop.   Where can you learn more?  Go to https://www.[employer].net/patiented  Enter H573 in the search box to learn more about \"Substance Use Disorder: Care Instructions.\"  Current as of: August 20, 2024  Content Version: 14.4    1975-2843 [facility] [employer].   Care instructions adapted under license by your " healthcare professional. If you have questions about a medical condition or this instruction, always ask your healthcare professional. [employer] disclaims any warranty or liability for your use of this information.       Lung Cancer Screening   Frequently Asked Questions  If you are at high-risk for lung cancer, getting screened with low-dose computed tomography (LDCT) every year can help save your life. This handout offers answers to some of the most common questions about lung cancer screening. If you have other questions, please call 7-781[facility] (1-767.903.2287).     What is it?  Lung cancer screening uses special X-ray technology to create an image of your lung tissue. The exam is quick and easy and takes less than 10 seconds. We don t give you any medicine or use any needles. You can eat before and after the exam. You don t need to change your clothes as long as the clothing on your chest doesn t contain metal. But, you do need to be able to hold your breath for at least 6 seconds during the exam.    What is the goal of lung cancer screening?  The goal of lung cancer screening is to save lives. Many times, lung cancer is not found until a person starts having physical symptoms. Lung cancer screening can help detect lung cancer in the earliest stages when it may be easier to treat.    Who should be screened for lung cancer?  We suggest lung cancer screening for anyone who is at high-risk for lung cancer. You are in the high-risk group if you:      are between the ages of 55 and 79, and    have smoked at least 1 pack of cigarettes a day for 20 or more years, and    still smoke or have quit within the past 15 years.    However, if you have a new cough or shortness of breath, you should talk to your doctor before being screened.    Why does it matter if I have symptoms?  Certain symptoms can be a sign that you have a condition in your lungs that should be checked and treated by your doctor. These  symptoms include fever, chest pain, a new or changing cough, shortness of breath that you have never felt before, coughing up blood or unexplained weight loss. Having any of these symptoms can greatly affect the results of lung cancer screening.       Should all smokers get an LDCT lung cancer screening exam?  It depends. Lung cancer screening is for a very specific group of men and women who have a history of heavy smoking over a long period of time (see  Who should be screened for lung cancer  above).  I am in the high-risk group, but have been diagnosed with cancer in the past. Is LDCT lung cancer screening right for me?  In some cases, you should not have LDCT lung screening, such as when your doctor is already following your cancer with CT scan studies. Your doctor will help you decide if LDCT lung screening is right for you.  Do I need to have a screening exam every year?  Yes. If you are in the high-risk group described earlier, you should get an LDCT lung cancer screening exam every year until you are 79, or are no longer willing or able to undergo screening and possible procedures to diagnose and treat lung cancer.  How effective is LDCT at preventing death from lung cancer?  Studies have shown that LDCT lung cancer screening can lower the risk of death from lung cancer by 20 percent in people who are at high-risk.  What are the risks?  There are some risks and limitations of LDCT lung cancer screening. We want to make sure you understand the risks and benefits, so please let us know if you have any questions. Your doctor may want to talk with you more about these risks.    Radiation exposure: As with any exam that uses radiation, there is a very small increased risk of cancer. The amount of radiation in LDCT is small--about the same amount a person would get from a mammogram. Your doctor orders the exam when he or she feels the potential benefits outweigh the risks.    False negatives: No test is perfect,  including LDCT. It is possible that you may have a medical condition, including lung cancer, that is not found during your exam. This is called a false negative result.    False positives and more testing: LDCT very often finds something in the lung that could be cancer, but in fact is not. This is called a false positive result. False positive tests often cause anxiety. To make sure these findings are not cancer, you may need to have more tests. These tests will be done only if you give us permission. Sometimes patients need a treatment that can have side effects, such as a biopsy. For more information on false positives, see  What can I expect from the results?     Findings not related to lung cancer: Your LDCT exam also takes pictures of areas of your body next to your lungs. In a very small number of cases, the CT scan will show an abnormal finding in one of these areas, such as your kidneys, adrenal glands, liver or thyroid. This finding may not be serious, but you may need more tests. Your doctor can help you decide what other tests you may need, if any.  What can I expect from the results?  About 1 out of 4 LDCT exams will find something that may need more tests. Most of the time, these findings are lung nodules. Lung nodules are very small collections of tissue in the lung. These nodules are very common, and the vast majority--more than 97 percent--are not cancer (benign). Most are normal lymph nodes or small areas of scarring from past infections.  But, if a small lung nodule is found to be cancer, the cancer can be cured more than 90 percent of the time. To know if the nodule is cancer, we may need to get more images before your next yearly screening exam. If the nodule has suspicious features (for example, it is large, has an odd shape or grows over time), we will refer you to a specialist for further testing.  Will my doctor also get the results?  Yes. Your doctor will get a copy of your results.  Is it  okay to keep smoking now that there s a cancer screening exam?  No. Tobacco is one of the strongest cancer-causing agents. It causes not only lung cancer, but other cancers and cardiovascular (heart) diseases as well. The damage caused by smoking builds over time. This means that the longer you smoke, the higher your risk of disease. While it is never too late to quit, the sooner you quit, the better.  Where can I find help to quit smoking?  The best way to prevent lung cancer is to stop smoking. If you have already quit smoking, congratulations and keep it up! For help on quitting smoking, please call [employer] at 7-277-QUITNOW (1-221.645.4020) or the American Cancer Society at 1-625.682.9121 to find local resources near you.  One-on-one health coaching:  If you d prefer to work individually with a health care provider on tobacco cessation, we offer:      Medication Therapy Management:  Our specially trained pharmacists work closely with you and your doctor to help you quit smoking.  Call [phone] or [phone] (toll free).

## 2025-06-03 NOTE — LETTER
My Heart Failure Action Plan  Name: Flavia Brice   YOB: 1965  Date: 6/3/2025   My doctor:   Monet Da Silva [facility] [address] [place]   [id number]  [place] [id number] [phone] My Diagnosis: HF-pEF (EF > 40%)  My Ejection Fraction:   Lab Results   Component Value Date    LVEF 55-60% 03/07/2022     Over 50%  My Exercise Goal: Start exercise slowly - to begin, do a few minutes of exercise, several times a day. Increase your time and speed [id number] to build tolerance, with a goal of 30 minutes of exercise daily. Steady, slow, and consistent exercise is both safe and healthy. Stop and rest when you feel tired or become short of breath. Do not push yourself on days when you don t feel well.       My Weight Plan:   Wt Readings from Last 2 Encounters:   06/03/25 92.1 kg (203 lb)   04/23/25 91.7 kg (202 lb 3.2 oz)     Weigh yourself daily using the same scale. If you gain more than 2 pounds in 24 hours or 5 pounds in 7 days. call the clinic    My Diet Goal: No added salt    Emergency Room Visits:    Our goal is to improve your quality of life and help you avoid a visit to the emergency room or hospital.  If we work together, we can achieve this goal. But, if you feel you need to call 911 or go to the emergency room, please do so.  If you go to the emergency room, please bring your list of medicines and your daily weight chart with you.    Each day ask yourself:  Is my weight up?  Do I have swelling?  Do I have trouble breathing?  How did I sleep?  Other problems?       GREEN ZONE     Weight gained is no more than 2 pounds a day or 5 pounds a in 7 days.  No swelling in feet, ankles, legs or stomach.  No more swelling than usual.  No more trouble breathing than usual.  No change in my sleep.  No other problems. What should I do?  I am doing fine. I will take my medicine, follow my diet, see my doctor, exercise, and watch for symptoms.           YELLOW  ZONE         Weight gain of more than 2 pounds in one day or 5 pounds in 7 days.  New swelling in ankle, leg, knee or thigh.  Bloating in belly, pants feel tighter.  Swelling in hands or face.  Coughing or trouble breathing while walking or talking.  Harder to breathe last night.  Have trouble sleeping, wake up short of breath.  Unusually tired.  Not eating.  Nausea, vomiting, or diarrhea  Pain in my chest or bad  leg cramps.  Feel weak or dizzy. What should I do?  I need to take action and call my doctor or nurse today.                 RED ZONE         Weight gain of 5 pounds overnight.  Chest pain or pressure that does not go away.  Feel less alert.  Wheezing or have trouble breathing when at rest.  Cannot sleep lying down.  Cannot take my medicines.  Pass out or faint. What should I do?  I need to call my doctor or nurse now!  Call 911 if I have chest pain or cannot breathe.

## 2025-06-04 ENCOUNTER — PATIENT OUTREACH (OUTPATIENT)
Dept: CARE COORDINATION | Facility: CLINIC | Age: 60
End: 2025-06-04
Payer: COMMERCIAL

## 2025-06-04 LAB
CREAT UR-MCNC: 56.9 MG/DL
MICROALBUMIN UR-MCNC: 295 MG/L
MICROALBUMIN/CREAT UR: 518.45 MG/G CR (ref 0–25)

## 2025-06-04 RX ORDER — SEMAGLUTIDE 0.25 MG/.5ML
INJECTION, SOLUTION SUBCUTANEOUS
Qty: 2 ML | Refills: 0 | Status: SHIPPED | OUTPATIENT
Start: 2025-06-04

## 2025-06-06 ENCOUNTER — RESULTS FOLLOW-UP (OUTPATIENT)
Dept: FAMILY MEDICINE | Facility: CLINIC | Age: 60
End: 2025-06-06

## 2025-06-18 ENCOUNTER — TELEPHONE (OUTPATIENT)
Dept: RHEUMATOLOGY | Facility: CLINIC | Age: 60
End: 2025-06-18
Payer: COMMERCIAL

## 2025-06-18 NOTE — TELEPHONE ENCOUNTER
HARLEY Brush, and infusion team: We received a letter from [facility] dated June 7, 2025 stating that rituximab (for Truxima) is medically necessary but the administration is considered not medically necessary at the requested outside hospital site of care.  Does this patient need to get the infusion at the infusion center?  Please help coordinate this and communicate with the patient.    Ronaldo Moreira MD  6/18/2025

## 2025-06-19 ENCOUNTER — TELEPHONE (OUTPATIENT)
Dept: RHEUMATOLOGY | Facility: CLINIC | Age: 60
End: 2025-06-19
Payer: COMMERCIAL

## 2025-06-19 ENCOUNTER — ENROLLMENT (OUTPATIENT)
Dept: HOME HEALTH SERVICES | Facility: HOME HEALTH | Age: 60
End: 2025-06-19
Payer: COMMERCIAL

## 2025-08-05 ENCOUNTER — LAB (OUTPATIENT)
Dept: LAB | Facility: CLINIC | Age: 60
End: 2025-08-05
Payer: COMMERCIAL

## 2025-08-05 DIAGNOSIS — Z79.899 HIGH RISK MEDICATIONS (NOT ANTICOAGULANTS) LONG-TERM USE: ICD-10-CM

## 2025-08-05 DIAGNOSIS — N18.32 STAGE 3B CHRONIC KIDNEY DISEASE (H): ICD-10-CM

## 2025-08-05 DIAGNOSIS — Z13.1 SCREENING FOR DIABETES MELLITUS: ICD-10-CM

## 2025-08-05 DIAGNOSIS — I50.32 CHRONIC DIASTOLIC (CONGESTIVE) HEART FAILURE (H): ICD-10-CM

## 2025-08-05 DIAGNOSIS — M31.31 GRANULOMATOSIS WITH POLYANGIITIS WITH RENAL INVOLVEMENT (H): ICD-10-CM

## 2025-08-05 LAB
ALBUMIN MFR UR ELPH: 15.4 MG/DL
ALBUMIN SERPL BCG-MCNC: 3.9 G/DL (ref 3.5–5.2)
ALBUMIN UR-MCNC: NEGATIVE MG/DL
ALP SERPL-CCNC: 83 U/L (ref 40–150)
ALT SERPL W P-5'-P-CCNC: 20 U/L (ref 0–50)
ANION GAP SERPL CALCULATED.3IONS-SCNC: 11 MMOL/L (ref 7–15)
APPEARANCE UR: CLEAR
AST SERPL W P-5'-P-CCNC: 15 U/L (ref 0–45)
BASOPHILS # BLD AUTO: 0 10E3/UL (ref 0–0.2)
BASOPHILS NFR BLD AUTO: 1 %
BILIRUB SERPL-MCNC: <0.2 MG/DL
BILIRUB UR QL STRIP: NEGATIVE
BUN SERPL-MCNC: 31.2 MG/DL (ref 8–23)
CALCIUM SERPL-MCNC: 9.5 MG/DL (ref 8.8–10.4)
CHLORIDE SERPL-SCNC: 107 MMOL/L (ref 98–107)
CHOLEST SERPL-MCNC: 179 MG/DL
COLOR UR AUTO: YELLOW
CREAT SERPL-MCNC: 1.44 MG/DL (ref 0.51–0.95)
CREAT UR-MCNC: 111 MG/DL
CRP SERPL-MCNC: 4.31 MG/L
EGFRCR SERPLBLD CKD-EPI 2021: 41 ML/MIN/1.73M2
EOSINOPHIL # BLD AUTO: 0.3 10E3/UL (ref 0–0.7)
EOSINOPHIL NFR BLD AUTO: 5 %
ERYTHROCYTE [DISTWIDTH] IN BLOOD BY AUTOMATED COUNT: 13.6 % (ref 10–15)
ERYTHROCYTE [SEDIMENTATION RATE] IN BLOOD BY WESTERGREN METHOD: 12 MM/HR (ref 0–30)
EST. AVERAGE GLUCOSE BLD GHB EST-MCNC: 140 MG/DL
FASTING STATUS PATIENT QL REPORTED: YES
FASTING STATUS PATIENT QL REPORTED: YES
GLUCOSE SERPL-MCNC: 122 MG/DL (ref 70–99)
GLUCOSE UR STRIP-MCNC: NEGATIVE MG/DL
HBA1C MFR BLD: 6.5 % (ref 0–5.6)
HCO3 SERPL-SCNC: 23 MMOL/L (ref 22–29)
HCT VFR BLD AUTO: 37.8 % (ref 35–47)
HDLC SERPL-MCNC: 70 MG/DL
HGB BLD-MCNC: 12.1 G/DL (ref 11.7–15.7)
HGB UR QL STRIP: NEGATIVE
IMM GRANULOCYTES # BLD: 0 10E3/UL
IMM GRANULOCYTES NFR BLD: 0 %
KETONES UR STRIP-MCNC: NEGATIVE MG/DL
LDLC SERPL CALC-MCNC: 97 MG/DL
LEUKOCYTE ESTERASE UR QL STRIP: NEGATIVE
LYMPHOCYTES # BLD AUTO: 1.3 10E3/UL (ref 0.8–5.3)
LYMPHOCYTES NFR BLD AUTO: 24 %
MCH RBC QN AUTO: 30.1 PG (ref 26.5–33)
MCHC RBC AUTO-ENTMCNC: 32 G/DL (ref 31.5–36.5)
MCV RBC AUTO: 94 FL (ref 78–100)
MONOCYTES # BLD AUTO: 0.7 10E3/UL (ref 0–1.3)
MONOCYTES NFR BLD AUTO: 12 %
NEUTROPHILS # BLD AUTO: 3.3 10E3/UL (ref 1.6–8.3)
NEUTROPHILS NFR BLD AUTO: 59 %
NITRATE UR QL: NEGATIVE
NONHDLC SERPL-MCNC: 109 MG/DL
PH UR STRIP: 5 [PH] (ref 5–7)
PLATELET # BLD AUTO: 307 10E3/UL (ref 150–450)
POTASSIUM SERPL-SCNC: 4.5 MMOL/L (ref 3.4–5.3)
PROT SERPL-MCNC: 6.4 G/DL (ref 6.4–8.3)
PROT/CREAT 24H UR: 0.14 MG/MG CR (ref 0–0.2)
RBC # BLD AUTO: 4.02 10E6/UL (ref 3.8–5.2)
SODIUM SERPL-SCNC: 141 MMOL/L (ref 135–145)
SP GR UR STRIP: 1.02 (ref 1–1.03)
TRIGL SERPL-MCNC: 62 MG/DL
TSH SERPL DL<=0.005 MIU/L-ACNC: 1.26 UIU/ML (ref 0.3–4.2)
UROBILINOGEN UR STRIP-ACNC: 0.2 E.U./DL
WBC # BLD AUTO: 5.6 10E3/UL (ref 4–11)

## 2025-08-05 PROCEDURE — 80061 LIPID PANEL: CPT

## 2025-08-05 PROCEDURE — 85025 COMPLETE CBC W/AUTO DIFF WBC: CPT

## 2025-08-05 PROCEDURE — 84156 ASSAY OF PROTEIN URINE: CPT

## 2025-08-05 PROCEDURE — 86140 C-REACTIVE PROTEIN: CPT

## 2025-08-05 PROCEDURE — 85652 RBC SED RATE AUTOMATED: CPT

## 2025-08-05 PROCEDURE — 83036 HEMOGLOBIN GLYCOSYLATED A1C: CPT

## 2025-08-05 PROCEDURE — 80053 COMPREHEN METABOLIC PANEL: CPT

## 2025-08-05 PROCEDURE — 84443 ASSAY THYROID STIM HORMONE: CPT

## 2025-08-05 PROCEDURE — 36415 COLL VENOUS BLD VENIPUNCTURE: CPT

## 2025-08-05 PROCEDURE — 81003 URINALYSIS AUTO W/O SCOPE: CPT

## 2025-08-12 ENCOUNTER — OFFICE VISIT (OUTPATIENT)
Dept: RHEUMATOLOGY | Facility: CLINIC | Age: 60
End: 2025-08-12
Payer: COMMERCIAL

## 2025-08-12 VITALS
BODY MASS INDEX: 33.59 KG/M2 | TEMPERATURE: 98.3 F | SYSTOLIC BLOOD PRESSURE: 108 MMHG | WEIGHT: 202.8 LBS | OXYGEN SATURATION: 96 % | HEART RATE: 68 BPM | DIASTOLIC BLOOD PRESSURE: 77 MMHG | RESPIRATION RATE: 16 BRPM

## 2025-08-12 DIAGNOSIS — M06.4 INFLAMMATORY POLYARTHROPATHY (H): ICD-10-CM

## 2025-08-12 DIAGNOSIS — M31.31 GRANULOMATOSIS WITH POLYANGIITIS WITH RENAL INVOLVEMENT (H): Primary | ICD-10-CM

## 2025-08-12 DIAGNOSIS — Z79.899 HIGH RISK MEDICATIONS (NOT ANTICOAGULANTS) LONG-TERM USE: ICD-10-CM

## 2025-08-12 PROCEDURE — G2211 COMPLEX E/M VISIT ADD ON: HCPCS | Performed by: INTERNAL MEDICINE

## 2025-08-12 PROCEDURE — 99214 OFFICE O/P EST MOD 30 MIN: CPT | Performed by: INTERNAL MEDICINE

## 2025-08-12 PROCEDURE — 3074F SYST BP LT 130 MM HG: CPT | Performed by: INTERNAL MEDICINE

## 2025-08-12 PROCEDURE — 3078F DIAST BP <80 MM HG: CPT | Performed by: INTERNAL MEDICINE

## 2025-08-12 RX ORDER — DIPHENHYDRAMINE HYDROCHLORIDE 50 MG/ML
25 INJECTION, SOLUTION INTRAMUSCULAR; INTRAVENOUS
Start: 2025-10-23

## 2025-08-12 RX ORDER — AZATHIOPRINE 50 MG/1
100 TABLET ORAL DAILY
Qty: 180 TABLET | Refills: 2 | Status: SHIPPED | OUTPATIENT
Start: 2025-08-12

## 2025-08-12 RX ORDER — ALBUTEROL SULFATE 0.83 MG/ML
2.5 SOLUTION RESPIRATORY (INHALATION)
OUTPATIENT
Start: 2025-10-23

## 2025-08-12 RX ORDER — MEPERIDINE HYDROCHLORIDE 25 MG/ML
25 INJECTION INTRAMUSCULAR; INTRAVENOUS; SUBCUTANEOUS
OUTPATIENT
Start: 2025-10-23

## 2025-08-12 RX ORDER — DIPHENHYDRAMINE HCL 25 MG
50 CAPSULE ORAL ONCE
Start: 2025-10-23

## 2025-08-12 RX ORDER — HEPARIN SODIUM (PORCINE) LOCK FLUSH IV SOLN 100 UNIT/ML 100 UNIT/ML
5 SOLUTION INTRAVENOUS
OUTPATIENT
Start: 2025-10-23

## 2025-08-12 RX ORDER — ALBUTEROL SULFATE 90 UG/1
1-2 INHALANT RESPIRATORY (INHALATION)
Start: 2025-10-23

## 2025-08-12 RX ORDER — HEPARIN SODIUM,PORCINE 10 UNIT/ML
5-20 VIAL (ML) INTRAVENOUS DAILY PRN
OUTPATIENT
Start: 2025-10-23

## 2025-08-12 RX ORDER — METHYLPREDNISOLONE SODIUM SUCCINATE 125 MG/2ML
125 INJECTION INTRAMUSCULAR; INTRAVENOUS ONCE
OUTPATIENT
Start: 2025-10-23

## 2025-08-12 RX ORDER — ACETAMINOPHEN 325 MG/1
650 TABLET ORAL ONCE
Start: 2025-10-23

## 2025-08-12 RX ORDER — EPINEPHRINE 1 MG/ML
0.3 INJECTION, SOLUTION, CONCENTRATE INTRAVENOUS EVERY 5 MIN PRN
OUTPATIENT
Start: 2025-10-23

## 2025-08-12 RX ORDER — DIPHENHYDRAMINE HYDROCHLORIDE 50 MG/ML
50 INJECTION, SOLUTION INTRAMUSCULAR; INTRAVENOUS
Start: 2025-10-23

## 2025-08-12 RX ORDER — METHYLPREDNISOLONE SODIUM SUCCINATE 40 MG/ML
40 INJECTION INTRAMUSCULAR; INTRAVENOUS
Start: 2025-10-23

## (undated) DEVICE — PREP CHLORAPREP 26ML TINTED ORANGE  260815

## (undated) DEVICE — SOL WATER IRRIG 1000ML BOTTLE 07139-09

## (undated) RX ORDER — FENTANYL CITRATE 50 UG/ML
INJECTION, SOLUTION INTRAMUSCULAR; INTRAVENOUS
Status: DISPENSED
Start: 2019-02-18

## (undated) RX ORDER — ONDANSETRON 2 MG/ML
INJECTION INTRAMUSCULAR; INTRAVENOUS
Status: DISPENSED
Start: 2019-02-18